# Patient Record
Sex: MALE | Race: WHITE | NOT HISPANIC OR LATINO | Employment: FULL TIME | URBAN - METROPOLITAN AREA
[De-identification: names, ages, dates, MRNs, and addresses within clinical notes are randomized per-mention and may not be internally consistent; named-entity substitution may affect disease eponyms.]

---

## 2019-05-13 ENCOUNTER — HOSPITAL ENCOUNTER (EMERGENCY)
Facility: OTHER | Age: 79
Discharge: HOME | End: 2019-05-13
Attending: EMERGENCY MEDICINE
Payer: MEDICARE

## 2019-05-13 VITALS
BODY MASS INDEX: 32.21 KG/M2 | TEMPERATURE: 98 F | HEIGHT: 70 IN | DIASTOLIC BLOOD PRESSURE: 79 MMHG | HEART RATE: 82 BPM | RESPIRATION RATE: 18 BRPM | SYSTOLIC BLOOD PRESSURE: 134 MMHG | WEIGHT: 225 LBS | OXYGEN SATURATION: 94 %

## 2019-05-13 DIAGNOSIS — M70.62 TROCHANTERIC BURSITIS OF LEFT HIP: Primary | ICD-10-CM

## 2019-05-13 PROCEDURE — 25000003 PHARM REV CODE 250: Performed by: EMERGENCY MEDICINE

## 2019-05-13 PROCEDURE — 63600175 PHARM REV CODE 636 W HCPCS: Performed by: EMERGENCY MEDICINE

## 2019-05-13 PROCEDURE — 99283 EMERGENCY DEPT VISIT LOW MDM: CPT

## 2019-05-13 RX ORDER — MAGNESIUM 30 MG
TABLET ORAL ONCE
Status: ON HOLD | COMMUNITY
End: 2020-01-30 | Stop reason: HOSPADM

## 2019-05-13 RX ORDER — SPIRONOLACTONE 25 MG/1
25 TABLET ORAL DAILY
Status: ON HOLD | COMMUNITY
End: 2020-01-30 | Stop reason: HOSPADM

## 2019-05-13 RX ORDER — LIDOCAINE HYDROCHLORIDE 10 MG/ML
1 INJECTION INFILTRATION; PERINEURAL ONCE
Status: DISCONTINUED | OUTPATIENT
Start: 2019-05-13 | End: 2019-05-13

## 2019-05-13 RX ORDER — METHYLPREDNISOLONE 4 MG/1
TABLET ORAL
Qty: 1 PACKAGE | Refills: 0 | Status: SHIPPED | OUTPATIENT
Start: 2019-05-13 | End: 2019-06-03

## 2019-05-13 RX ORDER — LIDOCAINE HYDROCHLORIDE 10 MG/ML
1 INJECTION INFILTRATION; PERINEURAL
Status: COMPLETED | OUTPATIENT
Start: 2019-05-13 | End: 2019-05-13

## 2019-05-13 RX ORDER — ENALAPRIL MALEATE 20 MG/1
1 TABLET ORAL
Status: ON HOLD | COMMUNITY
End: 2020-01-30 | Stop reason: HOSPADM

## 2019-05-13 RX ORDER — ALLOPURINOL 300 MG/1
1 TABLET ORAL
Status: ON HOLD | COMMUNITY
End: 2020-01-30 | Stop reason: SDUPTHER

## 2019-05-13 RX ORDER — LEVOTHYROXINE SODIUM 75 UG/1
75 TABLET ORAL DAILY
Status: ON HOLD | COMMUNITY
End: 2020-01-30 | Stop reason: SDUPTHER

## 2019-05-13 RX ORDER — BETAMETHASONE SODIUM PHOSPHATE AND BETAMETHASONE ACETATE 3; 3 MG/ML; MG/ML
6 INJECTION, SUSPENSION INTRA-ARTICULAR; INTRALESIONAL; INTRAMUSCULAR; SOFT TISSUE
Status: COMPLETED | OUTPATIENT
Start: 2019-05-13 | End: 2019-05-13

## 2019-05-13 RX ORDER — AMLODIPINE BESYLATE 5 MG/1
1 TABLET ORAL
Status: ON HOLD | COMMUNITY
End: 2020-01-30 | Stop reason: HOSPADM

## 2019-05-13 RX ORDER — HYDROCODONE BITARTRATE AND ACETAMINOPHEN 5; 325 MG/1; MG/1
1 TABLET ORAL
Status: COMPLETED | OUTPATIENT
Start: 2019-05-13 | End: 2019-05-13

## 2019-05-13 RX ORDER — WARFARIN SODIUM 5 MG/1
1 TABLET ORAL
Status: ON HOLD | COMMUNITY
End: 2020-01-30 | Stop reason: HOSPADM

## 2019-05-13 RX ORDER — TRAMADOL HYDROCHLORIDE 50 MG/1
1 TABLET ORAL
Status: ON HOLD | COMMUNITY
End: 2020-01-30 | Stop reason: HOSPADM

## 2019-05-13 RX ORDER — BETAMETHASONE SODIUM PHOSPHATE AND BETAMETHASONE ACETATE 3; 3 MG/ML; MG/ML
6 INJECTION, SUSPENSION INTRA-ARTICULAR; INTRALESIONAL; INTRAMUSCULAR; SOFT TISSUE ONCE
Status: DISCONTINUED | OUTPATIENT
Start: 2019-05-13 | End: 2019-05-13

## 2019-05-13 RX ADMIN — LIDOCAINE HYDROCHLORIDE 1 ML: 10 INJECTION, SOLUTION INFILTRATION; PERINEURAL at 11:05

## 2019-05-13 RX ADMIN — BETAMETHASONE SODIUM PHOSPHATE AND BETAMETHASONE ACETATE 6 MG: 3; 3 INJECTION, SUSPENSION INTRA-ARTICULAR; INTRALESIONAL; INTRAMUSCULAR; SOFT TISSUE at 11:05

## 2019-05-13 RX ADMIN — HYDROCODONE BITARTRATE AND ACETAMINOPHEN 1 TABLET: 5; 325 TABLET ORAL at 01:05

## 2019-05-13 NOTE — DISCHARGE INSTRUCTIONS
Monitor your blood sugar while taking anti-inflammatory medication.  Return for new or worsening symptoms.  Followup with Dr. Romero within a week.

## 2019-05-13 NOTE — ED NOTES
ROUNDING:  Lying on stretcher with HOB elevated. AAOx3. Calm and cooperative. States L hip pain 0/10 at rest and L hip pain 5/10 with movement. States pain prior to injection with movement was 10/10.  Denies any pain or discomfort at this time. Resp:18 even and unlabored. Skin is warm and dry. Comfort and BR needs addressed. Plan of care updated. Family member at BS. Bed locked in low position, side rails up x2 and call light within reach. Will continue to monitor.

## 2019-05-13 NOTE — ED NOTES
Pt to er with  C/o left hip pain x 3 days incresaing last few day . Pt states pain increases with walking . Pt aaox3 skin warm and dry. intermittent bruise noted on several area of pt body. Pt on coumadin . Pt son at bedside.

## 2019-05-13 NOTE — ED PROVIDER NOTES
Encounter Date: 5/13/2019    SCRIBE #1 NOTE: Vance DESAI am scribing for, and in the presence of, Deanna Toussaint.       History     Chief Complaint   Patient presents with    Leg Pain     Pt reports left thigh pain since thursday. He denies any trauma.      Time seen by provider: 10:38 AM    This is a 78 y.o. male with a history of HTN and DM who presents with complaint of left thigh pain that began approximately four days ago. The patient reports that the pain has worsened over the last few days. The patient reports that he uses a cane at home and a walker when he leaves the house at baseline. The patient's son reports that he has been having trouble getting out of the bed this morning and last night which isn't normal. The patient reports that he has been taking two ibuprofen at night with no relief of his symptoms.  He denies fever, sore throat, chest pain, shortness of breath, nausea, and dysuria. The patient reports that he see's his PCP Dr. Romero regularly to check his Coumadin levels.     The history is provided by the patient.     Review of patient's allergies indicates:  No Known Allergies  No past medical history on file.  No past surgical history on file.  No family history on file.  Social History     Tobacco Use    Smoking status: Not on file   Substance Use Topics    Alcohol use: Not on file    Drug use: Not on file     Review of Systems   Constitutional: Negative for fever.   HENT: Negative for sore throat.    Respiratory: Negative for shortness of breath.    Cardiovascular: Negative for chest pain.   Gastrointestinal: Negative for nausea.   Genitourinary: Negative for dysuria.   Musculoskeletal: Negative for back pain.        Positive for left hip pain.    Skin: Negative for rash.   Neurological: Negative for weakness.   Hematological: Does not bruise/bleed easily.       Physical Exam     Initial Vitals [05/13/19 1025]   BP Pulse Resp Temp SpO2   (!) 171/92 102 18 98.7 °F (37.1 °C) (!) 94 %       MAP       --         Physical Exam    Nursing note and vitals reviewed.  Constitutional: He appears well-developed and well-nourished. He is not diaphoretic. No distress.   HENT:   Head: Normocephalic and atraumatic.   Mouth/Throat: Oropharynx is clear and moist.   Eyes: Conjunctivae and EOM are normal. Pupils are equal, round, and reactive to light.   Neck: Normal range of motion.   Cardiovascular: Normal rate, regular rhythm and normal heart sounds. Exam reveals no gallop and no friction rub.    No murmur heard.  Pulmonary/Chest: Breath sounds normal. No respiratory distress. He has no wheezes. He has no rhonchi. He has no rales.   Abdominal: Soft. There is no tenderness. There is no rebound and no guarding.   Musculoskeletal: Normal range of motion. He exhibits tenderness. He exhibits no edema.   Tenderness along the lateral aspect of left hip to trochanteric bursa.   Neurological: He is alert and oriented to person, place, and time. GCS score is 15. GCS eye subscore is 4. GCS verbal subscore is 5. GCS motor subscore is 6.   Skin: Skin is warm and dry. No rash and no abscess noted. No erythema. No pallor.   Psychiatric: He has a normal mood and affect. His behavior is normal. Judgment and thought content normal.         ED Course   Procedures  Labs Reviewed - No data to display       Imaging Results    None          Medical Decision Making:   ED Management:  L hip prepped with chlorhexidene and betamethazone and lidocaine injected at point of maximal tenderness over L trochangeric bursa.  Patient tolerated the procedure well, neurovascularly intact prior and following procedure.     Patient improved with treatment in the emergency department and comfortable going home. Discussed reasons to return and importance of followup.  Patient understands that the emergency visit today is primarily to address immediate concerns and to rule out emergent cause of symptoms and that they may require further workup and  evaluation as an outpatient. All questions addressed and patient given discharge instructions and followup information.                Scribe Attestation:   Scribe #1: I performed the above scribed service and the documentation accurately describes the services I performed. I attest to the accuracy of the note.    Attending Attestation:           Physician Attestation for Scribe:  Physician Attestation Statement for Scribe #1: I, Dr. Huerta, reviewed documentation, as scribed by Vance Roach  in my presence, and it is both accurate and complete.                    Clinical Impression:     1. Trochanteric bursitis of left hip          Disposition:   Disposition: Discharged  Condition: Stable                        Dee Huerta MD  05/18/19 2279

## 2020-01-10 ENCOUNTER — HOSPITAL ENCOUNTER (INPATIENT)
Facility: OTHER | Age: 80
LOS: 20 days | Discharge: NURSING, REHAB OR LONG-TERM CARE | DRG: 064 | End: 2020-01-30
Attending: EMERGENCY MEDICINE | Admitting: HOSPITALIST
Payer: MEDICARE

## 2020-01-10 DIAGNOSIS — I63.9 CEREBROVASCULAR ACCIDENT (CVA), UNSPECIFIED MECHANISM: ICD-10-CM

## 2020-01-10 DIAGNOSIS — G93.6 CYTOTOXIC CEREBRAL EDEMA: ICD-10-CM

## 2020-01-10 DIAGNOSIS — I63.412 EMBOLIC STROKE INVOLVING LEFT MIDDLE CEREBRAL ARTERY: Primary | ICD-10-CM

## 2020-01-10 DIAGNOSIS — R13.11 ORAL PHASE DYSPHAGIA: ICD-10-CM

## 2020-01-10 DIAGNOSIS — I63.512 ACUTE ISCHEMIC LEFT MCA STROKE: ICD-10-CM

## 2020-01-10 DIAGNOSIS — I63.9 CVA (CEREBRAL VASCULAR ACCIDENT): ICD-10-CM

## 2020-01-10 DIAGNOSIS — Z79.01 CHRONIC ANTICOAGULATION: ICD-10-CM

## 2020-01-10 DIAGNOSIS — Z46.59 ENCOUNTER FOR NASOGASTRIC (NG) TUBE PLACEMENT: ICD-10-CM

## 2020-01-10 DIAGNOSIS — I10 ESSENTIAL HYPERTENSION: Chronic | ICD-10-CM

## 2020-01-10 DIAGNOSIS — I48.0 PAROXYSMAL ATRIAL FIBRILLATION: ICD-10-CM

## 2020-01-10 DIAGNOSIS — E87.1 HYPONATREMIA: ICD-10-CM

## 2020-01-10 DIAGNOSIS — R41.0 CONFUSION: ICD-10-CM

## 2020-01-10 DIAGNOSIS — I63.512 ACUTE ISCHEMIC LEFT MIDDLE CEREBRAL ARTERY (MCA) STROKE: ICD-10-CM

## 2020-01-10 DIAGNOSIS — R47.01 APHASIA: ICD-10-CM

## 2020-01-10 DIAGNOSIS — G81.11 RIGHT SPASTIC HEMIPARESIS: ICD-10-CM

## 2020-01-10 DIAGNOSIS — I47.29 VENTRICULAR TACHYCARDIA, NON-SUSTAINED: ICD-10-CM

## 2020-01-10 DIAGNOSIS — I63.9 STROKE: ICD-10-CM

## 2020-01-10 PROBLEM — E11.9 DMII (DIABETES MELLITUS, TYPE 2): Status: ACTIVE | Noted: 2020-01-10

## 2020-01-10 PROBLEM — I48.91 ATRIAL FIBRILLATION: Status: ACTIVE | Noted: 2020-01-10

## 2020-01-10 LAB
ALBUMIN SERPL BCP-MCNC: 3.8 G/DL (ref 3.5–5.2)
ALP SERPL-CCNC: 107 U/L (ref 55–135)
ALT SERPL W/O P-5'-P-CCNC: 18 U/L (ref 10–44)
ANION GAP SERPL CALC-SCNC: 6 MMOL/L (ref 8–16)
AST SERPL-CCNC: 27 U/L (ref 10–40)
BASOPHILS # BLD AUTO: 0.07 K/UL (ref 0–0.2)
BASOPHILS NFR BLD: 1.2 % (ref 0–1.9)
BILIRUB SERPL-MCNC: 1.3 MG/DL (ref 0.1–1)
BILIRUB UR QL STRIP: NEGATIVE
BUN SERPL-MCNC: 16 MG/DL (ref 8–23)
CALCIUM SERPL-MCNC: 10 MG/DL (ref 8.7–10.5)
CHLORIDE SERPL-SCNC: 106 MMOL/L (ref 95–110)
CLARITY UR: CLEAR
CO2 SERPL-SCNC: 27 MMOL/L (ref 23–29)
COLOR UR: YELLOW
CREAT SERPL-MCNC: 1 MG/DL (ref 0.5–1.4)
DIFFERENTIAL METHOD: ABNORMAL
EOSINOPHIL # BLD AUTO: 0.6 K/UL (ref 0–0.5)
EOSINOPHIL NFR BLD: 10 % (ref 0–8)
ERYTHROCYTE [DISTWIDTH] IN BLOOD BY AUTOMATED COUNT: 14.9 % (ref 11.5–14.5)
EST. GFR  (AFRICAN AMERICAN): >60 ML/MIN/1.73 M^2
EST. GFR  (NON AFRICAN AMERICAN): >60 ML/MIN/1.73 M^2
GLUCOSE SERPL-MCNC: 111 MG/DL (ref 70–110)
GLUCOSE UR QL STRIP: NEGATIVE
HCT VFR BLD AUTO: 39.7 % (ref 40–54)
HGB BLD-MCNC: 12.7 G/DL (ref 14–18)
HGB UR QL STRIP: NEGATIVE
IMM GRANULOCYTES # BLD AUTO: 0.02 K/UL (ref 0–0.04)
IMM GRANULOCYTES NFR BLD AUTO: 0.4 % (ref 0–0.5)
INR PPP: 1.2 (ref 0.8–1.2)
KETONES UR QL STRIP: NEGATIVE
LEUKOCYTE ESTERASE UR QL STRIP: NEGATIVE
LYMPHOCYTES # BLD AUTO: 1.2 K/UL (ref 1–4.8)
LYMPHOCYTES NFR BLD: 21.3 % (ref 18–48)
MAGNESIUM SERPL-MCNC: 1.5 MG/DL (ref 1.6–2.6)
MAGNESIUM SERPL-MCNC: 1.6 MG/DL (ref 1.6–2.6)
MCH RBC QN AUTO: 32.9 PG (ref 27–31)
MCHC RBC AUTO-ENTMCNC: 32 G/DL (ref 32–36)
MCV RBC AUTO: 103 FL (ref 82–98)
MONOCYTES # BLD AUTO: 0.6 K/UL (ref 0.3–1)
MONOCYTES NFR BLD: 10.2 % (ref 4–15)
NEUTROPHILS # BLD AUTO: 3.2 K/UL (ref 1.8–7.7)
NEUTROPHILS NFR BLD: 56.9 % (ref 38–73)
NITRITE UR QL STRIP: NEGATIVE
NRBC BLD-RTO: 0 /100 WBC
PH UR STRIP: 7 [PH] (ref 5–8)
PHOSPHATE SERPL-MCNC: 2.7 MG/DL (ref 2.7–4.5)
PLATELET # BLD AUTO: 109 K/UL (ref 150–350)
PMV BLD AUTO: 11.2 FL (ref 9.2–12.9)
POCT GLUCOSE: 105 MG/DL (ref 70–110)
POCT GLUCOSE: 106 MG/DL (ref 70–110)
POTASSIUM SERPL-SCNC: 4.1 MMOL/L (ref 3.5–5.1)
PROT SERPL-MCNC: 6.8 G/DL (ref 6–8.4)
PROT UR QL STRIP: NEGATIVE
PROTHROMBIN TIME: 13.9 SEC (ref 9–12.5)
RBC # BLD AUTO: 3.86 M/UL (ref 4.6–6.2)
SODIUM SERPL-SCNC: 139 MMOL/L (ref 136–145)
SP GR UR STRIP: 1.02 (ref 1–1.03)
T4 FREE SERPL-MCNC: 0.97 NG/DL (ref 0.71–1.51)
TSH SERPL DL<=0.005 MIU/L-ACNC: 4.28 UIU/ML (ref 0.4–4)
URN SPEC COLLECT METH UR: NORMAL
UROBILINOGEN UR STRIP-ACNC: NEGATIVE EU/DL
WBC # BLD AUTO: 5.68 K/UL (ref 3.9–12.7)

## 2020-01-10 PROCEDURE — 93005 ELECTROCARDIOGRAM TRACING: CPT

## 2020-01-10 PROCEDURE — 84439 ASSAY OF FREE THYROXINE: CPT

## 2020-01-10 PROCEDURE — 36415 COLL VENOUS BLD VENIPUNCTURE: CPT

## 2020-01-10 PROCEDURE — 85610 PROTHROMBIN TIME: CPT

## 2020-01-10 PROCEDURE — 94761 N-INVAS EAR/PLS OXIMETRY MLT: CPT

## 2020-01-10 PROCEDURE — 93010 ELECTROCARDIOGRAM REPORT: CPT | Mod: ,,, | Performed by: INTERNAL MEDICINE

## 2020-01-10 PROCEDURE — 11000001 HC ACUTE MED/SURG PRIVATE ROOM

## 2020-01-10 PROCEDURE — 83735 ASSAY OF MAGNESIUM: CPT | Mod: 91

## 2020-01-10 PROCEDURE — 84100 ASSAY OF PHOSPHORUS: CPT

## 2020-01-10 PROCEDURE — 85025 COMPLETE CBC W/AUTO DIFF WBC: CPT

## 2020-01-10 PROCEDURE — 99285 EMERGENCY DEPT VISIT HI MDM: CPT | Mod: 25

## 2020-01-10 PROCEDURE — 82962 GLUCOSE BLOOD TEST: CPT

## 2020-01-10 PROCEDURE — 84443 ASSAY THYROID STIM HORMONE: CPT

## 2020-01-10 PROCEDURE — 83735 ASSAY OF MAGNESIUM: CPT

## 2020-01-10 PROCEDURE — 99223 PR INITIAL HOSPITAL CARE,LEVL III: ICD-10-PCS | Mod: ,,, | Performed by: PHYSICIAN ASSISTANT

## 2020-01-10 PROCEDURE — 80053 COMPREHEN METABOLIC PANEL: CPT

## 2020-01-10 PROCEDURE — 81003 URINALYSIS AUTO W/O SCOPE: CPT

## 2020-01-10 PROCEDURE — 99223 1ST HOSP IP/OBS HIGH 75: CPT | Mod: ,,, | Performed by: PHYSICIAN ASSISTANT

## 2020-01-10 PROCEDURE — 93010 EKG 12-LEAD: ICD-10-PCS | Mod: ,,, | Performed by: INTERNAL MEDICINE

## 2020-01-10 RX ORDER — WARFARIN SODIUM 5 MG/1
5 TABLET ORAL DAILY
Status: DISCONTINUED | OUTPATIENT
Start: 2020-01-11 | End: 2020-01-12

## 2020-01-10 RX ORDER — IBUPROFEN 200 MG
16 TABLET ORAL
Status: DISCONTINUED | OUTPATIENT
Start: 2020-01-10 | End: 2020-01-14

## 2020-01-10 RX ORDER — SODIUM CHLORIDE 0.9 % (FLUSH) 0.9 %
10 SYRINGE (ML) INJECTION
Status: DISCONTINUED | OUTPATIENT
Start: 2020-01-10 | End: 2020-01-17

## 2020-01-10 RX ORDER — INSULIN ASPART 100 [IU]/ML
0-5 INJECTION, SOLUTION INTRAVENOUS; SUBCUTANEOUS
Status: DISCONTINUED | OUTPATIENT
Start: 2020-01-10 | End: 2020-01-15

## 2020-01-10 RX ORDER — METOPROLOL SUCCINATE 25 MG/1
25 TABLET, EXTENDED RELEASE ORAL DAILY
Status: DISCONTINUED | OUTPATIENT
Start: 2020-01-11 | End: 2020-01-14

## 2020-01-10 RX ORDER — GADOBUTROL 604.72 MG/ML
10 INJECTION INTRAVENOUS
Status: COMPLETED | OUTPATIENT
Start: 2020-01-11 | End: 2020-01-10

## 2020-01-10 RX ORDER — LEVOTHYROXINE SODIUM 75 UG/1
75 TABLET ORAL
Status: DISCONTINUED | OUTPATIENT
Start: 2020-01-11 | End: 2020-01-14

## 2020-01-10 RX ORDER — SODIUM CHLORIDE 0.9 % (FLUSH) 0.9 %
10 SYRINGE (ML) INJECTION
Status: DISCONTINUED | OUTPATIENT
Start: 2020-01-10 | End: 2020-01-11

## 2020-01-10 RX ORDER — ACETAMINOPHEN 325 MG/1
650 TABLET ORAL EVERY 8 HOURS PRN
Status: DISCONTINUED | OUTPATIENT
Start: 2020-01-10 | End: 2020-01-14

## 2020-01-10 RX ORDER — GLUCAGON 1 MG
1 KIT INJECTION
Status: DISCONTINUED | OUTPATIENT
Start: 2020-01-10 | End: 2020-01-14

## 2020-01-10 RX ORDER — IBUPROFEN 200 MG
24 TABLET ORAL
Status: DISCONTINUED | OUTPATIENT
Start: 2020-01-10 | End: 2020-01-14

## 2020-01-10 RX ORDER — ALLOPURINOL 300 MG/1
300 TABLET ORAL DAILY
Status: DISCONTINUED | OUTPATIENT
Start: 2020-01-11 | End: 2020-01-14

## 2020-01-10 RX ORDER — ONDANSETRON 2 MG/ML
4 INJECTION INTRAMUSCULAR; INTRAVENOUS EVERY 8 HOURS PRN
Status: DISCONTINUED | OUTPATIENT
Start: 2020-01-10 | End: 2020-01-14

## 2020-01-10 RX ADMIN — GADOBUTROL 10 ML: 604.72 INJECTION INTRAVENOUS at 11:01

## 2020-01-10 NOTE — ED NOTES
Pt tolerated cath sample. Pt still unable to form appropriate responses to questions and seems to rambling without expressing full ideas. Monitoring continues.

## 2020-01-10 NOTE — ED TRIAGE NOTES
"Pt presents via EMS with last seen normal yesterday morning at breakfast by nephew. Nephew reports to EMS that throughout day and this morning pt was much more confused. Pt orientated to person. Pt responds inappropriately to questions. When asked, "Who do you live with" he states, "I feel fine". Pt denies any current pain. No obvious other neuro deficits noted but pt unable to perform neuro exam.   "

## 2020-01-10 NOTE — ED NOTES
Zeynep GUERRERO at bedside. Pt remains free from changes. Still with word salad and unable to fully communicate.

## 2020-01-10 NOTE — ED PROVIDER NOTES
Encounter Date: 1/10/2020    SCRIBE #1 NOTE: I, Murphy Leavitt, am scribing for, and in the presence of, Dr. Negron.       History     Chief Complaint   Patient presents with    Altered Mental Status     pt last seen normal 30 hrs ago. orientated to person and time. no neuro deficits      Time seen by provider: 3:06 PM    This is a 79 y.o. male arriving via EMS who presents with complaint of AMS for over 30 hours per EMS. EMS reports the patient's home health nurse noticed he was not acting how he normally does at baseline and not answering questions appropriately PTA. The patient's nephew told EMS that the patient was normal at breakfast time yesterday. EMS reports the nephew stated after breakfast the patient stated he did not feel well and he slept for a little while. The nephew reported to EMS that after waking up the patient was confused, did not know who he was, did not know where he was, and became upset. The nephew reports to EMS he waited to send the patient to the ED after the home health nurse came check on him today. EMS reports the patient had no fever on arrival. The patient is not answering questions appropriately but keeps stating he feels okay.    The history is provided by the patient and the EMS personnel. The history is limited by the condition of the patient.     Review of patient's allergies indicates:  No Known Allergies  Past Medical History:   Diagnosis Date    Arthritis     Hypertension     Thyroid disease      Past Surgical History:   Procedure Laterality Date    HERNIA REPAIR       History reviewed. No pertinent family history.  Social History     Tobacco Use    Smoking status: Never Smoker   Substance Use Topics    Alcohol use: Never     Frequency: Never    Drug use: Never     Review of Systems   Constitutional: Negative for fever.   HENT: Negative for sore throat.    Respiratory: Negative for shortness of breath.    Cardiovascular: Negative for chest pain.    Gastrointestinal: Negative for nausea.   Genitourinary: Negative for dysuria.   Musculoskeletal: Negative for back pain.   Skin: Negative for rash.   Neurological: Negative for weakness.   Hematological: Does not bruise/bleed easily.   Psychiatric/Behavioral: Positive for confusion.   All other systems reviewed and are negative.      Physical Exam     Initial Vitals [01/10/20 1447]   BP Pulse Resp Temp SpO2   139/80 83 20 98.1 °F (36.7 °C) 98 %      MAP       --         Physical Exam    Nursing note and vitals reviewed.  Constitutional: He appears well-developed.   HENT:   Head: Normocephalic and atraumatic.   Eyes: Conjunctivae and EOM are normal.   Neck: Normal range of motion. Neck supple.   Cardiovascular: Normal rate.   Pulmonary/Chest: Effort normal and breath sounds normal.   Abdominal: Soft. Normal appearance and bowel sounds are normal.   Musculoskeletal: Normal range of motion.   Neurological: He is alert. No cranial nerve deficit or sensory deficit. GCS eye subscore is 4. GCS verbal subscore is 4. GCS motor subscore is 5.   Gait not assessed   Skin: Skin is warm and dry.   Psychiatric:   Confused. Disoriented. Clear speech.         ED Course   Procedures  Labs Reviewed   CBC W/ AUTO DIFFERENTIAL - Abnormal; Notable for the following components:       Result Value    RBC 3.86 (*)     Hemoglobin 12.7 (*)     Hematocrit 39.7 (*)     Mean Corpuscular Volume 103 (*)     Mean Corpuscular Hemoglobin 32.9 (*)     RDW 14.9 (*)     Platelets 109 (*)     Eos # 0.6 (*)     Eosinophil% 10.0 (*)     All other components within normal limits   COMPREHENSIVE METABOLIC PANEL - Abnormal; Notable for the following components:    Glucose 111 (*)     Total Bilirubin 1.3 (*)     Anion Gap 6 (*)     All other components within normal limits   URINALYSIS, REFLEX TO URINE CULTURE    Narrative:     Preferred Collection Type->Urine, Clean Catch   MAGNESIUM   POCT GLUCOSE MONITORING CONTINUOUS     EKG Readings: (Independently  "Interpreted)   Junctional rhythm at a rate of 68 bpm. Artifact present. No STEMI.       Imaging Results          CT Head Without Contrast (Final result)  Result time 01/10/20 16:51:22    Final result by Yasir Espinoza MD (01/10/20 16:51:22)                 Impression:      Evolving left parietal lobe infarct.      Electronically signed by: Yasir Espinoza MD  Date:    01/10/2020  Time:    16:51             Narrative:    EXAMINATION:  CT HEAD WITHOUT CONTRAST    CLINICAL HISTORY:  Stroke;    TECHNIQUE:  Low dose axial CT images obtained throughout the head without intravenous contrast. Sagittal and coronal reconstructions were performed.    COMPARISON:  None.    FINDINGS:  Intracranial compartment:    Ventricles and sulci are normal in size for age without evidence of hydrocephalus. No extra-axial blood or fluid collections.    There is a region of hypoattenuation with loss of gray-white differentiation and sulcal effacement in the left parietal lobe consistent with evolving infarction. Probable chronic lacunar infarcts in the right caudate head and bilateral basal ganglia.  No parenchymal mass or hemorrhage.  Periventricular white matter hypoattenuation in keeping with chronic microvascular disease.    Skull/extracranial contents (limited evaluation): No fracture. Mastoid air cells and paranasal sinuses are essentially clear.                                 Medical Decision Making:   History:   Old Medical Records: I decided to obtain old medical records.  Initial Assessment:   78 yo M brought in by EMS for reported abnormal behavior, last at baseline yesterday per EMS as reported by nephew caretaker. Here pt is verbal though not answering questions appropriately, repeatedly answering "feeling alright". Broad ddx includes uti, cva, metabolic disturbance, ICH. Will obtain imaging, labs reassess.     Independently Interpreted Test(s):   I have ordered and independently interpreted EKG Reading(s) - see prior " notes  Clinical Tests:   Lab Tests: Ordered and Reviewed  Radiological Study: Ordered and Reviewed  Medical Tests: Ordered and Reviewed  ED Management:  Imaging notable for L parietal lobe infarct consistent with abnormal expressive difficulties. Will plan to admit for further work up, MRI etc.             Scribe Attestation:   Scribe #1: I performed the above scribed service and the documentation accurately describes the services I performed. I attest to the accuracy of the note.    Attending Attestation:           Physician Attestation for Scribe:  Physician Attestation Statement for Scribe #1: I, Dr. Negron, reviewed documentation, as scribed by Murphy Leavitt in my presence, and it is both accurate and complete.                 ED Course as of Shane 10 1846   Fri Shane 10, 2020   1709 D/w Dr Templeton, admit to Dr Woods for acute cva continued work up.     [DM]      ED Course User Index  [DM] Olive Negron MD                Clinical Impression:     1. Cerebrovascular accident (CVA), unspecified mechanism    2. Confusion            Disposition:   Disposition: Admitted  Condition: Fair                     Olive Negron MD  01/10/20 9490

## 2020-01-11 PROBLEM — I63.512 ACUTE ISCHEMIC LEFT MCA STROKE: Status: ACTIVE | Noted: 2020-01-10

## 2020-01-11 PROBLEM — E11.59 TYPE 2 DIABETES MELLITUS WITH CIRCULATORY DISORDER, WITHOUT LONG-TERM CURRENT USE OF INSULIN: Chronic | Status: ACTIVE | Noted: 2020-01-10

## 2020-01-11 PROBLEM — I10 ESSENTIAL HYPERTENSION: Chronic | Status: ACTIVE | Noted: 2020-01-10

## 2020-01-11 LAB
ANION GAP SERPL CALC-SCNC: 5 MMOL/L (ref 8–16)
AORTIC ROOT ANNULUS: 2.33 CM
AORTIC VALVE CUSP SEPERATION: 1.53 CM
ASCENDING AORTA: 2.65 CM
AV INDEX (PROSTH): 0.67
AV MEAN GRADIENT: 4 MMHG
AV PEAK GRADIENT: 7 MMHG
AV VALVE AREA: 2.09 CM2
AV VELOCITY RATIO: 0.66
BASOPHILS # BLD AUTO: 0.06 K/UL (ref 0–0.2)
BASOPHILS NFR BLD: 1 % (ref 0–1.9)
BSA FOR ECHO PROCEDURE: 2.2 M2
BUN SERPL-MCNC: 15 MG/DL (ref 8–23)
CALCIUM SERPL-MCNC: 9.5 MG/DL (ref 8.7–10.5)
CHLORIDE SERPL-SCNC: 107 MMOL/L (ref 95–110)
CHOLEST SERPL-MCNC: 150 MG/DL (ref 120–199)
CHOLEST/HDLC SERPL: 2.5 {RATIO} (ref 2–5)
CO2 SERPL-SCNC: 25 MMOL/L (ref 23–29)
CREAT SERPL-MCNC: 0.9 MG/DL (ref 0.5–1.4)
CV ECHO LV RWT: 0.38 CM
DIFFERENTIAL METHOD: ABNORMAL
DOP CALC AO PEAK VEL: 1.34 M/S
DOP CALC AO VTI: 28.69 CM
DOP CALC LVOT AREA: 3.1 CM2
DOP CALC LVOT DIAMETER: 1.99 CM
DOP CALC LVOT PEAK VEL: 0.88 M/S
DOP CALC LVOT STROKE VOLUME: 59.84 CM3
DOP CALCLVOT PEAK VEL VTI: 19.25 CM
ECHO LV POSTERIOR WALL: 0.89 CM (ref 0.6–1.1)
EOSINOPHIL # BLD AUTO: 0.4 K/UL (ref 0–0.5)
EOSINOPHIL NFR BLD: 5.6 % (ref 0–8)
ERYTHROCYTE [DISTWIDTH] IN BLOOD BY AUTOMATED COUNT: 15.1 % (ref 11.5–14.5)
EST. GFR  (AFRICAN AMERICAN): >60 ML/MIN/1.73 M^2
EST. GFR  (NON AFRICAN AMERICAN): >60 ML/MIN/1.73 M^2
ESTIMATED AVG GLUCOSE: 108 MG/DL (ref 68–131)
FRACTIONAL SHORTENING: 31 % (ref 28–44)
GLUCOSE SERPL-MCNC: 104 MG/DL (ref 70–110)
HBA1C MFR BLD HPLC: 5.4 % (ref 4–5.6)
HCT VFR BLD AUTO: 39.1 % (ref 40–54)
HDLC SERPL-MCNC: 61 MG/DL (ref 40–75)
HDLC SERPL: 40.7 % (ref 20–50)
HGB BLD-MCNC: 12.8 G/DL (ref 14–18)
IMM GRANULOCYTES # BLD AUTO: 0.02 K/UL (ref 0–0.04)
IMM GRANULOCYTES NFR BLD AUTO: 0.3 % (ref 0–0.5)
INR PPP: 1.3 (ref 0.8–1.2)
INTERVENTRICULAR SEPTUM: 1.1 CM (ref 0.6–1.1)
IVRT: 0.05 MSEC
LA MAJOR: 5.74 CM
LA MINOR: 5.53 CM
LA WIDTH: 5.23 CM
LDLC SERPL CALC-MCNC: 79.8 MG/DL (ref 63–159)
LEFT ATRIUM SIZE: 4.74 CM
LEFT ATRIUM VOLUME INDEX: 55 ML/M2
LEFT ATRIUM VOLUME: 118.7 CM3
LEFT INTERNAL DIMENSION IN SYSTOLE: 3.25 CM (ref 2.1–4)
LEFT VENTRICLE DIASTOLIC VOLUME INDEX: 47.81 ML/M2
LEFT VENTRICLE DIASTOLIC VOLUME: 103.12 ML
LEFT VENTRICLE MASS INDEX: 76 G/M2
LEFT VENTRICLE SYSTOLIC VOLUME INDEX: 19.7 ML/M2
LEFT VENTRICLE SYSTOLIC VOLUME: 42.49 ML
LEFT VENTRICULAR INTERNAL DIMENSION IN DIASTOLE: 4.71 CM (ref 3.5–6)
LEFT VENTRICULAR MASS: 163.9 G
LYMPHOCYTES # BLD AUTO: 1.1 K/UL (ref 1–4.8)
LYMPHOCYTES NFR BLD: 17.1 % (ref 18–48)
MCH RBC QN AUTO: 33.2 PG (ref 27–31)
MCHC RBC AUTO-ENTMCNC: 32.7 G/DL (ref 32–36)
MCV RBC AUTO: 101 FL (ref 82–98)
MONOCYTES # BLD AUTO: 0.6 K/UL (ref 0.3–1)
MONOCYTES NFR BLD: 9.6 % (ref 4–15)
MV STENOSIS PRESSURE HALF TIME: 65 MS
MV VALVE AREA P 1/2 METHOD: 3.38 CM2
NEUTROPHILS # BLD AUTO: 4.2 K/UL (ref 1.8–7.7)
NEUTROPHILS NFR BLD: 66.4 % (ref 38–73)
NONHDLC SERPL-MCNC: 89 MG/DL
NRBC BLD-RTO: 0 /100 WBC
PISA TR MAX VEL: 2.84 M/S
PLATELET # BLD AUTO: 107 K/UL (ref 150–350)
PMV BLD AUTO: 10.9 FL (ref 9.2–12.9)
POCT GLUCOSE: 102 MG/DL (ref 70–110)
POCT GLUCOSE: 105 MG/DL (ref 70–110)
POTASSIUM SERPL-SCNC: 4.1 MMOL/L (ref 3.5–5.1)
PROTHROMBIN TIME: 14.1 SEC (ref 9–12.5)
PV PEAK VELOCITY: 1.34 CM/S
RA MAJOR: 6.53 CM
RA PRESSURE: 8 MMHG
RA WIDTH: 5.22 CM
RBC # BLD AUTO: 3.86 M/UL (ref 4.6–6.2)
RIGHT VENTRICULAR END-DIASTOLIC DIMENSION: 4 CM
RV TISSUE DOPPLER FREE WALL SYSTOLIC VELOCITY 1 (APICAL 4 CHAMBER VIEW): 14.26 CM/S
SINUS: 2.98 CM
SODIUM SERPL-SCNC: 137 MMOL/L (ref 136–145)
STJ: 2.62 CM
TDI LATERAL: 0.11 M/S
TDI SEPTAL: 0.08 M/S
TDI: 0.1 M/S
TR MAX PG: 32 MMHG
TRICUSPID ANNULAR PLANE SYSTOLIC EXCURSION: 1.85 CM
TRIGL SERPL-MCNC: 46 MG/DL (ref 30–150)
TV REST PULMONARY ARTERY PRESSURE: 40 MMHG
WBC # BLD AUTO: 6.27 K/UL (ref 3.9–12.7)

## 2020-01-11 PROCEDURE — A9585 GADOBUTROL INJECTION: HCPCS | Performed by: HOSPITALIST

## 2020-01-11 PROCEDURE — 99233 PR SUBSEQUENT HOSPITAL CARE,LEVL III: ICD-10-PCS | Mod: ,,, | Performed by: INTERNAL MEDICINE

## 2020-01-11 PROCEDURE — 36415 COLL VENOUS BLD VENIPUNCTURE: CPT

## 2020-01-11 PROCEDURE — 92610 EVALUATE SWALLOWING FUNCTION: CPT

## 2020-01-11 PROCEDURE — 80061 LIPID PANEL: CPT

## 2020-01-11 PROCEDURE — 97161 PT EVAL LOW COMPLEX 20 MIN: CPT

## 2020-01-11 PROCEDURE — 94761 N-INVAS EAR/PLS OXIMETRY MLT: CPT

## 2020-01-11 PROCEDURE — 80048 BASIC METABOLIC PNL TOTAL CA: CPT

## 2020-01-11 PROCEDURE — 99233 SBSQ HOSP IP/OBS HIGH 50: CPT | Mod: ,,, | Performed by: INTERNAL MEDICINE

## 2020-01-11 PROCEDURE — 97535 SELF CARE MNGMENT TRAINING: CPT

## 2020-01-11 PROCEDURE — 11000001 HC ACUTE MED/SURG PRIVATE ROOM

## 2020-01-11 PROCEDURE — 92523 SPEECH SOUND LANG COMPREHEN: CPT

## 2020-01-11 PROCEDURE — 85610 PROTHROMBIN TIME: CPT

## 2020-01-11 PROCEDURE — 25500020 PHARM REV CODE 255: Performed by: HOSPITALIST

## 2020-01-11 PROCEDURE — 25000003 PHARM REV CODE 250: Performed by: INTERNAL MEDICINE

## 2020-01-11 PROCEDURE — 97166 OT EVAL MOD COMPLEX 45 MIN: CPT

## 2020-01-11 PROCEDURE — 85025 COMPLETE CBC W/AUTO DIFF WBC: CPT

## 2020-01-11 PROCEDURE — 25000003 PHARM REV CODE 250: Performed by: PHYSICIAN ASSISTANT

## 2020-01-11 PROCEDURE — 83036 HEMOGLOBIN GLYCOSYLATED A1C: CPT

## 2020-01-11 PROCEDURE — 97116 GAIT TRAINING THERAPY: CPT

## 2020-01-11 RX ORDER — ATORVASTATIN CALCIUM 10 MG/1
10 TABLET, FILM COATED ORAL NIGHTLY
Status: DISCONTINUED | OUTPATIENT
Start: 2020-01-11 | End: 2020-01-14

## 2020-01-11 RX ADMIN — ATORVASTATIN CALCIUM 10 MG: 10 TABLET, FILM COATED ORAL at 08:01

## 2020-01-11 RX ADMIN — ALLOPURINOL 300 MG: 300 TABLET ORAL at 08:01

## 2020-01-11 RX ADMIN — METOPROLOL SUCCINATE 25 MG: 25 TABLET, EXTENDED RELEASE ORAL at 08:01

## 2020-01-11 RX ADMIN — WARFARIN SODIUM 5 MG: 5 TABLET ORAL at 05:01

## 2020-01-11 NOTE — PT/OT/SLP EVAL
"Occupational Therapy   Evaluation and Treatment    Name: Chirag Thompson  MRN: [id number]  Admitting Diagnosis:  Cerebrovascular accident (CVA)      Recommendations:     Discharge Recommendations: rehabilitation facility  Discharge Equipment Recommendations:  other (see comments)(defer to next level of care)  Barriers to discharge:  Other (Comment)(current functional level)    Assessment:   Initial OT eval/treat complete. Sit>stand X 2 from bedside chair with CGA and RW.  Ambulating short household distance bedside chair>bathroom>sink with RW and CGA for steadying/safety though discarding RW once coming from sink and increased verbal cues for continued RW with decreased follow through and discarding RW again requiring HHA with return to chair.  Step transfer to toilet using RW with attempt to lift RW from floor once in bathroom requiring physical return of RW onto floor, able lower onto toilet using grab bar for RUE support, and MIN A needed for lift from toilet using grab bar.  Able to manage hospital gown in stance and cleaning back jocelyne hygiene seated with L-lateral/forward lean while using RUE for toilet hygiene - no LOB during task; assist needed in stance for thoroughness.  Unable to understand command for taking off socks though once off; given single step command for donning sock with good follow through.  Hand hygiene attempted after task with Pt. Turning on water, receiving soap, and able to dry hands with CGA sink for balance and handing soap and towel.  Cognitive/Psychosocial Skills:  Pt. With severe expressive aphasia.  Able to state "I'm sorry."  "Thank you." Though often stating incomprehensible phrases though attempting to communicate.  Pt. Able to follow 60% of commands; does better with short simple commands.  Intense therapy needed to improve dynamic/static standing/sitting balance through balance training, strength through therapeutic exercise, and ADL safety/independence through safety " education/training for daily tasks.  Recommend post acute therapy in IRF.  To benefit from continued acute care OT services to increase independence in self-care/functional transfers.  OT to follow.     Chirag Thompson is a 79 y.o. male with a medical diagnosis of Cerebrovascular accident (CVA).  He presents with below deficits decreasing independence in self-care/functional transfers. Performance deficits affecting function: weakness, impaired self care skills, impaired functional mobilty, gait instability, impaired balance, decreased safety awareness.      Rehab Prognosis: Good; patient would benefit from acute skilled OT services to address these deficits and reach maximum level of function.       Plan:     Patient to be seen 6 x/week to address the above listed problems via self-care/home management, therapeutic activities, therapeutic exercises  · Plan of Care Expires: 01/25/20  · Plan of Care Reviewed with: patient    Subjective     Chief Complaint: Pt. With impaired communication due to severe expressive aphasia.  No signs of pain noted.  Patient/Family Comments/goals:  Pt. With impaired communication due to severe expressive aphasia.    Occupational Profile:  PT reports communicating with nephew of patient with the follow info obtained:  Lives with nephew in [facility] with 4 SARA and R-handrail; tub/shower combo with TTB.  MOD I with ADL and ambulation using SPC. Pt. Performing IADL tasks though nephew would drive; Pt. Also would make out grocery list for nephew to go grocery shopping.   Equipment Used at Home:  cane, straight  Assistance upon Discharge: Nephew able to assist.     Pain/Comfort:  · Pain Rating 1: other (see comments)(impaired communication though no facial grimace, guarding, rigidity, moaning, or furrowed brows observed throughout tasks)  · Pain Rating Post-Intervention 1: other (see comments)(impaired communication though no facial grimace, guarding, rigidity, moaning, or furrowed brows observed  "throughout tasks)    Patients cultural, spiritual, Hoahaoism conflicts given the current situation: other (see comments)(Unable to determine due to impaired communction - severe expressive aphasia)    Objective:     Communicated with: Nursing prior to session.  Patient found up in chair with peripheral IV, telemetry(AVASYS/telesitter) upon OT entry to room.    General Precautions: Standard, aspiration, fall, aphasia   Orthopedic Precautions:N/A   Braces: N/A     Occupational Performance:    Functional Mobility/Transfers:  Sit>stand X 2 from bedside chair with CGA and RW.  Ambulating short household distance bedside chair>bathroom>sink with RW and CGA for steadying/safety though discarding RW once coming from sink and increased verbal cues for continued RW with decreased follow through and discarding RW again requiring HHA with return to chair.  Step transfer to toilet using RW with attempt to lift RW from floor once in bathroom requiring physical return of RW onto floor, able lower onto toilet using grab bar for RUE support, and MIN A needed for lift from toilet using grab bar.      Activities of Daily Living:  Able to manage hospital gown in stance and cleaning back jocelyne hygiene seated with L-lateral/forward lean while using RUE for toilet hygiene - no LOB during task; assist needed in stance for thoroughness.  Unable to understand command for taking off socks though once off; given single step command for donning sock with good follow through.  Hand hygiene attempted after task with Pt. Turning on water, receiving soap, and able to dry hands with CGA sink for balance and handing soap and towel.    Cognitive/Visual Perceptual:  Cognitive/Psychosocial Skills:  Pt. With severe expressive aphasia.  Able to state "I'm sorry."  "Thank you." Though often stating incomprehensible phrases though attempting to communicate.  Pt. Able to follow 60% of commands; does better with short simple commands.  Visual/Perceptual:  " Unable to formally assess due to decreased command following and impaired communication.    Physical Exam:  Postural examination/scapula alignment: -       Rounded shoulders  -       Forward head  Skin integrity: Visible skin intact  Edema:  None noted  Sensation: Unable to formally assess due to impaired communication and command following  Dominant hand: Unable to formally assess due to impaired communication  Upper Extremity Range of Motion:  -       Right Upper Extremity: WFL  -       Left Upper Extremity: WFL  Upper Extremity Strength: -       Right Upper Extremity: 4/5 gross   -       Left Upper Extremity: 4/5 gross    Strength: -       Right Upper Extremity: WFL  -       Left Upper Extremity: WFL  Fine Motor Coordination: attempted finger to thumb opposition with demos and HOHA though no follow through for task observed    AMPA 6 Click ADL:  AMPA Total Score: 18    Treatment & Education:  Educated on role of OT, POC, functional transfers/ADL safety.   Education:    Patient left up in chair with all lines intact, call button in reach, nursing notified and AVASYS/telesitter in place     GOALS:   Multidisciplinary Problems     Occupational Therapy Goals        Problem: Occupational Therapy Goal    Goal Priority Disciplines Outcome Interventions   Occupational Therapy Goal     OT, PT/OT Ongoing, Progressing    Description:  Goals to be met by: 1/25/2020     Patient will increase functional independence with ADLs by performing:    UE Dressing with Stand-by Assistance.  LE Dressing with Stand-by Assistance.  Grooming while standing at sink with Stand-by Assistance for 3 tasks.  Toileting from bedside commode with Stand-by Assistance for hygiene and clothing management.   Toilet transfer to bedside commode with Stand-by Assistance.                      History:     Past Medical History:   Diagnosis Date    Arthritis     Hypertension     Thyroid disease        Past Surgical History:   Procedure Laterality  Date    HERNIA REPAIR         Time Tracking:     OT Date of Treatment: 01/11/20  OT Start Time: 1204  OT Stop Time: 1237  OT Total Time (min): 33 min    Billable Minutes:Evaluation 15  Self Care/Home Management 18    Gema Iqbal, OT  1/11/2020

## 2020-01-11 NOTE — PLAN OF CARE
Pt arrived to floor via bed. VS taken and stable on RA and afebrile.  SCDs applied, oriented to room, call light placed within reach, bed low and locked.  No complaints of pain. No acute distress noted at this time. Will continue to monitor. Pt  is globally aphasic. Unable to answer questions att. Admit papers unable to be completed att.

## 2020-01-11 NOTE — PLAN OF CARE
Initial Discharge Planning Assessment:  Patient admitted on 1/10/2020    Chart reviewed, Care plan discussed with treatment team,  attending Dr Templeton    PCP updated in Epic: Dr. Marinelli  Pharmacy, updated in [facility]: [facility] [address]    DME at home: straight cane    Current dispo: therapy recommending IRF.  List provided, family choice: [facility].  Referral sent in [facility] to [facility] and [facility]N.  Awaiting acceptance/auth.     Prior to hospital stay pt was recieveing wound care at [facility] each Wednesday for Right lower leg and nursing visits once/week by yudelka VALLECILLO.       Power of  or Living Will: Pt does not have children.  Lives with his primary caretaker (nephew) David Arteaga [phone].  Nephew has looked into obtaining POA, however pt never completed paperwork.  David states he is next of kin.     Case management  to follow.       01/11/20 1423   Discharge Assessment   Assessment Type Discharge Planning Assessment   Confirmed/corrected address and phone number on facesheet? Yes   Assessment information obtained from? Caregiver;Medical Record   Communicated expected length of stay with patient/caregiver yes   Prior to hospitilization cognitive status: Alert/Oriented   Prior to hospitalization functional status: Assistive Equipment   Current cognitive status: Unable to Assess   Current Functional Status: Partially Dependent   Lives With other relative(s)   Able to Return to Prior Arrangements no   Is patient able to care for self after discharge? No   Who are your caregiver(s) and their phone number(s)? nephew: David Thompson [phone]   Patient's perception of discharge disposition rehab facility   Readmission Within the Last 30 Days no previous admission in last 30 days   Patient currently being followed by outpatient case management? No   Patient currently receives any other outside agency services? Yes   Name and contact number of agency or person providing outside services Wound care on Right lower  extremity at [facility] once/week on wednesday and current with [facility]    Equipment Currently Used at Home cane, straight   Do you have any problems affording any of your prescribed medications? No   Is the patient taking medications as prescribed? yes   Does the patient have transportation home? Yes   Transportation Anticipated family or friend will provide   Does the patient receive services at [facility]? Yes  (managed by PCP, levels checked once per month)   Discharge Plan A Rehab   Patient/Family in Agreement with Plan yes

## 2020-01-11 NOTE — PT/OT/SLP EVAL
Physical Therapy Evaluation    Patient Name:  Chirag Thompson   MRN:  [id number]    Recommendations:     Discharge Recommendations:  rehabilitation facility   Discharge Equipment Recommendations: (TBD by next level of care)   Barriers to discharge: Decreased caregiver support    Assessment:     Chirag Thompson is a 79 y.o. male admitted with a medical diagnosis of Cerebrovascular accident (CVA).  He presents with the following impairments/functional limitations:  weakness, gait instability, impaired cognition, decreased safety awareness, decreased coordination, impaired balance, impaired self care skills.    Pt admitted with dx of CVA with imaging report from 1-: MRI brain: Large areas of acute infarction in the left temporal and parietal lobes along with punctate focus of infarction in the left occipital lobe.     Pt with severe expressive aphasia and unable to answer questions although following commands at least 75% of the time.  Spoke to nephew David ([phone]) who was listed as a contact in the chart.  He lives with pt and reported that patient was functioning independently using a cane (with the exception that he needed help tieing shoes) prior to this incident.  His present deficits bring into question his ability to function at his previous level and point to his need to be treated in an IP rehab facility.      Rehab Prognosis: Good; patient would benefit from acute skilled PT services to address these deficits and reach maximum level of function.    Recent Surgery: * No surgery found *      Plan:     During this hospitalization, patient to be seen 6 x/week to address the identified rehab impairments via gait training, therapeutic activities, therapeutic exercises, neuromuscular re-education and progress toward the following goals:    · Plan of Care Expires:  02/11/20    Subjective     Chief Complaint: unable to articulate  Patient/Family Comments/goals: unable to articulate  Pain/Comfort:  · Pain Rating  1: (unable to express)    Patients cultural, spiritual, Lutheran conflicts given the current situation:      Living Environment:  Lives with nephew (nephew live with him) in SS house with 4 SARA and R handrail.  Shower in tub with bench  Prior to admission, patients level of function was mod I with cane.  Equipment used at home: cane, straight.  DME owned (not currently used): standard walker.  Upon discharge, patient will have assistance from nephew.    Objective:     Communicated with patient and nurse prior to session.  Patient found with bed in chair position with peripheral IV, telemetry  upon PT entry to room.    General Precautions: Standard, fall   Orthopedic Precautions:N/A   Braces: N/A     Exams:  · Cognitive Exam:  Patient is oriented to Person  · Fine Motor Coordination:    · -       Intact  · Gross Motor Coordination:  WFL  · Postural Exam:  Patient presented with the following abnormalities:    · -       Rounded shoulders  · -       Forward head  · Sensation:    · -       Intact - ?  · Skin Integrity/Edema:      · -       Skin integrity: Visible skin intact  · -       Edema: None noted BLE  · RLE ROM: WFL  · RLE Strength: WFL  · LLE ROM: WFL  · LLE Strength: WFL    Functional Mobility:  · Bed Mobility:     · Supine to Sit: supervision  · Transfers:     · Sit to Stand:  contact guard assistance with rolling walker  · Gait: amb 50' with RW and min A  · Balance: F+ standing dynamic balance      AM-PAC 6 CLICK MOBILITY  Total Score:20     Patient left up in chair with all lines intact, call button in reach and nurse notified.    GOALS:   Multidisciplinary Problems     Physical Therapy Goals        Problem: Physical Therapy Goal    Goal Priority Disciplines Outcome Goal Variances Interventions   Physical Therapy Goal     PT, PT/OT Ongoing, Progressing     Description:  Goals to be met by 1-.  1. Sit<>stand with RW mod I  2. amb 150' with RW mod I  3. Up/down 3 steps B rail mod I                     History:     Past Medical History:   Diagnosis Date    Arthritis     Hypertension     Thyroid disease        Past Surgical History:   Procedure Laterality Date    HERNIA REPAIR         Time Tracking:     PT Received On: 01/11/20  PT Start Time: 0853     PT Stop Time: 1001  PT Total Time (min): 68 min     Billable Minutes: Evaluation 53 and Gait Training 15      Rogelio Kohler, PT  01/11/2020

## 2020-01-11 NOTE — ASSESSMENT & PLAN NOTE
- unable to determine home medications,   - per EMR on metoprolol 25 mg  - Allow for permissive HTN  - monitor

## 2020-01-11 NOTE — ASSESSMENT & PLAN NOTE
- L MCA territory infarcts involving L posterior temporal and L parietal with small focus in L occipital lobe as well.  - Lipid panel with total cholesterol 150, LDL 79; start atorvastatin 10mg PO qHS.  - 2D Echo pending.  - PT/OT/SLP evaluations; neurology consult pending.

## 2020-01-11 NOTE — SUBJECTIVE & OBJECTIVE
"Interval History: No acute events since admission.    Review of Systems   Unable to perform ROS: Other (Severe expressive aphasia.)     Objective:     Vital Signs (Most Recent):  Temp: 97.6 °F (36.4 °C) (01/11/20 1221)  Pulse: 82 (01/11/20 1221)  Resp: 16 (01/11/20 1221)  BP: (!) 142/76 (01/11/20 1221)  SpO2: 97 % (01/11/20 1221) Vital Signs (24h Range):  Temp:  [97.6 °F (36.4 °C)-99 °F (37.2 °C)] 97.6 °F (36.4 °C)  Pulse:  [68-86] 82  Resp:  [16-20] 16  SpO2:  [91 %-98 %] 97 %  BP: (135-170)/(60-83) 142/76     Weight: 98.1 kg (216 lb 4.3 oz)  Body mass index is 31.03 kg/m².  No intake or output data in the 24 hours ending 01/11/20 1422   Physical Exam   Constitutional: He appears well-developed. No distress.   HENT:   Head: Normocephalic and atraumatic.   Eyes: Conjunctivae and EOM are normal.   Cardiovascular: Normal rate and intact distal pulses.   Pulmonary/Chest: Effort normal. No respiratory distress.   Abdominal: Soft. There is no tenderness.   Musculoskeletal: Normal range of motion. He exhibits no edema.   Neurological: He is alert.   Significant expressive and receptive aphasia; able to verbalize some set phrases (e.g. "I am good") but otherwise a 'word salad' reminiscent of Wernicke aphasia with nonsensical but coherent words: "I'm good, with the ladder out in [place] and that will go there what with you'd want me to do next okay then"   Skin: Skin is warm and dry.   Nursing note and vitals reviewed.    Significant Labs:   CBC:  Recent Labs   Lab 01/10/20  1514 01/11/20  0357   WBC 5.68 6.27   HGB 12.7* 12.8*   HCT 39.7* 39.1*   * 107*   GRAN 56.9  3.2 66.4  4.2   LYMPH 21.3  1.2 17.1*  1.1   MONO 10.2  0.6 9.6  0.6   EOS 0.6* 0.4   BASO 0.07 0.06      CMP:  Recent Labs   Lab 01/10/20  1616 01/10/20  2012 01/11/20  0357     --  137   K 4.1  --  4.1     --  107   CO2 27  --  25   BUN 16  --  15   CREATININE 1.0  --  0.9   *  --  104   CALCIUM 10.0  --  9.5   MG 1.6 1.5*  " --    PHOS  --  2.7  --    ALKPHOS 107  --   --    AST 27  --   --    ALT 18  --   --    BILITOT 1.3*  --   --    PROT 6.8  --   --    ALBUMIN 3.8  --   --    ANIONGAP 6*  --  5*     Lipid Panel:  Recent Labs   Lab 01/11/20  0357   CHOL 150   HDL 61   LDLCALC 79.8   TRIG 46     Recent Labs   Lab 01/10/20  2012 01/11/20  0357   TSH 4.277*  --    HGBA1C  --  5.4      Significant Imaging:   MRI Brain W WO Contrast 01/10/19:  Large areas of acute infarction in the left temporal and parietal lobes along with punctate focus of infarction in the left occipital lobe.    MRA Head/Neck 01/10/19:  No evidence of large vessel occlusion in the intracranial circulation.  Diminished flow within the distal segment of the left middle cerebral artery with no definitive occlusion. No hemodynamically significant stenosis of the neck vessels.

## 2020-01-11 NOTE — PLAN OF CARE
Problem: SLP Goal  Goal: SLP Goal  Description  1. Pt will be able to consume mechanical soft solids and thin liquids with no overt s/s of airway threat or aspiration given 1:1 assistance with meals.   2. Pt will be able to follow one step commands with 75% accuracy given max verbal and visual cues.   3. Pt will be able to identify real or pictured objects in a field of 3 with 75% accuracy given verbal cues.   4. Pt will be able to match a word with an object in a field of 3 with 75% accuracy.   5. Pt will be able to communicate basic wants and needs via single words and gestures 75% of time given max verbal and visual cues from caregiver.   6. Ongoing assessment of receptive and expressive language s/p CVA.   Outcome: Ongoing, Progressing    Bedside swallow evaluation and expressive and receptive language evaluated this date

## 2020-01-11 NOTE — ASSESSMENT & PLAN NOTE
- CT head Evolving left parietal lobe infarct  - MRI/MRA brain/neck/ECHO  - allow for permissive hypertension  - assess lipid, a1c  - ECHO  - PT/OT/speech  - neuro checks  - Neuro consulted

## 2020-01-11 NOTE — H&P
[facility] Hospital Medicine  History & Physical    Patient Name: Chirag Thompson  MRN: [id number]  Admission Date: 1/10/2020  Attending Physician: Olive Negron MD   Primary Care Provider: Rogelio Romero MD         Patient information was obtained from EMS personnel, past medical records and ER records.     Subjective:     Principal Problem:Cerebrovascular accident (CVA)    Chief Complaint:   Chief Complaint   Patient presents with    Altered Mental Status     pt last seen normal 30 hrs ago. orientated to person and time. no neuro deficits         HPI:  79 y.o. male with PMHx HTN, AF on coumadin, DMII, venous stasis presented to ED via AMS for over 30 hours per EMS. Per ED, EMS reported the patient's home health nurse noticed he was not acting how he normally does at baseline and not answering questions appropriately PTA. The patient's nephew told EMS that the patient was normal at breakfast time yesterday. EMS reported the nephew stated after breakfast the patient stated he did not feel well and he slept for a little while. The nephew reported to EMS that after waking up the patient was confused, did not know who he was, did not know where he was, and became upset. The nephew reports to EMS he waited to send the patient to the ED after the home health nurse came check on him today. EMS reports the patient had no fever on arrival. The patient is not answering questions appropriately but keeps stating he feels okay. Information from ED, as patient unable to provide history, no family at bedside and no answer on number listed in chart. ED evaluation  CT head showed Evolving left parietal lobe infarct. Admitted.       Past Medical History:   Diagnosis Date    Arthritis     Hypertension     Thyroid disease        Past Surgical History:   Procedure Laterality Date    HERNIA REPAIR         Review of patient's allergies indicates:  No Known Allergies    No current facility-administered  medications on file prior to encounter.      Current Outpatient Medications on File Prior to Encounter   Medication Sig    allopurinol (ZYLOPRIM) 300 MG tablet Take 1 tablet by mouth.    amLODIPine (NORVASC) 5 MG tablet Take 1 tablet by mouth.    enalapril (VASOTEC) 20 MG tablet Take 1 tablet by mouth.    glipiZIDE (GLUCOTROL) 2.5 MG tablet Take 1 tablet by mouth.    levothyroxine (SYNTHROID) 75 MCG tablet Take 75 mcg by mouth once daily.    magnesium 30 mg Tab Take by mouth once.    METOPROLOL SUCCINATE ORAL Take by mouth.    spironolactone (ALDACTONE) 25 MG tablet Take 25 mg by mouth once daily.    traMADol (ULTRAM) 50 mg tablet Take 1 tablet by mouth.    warfarin (COUMADIN) 5 MG tablet Take 1 tablet by mouth.     Family History     None        Tobacco Use    Smoking status: Never Smoker   Substance and Sexual Activity    Alcohol use: Never     Frequency: Never    Drug use: Never    Sexual activity: Not on file     Review of Systems   Unable to perform ROS: Acuity of condition     Objective:     Vital Signs (Most Recent):  Temp: 98.1 °F (36.7 °C) (01/10/20 1447)  Pulse: 72 (01/10/20 1822)  Resp: 18 (01/10/20 1822)  BP: (!) 170/82 (01/10/20 1728)  SpO2: (!) 94 % (01/10/20 1632) Vital Signs (24h Range):  Temp:  [98.1 °F (36.7 °C)] 98.1 °F (36.7 °C)  Pulse:  [71-86] 72  Resp:  [18-20] 18  SpO2:  [93 %-98 %] 94 %  BP: (135-170)/(60-82) 170/82     Weight: 83.9 kg (185 lb)  Body mass index is 26.54 kg/m².    Physical Exam   Constitutional: He appears well-developed and well-nourished. No distress.   Elderly male in no distress   HENT:   Head: Normocephalic and atraumatic.   Eyes: Pupils are equal, round, and reactive to light. Conjunctivae are normal.   Neck: Neck supple.   Cardiovascular: Intact distal pulses.   Irregularly irregular   Pulmonary/Chest: Effort normal and breath sounds normal. No respiratory distress. He has no wheezes.   Abdominal: Soft. Bowel sounds are normal. He exhibits no distension.  "There is no tenderness.   Musculoskeletal: Normal range of motion.   B/l venous stasis, compression hose in place   Neurological:   Difficult exam, patient repeatedly would answer "I am OK" when asked any question, moves all 4 extremities, can tell me his name, time or place   Skin: Skin is warm and dry. He is not diaphoretic.   Psychiatric: He has a normal mood and affect.   Nursing note and vitals reviewed.        CRANIAL NERVES     CN III, IV, VI   Pupils are equal, round, and reactive to light.       Significant Labs:   CBC:   Recent Labs   Lab 01/10/20  1514   WBC 5.68   HGB 12.7*   HCT 39.7*   *     CMP:   Recent Labs   Lab 01/10/20  1616      K 4.1      CO2 27   *   BUN 16   CREATININE 1.0   CALCIUM 10.0   PROT 6.8   ALBUMIN 3.8   BILITOT 1.3*   ALKPHOS 107   AST 27   ALT 18   ANIONGAP 6*   EGFRNONAA >60     All pertinent labs within the past 24 hours have been reviewed.    Significant Imaging: I have reviewed all pertinent imaging results/findings within the past 24 hours.   Imaging Results          CT Head Without Contrast (Final result)  Result time 01/10/20 16:51:22    Final result by Yasir Espinoza MD (01/10/20 16:51:22)                 Impression:      Evolving left parietal lobe infarct.      Electronically signed by: Yasir Espinoza MD  Date:    01/10/2020  Time:    16:51             Narrative:    EXAMINATION:  CT HEAD WITHOUT CONTRAST    CLINICAL HISTORY:  Stroke;    TECHNIQUE:  Low dose axial CT images obtained throughout the head without intravenous contrast. Sagittal and coronal reconstructions were performed.    COMPARISON:  None.    FINDINGS:  Intracranial compartment:    Ventricles and sulci are normal in size for age without evidence of hydrocephalus. No extra-axial blood or fluid collections.    There is a region of hypoattenuation with loss of gray-white differentiation and sulcal effacement in the left parietal lobe consistent with evolving infarction. Probable " chronic lacunar infarcts in the right caudate head and bilateral basal ganglia.  No parenchymal mass or hemorrhage.  Periventricular white matter hypoattenuation in keeping with chronic microvascular disease.    Skull/extracranial contents (limited evaluation): No fracture. Mastoid air cells and paranasal sinuses are essentially clear.                                  Assessment/Plan:     * Cerebrovascular accident (CVA)  - CT head Evolving left parietal lobe infarct  - MRI/MRA brain/neck/ECHO  - allow for permissive hypertension  - assess lipid, a1c  - ECHO  - PT/OT/speech  - neuro checks  - Neuro consulted      Atrial fibrillation  -patient on home Metoprolol 25 q24; on home Warfarin 5 q24  - check INR        DMII (diabetes mellitus, type 2)  - SSI/accuchecks  - check A1c      Essential hypertension  - unable to determine home medications,   - per EMR on metoprolol 25 mg  - Allow for permissive HTN  - monitor      VTE Risk Mitigation (From admission, onward)    None             Zeynep Tovar PA-C  Department of Hospital Medicine   [facility]

## 2020-01-11 NOTE — NURSING
Recvd note from unit clerk that pts nephew called and that he would like to talk with case management.  CM unavailable at this time.  Will notify CM to contact pts nephew.    Changed Directive status to requests information.    David (nephew) [phone]

## 2020-01-11 NOTE — PROGRESS NOTES
Received report from ED nurse and pt arrived to floor at approximately 1840. Telemetry monitor placed and pt oriented to call light use. Bed alarm on. All safety precautions in place. Oncoming nurse given report and will further assess.

## 2020-01-11 NOTE — PT/OT/SLP EVAL
Speech Language Pathology Evaluation  Bedside Swallow    Patient Name:  Chirag Thompson   MRN:  [id number]  Admitting Diagnosis: Cerebrovascular accident (CVA)    Recommendations:                 General Recommendations:  SLP to continue to follow for ongoing assessment and treatment of expressive and receptive language deficits and to monitor diet tolerance s/p CVA    Diet recommendations: Solids: Mechanical soft, Liquids: Thin     Aspiration Precautions: 1 bite/sip at a time, Assistance with meals, Avoid talking while eating, Eliminate distractions, Feed only when awake/alert, HOB to 90 degrees, Meds whole 1 at a time, Remain upright 30 minutes post meal and Small bites/sips     General Precautions: Standard, aspiration, fall, aphasia     Communication strategies:  Pt presenting with severe expressive and receptive aphasia, use simple commands, provide visual cues, do not expect pt to provide accurate answers to questions or comprehend information at this time    History:     HPI per MD:  79 y.o. male with PMHx HTN, AF on coumadin, DMII, venous stasis presented to ED via AMS for over 30 hours per EMS. Per ED, EMS reported the patient's home health nurse noticed he was not acting how he normally does at baseline and not answering questions appropriately PTA. The patient's nephew told EMS that the patient was normal at breakfast time yesterday. EMS reported the nephew stated after breakfast the patient stated he did not feel well and he slept for a little while. The nephew reported to EMS that after waking up the patient was confused, did not know who he was, did not know where he was, and became upset. The nephew reports to EMS he waited to send the patient to the ED after the home health nurse came check on him today. EMS reports the patient had no fever on arrival. The patient is not answering questions appropriately but keeps stating he feels okay. Information from ED, as patient unable to provide history, no family  "at bedside and no answer on number listed in chart. ED evaluation  CT head showed Evolving left parietal lobe infarct. Admitted.     MRI brain 1/10/20: Large areas of acute infarction in the left temporal and parietal lobes along with punctate focus of infarction in the left occipital lobe.    Past Medical History:   Diagnosis Date    Arthritis     Hypertension     Thyroid disease        Past Surgical History:   Procedure Laterality Date    HERNIA REPAIR       Chest X-Rays: none on file this admit    Per PT after phone conversation with family:  "Spoke to nephew David ([phone]) who was listed as a contact in the chart.  He lives with pt and reported that patient was functioning independently using a cane (with the exception that he needed help tieing shoes) prior to this incident."    Subjective     Pt awake, MD attempting to assess pt. Pt unable to follow MD's commands or communicate wants and needs with MD.     Pain/Comfort:  · Pain Rating 1: other (see comments)(unable to communicate pain, however, no pain behaviors observed )    Objective:     Cognitive Communication Status: Pt awake, alert, cooperative. Able to sustain attention to SLP for entire 30 min evaluation with min cues required to attend to task. Unable to provide orientation information. Pt able to state "I am good" in response to "hello" or "how are you". Expressive aphasia impacting pt's ability to communicate effectively. Pt notably upset and frustrated with current status, demonstrating worried facial expressions. Unable to safely communicate basic wants and needs at this time, will primarily rely on caregiver. Ongoing evaluation.    Language:  Portions of the Western Aphasia Battery (WAB) given to assess receptive and expressive language:     VERBAL EXPRESSION: Verbal expression is non-fluent, verbalizations consist of jargon, unintelligible utterances, and as MD would describe "word salad". Pt able to clearly state "I am good I am good" " "in response to majority of questions. Able to clearly state "I don't know" during assessment tasks. All other utterances consist of nonsense words mixed with paraphasias. Pt unable to state name. Able to name objects with 20% accuracy given max cues. Pt able to say "cap" for "cup" after presented with real and pictured object, after SLP model. Instances of pt stating "beverage" for cup or "grass" when asked to point to green. Pt unable to continue with paraphasias or related words past first stimulus item of each task. Unable to recite automatic speech tasks given max cues (count to 10, only repeated "1" after SLP and then onset of jargon). Unable to repeat familiar words after SLP (name, colors, ect). Pt demonstrating perseveration on some words and in response to picture cues often stating "yes this goes here and that can go there" followed by unintelligible nonsense words. Ongoing evaluation required.     RECEPTIVE LANGUAGE: Pt able to answer simple y/n questions with 0% accuracy. Able to follow simple commands related to oral feeding. Able to follow one cornelius commands related to bodily functions with 70% accuracy after demonstration. Unable to independently follow commands outside of simple task or without demonstration. Pt able to identify concrete objects in a field of 3 with 20% accuracy. Able to identify pictured objects in with 30% accuracy. Able to identify shapes with 10% accuracy. Able to identify letters with 50% accuracy. Able to identify numbers with 0% accuracy. Able to identify colors with 30% accuracy. All in a field of 6 and pt required stimulus book to be removed in between each question, if stimulus book remained in sight pt would begin pointing and talking (untintelligible) about each picture on the page. Pt unable to identify objects around room or body parts, requires visual cues in immediate view to succeed in task.     READING: Pt unable to read name, able to identify his own name in a field " of two after hearing both names. Unable to identify last name in a field of two. Unable to read single words. Able to identify picture in a field of 6 when showed word and when word read aloud by SLP with 50% accuracy. Able to identify word in a field of 6 when given picture and when object name said out loud by SLP with 30% accuracy.       Further assessment of writing warranted as pt became upset and notably frustrated with tasks.     Oral Musculature Evaluation  · Oral Musculature: WFL  · Dentition: present and adequate  · Secretion Management: adequate  · Mucosal Quality: good  · Lingual Strength and Mobility: WFL  · Voice Prior to PO Intake: clear, normal volume  · Oral Musculature Comments: Face is symmetrical at rest and during smile. Lingual and labial strength and ROM assessed in function due to pt difficulty follow commands, appear to be WFL for speech and oral intake. Pt presenting with severe expressive aphasia, verbal expression is non-fluent.    · Motor Speech: Pt with instances of clear words, unable to fully evaluated at this time due to level of aphasia. Unable to determine if slurring is due to expressive language deficits or motor speech deficits. Pt unable to repeat single or multiple word utterances during Apraxia portion of WAB.     Bedside Swallow Eval:   Consistencies Assessed:  · Thin liquids single sips via cup, large sips via cup and straw  · Puree 1/2-1tsp bites  · Solids single bites andrew crackcarmela     Oral Phase:   · Lip seal, a-p transport, ability to form cohesive bolus WNL for liquids and purees  · Mildly prolonged mastication of solids, able to clear residuals    Pharyngeal Phase:   · Trigger of pharyngeal swallow appears to be delayed  · No overt s/s of airway threat or aspiration during intake of liquids, purees, or solids: no coughing, change in vocal quality  · Pt with intermittent throat clear throughout entire evaluation, did not appear to be directly related to oral  intake      Treatment: Recommend pt put on a mechanical soft diet with thin liquids at this time, given 1:1 supervision with meals to assure pt is safe during oral intake. Discussed with pt's RN.     Assessment:     Chirag Thompson is a 79 y.o. male with an SLP diagnosis of severe expressive and receptive aphasia, with characteristics of global aphasia according to Western Aphasia Battery. Pt verbal expression is non-fluent, consisting of paraphasias, nonsense utterances, and islands of clear speech often unrelated to topic. Pt also demonstrating severe receptive language deficits with inability to follow commands, answer simple y/n questions, or confirm personal details. Pt with mild oral dysphagia, OK for mechanical soft diet and thin liquids and 1:1 supervision with meals. SLP to continue to follow for ongoing treatment and assessment. Pt would benefit from discharge to a rehabilitation facility from acute care.     Goals:   Multidisciplinary Problems     SLP Goals        Problem: SLP Goal    Goal Priority Disciplines Outcome   SLP Goal     SLP Ongoing, Progressing   Description:  1. Pt will be able to consume mechanical soft solids and thin liquids with no overt s/s of airway threat or aspiration given 1:1 assistance with meals.   2. Pt will be able to follow one step commands with 75% accuracy given max verbal and visual cues.   3. Pt will be able to identify real or pictured objects in a field of 3 with 75% accuracy given verbal cues.   4. Pt will be able to match a word with an object in a field of 3 with 75% accuracy.   5. Pt will be able to communicate basic wants and needs via single words and gestures 75% of time given max verbal and visual cues from caregiver.   6. Ongoing assessment of receptive and expressive language s/p CVA.                    Plan:     · Patient to be seen:  4 x/week, 6 x/week   · Plan of Care expires:  01/18/20  · Plan of Care reviewed with:  other (see comments), patient(MD, RN,  PA, PT, OT)   · SLP Follow-Up:  Yes       Discharge recommendations:  rehabilitation facility       Time Tracking:     SLP Treatment Date:   01/11/20  Speech Start Time:  1010  Speech Stop Time:  1040     Speech Total Time (min):  30 min    Billable Minutes: Eval 20  and Eval Swallow and Oral Function 10    Janette Miller CCC-SLP  01/11/2020

## 2020-01-11 NOTE — HPI
79 y.o. male with PMHx HTN, AF on coumadin, DMII, venous stasis presented to ED via AMS for over 30 hours per EMS. Per ED, EMS reported the patient's home health nurse noticed he was not acting how he normally does at baseline and not answering questions appropriately PTA. The patient's nephew told EMS that the patient was normal at breakfast time yesterday. EMS reported the nephew stated after breakfast the patient stated he did not feel well and he slept for a little while. The nephew reported to EMS that after waking up the patient was confused, did not know who he was, did not know where he was, and became upset. The nephew reports to EMS he waited to send the patient to the ED after the home health nurse came check on him today. EMS reports the patient had no fever on arrival. The patient is not answering questions appropriately but keeps stating he feels okay. Information from ED, as patient unable to provide history, no family at bedside and no answer on number listed in chart. ED evaluation  CT head showed Evolving left parietal lobe infarct. Admitted.

## 2020-01-11 NOTE — PROGRESS NOTES
[facility] Hospital Medicine  Progress Note    Patient Name: Chirag Thompson  MRN: [id number]  Patient Class: IP- Inpatient   Admission Date: 1/10/2020  Length of Stay: 1 days  Attending Physician: WELLINGTON Templeton MD  Primary Care Provider: Dany Marinelli Iii, MD        Subjective:     Principal Problem:Acute ischemic left MCA stroke        HPI:   79 y.o. male with PMHx HTN, AF on coumadin, DMII, venous stasis presented to ED via AMS for over 30 hours per EMS. Per ED, EMS reported the patient's home health nurse noticed he was not acting how he normally does at baseline and not answering questions appropriately PTA. The patient's nephew told EMS that the patient was normal at breakfast time yesterday. EMS reported the nephew stated after breakfast the patient stated he did not feel well and he slept for a little while. The nephew reported to EMS that after waking up the patient was confused, did not know who he was, did not know where he was, and became upset. The nephew reports to EMS he waited to send the patient to the ED after the home health nurse came check on him today. EMS reports the patient had no fever on arrival. The patient is not answering questions appropriately but keeps stating he feels okay. Information from ED, as patient unable to provide history, no family at bedside and no answer on number listed in chart. ED evaluation  CT head showed Evolving left parietal lobe infarct. Admitted.       Overview/Hospital Course:  No notes on file    Interval History: No acute events since admission.    Review of Systems   Unable to perform ROS: Other (Severe expressive aphasia.)     Objective:     Vital Signs (Most Recent):  Temp: 97.6 °F (36.4 °C) (01/11/20 1221)  Pulse: 82 (01/11/20 1221)  Resp: 16 (01/11/20 1221)  BP: (!) 142/76 (01/11/20 1221)  SpO2: 97 % (01/11/20 1221) Vital Signs (24h Range):  Temp:  [97.6 °F (36.4 °C)-99 °F (37.2 °C)] 97.6 °F (36.4 °C)  Pulse:  [68-86] 82  Resp:   "[16-20] 16  SpO2:  [91 %-98 %] 97 %  BP: (135-170)/(60-83) 142/76     Weight: 98.1 kg (216 lb 4.3 oz)  Body mass index is 31.03 kg/m².  No intake or output data in the 24 hours ending 01/11/20 1422   Physical Exam   Constitutional: He appears well-developed. No distress.   HENT:   Head: Normocephalic and atraumatic.   Eyes: Conjunctivae and EOM are normal.   Cardiovascular: Normal rate and intact distal pulses.   Pulmonary/Chest: Effort normal. No respiratory distress.   Abdominal: Soft. There is no tenderness.   Musculoskeletal: Normal range of motion. He exhibits no edema.   Neurological: He is alert.   Significant expressive and receptive aphasia; able to verbalize some set phrases (e.g. "I am good") but otherwise a 'word salad' reminiscent of Wernicke aphasia with nonsensical but coherent words: "I'm good, with the ladder out in [place] and that will go there what with you'd want me to do next okay then"   Skin: Skin is warm and dry.   Nursing note and vitals reviewed.    Significant Labs:   CBC:  Recent Labs   Lab 01/10/20  1514 01/11/20  0357   WBC 5.68 6.27   HGB 12.7* 12.8*   HCT 39.7* 39.1*   * 107*   GRAN 56.9  3.2 66.4  4.2   LYMPH 21.3  1.2 17.1*  1.1   MONO 10.2  0.6 9.6  0.6   EOS 0.6* 0.4   BASO 0.07 0.06      CMP:  Recent Labs   Lab 01/10/20  1616 01/10/20  2012 01/11/20  0357     --  137   K 4.1  --  4.1     --  107   CO2 27  --  25   BUN 16  --  15   CREATININE 1.0  --  0.9   *  --  104   CALCIUM 10.0  --  9.5   MG 1.6 1.5*  --    PHOS  --  2.7  --    ALKPHOS 107  --   --    AST 27  --   --    ALT 18  --   --    BILITOT 1.3*  --   --    PROT 6.8  --   --    ALBUMIN 3.8  --   --    ANIONGAP 6*  --  5*     Lipid Panel:  Recent Labs   Lab 01/11/20  0357   CHOL 150   HDL 61   LDLCALC 79.8   TRIG 46     Recent Labs   Lab 01/10/20  2012 01/11/20  0357   TSH 4.277*  --    HGBA1C  --  5.4      Significant Imaging:   MRI Brain W WO Contrast 01/10/19:  Large areas of acute " infarction in the left temporal and parietal lobes along with punctate focus of infarction in the left occipital lobe.    MRA Head/Neck 01/10/19:  No evidence of large vessel occlusion in the intracranial circulation.  Diminished flow within the distal segment of the left middle cerebral artery with no definitive occlusion. No hemodynamically significant stenosis of the neck vessels.      Assessment/Plan:      * Acute ischemic left MCA stroke  - L MCA territory infarcts involving L posterior temporal and L parietal with small focus in L occipital lobe as well.  - Lipid panel with total cholesterol 150, LDL 79; start atorvastatin 10mg PO qHS.  - 2D Echo pending.  - PT/OT/SLP evaluations; neurology consult pending.    Atrial fibrillation  - Continuing metoprolol 25mg PO daily.  - Daily INR; continuing warfarin 5mg PO daily for time being. Likely convert to DOAC dependent on stroke evaluation.    Type 2 diabetes mellitus with circulatory disorder, without long-term current use of insulin  - HgbA1c 5.4.  - Continuing low-dose sliding scale insulin aspart 0-5U subQ TIDWM PRN.    Essential hypertension  - Permissive hypertension       VTE Risk Mitigation (From admission, onward)         Ordered     warfarin (COUMADIN) tablet 5 mg  Daily      01/10/20 1848     IP VTE HIGH RISK PATIENT  Once      01/10/20 2226     Place sequential compression device  Until discontinued      01/10/20 1847     Reason for No Pharmacological VTE Prophylaxis  Once     Question:  Reasons:  Answer:  Already adequately anticoagulated on oral Anticoagulants    01/10/20 1847                      ANDERS Templeton MD  Department of Hospital Medicine   [facility]

## 2020-01-11 NOTE — PLAN OF CARE
Pt is alert not able to orient.  Not able to voice and make needs known with call light.  No s/s of pain.  Meds given whole with small sip of water by mouth.  Call light near, bed low and locked, rails at head up x2, belongings in place.

## 2020-01-11 NOTE — SUBJECTIVE & OBJECTIVE
Past Medical History:   Diagnosis Date    Arthritis     Hypertension     Thyroid disease        Past Surgical History:   Procedure Laterality Date    HERNIA REPAIR         Review of patient's allergies indicates:  No Known Allergies    No current facility-administered medications on file prior to encounter.      Current Outpatient Medications on File Prior to Encounter   Medication Sig    allopurinol (ZYLOPRIM) 300 MG tablet Take 1 tablet by mouth.    amLODIPine (NORVASC) 5 MG tablet Take 1 tablet by mouth.    enalapril (VASOTEC) 20 MG tablet Take 1 tablet by mouth.    glipiZIDE (GLUCOTROL) 2.5 MG tablet Take 1 tablet by mouth.    levothyroxine (SYNTHROID) 75 MCG tablet Take 75 mcg by mouth once daily.    magnesium 30 mg Tab Take by mouth once.    METOPROLOL SUCCINATE ORAL Take by mouth.    spironolactone (ALDACTONE) 25 MG tablet Take 25 mg by mouth once daily.    traMADol (ULTRAM) 50 mg tablet Take 1 tablet by mouth.    warfarin (COUMADIN) 5 MG tablet Take 1 tablet by mouth.     Family History     None        Tobacco Use    Smoking status: Never Smoker   Substance and Sexual Activity    Alcohol use: Never     Frequency: Never    Drug use: Never    Sexual activity: Not on file     Review of Systems   Unable to perform ROS: Acuity of condition     Objective:     Vital Signs (Most Recent):  Temp: 98.1 °F (36.7 °C) (01/10/20 1447)  Pulse: 72 (01/10/20 1822)  Resp: 18 (01/10/20 1822)  BP: (!) 170/82 (01/10/20 1728)  SpO2: (!) 94 % (01/10/20 1632) Vital Signs (24h Range):  Temp:  [98.1 °F (36.7 °C)] 98.1 °F (36.7 °C)  Pulse:  [71-86] 72  Resp:  [18-20] 18  SpO2:  [93 %-98 %] 94 %  BP: (135-170)/(60-82) 170/82     Weight: 83.9 kg (185 lb)  Body mass index is 26.54 kg/m².    Physical Exam   Constitutional: He appears well-developed and well-nourished. No distress.   Elderly male in no distress   HENT:   Head: Normocephalic and atraumatic.   Eyes: Pupils are equal, round, and reactive to light.  "Conjunctivae are normal.   Neck: Neck supple.   Cardiovascular: Intact distal pulses.   Irregularly irregular   Pulmonary/Chest: Effort normal and breath sounds normal. No respiratory distress. He has no wheezes.   Abdominal: Soft. Bowel sounds are normal. He exhibits no distension. There is no tenderness.   Musculoskeletal: Normal range of motion.   B/l venous stasis, compression hose in place   Neurological:   Difficult exam, patient repeatedly would answer "I am OK" when asked any question, moves all 4 extremities, can tell me his name, time or place   Skin: Skin is warm and dry. He is not diaphoretic.   Psychiatric: He has a normal mood and affect.   Nursing note and vitals reviewed.        CRANIAL NERVES     CN III, IV, VI   Pupils are equal, round, and reactive to light.       Significant Labs:   CBC:   Recent Labs   Lab 01/10/20  1514   WBC 5.68   HGB 12.7*   HCT 39.7*   *     CMP:   Recent Labs   Lab 01/10/20  1616      K 4.1      CO2 27   *   BUN 16   CREATININE 1.0   CALCIUM 10.0   PROT 6.8   ALBUMIN 3.8   BILITOT 1.3*   ALKPHOS 107   AST 27   ALT 18   ANIONGAP 6*   EGFRNONAA >60     All pertinent labs within the past 24 hours have been reviewed.    Significant Imaging: I have reviewed all pertinent imaging results/findings within the past 24 hours.   Imaging Results          CT Head Without Contrast (Final result)  Result time 01/10/20 16:51:22    Final result by Yasir Espinoza MD (01/10/20 16:51:22)                 Impression:      Evolving left parietal lobe infarct.      Electronically signed by: Yasir Espinoza MD  Date:    01/10/2020  Time:    16:51             Narrative:    EXAMINATION:  CT HEAD WITHOUT CONTRAST    CLINICAL HISTORY:  Stroke;    TECHNIQUE:  Low dose axial CT images obtained throughout the head without intravenous contrast. Sagittal and coronal reconstructions were performed.    COMPARISON:  None.    FINDINGS:  Intracranial compartment:    Ventricles and sulci " are normal in size for age without evidence of hydrocephalus. No extra-axial blood or fluid collections.    There is a region of hypoattenuation with loss of gray-white differentiation and sulcal effacement in the left parietal lobe consistent with evolving infarction. Probable chronic lacunar infarcts in the right caudate head and bilateral basal ganglia.  No parenchymal mass or hemorrhage.  Periventricular white matter hypoattenuation in keeping with chronic microvascular disease.    Skull/extracranial contents (limited evaluation): No fracture. Mastoid air cells and paranasal sinuses are essentially clear.

## 2020-01-11 NOTE — PLAN OF CARE
Problem: Physical Therapy Goal  Goal: Physical Therapy Goal  Description  Goals to be met by 1-.  1. Sit<>stand with RW mod I  2. amb 150' with RW mod I  3. Up/down 3 steps B rail mod I   Outcome: Ongoing, Progressing     Physical therapy eval completed.  Pt with severe expressive aphasia and mild apraxia.  Recommend IP rehab.   Pt clinical condition meets full inpatient rehab criteria; the lower level of care cannot provide the total approach needed.

## 2020-01-11 NOTE — ASSESSMENT & PLAN NOTE
- Continuing metoprolol 25mg PO daily.  - Daily INR; continuing warfarin 5mg PO daily for time being. Likely convert to DOAC dependent on stroke evaluation.

## 2020-01-11 NOTE — PLAN OF CARE
Problem: Occupational Therapy Goal  Goal: Occupational Therapy Goal  Description  Goals to be met by: 1/25/2020     Patient will increase functional independence with ADLs by performing:    UE Dressing with Stand-by Assistance.  LE Dressing with Stand-by Assistance.  Grooming while standing at sink with Stand-by Assistance for 3 tasks.  Toileting from bedside commode with Stand-by Assistance for hygiene and clothing management.   Toilet transfer to bedside commode with Stand-by Assistance.     Outcome: Ongoing, Progressing     Initial OT eval/treat complete.  Intense therapy needed to improve dynamic/static standing/sitting balance through balance training, strength through therapeutic exercise, and ADL safety/independence through safety education/training for daily tasks.  Recommend post acute therapy in IRF.  To benefit from continued acute care OT services to increase independence in self-care/functional transfers.  OT to follow.

## 2020-01-12 LAB
ANION GAP SERPL CALC-SCNC: 13 MMOL/L (ref 8–16)
BASOPHILS # BLD AUTO: 0.07 K/UL (ref 0–0.2)
BASOPHILS NFR BLD: 0.9 % (ref 0–1.9)
BUN SERPL-MCNC: 20 MG/DL (ref 8–23)
CALCIUM SERPL-MCNC: 9.8 MG/DL (ref 8.7–10.5)
CHLORIDE SERPL-SCNC: 103 MMOL/L (ref 95–110)
CO2 SERPL-SCNC: 23 MMOL/L (ref 23–29)
CREAT SERPL-MCNC: 1 MG/DL (ref 0.5–1.4)
DIFFERENTIAL METHOD: ABNORMAL
EOSINOPHIL # BLD AUTO: 0.4 K/UL (ref 0–0.5)
EOSINOPHIL NFR BLD: 4.5 % (ref 0–8)
ERYTHROCYTE [DISTWIDTH] IN BLOOD BY AUTOMATED COUNT: 15 % (ref 11.5–14.5)
EST. GFR  (AFRICAN AMERICAN): >60 ML/MIN/1.73 M^2
EST. GFR  (NON AFRICAN AMERICAN): >60 ML/MIN/1.73 M^2
GLUCOSE SERPL-MCNC: 93 MG/DL (ref 70–110)
HCT VFR BLD AUTO: 40.4 % (ref 40–54)
HGB BLD-MCNC: 13.3 G/DL (ref 14–18)
IMM GRANULOCYTES # BLD AUTO: 0.03 K/UL (ref 0–0.04)
IMM GRANULOCYTES NFR BLD AUTO: 0.4 % (ref 0–0.5)
INR PPP: 1.4 (ref 0.8–1.2)
LYMPHOCYTES # BLD AUTO: 1 K/UL (ref 1–4.8)
LYMPHOCYTES NFR BLD: 12.2 % (ref 18–48)
MCH RBC QN AUTO: 33.1 PG (ref 27–31)
MCHC RBC AUTO-ENTMCNC: 32.9 G/DL (ref 32–36)
MCV RBC AUTO: 101 FL (ref 82–98)
MONOCYTES # BLD AUTO: 0.9 K/UL (ref 0.3–1)
MONOCYTES NFR BLD: 11.3 % (ref 4–15)
NEUTROPHILS # BLD AUTO: 5.6 K/UL (ref 1.8–7.7)
NEUTROPHILS NFR BLD: 70.7 % (ref 38–73)
NRBC BLD-RTO: 0 /100 WBC
PLATELET # BLD AUTO: 120 K/UL (ref 150–350)
PMV BLD AUTO: 11 FL (ref 9.2–12.9)
POCT GLUCOSE: 103 MG/DL (ref 70–110)
POCT GLUCOSE: 105 MG/DL (ref 70–110)
POCT GLUCOSE: 161 MG/DL (ref 70–110)
POCT GLUCOSE: 92 MG/DL (ref 70–110)
POTASSIUM SERPL-SCNC: 4.1 MMOL/L (ref 3.5–5.1)
PROTHROMBIN TIME: 16.1 SEC (ref 9–12.5)
RBC # BLD AUTO: 4.02 M/UL (ref 4.6–6.2)
SODIUM SERPL-SCNC: 139 MMOL/L (ref 136–145)
WBC # BLD AUTO: 7.93 K/UL (ref 3.9–12.7)

## 2020-01-12 PROCEDURE — 97110 THERAPEUTIC EXERCISES: CPT | Mod: CQ

## 2020-01-12 PROCEDURE — 99223 PR INITIAL HOSPITAL CARE,LEVL III: ICD-10-PCS | Mod: ,,, | Performed by: STUDENT IN AN ORGANIZED HEALTH CARE EDUCATION/TRAINING PROGRAM

## 2020-01-12 PROCEDURE — 25000003 PHARM REV CODE 250: Performed by: INTERNAL MEDICINE

## 2020-01-12 PROCEDURE — 36415 COLL VENOUS BLD VENIPUNCTURE: CPT

## 2020-01-12 PROCEDURE — 97116 GAIT TRAINING THERAPY: CPT | Mod: CQ

## 2020-01-12 PROCEDURE — 99232 SBSQ HOSP IP/OBS MODERATE 35: CPT | Mod: ,,, | Performed by: INTERNAL MEDICINE

## 2020-01-12 PROCEDURE — 11000001 HC ACUTE MED/SURG PRIVATE ROOM

## 2020-01-12 PROCEDURE — 99223 1ST HOSP IP/OBS HIGH 75: CPT | Mod: ,,, | Performed by: STUDENT IN AN ORGANIZED HEALTH CARE EDUCATION/TRAINING PROGRAM

## 2020-01-12 PROCEDURE — 25000003 PHARM REV CODE 250: Performed by: PHYSICIAN ASSISTANT

## 2020-01-12 PROCEDURE — 92507 TX SP LANG VOICE COMM INDIV: CPT

## 2020-01-12 PROCEDURE — 99232 PR SUBSEQUENT HOSPITAL CARE,LEVL II: ICD-10-PCS | Mod: ,,, | Performed by: INTERNAL MEDICINE

## 2020-01-12 PROCEDURE — 85610 PROTHROMBIN TIME: CPT

## 2020-01-12 PROCEDURE — 85025 COMPLETE CBC W/AUTO DIFF WBC: CPT

## 2020-01-12 PROCEDURE — 92526 ORAL FUNCTION THERAPY: CPT

## 2020-01-12 PROCEDURE — 80048 BASIC METABOLIC PNL TOTAL CA: CPT

## 2020-01-12 RX ORDER — WARFARIN 2.5 MG/1
7.5 TABLET ORAL DAILY
Status: DISCONTINUED | OUTPATIENT
Start: 2020-01-12 | End: 2020-01-14

## 2020-01-12 RX ADMIN — ALLOPURINOL 300 MG: 300 TABLET ORAL at 08:01

## 2020-01-12 RX ADMIN — METOPROLOL SUCCINATE 25 MG: 25 TABLET, EXTENDED RELEASE ORAL at 08:01

## 2020-01-12 RX ADMIN — ATORVASTATIN CALCIUM 10 MG: 10 TABLET, FILM COATED ORAL at 08:01

## 2020-01-12 RX ADMIN — WARFARIN SODIUM 7.5 MG: 5 TABLET ORAL at 05:01

## 2020-01-12 RX ADMIN — LEVOTHYROXINE SODIUM 75 MCG: 75 TABLET ORAL at 05:01

## 2020-01-12 NOTE — PLAN OF CARE
Problem: Physical Therapy Goal  Goal: Physical Therapy Goal  Description  Goals to be met by 1-.  1. Sit<>stand with RW mod I  2. amb 150' with RW mod I  3. Up/down 3 steps B rail mod I   Outcome: Ongoing, Progressing   Pt presented w/ HOB elevated upon arrival of PT. PT agreeable to PT. Pt progressing towards goals. Pt sit <> stand w / CGA and RW. Pt amb'd 150' w/ RW and CGA. VC's for use of RW

## 2020-01-12 NOTE — PT/OT/SLP PROGRESS
Physical Therapy Treatment    Patient Name:  Chirag Thompson   MRN:  [id number]    Recommendations:     Discharge Recommendations:  rehabilitation facility   Discharge Equipment Recommendations: (defer to next level of care)   Barriers to discharge: Decreased caregiver support    Assessment:     Chirag Thompson is a 79 y.o. male admitted with a medical diagnosis of Acute ischemic left MCA stroke.  He presents with the following impairments/functional limitations:  weakness, impaired self care skills, impaired functional mobilty, gait instability, impaired balance, decreased safety awareness ,  Pt presented w/ HOB elevated upon arrival of PT. PT agreeable to PT. Pt progressing towards goals. Pt sit <> stand w / CGA and RW. Pt amb'd 150' w/ RW and CGA. VC's for use of RW .    Rehab Prognosis: Good; patient would benefit from acute skilled PT services to address these deficits and reach maximum level of function.    Recent Surgery: * No surgery found *      Plan:     During this hospitalization, patient to be seen 6 x/week to address the identified rehab impairments via gait training, therapeutic activities, therapeutic exercises, neuromuscular re-education and progress toward the following goals:    · Plan of Care Expires:  02/11/20    Subjective     Chief Complaint: none stated  Patient/Family Comments/goals: none stated  Pain/Comfort:  · Pain Rating 1: other (see comments)(impaired communication)      Objective:     Communicated with  Ns(Farzana) prior to session.  Patient found HOB elevated with peripheral IV, telemetry(AVASYS) upon PT entry to room.     General Precautions: Standard, aphasia, aspiration, fall   Orthopedic Precautions:N/A   Braces: N/A     Functional Mobility:  ·   · Transfers:     · Sit to Stand:  contact guard assistance with rolling walker   · Gait: 150' w/ RW and CGA. VC's for hand placement      AM-PAC 6 CLICK MOBILITY  Turning over in bed (including adjusting bedclothes, sheets and blankets)?:  4  Sitting down on and standing up from a chair with arms (e.g., wheelchair, bedside commode, etc.): 3  Moving from lying on back to sitting on the side of the bed?: 4  Moving to and from a bed to a chair (including a wheelchair)?: 3  Need to walk in hospital room?: 3  Climbing 3-5 steps with a railing?: 3  Basic Mobility Total Score: 20       Therapeutic Activities and Exercises:   Pt performed seated therapeutic exercises including hip flexion, AP and LAQs x 10 reps with verbal and tactile cues.      Patient left up in chair with all lines intact, call button in reach, ns notified and AVASYS..    GOALS:   Multidisciplinary Problems     Physical Therapy Goals        Problem: Physical Therapy Goal    Goal Priority Disciplines Outcome Goal Variances Interventions   Physical Therapy Goal     PT, PT/OT Ongoing, Progressing     Description:  Goals to be met by 1-.  1. Sit<>stand with RW mod I  2. amb 150' with RW mod I  3. Up/down 3 steps B rail mod I                    Time Tracking:     PT Received On: 01/12/20  PT Start Time: 1000     PT Stop Time: 1023  PT Total Time (min): 23 min     Billable Minutes: Gait Training 12 and Therapeutic Exercise 11    Treatment Type: Treatment  PT/PTA: PTA     PTA Visit Number: 1     Houston Lin PTA  01/12/2020
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
## 2020-01-12 NOTE — PLAN OF CARE
No significant events overnight. Remains free from fall, injury, and skin breakdown. Voiding spontaneously via br. VSS on RA throughout the night. Positions self denies pain. Tele maintained; all alarms active and audible. Neuro checks wnl. TEDs/SCDs in place. Plan of care reviewed with patient and all questions answered. Avasys in use. Chair alarm use while up.Bed low, locked w/ bed alarm on. Call light within reach. Purposeful rounding performed. Resting comfortably in bed, no other complaints at this time.  Pt cont with global aphasia.

## 2020-01-12 NOTE — ASSESSMENT & PLAN NOTE
- L MCA territory infarcts involving L posterior temporal and L parietal with small focus in L occipital lobe as well.  - Lipid panel with total cholesterol 150, LDL 79; start atorvastatin 10mg PO qHS.  - 2D Echo shows EF 55%, mild-moderate mitral regurgitation.  - PT/OT/SLP.  - Appreciate neurology assistance.

## 2020-01-12 NOTE — PROGRESS NOTES
[facility] Hospital Medicine  Progress Note    Patient Name: Chirag Thompson  MRN: [id number]  Patient Class: IP- Inpatient   Admission Date: 1/10/2020  Length of Stay: 2 days  Attending Physician: WELLINGTON Templeton MD  Primary Care Provider: Dany Marinelli Iii, MD    Subjective:     Principal Problem:Acute ischemic left MCA stroke    HPI:   79 y.o. male with PMHx HTN, AF on coumadin, DMII, venous stasis presented to ED via AMS for over 30 hours per EMS. Per ED, EMS reported the patient's home health nurse noticed he was not acting how he normally does at baseline and not answering questions appropriately PTA. The patient's nephew told EMS that the patient was normal at breakfast time yesterday. EMS reported the nephew stated after breakfast the patient stated he did not feel well and he slept for a little while. The nephew reported to EMS that after waking up the patient was confused, did not know who he was, did not know where he was, and became upset. The nephew reports to EMS he waited to send the patient to the ED after the home health nurse came check on him today. EMS reports the patient had no fever on arrival. The patient is not answering questions appropriately but keeps stating he feels okay. Information from ED, as patient unable to provide history, no family at bedside and no answer on number listed in chart. ED evaluation  CT head showed Evolving left parietal lobe infarct. Admitted.     Overview/Hospital Course:  No notes on file    Interval History: No acute events since admission.    Review of Systems   Unable to perform ROS: Other (Severe expressive aphasia.)     Objective:     Vital Signs (Most Recent):  Temp: 97.8 °F (36.6 °C) (01/12/20 1159)  Pulse: 84 (01/12/20 1200)  Resp: 20 (01/12/20 1159)  BP: (!) 143/69 (01/12/20 1159)  SpO2: 97 % (01/12/20 1159) Vital Signs (24h Range):  Temp:  [97.8 °F (36.6 °C)-98.7 °F (37.1 °C)] 97.8 °F (36.6 °C)  Pulse:  [68-90] 84  Resp:  [14-20]  20  SpO2:  [95 %-99 %] 97 %  BP: (127-158)/(59-76) 143/69     Weight: 98.1 kg (216 lb 4.3 oz)  Body mass index is 31.03 kg/m².    Intake/Output Summary (Last 24 hours) at 1/12/2020 1522  Last data filed at 1/12/2020 0500  Gross per 24 hour   Intake 111 ml   Output 620 ml   Net -509 ml      Physical Exam   Constitutional: He appears well-developed. No distress.   HENT:   Head: Normocephalic and atraumatic.   Eyes: Conjunctivae and EOM are normal.   Cardiovascular: Normal rate and intact distal pulses.   Pulmonary/Chest: Effort normal. No respiratory distress.   Abdominal: Soft. There is no tenderness.   Musculoskeletal: Normal range of motion. He exhibits no edema.   Neurological: He is alert.   Significant expressive and receptive aphasia.   Skin: Skin is warm and dry.   Nursing note and vitals reviewed.    Significant Labs:   CBC:  Recent Labs   Lab 01/10/20  1514 01/11/20 0357 01/12/20  0444   WBC 5.68 6.27 7.93   HGB 12.7* 12.8* 13.3*   HCT 39.7* 39.1* 40.4   * 107* 120*   GRAN 56.9  3.2 66.4  4.2 70.7  5.6   LYMPH 21.3  1.2 17.1*  1.1 12.2*  1.0   MONO 10.2  0.6 9.6  0.6 11.3  0.9   EOS 0.6* 0.4 0.4   BASO 0.07 0.06 0.07      CMP:  Recent Labs   Lab 01/10/20  1616 01/10/20  2012 01/11/20  0357 01/12/20  0444     --  137 139   K 4.1  --  4.1 4.1     --  107 103   CO2 27  --  25 23   BUN 16  --  15 20   CREATININE 1.0  --  0.9 1.0   *  --  104 93   CALCIUM 10.0  --  9.5 9.8   MG 1.6 1.5*  --   --    PHOS  --  2.7  --   --    ALKPHOS 107  --   --   --    AST 27  --   --   --    ALT 18  --   --   --    BILITOT 1.3*  --   --   --    PROT 6.8  --   --   --    ALBUMIN 3.8  --   --   --    ANIONGAP 6*  --  5* 13     Lipid Panel:  Recent Labs   Lab 01/11/20  0357   CHOL 150   HDL 61   LDLCALC 79.8   TRIG 46     Recent Labs   Lab 01/10/20  2012 01/11/20  0357   TSH 4.277*  --    HGBA1C  --  5.4      Significant Imaging:   No new imaging this morning.      Assessment/Plan:      * Acute  ischemic left MCA stroke  - L MCA territory infarcts involving L posterior temporal and L parietal with small focus in L occipital lobe as well.  - Lipid panel with total cholesterol 150, LDL 79; start atorvastatin 10mg PO qHS.  - 2D Echo shows EF 55%, mild-moderate mitral regurgitation.  - PT/OT/SLP.  - Appreciate neurology assistance.    Atrial fibrillation  - Continuing metoprolol 25mg PO daily.  - Daily INR; continuing warfarin at 7.5mg PO daily.    Type 2 diabetes mellitus with circulatory disorder, without long-term current use of insulin  - HgbA1c 5.4.  - Continuing low-dose sliding scale insulin aspart 0-5U subQ TIDWM PRN.    Essential hypertension  - Initial permissive hypertension; continuing metoprolol as above with decent BPs.    VTE Risk Mitigation (From admission, onward)         Ordered     warfarin tablet 7.5 mg  Daily      01/12/20 1524     IP VTE HIGH RISK PATIENT  Once      01/10/20 2226     Place sequential compression device  Until discontinued      01/10/20 1847     Reason for No Pharmacological VTE Prophylaxis  Once     Question:  Reasons:  Answer:  Already adequately anticoagulated on oral Anticoagulants    01/10/20 1847                 ANDERS Templeton MD  Department of Hospital Medicine   [facility]
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
## 2020-01-12 NOTE — SUBJECTIVE & OBJECTIVE
Interval History: No acute events since admission.    Review of Systems   Unable to perform ROS: Other (Severe expressive aphasia.)     Objective:     Vital Signs (Most Recent):  Temp: 97.8 °F (36.6 °C) (01/12/20 1159)  Pulse: 84 (01/12/20 1200)  Resp: 20 (01/12/20 1159)  BP: (!) 143/69 (01/12/20 1159)  SpO2: 97 % (01/12/20 1159) Vital Signs (24h Range):  Temp:  [97.8 °F (36.6 °C)-98.7 °F (37.1 °C)] 97.8 °F (36.6 °C)  Pulse:  [68-90] 84  Resp:  [14-20] 20  SpO2:  [95 %-99 %] 97 %  BP: (127-158)/(59-76) 143/69     Weight: 98.1 kg (216 lb 4.3 oz)  Body mass index is 31.03 kg/m².    Intake/Output Summary (Last 24 hours) at 1/12/2020 1522  Last data filed at 1/12/2020 0500  Gross per 24 hour   Intake 111 ml   Output 620 ml   Net -509 ml      Physical Exam   Constitutional: He appears well-developed. No distress.   HENT:   Head: Normocephalic and atraumatic.   Eyes: Conjunctivae and EOM are normal.   Cardiovascular: Normal rate and intact distal pulses.   Pulmonary/Chest: Effort normal. No respiratory distress.   Abdominal: Soft. There is no tenderness.   Musculoskeletal: Normal range of motion. He exhibits no edema.   Neurological: He is alert.   Significant expressive and receptive aphasia.   Skin: Skin is warm and dry.   Nursing note and vitals reviewed.    Significant Labs:   CBC:  Recent Labs   Lab 01/10/20  1514 01/11/20  0357 01/12/20  0444   WBC 5.68 6.27 7.93   HGB 12.7* 12.8* 13.3*   HCT 39.7* 39.1* 40.4   * 107* 120*   GRAN 56.9  3.2 66.4  4.2 70.7  5.6   LYMPH 21.3  1.2 17.1*  1.1 12.2*  1.0   MONO 10.2  0.6 9.6  0.6 11.3  0.9   EOS 0.6* 0.4 0.4   BASO 0.07 0.06 0.07      CMP:  Recent Labs   Lab 01/10/20  1616 01/10/20  2012 01/11/20  0357 01/12/20  0444     --  137 139   K 4.1  --  4.1 4.1     --  107 103   CO2 27  --  25 23   BUN 16  --  15 20   CREATININE 1.0  --  0.9 1.0   *  --  104 93   CALCIUM 10.0  --  9.5 9.8   MG 1.6 1.5*  --   --    PHOS  --  2.7  --   --     ALKPHOS 107  --   --   --    AST 27  --   --   --    ALT 18  --   --   --    BILITOT 1.3*  --   --   --    PROT 6.8  --   --   --    ALBUMIN 3.8  --   --   --    ANIONGAP 6*  --  5* 13     Lipid Panel:  Recent Labs   Lab 01/11/20  0357   CHOL 150   HDL 61   LDLCALC 79.8   TRIG 46     Recent Labs   Lab 01/10/20  2012 01/11/20  0357   TSH 4.277*  --    HGBA1C  --  5.4      Significant Imaging:   No new imaging this morning.

## 2020-01-12 NOTE — CONSULTS
[facility]  General Neurology Consultation    Reason for consult:  Left MCA acute ischemic stroke   Reason for admission:  Confusion [R41.0]  Cerebrovascular accident (CVA), unspecified mechanism [I63.9]  Length of Stay:  2    History of present illness:     Mr Thompson is a 78 y/o male presented to the ED at [facility] with difficulty communicating.     Pt was at home with nephew when he noticed pt ws not able to communicate well. He waited for about 1 day and since he was not getting better, got him to emergency room at [facility]. Here pt was found to have aphasia and CTH revealed left MCA wernicke's aphasia. Does not have any focal weakness or numbness.     Review of systems:   CONSTITUTIONAL: No weight loss, fever, chills, weakness or fatigue.   HEENT: Eyes: No visual loss, blurred vision, double vision or yellow sclerae. Ears, Nose, Throat: No hearing loss, sneezing, congestion, runny nose or sore throat.   SKIN: No rash or itching.   CARDIOVASCULAR: No chest pain, chest pressure or chest discomfort. No palpitations or edema.   RESPIRATORY: No shortness of breath, cough or sputum.   GASTROINTESTINAL: No anorexia, nausea, vomiting or diarrhea. No abdominal pain or blood.   GENITOURINARY: No dysuria, frequency or retention.   NEUROLOGICAL: As in HPI   MUSCULOSKELETAL: No muscle, back pain, joint pain or stiffness.   HEMATOLOGIC: No anemia, bleeding or bruising.   LYMPHATICS: No enlarged nodes.  PSYCHIATRIC: No history of depression or anxiety.   ENDOCRINOLOGIC: No reports of sweating, cold or heat intolerance. No polyuria or polydipsia.     Past Medical History:   Diagnosis Date    Arthritis     Hypertension     Thyroid disease        Past Surgical History:   Procedure Laterality Date    HERNIA REPAIR         History reviewed. No pertinent family history.    Social History     Socioeconomic History    Marital status: Single     Spouse name: Not on file    Number of children: Not on file    Years of  education: Not on file    Highest education level: Not on file   Occupational History    Not on file   Social Needs    Financial resource strain: Not on file    Food insecurity:     Worry: Not on file     Inability: Not on file    Transportation needs:     Medical: Not on file     Non-medical: Not on file   Tobacco Use    Smoking status: Never Smoker   Substance and Sexual Activity    Alcohol use: Never     Frequency: Never    Drug use: Never    Sexual activity: Not on file   Lifestyle    Physical activity:     Days per week: Not on file     Minutes per session: Not on file    Stress: Not on file   Relationships    Social connections:     Talks on phone: Not on file     Gets together: Not on file     Attends Gnosticism service: Not on file     Active member of club or organization: Not on file     Attends meetings of clubs or organizations: Not on file     Relationship status: Not on file   Other Topics Concern    Not on file   Social History Narrative    Not on file       Scheduled Meds:   allopurinol  300 mg Oral Daily    atorvastatin  10 mg Oral QHS    levothyroxine  75 mcg Oral Before breakfast    metoprolol succinate  25 mg Oral Daily    warfarin  5 mg Oral Daily     Continuous Infusions:  PRN Meds:.acetaminophen, dextrose 50%, dextrose 50%, glucagon (human recombinant), glucose, glucose, insulin aspart U-100, ondansetron, sodium chloride 0.9%    Review of patient's allergies indicates:  No Known Allergies      Physical Exam    Vitals:    01/11/20 2000 01/11/20 2200 01/11/20 2343 01/12/20 0000   BP:   (!) 129/59    BP Location:   Right arm    Patient Position:   Lying    Pulse: 81 90 78 84   Resp:   18    Temp:   98.1 °F (36.7 °C)    TempSrc:   Oral    SpO2:   95%    Weight:       Height:           MENTAL STATUS:Awake, Alert but does not follow commands. Fluent aphasia, No dysarthria     CRANIAL NERVE EXAM:  Extraocular muscles are intact.  No facial asymmetry. Facial sensation is intact  bilaterally. There is no dysarthria.  Hearing is grossly intact. Neck is supple.     MOTOR EXAM: Normal bulk and tone throughout UE and LE bilaterally.   No pronator drift; rapid sequential movements are normal; Strength is  5/5 in all groups in the lower extremities and upper extremities.    SENSORY EXAM: Normal to light touch, vibration, pinprick and proprioception in BUE and BLE    COORDINATION: Finger to Nose examination intact, Heel to shin examination intact.      IMAGING (personally reviewed):  Results for orders placed or performed during the hospital encounter of 01/10/20   MRI Brain W WO Contrast    Narrative    EXAMINATION:  MRI BRAIN W WO CONTRAST; MRA BRAIN WITHOUT CONTRAST; MRA NECK WITH CONTRAST    CLINICAL HISTORY:  Stroke;    TECHNIQUE:  Multiplanar multisequence MR imaging of the brain was performed before and after the administration of 10 mL Gadavist intravenous contrast.    Per separate acquisition, 3D time-of-flight MRA of the intracranial circulation was performed.  MIP reconstructions were obtained and reviewed.    2D time-of-flight MRA of the neck was performed with intravenous contrast.  MIP reconstructions were obtained and reviewed.    COMPARISON:  CT dated 01/10/2020.    FINDINGS:  MRI of the brain:    The craniocervical junction is within normal limits.  The midline structures appear intact.  The intracranial flow voids appear within normal limits.    There is a large area of diffusion restriction in the left posterior temporal and left parietal lobes.  An adjacent small focus of diffuse is identified within the left occipital lobe.  There is associated T2/FLAIR signal hyperintense signal corresponding to the regions of diffusion weighted signal abnormality.    The ventricles and sulci are prominent, consistent with cerebral volume loss.  There are extensive Virchow Gomez spaces within the basal ganglia.  There are also old lacunar type infarctions in the basal ganglia and in the left  thalamus.  There is a probable old lacunar type infarction in the left cerebellum.  There are no extra-axial fluid collections.  There is no evidence of intracranial hemorrhage.    The right orbit is unremarkable.  There are postoperative changes in the left orbit.  Paranasal sinuses are unremarkable.  The mastoid air cells are clear.  The calvarium is intact.    There is no evidence of abnormal enhancement following intravenous contrast administration.    MRA head:    The intracranial segments of the ICAs are within normal limits.  The anterior cerebral arteries and anterior communicating artery complex are within normal limits.  The right middle cerebral artery is unremarkable.  The proximal segments of the left middle cerebral artery are unremarkable.  There is attenuated flow involving the distal segments of the left middle cerebral with no definitive occlusion.    The vertebral arteries are unremarkable.  There is tortuosity of the basilar.  The basilar remains patent.  The posterior cerebral arteries are within normal limits.  There are hypoplastic posterior communicating arteries.    MRA neck:    The great vessels arising from the aortic arch are within normal limits.  The bilateral common carotid arteries are within normal limits.  The internal and external carotid arteries are unremarkable.    The vertebral arteries are unremarkable.    There is no evidence of hemodynamically significant stenosis in the neck vessels.    There is no evidence of abnormal enhancement      Impression    MRI brain:    Large areas of acute infarction in the left temporal and parietal lobes along with punctate focus of infarction in the left occipital lobe.    MRA head:    No evidence of large vessel occlusion in the intracranial circulation.  Diminished flow within the distal segment of the left middle cerebral artery with no definitive occlusion.  CT angiogram may be obtained for follow-up.    MRA neck:    No hemodynamically  significant stenosis of the neck vessels.    This report was flagged in Epic as abnormal.      Electronically signed by: Finesse Ojeda MD  Date:    01/10/2020  Time:    23:43   MRA Brain without contrast    Narrative    EXAMINATION:  MRI BRAIN W WO CONTRAST; MRA BRAIN WITHOUT CONTRAST; MRA NECK WITH CONTRAST    CLINICAL HISTORY:  Stroke;    TECHNIQUE:  Multiplanar multisequence MR imaging of the brain was performed before and after the administration of 10 mL Gadavist intravenous contrast.    Per separate acquisition, 3D time-of-flight MRA of the intracranial circulation was performed.  MIP reconstructions were obtained and reviewed.    2D time-of-flight MRA of the neck was performed with intravenous contrast.  MIP reconstructions were obtained and reviewed.    COMPARISON:  CT dated 01/10/2020.    FINDINGS:  MRI of the brain:    The craniocervical junction is within normal limits.  The midline structures appear intact.  The intracranial flow voids appear within normal limits.    There is a large area of diffusion restriction in the left posterior temporal and left parietal lobes.  An adjacent small focus of diffuse is identified within the left occipital lobe.  There is associated T2/FLAIR signal hyperintense signal corresponding to the regions of diffusion weighted signal abnormality.    The ventricles and sulci are prominent, consistent with cerebral volume loss.  There are extensive Virchow Gomez spaces within the basal ganglia.  There are also old lacunar type infarctions in the basal ganglia and in the left thalamus.  There is a probable old lacunar type infarction in the left cerebellum.  There are no extra-axial fluid collections.  There is no evidence of intracranial hemorrhage.    The right orbit is unremarkable.  There are postoperative changes in the left orbit.  Paranasal sinuses are unremarkable.  The mastoid air cells are clear.  The calvarium is intact.    There is no evidence of abnormal enhancement  following intravenous contrast administration.    MRA head:    The intracranial segments of the ICAs are within normal limits.  The anterior cerebral arteries and anterior communicating artery complex are within normal limits.  The right middle cerebral artery is unremarkable.  The proximal segments of the left middle cerebral artery are unremarkable.  There is attenuated flow involving the distal segments of the left middle cerebral with no definitive occlusion.    The vertebral arteries are unremarkable.  There is tortuosity of the basilar.  The basilar remains patent.  The posterior cerebral arteries are within normal limits.  There are hypoplastic posterior communicating arteries.    MRA neck:    The great vessels arising from the aortic arch are within normal limits.  The bilateral common carotid arteries are within normal limits.  The internal and external carotid arteries are unremarkable.    The vertebral arteries are unremarkable.    There is no evidence of hemodynamically significant stenosis in the neck vessels.    There is no evidence of abnormal enhancement      Impression    MRI brain:    Large areas of acute infarction in the left temporal and parietal lobes along with punctate focus of infarction in the left occipital lobe.    MRA head:    No evidence of large vessel occlusion in the intracranial circulation.  Diminished flow within the distal segment of the left middle cerebral artery with no definitive occlusion.  CT angiogram may be obtained for follow-up.    MRA neck:    No hemodynamically significant stenosis of the neck vessels.    This report was flagged in Epic as abnormal.      Electronically signed by: Finesse Ojeda MD  Date:    01/10/2020  Time:    23:43   CT Head Without Contrast    Narrative    EXAMINATION:  CT HEAD WITHOUT CONTRAST    CLINICAL HISTORY:  Stroke;    TECHNIQUE:  Low dose axial CT images obtained throughout the head without intravenous contrast. Sagittal and coronal  reconstructions were performed.    COMPARISON:  None.    FINDINGS:  Intracranial compartment:    Ventricles and sulci are normal in size for age without evidence of hydrocephalus. No extra-axial blood or fluid collections.    There is a region of hypoattenuation with loss of gray-white differentiation and sulcal effacement in the left parietal lobe consistent with evolving infarction. Probable chronic lacunar infarcts in the right caudate head and bilateral basal ganglia.  No parenchymal mass or hemorrhage.  Periventricular white matter hypoattenuation in keeping with chronic microvascular disease.    Skull/extracranial contents (limited evaluation): No fracture. Mastoid air cells and paranasal sinuses are essentially clear.      Impression    Evolving left parietal lobe infarct.      Electronically signed by: Yasir Espinoza MD  Date:    01/10/2020  Time:    16:51         LABS:  Lab Results   Component Value Date    WBC 6.27 01/11/2020    HGB 12.8 (L) 01/11/2020    HCT 39.1 (L) 01/11/2020     (H) 01/11/2020     (L) 01/11/2020   CMP  Sodium   Date Value Ref Range Status   01/11/2020 137 136 - 145 mmol/L Final     Potassium   Date Value Ref Range Status   01/11/2020 4.1 3.5 - 5.1 mmol/L Final     Chloride   Date Value Ref Range Status   01/11/2020 107 95 - 110 mmol/L Final     CO2   Date Value Ref Range Status   01/11/2020 25 23 - 29 mmol/L Final     Glucose   Date Value Ref Range Status   01/11/2020 104 70 - 110 mg/dL Final     BUN, Bld   Date Value Ref Range Status   01/11/2020 15 8 - 23 mg/dL Final     Creatinine   Date Value Ref Range Status   01/11/2020 0.9 0.5 - 1.4 mg/dL Final     Calcium   Date Value Ref Range Status   01/11/2020 9.5 8.7 - 10.5 mg/dL Final     Total Protein   Date Value Ref Range Status   01/10/2020 6.8 6.0 - 8.4 g/dL Final     Albumin   Date Value Ref Range Status   01/10/2020 3.8 3.5 - 5.2 g/dL Final     Total Bilirubin   Date Value Ref Range Status   01/10/2020 1.3 (H) 0.1 -  1.0 mg/dL Final     Comment:     For infants and newborns, interpretation of results should be based  on gestational age, weight and in agreement with clinical  observations.  Premature Infant recommended reference ranges:  Up to 24 hours.............<8.0 mg/dL  Up to 48 hours............<12.0 mg/dL  3-5 days..................<15.0 mg/dL  6-29 days.................<15.0 mg/dL       Alkaline Phosphatase   Date Value Ref Range Status   01/10/2020 107 55 - 135 U/L Final     AST   Date Value Ref Range Status   01/10/2020 27 10 - 40 U/L Final     ALT   Date Value Ref Range Status   01/10/2020 18 10 - 44 U/L Final     Anion Gap   Date Value Ref Range Status   01/11/2020 5 (L) 8 - 16 mmol/L Final     eGFR if    Date Value Ref Range Status   01/11/2020 >60 >60 mL/min/1.73 m^2 Final     eGFR if non    Date Value Ref Range Status   01/11/2020 >60 >60 mL/min/1.73 m^2 Final     Comment:     Calculation used to obtain the estimated glomerular filtration  rate (eGFR) is the CKD-EPI equation.                 Assessment / Plan     Left MCA acute ischemic stroke     - etiology - Cardioembolic   - MRA neck no hemodynamically significant stenosis   - subtherapeutic INR 1.3   - if patient has non valvular Afib, rec NOACs instead of Coumadin @ 4 days   - Speech therapt / PT / ot   - agree with low dose statin   - rec SBP < 130 mmHg                Jovanni Osuna MD  Neurology   [facility]

## 2020-01-13 LAB
ANION GAP SERPL CALC-SCNC: 9 MMOL/L (ref 8–16)
BASOPHILS # BLD AUTO: 0.06 K/UL (ref 0–0.2)
BASOPHILS NFR BLD: 0.7 % (ref 0–1.9)
BUN SERPL-MCNC: 22 MG/DL (ref 8–23)
CALCIUM SERPL-MCNC: 9.3 MG/DL (ref 8.7–10.5)
CHLORIDE SERPL-SCNC: 105 MMOL/L (ref 95–110)
CO2 SERPL-SCNC: 24 MMOL/L (ref 23–29)
CREAT SERPL-MCNC: 1 MG/DL (ref 0.5–1.4)
DIFFERENTIAL METHOD: ABNORMAL
EOSINOPHIL # BLD AUTO: 0.4 K/UL (ref 0–0.5)
EOSINOPHIL NFR BLD: 5.2 % (ref 0–8)
ERYTHROCYTE [DISTWIDTH] IN BLOOD BY AUTOMATED COUNT: 15 % (ref 11.5–14.5)
EST. GFR  (AFRICAN AMERICAN): >60 ML/MIN/1.73 M^2
EST. GFR  (NON AFRICAN AMERICAN): >60 ML/MIN/1.73 M^2
GLUCOSE SERPL-MCNC: 83 MG/DL (ref 70–110)
HCT VFR BLD AUTO: 39.6 % (ref 40–54)
HGB BLD-MCNC: 12.8 G/DL (ref 14–18)
IMM GRANULOCYTES # BLD AUTO: 0.04 K/UL (ref 0–0.04)
IMM GRANULOCYTES NFR BLD AUTO: 0.5 % (ref 0–0.5)
INR PPP: 2 (ref 0.8–1.2)
LYMPHOCYTES # BLD AUTO: 1.5 K/UL (ref 1–4.8)
LYMPHOCYTES NFR BLD: 18 % (ref 18–48)
MCH RBC QN AUTO: 32.8 PG (ref 27–31)
MCHC RBC AUTO-ENTMCNC: 32.3 G/DL (ref 32–36)
MCV RBC AUTO: 102 FL (ref 82–98)
MONOCYTES # BLD AUTO: 1.1 K/UL (ref 0.3–1)
MONOCYTES NFR BLD: 13 % (ref 4–15)
NEUTROPHILS # BLD AUTO: 5.1 K/UL (ref 1.8–7.7)
NEUTROPHILS NFR BLD: 62.6 % (ref 38–73)
NRBC BLD-RTO: 0 /100 WBC
PLATELET # BLD AUTO: 121 K/UL (ref 150–350)
PMV BLD AUTO: 11 FL (ref 9.2–12.9)
POCT GLUCOSE: 108 MG/DL (ref 70–110)
POCT GLUCOSE: 109 MG/DL (ref 70–110)
POCT GLUCOSE: 125 MG/DL (ref 70–110)
POTASSIUM SERPL-SCNC: 3.8 MMOL/L (ref 3.5–5.1)
PROTHROMBIN TIME: 22.7 SEC (ref 9–12.5)
RBC # BLD AUTO: 3.9 M/UL (ref 4.6–6.2)
SODIUM SERPL-SCNC: 138 MMOL/L (ref 136–145)
WBC # BLD AUTO: 8.22 K/UL (ref 3.9–12.7)

## 2020-01-13 PROCEDURE — 11000001 HC ACUTE MED/SURG PRIVATE ROOM

## 2020-01-13 PROCEDURE — 94761 N-INVAS EAR/PLS OXIMETRY MLT: CPT

## 2020-01-13 PROCEDURE — 25000003 PHARM REV CODE 250: Performed by: PHYSICIAN ASSISTANT

## 2020-01-13 PROCEDURE — 97110 THERAPEUTIC EXERCISES: CPT | Mod: CQ

## 2020-01-13 PROCEDURE — 25000003 PHARM REV CODE 250: Performed by: EMERGENCY MEDICINE

## 2020-01-13 PROCEDURE — 85025 COMPLETE CBC W/AUTO DIFF WBC: CPT

## 2020-01-13 PROCEDURE — 80048 BASIC METABOLIC PNL TOTAL CA: CPT

## 2020-01-13 PROCEDURE — 85610 PROTHROMBIN TIME: CPT

## 2020-01-13 PROCEDURE — 36415 COLL VENOUS BLD VENIPUNCTURE: CPT

## 2020-01-13 PROCEDURE — 25000003 PHARM REV CODE 250: Performed by: INTERNAL MEDICINE

## 2020-01-13 PROCEDURE — 97116 GAIT TRAINING THERAPY: CPT | Mod: CQ

## 2020-01-13 PROCEDURE — 99232 PR SUBSEQUENT HOSPITAL CARE,LEVL II: ICD-10-PCS | Mod: ,,, | Performed by: INTERNAL MEDICINE

## 2020-01-13 PROCEDURE — 97535 SELF CARE MNGMENT TRAINING: CPT

## 2020-01-13 PROCEDURE — 92507 TX SP LANG VOICE COMM INDIV: CPT

## 2020-01-13 PROCEDURE — 99232 SBSQ HOSP IP/OBS MODERATE 35: CPT | Mod: ,,, | Performed by: INTERNAL MEDICINE

## 2020-01-13 RX ADMIN — ALLOPURINOL 300 MG: 300 TABLET ORAL at 08:01

## 2020-01-13 RX ADMIN — ATORVASTATIN CALCIUM 10 MG: 10 TABLET, FILM COATED ORAL at 08:01

## 2020-01-13 RX ADMIN — METOPROLOL SUCCINATE 25 MG: 25 TABLET, EXTENDED RELEASE ORAL at 08:01

## 2020-01-13 RX ADMIN — ACETAMINOPHEN 650 MG: 325 TABLET ORAL at 05:01

## 2020-01-13 RX ADMIN — LEVOTHYROXINE SODIUM 75 MCG: 75 TABLET ORAL at 05:01

## 2020-01-13 RX ADMIN — WARFARIN SODIUM 7.5 MG: 5 TABLET ORAL at 05:01
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
## 2020-01-13 NOTE — PROGRESS NOTES
[facility] Hospital Medicine  Progress Note    Patient Name: Chirag Thompson  MRN: [id number]  Patient Class: IP- Inpatient   Admission Date: 1/10/2020  Length of Stay: 3 days  Attending Physician: WELLINGTON Templeton MD  Primary Care Provider: Dany Marinelli Iii, MD        Subjective:     Principal Problem:Acute ischemic left MCA stroke        HPI:   79 y.o. male with PMHx HTN, AF on coumadin, DMII, venous stasis presented to ED via AMS for over 30 hours per EMS. Per ED, EMS reported the patient's home health nurse noticed he was not acting how he normally does at baseline and not answering questions appropriately PTA. The patient's nephew told EMS that the patient was normal at breakfast time yesterday. EMS reported the nephew stated after breakfast the patient stated he did not feel well and he slept for a little while. The nephew reported to EMS that after waking up the patient was confused, did not know who he was, did not know where he was, and became upset. The nephew reports to EMS he waited to send the patient to the ED after the home health nurse came check on him today. EMS reports the patient had no fever on arrival. The patient is not answering questions appropriately but keeps stating he feels okay. Information from ED, as patient unable to provide history, no family at bedside and no answer on number listed in chart. ED evaluation  CT head showed Evolving left parietal lobe infarct. Admitted.       Overview/Hospital Course:  Admitted with significant L MCA territory infarct. Physical capabilities not significantly impaired but with significant expressive and receptive aphasia. SLP, PT, and OT consulted as well as neurology. Medications adjusted and stroke evaluation continued.    Interval History: No acute events overnight.    Review of Systems   Unable to perform ROS: Other (Severe expressive aphasia.)     Objective:     Vital Signs (Most Recent):  Temp: 98.4 °F (36.9 °C) (01/13/20  1901)  Pulse: 72 (01/13/20 1901)  Resp: 18 (01/13/20 1901)  BP: 118/75 (01/13/20 1901)  SpO2: 97 % (01/13/20 1901) Vital Signs (24h Range):  Temp:  [97.6 °F (36.4 °C)-98.4 °F (36.9 °C)] 98.4 °F (36.9 °C)  Pulse:  [62-95] 72  Resp:  [18] 18  SpO2:  [92 %-99 %] 97 %  BP: (115-146)/(57-82) 118/75     Weight: 98.1 kg (216 lb 4.3 oz)  Body mass index is 31.03 kg/m².    Intake/Output Summary (Last 24 hours) at 1/13/2020 1905  Last data filed at 1/13/2020 0600  Gross per 24 hour   Intake 60 ml   Output --   Net 60 ml      Physical Exam   Constitutional: He appears well-developed. No distress.   HENT:   Head: Normocephalic and atraumatic.   Eyes: Conjunctivae and EOM are normal.   Cardiovascular: Normal rate and intact distal pulses.   Pulmonary/Chest: Effort normal. No respiratory distress.   Abdominal: Soft. There is no tenderness.   Musculoskeletal: Normal range of motion. He exhibits no edema.   Neurological: He is alert.   Significant expressive and receptive aphasia.   Skin: Skin is warm and dry.   Nursing note and vitals reviewed.    Significant Labs:   CBC:  Recent Labs   Lab 01/11/20 0357 01/12/20 0444 01/13/20 0455   WBC 6.27 7.93 8.22   HGB 12.8* 13.3* 12.8*   HCT 39.1* 40.4 39.6*   * 120* 121*   GRAN 66.4  4.2 70.7  5.6 62.6  5.1   LYMPH 17.1*  1.1 12.2*  1.0 18.0  1.5   MONO 9.6  0.6 11.3  0.9 13.0  1.1*   EOS 0.4 0.4 0.4   BASO 0.06 0.07 0.06      CMP:  Recent Labs   Lab 01/10/20  2012 01/11/20  0357 01/12/20  0444 01/13/20  0455   NA  --  137 139 138   K  --  4.1 4.1 3.8   CL  --  107 103 105   CO2  --  25 23 24   BUN  --  15 20 22   CREATININE  --  0.9 1.0 1.0   GLU  --  104 93 83   CALCIUM  --  9.5 9.8 9.3   MG 1.5*  --   --   --    PHOS 2.7  --   --   --    ANIONGAP  --  5* 13 9      Significant Imaging:   No new imaging this morning.      Assessment/Plan:      * Acute ischemic left MCA stroke  - L MCA territory infarcts involving L posterior temporal and L parietal with small focus in L  occipital lobe as well.  - Lipid panel with total cholesterol 150, LDL 79; continuing atorvastatin 10mg PO qHS.  - 2D Echo shows EF 55%, mild-moderate mitral regurgitation.  - PT/OT/SLP.  - Appreciate neurology assistance.    Atrial fibrillation  - Continuing metoprolol 25mg PO daily.  - Daily INR; continuing warfarin at 7.5mg PO daily.    Type 2 diabetes mellitus with circulatory disorder, without long-term current use of insulin  - HgbA1c 5.4.  - Continuing low-dose sliding scale insulin aspart 0-5U subQ TIDWM PRN.    Essential hypertension  - Initial permissive hypertension; continuing metoprolol as above with decent BPs.    VTE Risk Mitigation (From admission, onward)         Ordered     warfarin tablet 7.5 mg  Daily      01/12/20 1524     IP VTE HIGH RISK PATIENT  Once      01/10/20 2226     Place sequential compression device  Until discontinued      01/10/20 1847     Reason for No Pharmacological VTE Prophylaxis  Once     Question:  Reasons:  Answer:  Already adequately anticoagulated on oral Anticoagulants    01/10/20 1847                      ANDERS Templeton MD  Department of Hospital Medicine   [facility]

## 2020-01-13 NOTE — HOSPITAL COURSE
Admitted with significant L MCA territory infarct. Physical capabilities not significantly impaired but with significant expressive and receptive aphasia. SLP, PT, and OT consulted as well as neurology. Medications adjusted and stroke evaluation continued.

## 2020-01-13 NOTE — PLAN OF CARE
AVILA Thompson was notified by Es MCCLENDON that the request for auth for inpatient rehab has been sent to medical review.    CM contacted Es MCCLENDON [phone] to further discuss reasoning for the patient's case was sent for medical review. Es notified CM that all request for inpatient rehab are sent for medical review and she has include all notes from PT. OT, ST, and provider progress notes. Es will update CM when decision is made.

## 2020-01-13 NOTE — PLAN OF CARE
CM was notified by Kristin, [facility] re[facility] patient was accepted and she submitted to [facility] for auth.

## 2020-01-13 NOTE — PT/OT/SLP PROGRESS
"Occupational Therapy   Treatment    Name: Chirag Thompson  MRN: [id number]  Admitting Diagnosis:  Acute ischemic left MCA stroke       Recommendations:     Discharge Recommendations: rehabilitation facility  Discharge Equipment Recommendations:  walker, rolling, bedside commode  Barriers to discharge:   current level of function    Assessment:     Chirag Thompson is a 79 y.o. male with a medical diagnosis of Acute ischemic left MCA stroke.  He presents with continued expressive and receptive aphasia, often replying "good, good" or "yeah" when asked questions. Pt occasionally responding in brief sentences, but obvious pt doesn't understand question posed by therapist. Some nonsensical speech/jargon noted when attempting to communicate and pt speaking off topic at times. Pt able to follow brief, simple commands after demonstration and when given tactile cues. Pt remains high fall risk. Would benefit from IRF upon discharge for intense OT, PT and SLP.    Performance deficits affecting function are weakness, impaired self care skills, impaired functional mobilty, gait instability, impaired balance, decreased safety awareness, impaired cognition.     Rehab Prognosis:  Good; patient would benefit from acute skilled OT services to address these deficits and reach maximum level of function.       Plan:     Patient to be seen 6 x/week to address the above listed problems via self-care/home management, therapeutic activities, therapeutic exercises  · Plan of Care Expires: 01/25/20  · Plan of Care Reviewed with: patient    Subjective     Pain/Comfort:  Pain Rating 1: 0/10    Objective:     Communicated with: PT/RN prior to session.  Patient found up in chair with chair alarm and AVASYS in place upon OT entry to room.    General Precautions: Standard, aspiration, fall, aphasia   Orthopedic Precautions:N/A   Braces: N/A     Occupational Performance:     Functional Mobility/Transfers:  · Patient completed Sit <> Stand Transfer with " minimum assistance  with  rolling walker   · Patient completed Toilet Transfer Step Transfer technique with minimum assistance with  rolling walker  · Functional Mobility: Pt ambulated from chair to sink in room, to bathroom, to sink, and back to chair with RW and CGA. Occasional verbal and tactile cueing required for RW mgmt, particularly to transfer onto toilet. Difficulty maneuvering in bathroom due to small space but also due to communication barrier.    Activities of Daily Living:  · Grooming: minimum assistance standing sinkside to wash hands x 2 and brush teeth; assistance required to open toothbrush package and toothpaste, reach for soap and hand towels  · Toileting: minimum assistance for assistance with transfer on/off toilet with use of R grab bar and RW; pt able to perform jocelyne hygiene following BM while seated on toilet      [facility] 6 Click ADL: 18    Treatment & Education:  Provided education re: role of OT, safety with transfers/mobility, management of RW, and safety during ADLs.    Patient left up in chair with all lines intact, call button in reach and chair alarm onEducation:      GOALS:   Multidisciplinary Problems     Occupational Therapy Goals        Problem: Occupational Therapy Goal    Goal Priority Disciplines Outcome Interventions   Occupational Therapy Goal     OT, PT/OT Ongoing, Progressing    Description:  Goals to be met by: 1/25/2020     Patient will increase functional independence with ADLs by performing:    UE Dressing with Stand-by Assistance.  LE Dressing with Stand-by Assistance.  Grooming while standing at sink with Stand-by Assistance for 3 tasks.  Toileting from bedside commode with Stand-by Assistance for hygiene and clothing management.   Toilet transfer to bedside commode with Stand-by Assistance.                      Time Tracking:     OT Date of Treatment: 01/13/20  OT Start Time: 1407  OT Stop Time: 1425  OT Total Time (min): 18 min    Billable Minutes:Self Care/Home  Management 18    KATELYN Gaines  1/13/2020

## 2020-01-13 NOTE — PT/OT/SLP PROGRESS
Physical Therapy Treatment    Patient Name:  Chirag Thompson   MRN:  [id number]    Recommendations:     Discharge Recommendations:  rehabilitation facility   Discharge Equipment Recommendations: walker, rolling, bedside commode, bath bench   Barriers to discharge: Decreased caregiver support    Assessment:     Chirag Thompson is a 79 y.o. male admitted with a medical diagnosis of Acute ischemic left MCA stroke.  He presents with the following impairments/functional limitations:  weakness, impaired self care skills, impaired functional mobilty, gait instability, impaired balance, impaired cognition, decreased safety awareness ,  Pt presented sitting in bedside chair upon arrival of PT. Pt agreeable to PT. Pt progressing towards goals. Pt sit <> stand w/ SPC and CGA. Pt amb'd 300' w/ SPC and CGA..    Rehab Prognosis: Good; patient would benefit from acute skilled PT services to address these deficits and reach maximum level of function.    Recent Surgery: * No surgery found *      Plan:     During this hospitalization, patient to be seen 6 x/week to address the identified rehab impairments via gait training, therapeutic activities, therapeutic exercises, neuromuscular re-education and progress toward the following goals:    · Plan of Care Expires:  02/11/20    Subjective     Chief Complaint: none stated  Patient/Family Comments/goals:  None stated  Pain/Comfort:  · Pain Rating 1: 0/10      Objective:     Communicated with ns(Farzana) prior to session.  Patient found up in chair with peripheral IV, telemetry(AVASYS) upon PT entry to room.     General Precautions: Standard, aspiration, aphasia, fall   Orthopedic Precautions:N/A   Braces: N/A     Functional Mobility:  · Transfers:     · Sit to Stand:  contact guard assistance with straight cane  · Gait: 300' w/ SPC and CGA      AM-PAC 6 CLICK MOBILITY  Turning over in bed (including adjusting bedclothes, sheets and blankets)?: 4  Sitting down on and standing up from a chair  with arms (e.g., wheelchair, bedside commode, etc.): 3  Moving from lying on back to sitting on the side of the bed?: 4  Moving to and from a bed to a chair (including a wheelchair)?: 3  Need to walk in hospital room?: 3  Climbing 3-5 steps with a railing?: 3  Basic Mobility Total Score: 20       Therapeutic Activities and Exercises:   Pt performed seated therapeutic exercises including hip flexion, AP and LAQs x 10 reps with verbal and tactile cues.      Patient left up in chair with all lines intact, NS notified,  call button in reach and chair alarm on..    GOALS:   Multidisciplinary Problems     Physical Therapy Goals        Problem: Physical Therapy Goal    Goal Priority Disciplines Outcome Goal Variances Interventions   Physical Therapy Goal     PT, PT/OT Ongoing, Progressing     Description:  Goals to be met by 1-.  1. Sit<>stand with RW mod I  2. amb 150' with RW mod I  3. Up/down 3 steps B rail mod I                    Time Tracking:     PT Received On: 01/13/20  PT Start Time: 0850     PT Stop Time: 0915  PT Total Time (min): 25 min     Billable Minutes: Gait Training 12 and Therapeutic Exercise 13    Treatment Type: Treatment  PT/PTA: PTA     PTA Visit Number: 2     Houston Lin, PTA  01/13/2020

## 2020-01-13 NOTE — PLAN OF CARE
Problem: Occupational Therapy Goal  Goal: Occupational Therapy Goal  Description  Goals to be met by: 1/25/2020     Patient will increase functional independence with ADLs by performing:    UE Dressing with Stand-by Assistance.  LE Dressing with Stand-by Assistance.  Grooming while standing at sink with Stand-by Assistance for 3 tasks.  Toileting from bedside commode with Stand-by Assistance for hygiene and clothing management.   Toilet transfer to bedside commode with Stand-by Assistance.     Outcome: Ongoing, Progressing     Pt continues to benefit from acute OT services. Agreeable to all aspects of session, but limited due to aphasia. Would benefit from rehab.

## 2020-01-13 NOTE — PLAN OF CARE
Patient admitted for stroke.  No physical deficits noted but he does have expressive aphasia and is impulsive. Ambulates with cane. Skin intact, room air, vitals signs wihtin normal limits
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
## 2020-01-13 NOTE — PLAN OF CARE
Problem: Physical Therapy Goal  Goal: Physical Therapy Goal  Description  Goals to be met by 1-.  1. Sit<>stand with RW mod I  2. amb 150' with RW mod I  3. Up/down 3 steps B rail mod I   Outcome: Ongoing, Progressing   Pt presented sitting in bedside chair upon arrival of PT. Pt agreeable to PT. Pt progressing towards goals. Pt sit <> stand w/ SPC and CGA. Pt amb'd 300' w/ SPC and CGA.

## 2020-01-14 PROBLEM — R47.01 APHASIA: Status: ACTIVE | Noted: 2020-01-14

## 2020-01-14 PROBLEM — I63.512 ACUTE ISCHEMIC LEFT MIDDLE CEREBRAL ARTERY (MCA) STROKE: Status: ACTIVE | Noted: 2020-01-14

## 2020-01-14 PROBLEM — Z79.01 CHRONIC ANTICOAGULATION: Status: ACTIVE | Noted: 2020-01-14

## 2020-01-14 PROBLEM — G93.6 CYTOTOXIC CEREBRAL EDEMA: Status: ACTIVE | Noted: 2020-01-14

## 2020-01-14 PROBLEM — G81.11 RIGHT SPASTIC HEMIPARESIS: Status: ACTIVE | Noted: 2020-01-14

## 2020-01-14 PROBLEM — E03.9 ACQUIRED HYPOTHYROIDISM: Status: ACTIVE | Noted: 2020-01-14

## 2020-01-14 PROBLEM — D68.59 HYPERCOAGULABLE STATE: Status: ACTIVE | Noted: 2020-01-14

## 2020-01-14 PROBLEM — E87.1 HYPONATREMIA: Status: ACTIVE | Noted: 2020-01-14

## 2020-01-14 LAB
ALBUMIN SERPL BCP-MCNC: 3.8 G/DL (ref 3.5–5.2)
ALLENS TEST: ABNORMAL
ALLENS TEST: ABNORMAL
ALP SERPL-CCNC: 93 U/L (ref 55–135)
ALT SERPL W/O P-5'-P-CCNC: 39 U/L (ref 10–44)
ANION GAP SERPL CALC-SCNC: 10 MMOL/L (ref 8–16)
APTT BLDCRRT: 35.8 SEC (ref 21–32)
AST SERPL-CCNC: 82 U/L (ref 10–40)
BACTERIA #/AREA URNS AUTO: NORMAL /HPF
BASOPHILS # BLD AUTO: 0.1 K/UL (ref 0–0.2)
BASOPHILS NFR BLD: 1.1 % (ref 0–1.9)
BILIRUB SERPL-MCNC: 1.2 MG/DL (ref 0.1–1)
BILIRUB UR QL STRIP: NEGATIVE
BUN SERPL-MCNC: 25 MG/DL (ref 8–23)
CALCIUM SERPL-MCNC: 9.4 MG/DL (ref 8.7–10.5)
CHLORIDE SERPL-SCNC: 105 MMOL/L (ref 95–110)
CLARITY UR REFRACT.AUTO: CLEAR
CO2 SERPL-SCNC: 20 MMOL/L (ref 23–29)
COLOR UR AUTO: YELLOW
CREAT SERPL-MCNC: 1 MG/DL (ref 0.5–1.4)
CREAT SERPL-MCNC: 1 MG/DL (ref 0.5–1.4)
DELSYS: ABNORMAL
DIFFERENTIAL METHOD: ABNORMAL
EOSINOPHIL # BLD AUTO: 0.5 K/UL (ref 0–0.5)
EOSINOPHIL NFR BLD: 5.7 % (ref 0–8)
ERYTHROCYTE [DISTWIDTH] IN BLOOD BY AUTOMATED COUNT: 15 % (ref 11.5–14.5)
EST. GFR  (AFRICAN AMERICAN): >60 ML/MIN/1.73 M^2
EST. GFR  (NON AFRICAN AMERICAN): >60 ML/MIN/1.73 M^2
GLUCOSE SERPL-MCNC: 108 MG/DL (ref 70–110)
GLUCOSE UR QL STRIP: NEGATIVE
HCO3 UR-SCNC: 22.2 MMOL/L (ref 24–28)
HCO3 UR-SCNC: 22.3 MMOL/L (ref 24–28)
HCT VFR BLD AUTO: 40.8 % (ref 40–54)
HGB BLD-MCNC: 13.4 G/DL (ref 14–18)
HGB UR QL STRIP: ABNORMAL
IMM GRANULOCYTES # BLD AUTO: 0.05 K/UL (ref 0–0.04)
IMM GRANULOCYTES NFR BLD AUTO: 0.5 % (ref 0–0.5)
INR PPP: 2.4 (ref 0.8–1.2)
KETONES UR QL STRIP: ABNORMAL
LEUKOCYTE ESTERASE UR QL STRIP: NEGATIVE
LYMPHOCYTES # BLD AUTO: 1.8 K/UL (ref 1–4.8)
LYMPHOCYTES NFR BLD: 19.1 % (ref 18–48)
MCH RBC QN AUTO: 32.9 PG (ref 27–31)
MCHC RBC AUTO-ENTMCNC: 32.8 G/DL (ref 32–36)
MCV RBC AUTO: 100 FL (ref 82–98)
MICROSCOPIC COMMENT: NORMAL
MODE: ABNORMAL
MONOCYTES # BLD AUTO: 1.3 K/UL (ref 0.3–1)
MONOCYTES NFR BLD: 13.6 % (ref 4–15)
NEUTROPHILS # BLD AUTO: 5.7 K/UL (ref 1.8–7.7)
NEUTROPHILS NFR BLD: 60 % (ref 38–73)
NITRITE UR QL STRIP: NEGATIVE
NRBC BLD-RTO: 0 /100 WBC
PCO2 BLDA: 31.1 MMHG (ref 35–45)
PCO2 BLDA: 32.6 MMHG (ref 35–45)
PH SMN: 7.44 [PH] (ref 7.35–7.45)
PH SMN: 7.46 [PH] (ref 7.35–7.45)
PH UR STRIP: 5 [PH] (ref 5–8)
PLATELET # BLD AUTO: 137 K/UL (ref 150–350)
PMV BLD AUTO: 10.7 FL (ref 9.2–12.9)
PO2 BLDA: 67 MMHG (ref 80–100)
PO2 BLDA: 72 MMHG (ref 80–100)
POC BE: -2 MMOL/L
POC BE: -2 MMOL/L
POC PTINR: 2 (ref 0.9–1.2)
POC PTWBT: 23.2 SEC (ref 9.7–14.3)
POC SATURATED O2: 94 % (ref 95–100)
POC SATURATED O2: 95 % (ref 95–100)
POC TCO2: 23 MMOL/L (ref 23–27)
POC TCO2: 23 MMOL/L (ref 23–27)
POCT GLUCOSE: 100 MG/DL (ref 70–110)
POCT GLUCOSE: 101 MG/DL (ref 70–110)
POCT GLUCOSE: 107 MG/DL (ref 70–110)
POCT GLUCOSE: 87 MG/DL (ref 70–110)
POCT GLUCOSE: 90 MG/DL (ref 70–110)
POCT GLUCOSE: 97 MG/DL (ref 70–110)
POTASSIUM SERPL-SCNC: 4 MMOL/L (ref 3.5–5.1)
PROT SERPL-MCNC: 6.7 G/DL (ref 6–8.4)
PROT UR QL STRIP: NEGATIVE
PROTHROMBIN TIME: 26.9 SEC (ref 9–12.5)
RBC # BLD AUTO: 4.07 M/UL (ref 4.6–6.2)
RBC #/AREA URNS AUTO: 1 /HPF (ref 0–4)
SAMPLE: ABNORMAL
SAMPLE: NORMAL
SITE: ABNORMAL
SITE: ABNORMAL
SODIUM SERPL-SCNC: 135 MMOL/L (ref 136–145)
SP GR UR STRIP: >=1.03 (ref 1–1.03)
SP02: 99
SQUAMOUS #/AREA URNS AUTO: 0 /HPF
URN SPEC COLLECT METH UR: ABNORMAL
WBC # BLD AUTO: 9.48 K/UL (ref 3.9–12.7)
WBC #/AREA URNS AUTO: 4 /HPF (ref 0–5)

## 2020-01-14 PROCEDURE — 92610 EVALUATE SWALLOWING FUNCTION: CPT

## 2020-01-14 PROCEDURE — 85610 PROTHROMBIN TIME: CPT

## 2020-01-14 PROCEDURE — 63600175 PHARM REV CODE 636 W HCPCS: Performed by: STUDENT IN AN ORGANIZED HEALTH CARE EDUCATION/TRAINING PROGRAM

## 2020-01-14 PROCEDURE — 99291 CRITICAL CARE FIRST HOUR: CPT | Mod: 25,ICN,, | Performed by: NURSE PRACTITIONER

## 2020-01-14 PROCEDURE — A4217 STERILE WATER/SALINE, 500 ML: HCPCS | Performed by: STUDENT IN AN ORGANIZED HEALTH CARE EDUCATION/TRAINING PROGRAM

## 2020-01-14 PROCEDURE — 36600 WITHDRAWAL OF ARTERIAL BLOOD: CPT

## 2020-01-14 PROCEDURE — 97162 PT EVAL MOD COMPLEX 30 MIN: CPT

## 2020-01-14 PROCEDURE — 99900035 HC TECH TIME PER 15 MIN (STAT)

## 2020-01-14 PROCEDURE — G0425 PR INPT TELEHEALTH CONSULT 30M: ICD-10-PCS | Mod: GT,,, | Performed by: PSYCHIATRY & NEUROLOGY

## 2020-01-14 PROCEDURE — 99292 CRITICAL CARE ADDL 30 MIN: CPT | Mod: GC,,, | Performed by: ANESTHESIOLOGY

## 2020-01-14 PROCEDURE — G0425 INPT/ED TELECONSULT30: HCPCS | Mod: GT,,, | Performed by: PSYCHIATRY & NEUROLOGY

## 2020-01-14 PROCEDURE — 85730 THROMBOPLASTIN TIME PARTIAL: CPT

## 2020-01-14 PROCEDURE — 82803 BLOOD GASES ANY COMBINATION: CPT

## 2020-01-14 PROCEDURE — 99223 PR INITIAL HOSPITAL CARE,LEVL III: ICD-10-PCS | Mod: GC,,, | Performed by: PSYCHIATRY & NEUROLOGY

## 2020-01-14 PROCEDURE — 93010 EKG 12-LEAD: ICD-10-PCS | Mod: ,,, | Performed by: INTERNAL MEDICINE

## 2020-01-14 PROCEDURE — 25000003 PHARM REV CODE 250: Performed by: STUDENT IN AN ORGANIZED HEALTH CARE EDUCATION/TRAINING PROGRAM

## 2020-01-14 PROCEDURE — 25000003 PHARM REV CODE 250: Performed by: NURSE PRACTITIONER

## 2020-01-14 PROCEDURE — 97166 OT EVAL MOD COMPLEX 45 MIN: CPT

## 2020-01-14 PROCEDURE — 82565 ASSAY OF CREATININE: CPT

## 2020-01-14 PROCEDURE — 99291 PR CRITICAL CARE, E/M 30-74 MINUTES: ICD-10-PCS | Mod: 25,ICN,, | Performed by: NURSE PRACTITIONER

## 2020-01-14 PROCEDURE — 85025 COMPLETE CBC W/AUTO DIFF WBC: CPT

## 2020-01-14 PROCEDURE — 93041 RHYTHM ECG TRACING: CPT

## 2020-01-14 PROCEDURE — 93010 ELECTROCARDIOGRAM REPORT: CPT | Mod: ,,, | Performed by: INTERNAL MEDICINE

## 2020-01-14 PROCEDURE — 93005 ELECTROCARDIOGRAM TRACING: CPT

## 2020-01-14 PROCEDURE — 80053 COMPREHEN METABOLIC PANEL: CPT

## 2020-01-14 PROCEDURE — 36415 COLL VENOUS BLD VENIPUNCTURE: CPT

## 2020-01-14 PROCEDURE — 25500020 PHARM REV CODE 255: Performed by: INTERNAL MEDICINE

## 2020-01-14 PROCEDURE — 20000000 HC ICU ROOM

## 2020-01-14 PROCEDURE — 99223 1ST HOSP IP/OBS HIGH 75: CPT | Mod: GC,,, | Performed by: PSYCHIATRY & NEUROLOGY

## 2020-01-14 PROCEDURE — 99292 PR CRITICAL CARE, ADDL 30 MIN: ICD-10-PCS | Mod: GC,,, | Performed by: ANESTHESIOLOGY

## 2020-01-14 PROCEDURE — 81001 URINALYSIS AUTO W/SCOPE: CPT

## 2020-01-14 RX ORDER — GLUCAGON 1 MG
1 KIT INJECTION
Status: DISCONTINUED | OUTPATIENT
Start: 2020-01-14 | End: 2020-01-18

## 2020-01-14 RX ORDER — ATORVASTATIN CALCIUM 10 MG/1
40 TABLET, FILM COATED ORAL DAILY
Status: DISCONTINUED | OUTPATIENT
Start: 2020-01-14 | End: 2020-01-14

## 2020-01-14 RX ORDER — LEVOTHYROXINE SODIUM 75 UG/1
75 TABLET ORAL
Status: DISCONTINUED | OUTPATIENT
Start: 2020-01-15 | End: 2020-01-14

## 2020-01-14 RX ORDER — ALLOPURINOL 300 MG/1
300 TABLET ORAL DAILY
Status: DISCONTINUED | OUTPATIENT
Start: 2020-01-14 | End: 2020-01-14

## 2020-01-14 RX ORDER — POTASSIUM CHLORIDE 7.45 MG/ML
60 INJECTION INTRAVENOUS
Status: DISCONTINUED | OUTPATIENT
Start: 2020-01-14 | End: 2020-01-17

## 2020-01-14 RX ORDER — SODIUM CHLORIDE 0.9 % (FLUSH) 0.9 %
10 SYRINGE (ML) INJECTION
Status: DISCONTINUED | OUTPATIENT
Start: 2020-01-14 | End: 2020-01-30 | Stop reason: HOSPADM

## 2020-01-14 RX ORDER — POTASSIUM CHLORIDE 7.45 MG/ML
40 INJECTION INTRAVENOUS
Status: DISCONTINUED | OUTPATIENT
Start: 2020-01-14 | End: 2020-01-17

## 2020-01-14 RX ORDER — MAGNESIUM SULFATE HEPTAHYDRATE 40 MG/ML
4 INJECTION, SOLUTION INTRAVENOUS
Status: DISCONTINUED | OUTPATIENT
Start: 2020-01-14 | End: 2020-01-17

## 2020-01-14 RX ORDER — AMOXICILLIN 250 MG
1 CAPSULE ORAL 2 TIMES DAILY
Status: DISCONTINUED | OUTPATIENT
Start: 2020-01-14 | End: 2020-01-30 | Stop reason: HOSPADM

## 2020-01-14 RX ORDER — ACETAMINOPHEN 650 MG/1
650 SUPPOSITORY RECTAL EVERY 6 HOURS PRN
Status: DISCONTINUED | OUTPATIENT
Start: 2020-01-14 | End: 2020-01-30 | Stop reason: HOSPADM

## 2020-01-14 RX ORDER — ATORVASTATIN CALCIUM 10 MG/1
40 TABLET, FILM COATED ORAL DAILY
Status: DISCONTINUED | OUTPATIENT
Start: 2020-01-15 | End: 2020-01-14

## 2020-01-14 RX ORDER — SODIUM CHLORIDE 9 MG/ML
INJECTION, SOLUTION INTRAVENOUS CONTINUOUS
Status: DISCONTINUED | OUTPATIENT
Start: 2020-01-14 | End: 2020-01-16

## 2020-01-14 RX ORDER — POTASSIUM CHLORIDE 7.45 MG/ML
80 INJECTION INTRAVENOUS
Status: DISCONTINUED | OUTPATIENT
Start: 2020-01-14 | End: 2020-01-17

## 2020-01-14 RX ORDER — METOPROLOL TARTRATE 25 MG/1
12.5 TABLET ORAL 2 TIMES DAILY
Status: DISCONTINUED | OUTPATIENT
Start: 2020-01-14 | End: 2020-01-19

## 2020-01-14 RX ORDER — INSULIN ASPART 100 [IU]/ML
1-10 INJECTION, SOLUTION INTRAVENOUS; SUBCUTANEOUS EVERY 6 HOURS PRN
Status: DISCONTINUED | OUTPATIENT
Start: 2020-01-14 | End: 2020-01-18

## 2020-01-14 RX ORDER — IBUPROFEN 200 MG
24 TABLET ORAL
Status: DISCONTINUED | OUTPATIENT
Start: 2020-01-14 | End: 2020-01-30 | Stop reason: HOSPADM

## 2020-01-14 RX ORDER — ONDANSETRON 2 MG/ML
4 INJECTION INTRAMUSCULAR; INTRAVENOUS EVERY 8 HOURS PRN
Status: DISCONTINUED | OUTPATIENT
Start: 2020-01-14 | End: 2020-01-30 | Stop reason: HOSPADM

## 2020-01-14 RX ORDER — IBUPROFEN 200 MG
16 TABLET ORAL
Status: DISCONTINUED | OUTPATIENT
Start: 2020-01-14 | End: 2020-01-30 | Stop reason: HOSPADM

## 2020-01-14 RX ORDER — MAGNESIUM SULFATE HEPTAHYDRATE 40 MG/ML
2 INJECTION, SOLUTION INTRAVENOUS
Status: DISCONTINUED | OUTPATIENT
Start: 2020-01-14 | End: 2020-01-17

## 2020-01-14 RX ORDER — ATORVASTATIN CALCIUM 20 MG/1
40 TABLET, FILM COATED ORAL DAILY
Status: DISCONTINUED | OUTPATIENT
Start: 2020-01-15 | End: 2020-01-30 | Stop reason: HOSPADM

## 2020-01-14 RX ORDER — AMOXICILLIN 250 MG
1 CAPSULE ORAL 2 TIMES DAILY
Status: DISCONTINUED | OUTPATIENT
Start: 2020-01-14 | End: 2020-01-14

## 2020-01-14 RX ADMIN — IOHEXOL 100 ML: 350 INJECTION, SOLUTION INTRAVENOUS at 02:01

## 2020-01-14 RX ADMIN — SODIUM CHLORIDE: 0.9 INJECTION, SOLUTION INTRAVENOUS at 10:01

## 2020-01-14 RX ADMIN — SENNOSIDES AND DOCUSATE SODIUM 1 TABLET: 8.6; 5 TABLET ORAL at 09:01

## 2020-01-14 RX ADMIN — SODIUM CHLORIDE: 234 INJECTION INTRAMUSCULAR; INTRAVENOUS; SUBCUTANEOUS at 02:01

## 2020-01-14 RX ADMIN — SODIUM CHLORIDE 500 ML: 0.9 INJECTION, SOLUTION INTRAVENOUS at 10:01

## 2020-01-14 NOTE — PT/OT/SLP EVAL
Occupational Therapy   Evaluation    Name: Chirag Thompson  MRN: [id number]  Admitting Diagnosis:  Acute ischemic left MCA stroke      Recommendations:     Discharge Recommendations: rehabilitation facility(return pending progression in care)  Discharge Equipment Recommendations:  (TBD with progression in care/at next level of care)  Barriers to discharge:  (level of skilled assistance required)    Assessment:     Chirag Thompson is a 79 y.o. male with a medical diagnosis of Acute ischemic left MCA stroke.  He presents with R hemiparesis, aphasia and a significant decline in his functional indep and participation with all functional tasks and activities. Pt remains in ED for OT assessment- no additional treatment completed on this date. Plan of care to 4x/w at this time. Performance deficits affecting function: weakness, impaired endurance, impaired self care skills, impaired functional mobilty, gait instability, impaired balance, decreased upper extremity function, decreased lower extremity function, visual deficits, impaired cognition, decreased coordination, impaired fine motor, impaired coordination, impaired skin, impaired cardiopulmonary response to activity.      Rehab Prognosis: Fair; patient would benefit from acute skilled OT services to address these deficits and reach maximum level of function.       Plan:     Patient to be seen 4 x/week to address the above listed problems via self-care/home management, therapeutic activities, therapeutic exercises, neuromuscular re-education, cognitive retraining  · Plan of Care Expires: 02/13/20  · Plan of Care Reviewed with: patient    Subjective     Chief Complaint: unable to report  Patient/Family Comments/goals: unable to report    Occupational Profile: (per rehab notes 2/2 aphasia)  L[facility] with nephew [facility] with 4 SARA and R-handrail; [facility]  MOD I with ADL and ambulation using SPC. Pt. Performing IADL tasks though nephew would drive;  Pt. Also would make out grocery list for nephew to go grocery shopping.    Equipment Used at Home:  cane, straight  Assistance upon Discharge: nephew     Pain/Comfort:  · Pain Rating 1: 0/10(pt unable to report; no indications)  · Pain Rating Post-Intervention 1: 0/10    Patients cultural, spiritual, Moravian conflicts given the current situation: no    Objective:     Communicated with: RN prior to session.  Completed with PT in ED. Patient found HOB elevated with blood pressure cuff, telemetry, peripheral IV, pulse ox (continuous) upon OT entry to room.    General Precautions: Standard, aphasia, aspiration, fall, NPO, vision impaired   Orthopedic Precautions:N/A   Braces: N/A     Occupational Performance:    Bed Mobility:    · Patient completed Rolling/Turning to Right with total assistance and 2 persons  · Patient completed Supine to Sit with total assistance and 2 persons  · Patient completed Sit to Supine with total assistance and 2 persons    Functional Mobility/Transfers:  · Patient completed Sit <> Stand Transfer with total(A)- dependence  with  no assistive device     Activities of Daily Living:  Total care    Cognitive/Visual Perceptual:  Cognitive/Psychosocial Skills:     -       Oriented to: unable to report   -       Follows Commands/attention:0%  -       Communication: aphasia; mumbles at times  -       Memory: No Deficits noted and unable to assess  -       Safety awareness/insight to disability: impaired   -       Mood/Affect/Coping skills/emotional control: Lethargic  Visual/Perceptual:      -Impaired  L gaze ; able to get to midline x2 trials      Physical Exam:  Postural examination/scapula alignment:    -       Rounded shoulders  -       Forward head  -       Posterior pelvic tilt  Skin integrity: Tear of L bicep; L calve  Edema:  Mild R UE  Sensation:    -       Impaired  R side  Motor Planning:    -       Impaired R side  Upper Extremity Range of Motion:     -       Right Upper Extremity:  AAROM WFL; limitations for shoulder flex to 100*  -       Left Upper Extremity: AAROM WFL  Upper Extremity Strength:    -       Right Upper Extremity: 1/5  -       Left Upper Extremity: 4+/5   Strength:    -       Right Upper Extremity: 0/5  -       Left Upper Extremity: WFL  Fine Motor Coordination:    -       Impaired  R  Gross motor coordination:   R hemiplegia/paresis   *extensor tone at times with R UE    AMPAC 6 Click ADL:  AMPAC Total Score: 7   Body mass index is 31.03 kg/m².  Vitals:    01/14/20 1232   BP: 138/66   Pulse: 90   Resp:    Temp:        Treatment & Education:  -Pt requiring max cues for stimulation and eyes open upon arrival  -edu on therapy role in care  -no family at bedside for education   -seated EOB with total(A) for postural control; facilitation for R UE in supportive extended arm weight bearing  -following stand, pt with increased extensor tone requiring return to supine  -positioned in upright position; R UE in elevation and extension with abduction   Education:    Patient left HOB elevated with all lines intact, call button in reach and RN notified    GOALS:   Multidisciplinary Problems     Occupational Therapy Goals        Problem: Occupational Therapy Goal    Goal Priority Disciplines Outcome Interventions   Occupational Therapy Goal     OT, PT/OT Ongoing, Progressing    Description:  Goals to be met by: 1/28     Patient will increase functional independence with ADLs by performing:    UE Dressing while seated EOB with Moderate Assistance.  Grooming while EOB with Moderate Assistance.  Sitting at edge of bed x10 minutes with Moderate Assistance.  Rolling to Bilateral with Moderate Assistance and use of bedrail as needed.   Supine to sit with Moderate Assistance.  Squat pivot transfers with Maximum Assistance.                      History:     Past Medical History:   Diagnosis Date    Arthritis     Chronic a-fib     Current use of long term anticoagulation     Hypertension      Pressure ulcer of right leg, unspecified pressure ulcer stage     was going to [facility] healed now    Stroke due to embolism of left middle cerebral artery 01/10/2020    left temp parietal    Thyroid disease        Past Surgical History:   Procedure Laterality Date    HERNIA REPAIR         Time Tracking:     OT Date of Treatment: 01/14/20  OT Start Time: 1023  OT Stop Time: 1041  OT Total Time (min): 18 min    Billable Minutes:Evaluation 18    KATELYN Ivey  1/14/2020

## 2020-01-14 NOTE — PLAN OF CARE
Problem: Physical Therapy Goal  Goal: Physical Therapy Goal  Description  Goals to be met by: 2020     Patient will increase functional independence with mobility by performin. Supine to sit with Moderate Assistance  2. Sit to supine with Moderate Assistance  3. Sit to stand transfer with Moderate Assistance  4. Bed to chair transfer with Moderate Assistance using LRAD  5. Gait  x 15 feet with Moderate Assistance using LRAD.   6. Sitting at edge of bed x10 minutes with Contact Guard Assistance  7. Stand for 1 minute with Contact Guard Assistance using LRAD  8. Lower extremity exercise program x15 reps per handout, with assistance as needed     Outcome: Ongoing, Progressing     Goals set

## 2020-01-14 NOTE — CONSULTS
Inpatient consult to Physical Medicine Rehab  Consult performed by: Norma Duarte NP  Consult ordered by: Connor Pagan NP  Reason for consult: Assess rehab needs      Reviewed patient history and current admission.  Rehab team following.  Full consult to follow.    ELLIS Richey, FNP-C  Physical Medicine & Rehabilitation   01/14/2020  Spectralink: [id number]

## 2020-01-14 NOTE — SUBJECTIVE & OBJECTIVE
Interval History: No acute events overnight.    Review of Systems   Unable to perform ROS: Other (Severe expressive aphasia.)     Objective:     Vital Signs (Most Recent):  Temp: 98.4 °F (36.9 °C) (01/13/20 1901)  Pulse: 72 (01/13/20 1901)  Resp: 18 (01/13/20 1901)  BP: 118/75 (01/13/20 1901)  SpO2: 97 % (01/13/20 1901) Vital Signs (24h Range):  Temp:  [97.6 °F (36.4 °C)-98.4 °F (36.9 °C)] 98.4 °F (36.9 °C)  Pulse:  [62-95] 72  Resp:  [18] 18  SpO2:  [92 %-99 %] 97 %  BP: (115-146)/(57-82) 118/75     Weight: 98.1 kg (216 lb 4.3 oz)  Body mass index is 31.03 kg/m².    Intake/Output Summary (Last 24 hours) at 1/13/2020 1905  Last data filed at 1/13/2020 0600  Gross per 24 hour   Intake 60 ml   Output --   Net 60 ml      Physical Exam   Constitutional: He appears well-developed. No distress.   HENT:   Head: Normocephalic and atraumatic.   Eyes: Conjunctivae and EOM are normal.   Cardiovascular: Normal rate and intact distal pulses.   Pulmonary/Chest: Effort normal. No respiratory distress.   Abdominal: Soft. There is no tenderness.   Musculoskeletal: Normal range of motion. He exhibits no edema.   Neurological: He is alert.   Significant expressive and receptive aphasia.   Skin: Skin is warm and dry.   Nursing note and vitals reviewed.    Significant Labs:   CBC:  Recent Labs   Lab 01/11/20  0357 01/12/20  0444 01/13/20  0455   WBC 6.27 7.93 8.22   HGB 12.8* 13.3* 12.8*   HCT 39.1* 40.4 39.6*   * 120* 121*   GRAN 66.4  4.2 70.7  5.6 62.6  5.1   LYMPH 17.1*  1.1 12.2*  1.0 18.0  1.5   MONO 9.6  0.6 11.3  0.9 13.0  1.1*   EOS 0.4 0.4 0.4   BASO 0.06 0.07 0.06      CMP:  Recent Labs   Lab 01/10/20  2012 01/11/20  0357 01/12/20  0444 01/13/20  0455   NA  --  137 139 138   K  --  4.1 4.1 3.8   CL  --  107 103 105   CO2  --  25 23 24   BUN  --  15 20 22   CREATININE  --  0.9 1.0 1.0   GLU  --  104 93 83   CALCIUM  --  9.5 9.8 9.3   MG 1.5*  --   --   --    PHOS 2.7  --   --   --    ANIONGAP  --  5* 13 9       Significant Imaging:   No new imaging this morning.

## 2020-01-14 NOTE — H&P
[facility]  Neurocritical Care  History & Physical    Admit Date: 1/10/2020  Service Date: 01/14/2020  Length of Stay: 4    Subjective:     Chief Complaint: Acute ischemic left MCA stroke    History of Present Illness: 78 y/o male, transferred to [facility] for thrombectomy of left M1 occlusion. On 1-9-20, he started having difficulty speaking. On 1-10-20 still no improvement was taken to [facility] that revealed a left temp pariatel infarct, causing aphasia. He has hx of afib and is on coumadin. He was admitted to [facility] and d/c recommendation was rehab, he was was waiting on placement. On 1-14-20, around 0050, it was noticed he could  not move his right sided and was no longer trying to talk or following commands. He was moved to ICU and telemedicne consult was done with Dr Olivares. No TPA as INR 2.0. Recommednation was to transfer to [facility] for further evaluation. CTA head and neck done revealing M1 occlusion.   Upon arrival patient still not talking or following commands, not moving the right side. Patient to MRI for acute intervention protocol.  MRI acute intervention protocol reveals large core infarct so no IR. NCC consulted for medical management.    HX from chart as patient is aphasic    Past Medical History:   Diagnosis Date    Arthritis     Chronic a-fib     Current use of long term anticoagulation     Hypertension     Pressure ulcer of right leg, unspecified pressure ulcer stage     was going to [facility] healed now    Stroke due to embolism of left middle cerebral artery 01/10/2020    left temp parietal    Thyroid disease      Past Surgical History:   Procedure Laterality Date    HERNIA REPAIR        No current facility-administered medications on file prior to encounter.      Current Outpatient Medications on File Prior to Encounter   Medication Sig Dispense Refill    allopurinol (ZYLOPRIM) 300 MG tablet Take 1 tablet by mouth.      amLODIPine (NORVASC) 5 MG  tablet Take 1 tablet by mouth.      enalapril (VASOTEC) 20 MG tablet Take 1 tablet by mouth.      glipiZIDE (GLUCOTROL) 2.5 MG tablet Take 1 tablet by mouth.      levothyroxine (SYNTHROID) 75 MCG tablet Take 75 mcg by mouth once daily.      magnesium 30 mg Tab Take by mouth once.      METOPROLOL SUCCINATE ORAL Take by mouth.      spironolactone (ALDACTONE) 25 MG tablet Take 25 mg by mouth once daily.      traMADol (ULTRAM) 50 mg tablet Take 1 tablet by mouth.      warfarin (COUMADIN) 5 MG tablet Take 1 tablet by mouth.        Allergies: Patient has no known allergies.    History reviewed. No pertinent family history.  Social History     Tobacco Use    Smoking status: Never Smoker    Smokeless tobacco: Never Used   Substance Use Topics    Alcohol use: Never     Frequency: Never    Drug use: Never     Review of Systems   Unable to perform ROS: Patient nonverbal (aphasia)     Objective:     Vitals:    Temp: 97.4 °F (36.3 °C)  Pulse: 95  Rhythm: normal sinus rhythm  BP: (!) 152/68  MAP (mmHg): 98  Resp: (!) 22  SpO2: 95 %    Temp  Min: 96.4 °F (35.8 °C)  Max: 98.4 °F (36.9 °C)  Pulse  Min: 62  Max: 97  BP  Min: 114/88  Max: 152/68  MAP (mmHg)  Min: 85  Max: 107  Resp  Min: 16  Max: 26  SpO2  Min: 92 %  Max: 99 %    No intake/output data recorded.   Unmeasured Output  Urine Occurrence: 2       Physical Exam   Constitutional: He appears well-developed.   Cardiovascular: Normal rate, normal heart sounds and intact distal pulses.   Pulmonary/Chest: Effort normal and breath sounds normal.   Abdominal: Soft. Bowel sounds are normal.   Neurological:   E4 V2 M5  Awake, alert, confused  Speech is slurred/mumbled  Right facial droop  Right hemianopsia  Right sided weakness upper and lower, appears almost flaccid.  Decreased sensation to RUE/RLE  Neglect Right side       Skin: Skin is warm. Capillary refill takes less than 2 seconds.   Vitals reviewed.      Today I personally reviewed pertinent medications,  lines/drains/airways, imaging, cardiology results, laboratory results, microbiology results,         Assessment/Plan:     Neuro  * Acute ischemic left MCA stroke  CTA at OSH shows new M1 occlusion, transfered to [facility] for thrombectomy eval.   MRI performed on arrival and reviewed by IR, Not thrombectomy candidate. There is new area of ischemia noted left parietal area.   Exam poor, concern for decline and airway. Admit to NCC, Vascular Neurology consulted  Q1hr Neuro Checks, SBP <220  PT/OT/SLP    Aphasia  SLP    Right spastic hemiparesis  PT/OT    Cardiac/Vascular  Atrial fibrillation  On Coumadin, INR 2.4  Will discuss restarting today with Vascular Neurology    Essential hypertension  Permissive HTN, SBP <220  EKG pending  Echo done prior to transfer, in chart      Endocrine  Acquired hypothyroidism  Home synthroid resumed    Type 2 diabetes mellitus with circulatory disorder, without long-term current use of insulin  Home glipizide held  ISS          The patient is being Prophylaxed for:  Venous Thromboembolism with: Mechanical  Stress Ulcer with: None  Ventilator Pneumonia with: not applicable    Activity Orders          Diet NPO: NPO starting at 01/14 0514        Full Code   Critical Care: 36 min    Connor Pagan NP  Neurocritical Care  [facility]

## 2020-01-14 NOTE — HOSPITAL COURSE
1/14: transfer to [facility] for Thrombectomy eval, Admit to NCC  1/18: CT head- follow up prior starting anticoagulation, rechecks CBC, stepdown to stroke team

## 2020-01-14 NOTE — ASSESSMENT & PLAN NOTE
78 yo man admitted for L temporal-parietal embolic stroke, with worsening of deficits tonight.  Suspect proximal MCA occlusion.  No TPA INR 2.0. No IR as too large infarct.       Antithrombotics: will need Coumadin restarted for afib    Statins: Lipitor 40 mg daily    Aggressive risk factor modification: A-Fib, HTN     Rehab efforts: The patient has been evaluated by a stroke team provider and the therapy needs have been fully considered based off the presenting complaints and exam findings. The following therapy evaluations are needed: PT evaluate and treat, OT evaluate and treat, SLP evaluate and treat, PM&R evaluate for appropriate placement    Diagnostics ordered/pending: None     VTE prophylaxis: None: Reason for No Pharmacological VTE Prophylaxis: Currently on anticoagulation    BP parameters: Infarct: No intervention, SBP <220

## 2020-01-14 NOTE — SUBJECTIVE & OBJECTIVE
"  Woke up with symptoms?: no    Recent bleeding noted: no  Does the patient take any Blood Thinners? yes  Medications: Anticoagulants:  coumadin      Past Medical History: stroke and Afib    Past Surgical History: no relevant surgical history    Family History: no relevant history    Social History: no smoking, no drinking, no drugs    Allergies: No Known Allergies No relevant allergies    Review of Systems   Unable to perform ROS: Patient nonverbal   Constitutional: Negative for chills and fever.   HENT: Negative for ear discharge and ear pain.    Eyes: Negative for pain and redness.   Respiratory: Negative for cough and shortness of breath.    Cardiovascular: Negative for chest pain and leg swelling.   Gastrointestinal: Negative for abdominal pain and anal bleeding.   Genitourinary: Negative for difficulty urinating and dysuria.   Musculoskeletal: Negative for arthralgias and back pain.   Skin: Negative for rash and wound.   Neurological: Positive for facial asymmetry, speech difficulty, weakness and numbness.   Psychiatric/Behavioral: Positive for confusion.     Objective:   Vitals: Blood pressure (!) 143/72, pulse 73, temperature 97.4 °F (36.3 °C), temperature source Oral, resp. rate 16, height 5' 10" (1.778 m), weight 98.1 kg (216 lb 4.3 oz), SpO2 99 %. Height: .    CT READ: Yes  Abnormal CT subacute L temporal/parietal stroke.      CTA head and neck 1-14-20 results:  As on the recent brain MRA examination there is appearance of attenuation of the left MCA branch vessels without evidence for high-grade stenosis or occlusion and otherwise there is no evidence for high-grade stenosis or occlusion of the major arterial vascular structures of the head or neck.    There is no evidence for intracranial aneurysm or AVM.    Findings consistent with acute to subacute left parietal ischemic change again noted.    MRI Brain acute intervention protocol 1-14-20 results:      Physical Exam   Constitutional: He appears " well-developed and well-nourished.   Obese, stuporous   HENT:   Head: Normocephalic and atraumatic.   Eyes: Pupils are equal, round, and reactive to light. EOM are normal.   Neck: Normal range of motion.   Cardiovascular: Normal rate.   Pulmonary/Chest: Effort normal.   Abdominal: Soft.   Neurological:   Drowsy, right sided plegia, and sensory loss, facial droop and dysarthria and aphasia       Skin: Skin is warm and dry.   Nursing note and vitals reviewed.      2D Echo 1-11-20 results:  · Normal left ventricular systolic function. The estimated ejection fraction is 55%.  · Mid anteroseptal and apical moderate hypokinesia.  · Diastolic pattern consistent with atrial fibrillation observed.  · Severe left atrial enlargement.  · Normal right ventricular systolic function.  · Severe right atrial enlargement.  · Mild-to-moderate mitral regurgitation.  · Mild tricuspid regurgitation.  · The estimated PA systolic pressure is 40 mmHg.  · Intermediate central venous pressure (8 mmHg).

## 2020-01-14 NOTE — SIGNIFICANT EVENT
Respiratory insuffiencey    Aphasia  Apnea at times  Moved up in bed  Cytotoxic cerebral edema-poa  Hyponatremia poa  Alkalosis-poa      Plan:  Follow abg  Ayala  Follow exam  Start 2 % gtt  Follow serial Na's    Critical secondary to Patient has a condition that poses threat to life and bodily function: at high risk of resp failure    Follow closely exam/na    Cc time 39 mins     Critical care was time spent personally by me on the following activities: development of treatment plan with patient or surrogate and bedside caregivers, discussions with consultants, evaluation of patient's response to treatment, examination of patient, ordering and performing treatments and interventions, ordering and review of laboratory studies, ordering and review of radiographic studies, pulse oximetry, re-evaluation of patient's condition. This critical care time did not overlap with that of any other provider or involve time for any procedures.

## 2020-01-14 NOTE — CONSULTS
[facility]  Vascular Neurology  Comprehensive Stroke Center  Consult Note    Inpatient consult to Vascular (Stroke) Neurology  Consult performed by: Aysha Solano NP  Consult ordered by: Trenton Hicks DO  Reason for consult: L MCA stroke    Inpatient consult to Vascular (Stroke) Neurology  Consult performed by: Aysha Solano NP  Consult ordered by: Connor Pagan NP        Assessment/Plan:     Patient is a 79 y.o. year old male with:    * Acute ischemic left MCA stroke  78 yo man admitted for L temporal-parietal embolic stroke, with worsening of deficits tonight.  Suspect proximal MCA occlusion.  No TPA INR 2.0. No IR as too large infarct.       Antithrombotics: will need Coumadin restarted for afib    Statins: Lipitor 40 mg daily    Aggressive risk factor modification: A-Fib, HTN     Rehab efforts: The patient has been evaluated by a stroke team provider and the therapy needs have been fully considered based off the presenting complaints and exam findings. The following therapy evaluations are needed: PT evaluate and treat, OT evaluate and treat, SLP evaluate and treat, PM&R evaluate for appropriate placement    Diagnostics ordered/pending: None     VTE prophylaxis: None: Reason for No Pharmacological VTE Prophylaxis: Currently on anticoagulation    BP parameters: Infarct: No intervention, SBP <220        Atrial fibrillation  Stroke risk factor  Rate control  Will need anticoagulation for this     Essential hypertension  Stroke risk factor  SBP <220    Type 2 diabetes mellitus with circulatory disorder, without long-term current use of insulin  Stroke risk factor  HgB A1C 5.1  SSI for now      Right spastic hemiparesis  Due to stroke  Aggressive therapy    Aphasia  Due to stroke  Aggressive therapy        STROKE DOCUMENTATION     Acute Stroke Times   Last Known Normal Date: 01/13/20  Last Known Normal Time: 2315  Symptom Onset Date: 01/14/20  Symptom Onset Time: 0050  Stroke Team  Called Date: 01/14/20  Stroke Team Called Time: 0105  Stroke Team Arrival Date: 01/14/20  Stroke Team Arrival Time: 0355  CT Interpretation Time: 0100    NIH Scale:  1a. Level of Consciousness: 1-->Not alert, but arousable by minor stimulation to obey, answer, or respond  1b. LOC Questions: 2-->Answers neither question correctly  1c. LOC Commands: 2-->Performs neither task correctly  2. Best Gaze: 1-->Partial gaze palsy, gaze is abnormal in one or both eyes, but forced deviation or total gaze paresis is not present  3. Visual: 0-->No visual loss  4. Facial Palsy: 1-->Minor paralysis (flattened nasolabial fold, asymmetry on smiling)  5a. Motor Arm, Left: 1-->Drift, limb holds 90 (or 45) degrees, but drifts down before full 10 seconds, does not hit bed or other support  5b. Motor Arm, Right: 4-->No movement  6a. Motor Leg, Left: 3-->No effort against gravity, leg falls to bed immediately  6b. Motor Leg, Right: 3-->No effort against gravity, leg falls to bed immediately  7. Limb Ataxia: 0-->Absent  8. Sensory: 0-->Normal, no sensory loss  9. Best Language: 2-->Severe aphasia, all communication is through fragmentary expression, great need for inference, questioning, and guessing by the listener. Range of information that can be exchanged is limited, listener carries burden of. . . (see row details)  10. Dysarthria: 2-->Severe dysarthria, patients speech is so slurred as to be unintelligible in the absence of or out of proportion to any dysphasia, or is mute/anarthric  11. Extinction and Inattention (formerly Neglect): 1-->Visual, tactile, auditory, spatial, or personal inattention or extinction to bilateral simultaneous stimulation in one of the sensory modalities  Total (NIH Stroke Scale): 23    Modified Birmingham Score: 0  Millie Coma Scale:11   ABCD2 Score:    [id number] Score:   HAS -BLED Score:   ICH Score:   Hunt & Jones Classification:       Thrombolysis Candidate? No, PT > 15 second or INR > 1.7     Delays to  Thrombolysis?  No    Interventional Revascularization Candidate?   Is the patient eligible for mechanical endovascular reperfusion (JANICE)?  No;  Large core infarct      Hemorrhagic change of an Ischemic Stroke: Does this patient have an ischemic stroke with hemorrhagic changes? No     Subjective:     History of Present Illness:  80 y/o male who on 1-9-20 started with difficulty speaking. On 1-10-20 still no improvement was taken to [facility] that revealed a left temp pariatel infarct. Patient just with aphasia. He has hx of afib and is on coumadin. He was admitted to [facility] and d/c recommendation was rehab so was waiting on placement. On 1-14-20 he was notice around 0050 to not move his right sided and no longer trying to talk and not following commands. He was moved to ICU and telemedicne consult was done with Dr Olivares. No TPA as INR 2.0. Recommednation was to transfer to [facility] for further evaluation. CTA head and neck done revealing M1 occlusion.   Upon arrival patient still not talking or following commands, not moving the right side. Patient to MRI for acute intervention protocol.  MRI acute intervention protocol reveals large core infarct so no IR.    Risk factors afib, HTN,       Woke up with symptoms?: no    Recent bleeding noted: no  Does the patient take any Blood Thinners? yes  Medications: Anticoagulants:  coumadin      Past Medical History: stroke and Afib    Past Surgical History: no relevant surgical history    Family History: no relevant history    Social History: no smoking, no drinking, no drugs    Allergies: No Known Allergies No relevant allergies    Review of Systems   Unable to perform ROS: Patient nonverbal   Constitutional: Negative for chills and fever.   HENT: Negative for ear discharge and ear pain.    Eyes: Negative for pain and redness.   Respiratory: Negative for cough and shortness of breath.    Cardiovascular: Negative for chest pain and leg swelling.   Gastrointestinal:  "Negative for abdominal pain and anal bleeding.   Genitourinary: Negative for difficulty urinating and dysuria.   Musculoskeletal: Negative for arthralgias and back pain.   Skin: Negative for rash and wound.   Neurological: Positive for facial asymmetry, speech difficulty, weakness and numbness.   Psychiatric/Behavioral: Positive for confusion.     Objective:   Vitals: Blood pressure (!) 143/72, pulse 73, temperature 97.4 °F (36.3 °C), temperature source Oral, resp. rate 16, height 5' 10" (1.778 m), weight 98.1 kg (216 lb 4.3 oz), SpO2 99 %. Height: .    CT READ: Yes  Abnormal CT subacute L temporal/parietal stroke.      CTA head and neck 1-14-20 results:  As on the recent brain MRA examination there is appearance of attenuation of the left MCA branch vessels without evidence for high-grade stenosis or occlusion and otherwise there is no evidence for high-grade stenosis or occlusion of the major arterial vascular structures of the head or neck.    There is no evidence for intracranial aneurysm or AVM.    Findings consistent with acute to subacute left parietal ischemic change again noted.    MRI Brain acute intervention protocol 1-14-20 results:      Physical Exam   Constitutional: He appears well-developed and well-nourished.   Obese, stuporous   HENT:   Head: Normocephalic and atraumatic.   Eyes: Pupils are equal, round, and reactive to light. EOM are normal.   Neck: Normal range of motion.   Cardiovascular: Normal rate.   Pulmonary/Chest: Effort normal.   Abdominal: Soft.   Neurological:   Drowsy, right sided plegia, and sensory loss, facial droop and dysarthria and aphasia       Skin: Skin is warm and dry.   Nursing note and vitals reviewed.      2D Echo 1-11-20 results:  · Normal left ventricular systolic function. The estimated ejection fraction is 55%.  · Mid anteroseptal and apical moderate hypokinesia.  · Diastolic pattern consistent with atrial fibrillation observed.  · Severe left atrial " enlargement.  · Normal right ventricular systolic function.  · Severe right atrial enlargement.  · Mild-to-moderate mitral regurgitation.  · Mild tricuspid regurgitation.  · The estimated PA systolic pressure is 40 mmHg.  · Intermediate central venous pressure (8 mmHg).      Aysha Solano NP  [facility]  Department of Vascular Neurology   [facility]

## 2020-01-14 NOTE — CODE/ RAPID DOCUMENTATION
Rapid Response Nurse Chart Check     Chart check completed, abnormal VS noted. No IR intervention.Call [phone] for further concerns or assistance.

## 2020-01-14 NOTE — SIGNIFICANT EVENT
Responded to Code Stroke. Per RN, patient was ambulating and occassionally making appropriate verbal responses, and following some commands. Upon exam, patient is mumbling, without any comprehensible words, not following commands, not even tracking appropriately with eyes when his name is called. He appears to have no facial droop, and good overall tone without any obvious flaccidity. STAT CT Head ordered, STAT Tele Neuro consult, and stat labs ordered. Transfer to ICU for closer monitoring.

## 2020-01-14 NOTE — ED NOTES
Assumed care of patient.   Patient was dishelved and crooked in ED stretcher, patient had disconnected himself from the EKG monitor and had legs hanging out of side rails.  The other RN and I cleaned him up fixed the sheets, placed a brief on him, and placed wedges on each side of him to keep him from coming out of the side of the stretcher.    LOC: Patient name and date of birth verified. The patient is awake  APPEARANCE: Patient resting but restless  SKIN: The skin is warm and dry, color consistent with ethnicity, patient has normal skin turgor and moist mucus membranes, skin intact, no breakdown or bruising noted.  MUSCULOSKELETAL: Patient moving all extremities, no obvious swelling or deformities noted.   RESPIRATORY: Respirations are spontaneous, patient has a normal effort and rate, no accessory muscle use noted.  CARDIAC: Patient has a normal rate and rhythm  ABDOMEN: Soft and non tender to palpation, no distention noted

## 2020-01-14 NOTE — CONSULTS
[facility]  Vascular Neurology  Comprehensive Stroke Center  Tele-Consultation Note      Consults    Consulting Provider: NARAYAN WILLIS  Current Providers  No providers found    Patient Location:  [facility] ICU IP Unit  Spoke hospital nurse at bedside with patient assisting consultant.     Patient information was obtained from RN.         Assessment/Plan:     STROKE DOCUMENTATION     Acute Stroke Times:   Acute Stroke Times   Last Known Normal Date: 01/13/20  Last Known Normal Time: 2248  Stroke Team Called Date: 01/14/20  Stroke Team Called Time: 1252  Stroke Team Arrival Date: 01/14/20  Stroke Team Arrival Time: 0105(patient in scanner then transferred to ICU)  CT Interpretation Time: 0100    NIH Scale:  1a. Level of Consciousness: 2-->Not alert, requires repeated stimulation to attend, or is obtunded and requires strong or painful stimulation to make movements (not stereotyped)  1b. LOC Questions: 2-->Answers neither question correctly  1c. LOC Commands: 2-->Performs neither task correctly  2. Best Gaze: 1-->Partial gaze palsy, gaze is abnormal in one or both eyes, but forced deviation or total gaze paresis is not present  3. Visual: 0-->No visual loss  4. Facial Palsy: 1-->Minor paralysis (flattened nasolabial fold, asymmetry on smiling)  5a. Motor Arm, Left: 0-->No drift, limb holds 90 (or 45) degrees for full 10 secs  5b. Motor Arm, Right: 4-->No movement  6a. Motor Leg, Left: 3-->No effort against gravity, leg falls to bed immediately  6b. Motor Leg, Right: 3-->No effort against gravity, leg falls to bed immediately  7. Limb Ataxia: 0-->Absent  8. Sensory: 0-->Normal, no sensory loss  9. Best Language: 3-->Mute, global aphasia, no usable speech or auditory comprehension  10. Dysarthria: 1-->Mild-to-moderate dysarthria, patient slurs at least some words and, at worst, can be understood with some difficulty  11. Extinction and Inattention (formerly Neglect): 1-->Visual,  "tactile, auditory, spatial, or personal inattention or extinction to bilateral simultaneous stimulation in one of the sensory modalities  Total (NIH Stroke Scale): 23     Modified Blanche    [place] Coma Scale:    ABCD2 Score:    [id number] Score:   HAS -BLED Score:   ICH Score:   Hunt & Jones Classification:       Diagnoses:   * Acute ischemic left MCA stroke  78 yo man admitted for L temporal-parietal embolic stroke, with worsening of deficits tonight.  Suspect proximal MCA occlusion.  May be a candidate for intervention, though subacute stroke poses high risk for reperfusion injury.  Recommend STAT CTA head/neck.      Antithrombotics for secondary stroke prevention: Anticoagulants: Warfarin INR adjusted target  Heparin protocol without bolus    Statins for secondary stroke prevention and hyperlipidemia, if present:   Statins: Atorvastatin- 40 mg daily    Aggressive risk factor modification: A-Fib     Rehab efforts: The patient has been evaluated by a stroke team provider and the therapy needs have been fully considered based off the presenting complaints and exam findings. The following therapy evaluations are needed: PT evaluate and treat, OT evaluate and treat, SLP evaluate and treat, PM&R evaluate for appropriate placement    Diagnostics ordered/pending: CTA Head to assess vasculature , CTA Neck/Arch to assess vasculature    VTE prophylaxis: None: Reason for No Pharmacological VTE Prophylaxis: Currently on anticoagulation    BP parameters: Infarct: No intervention, SBP <220            Blood pressure (!) 143/72, pulse 73, temperature 97.4 °F (36.3 °C), temperature source Oral, resp. rate 16, height 5' 10" (1.778 m), weight 98.1 kg (216 lb 4.3 oz), SpO2 99 %.  Alteplase Eligible?: No  Alteplase Recommendation: Alteplase not recommended due to Full dose anticoagulation   Possible Interventional Revascularization Candidate? Yes    Disposition Recommendation: pending further studies    Subjective:     History of " "Present Illness:  78 y/o man with afib (on coumadin), was admitted on 1/10 for sensory aphasia (without significant weakness), due to L temporal/parietal stroke.  He was ambulating with a cane and speaking fluently up until this evening, at which point he became more somnolent, plegic on the right side, and unable to verbalize any appreciable words.        Woke up with symptoms?: no    Recent bleeding noted: no  Does the patient take any Blood Thinners? yes  Medications: Anticoagulants:  coumadin      Past Medical History: stroke and Afib    Past Surgical History: no relevant surgical history    Family History: no relevant history    Social History: no smoking, no drinking, no drugs    Allergies: No Known Allergies No relevant allergies    Review of Systems   Unable to perform ROS: Patient nonverbal     Objective:   Vitals: Blood pressure (!) 143/72, pulse 73, temperature 97.4 °F (36.3 °C), temperature source Oral, resp. rate 16, height 5' 10" (1.778 m), weight 98.1 kg (216 lb 4.3 oz), SpO2 99 %. Height: .    CT READ: Yes  Abnormal CT subacute L temporal/parietal stroke.     Physical Exam   Constitutional:   Obese, stuporous   HENT:   Head: Normocephalic and atraumatic.   Eyes: Pupils are equal, round, and reactive to light. EOM are normal.   Neurological:   MS: Stuporous, opens eyes to noxious stimuli briefly, no speech output, does not follows commands, R hemispatial neglect  CN: PERRL, L gaze preference, R facial droop, +dysarthria  Motor: R arm plegic, neither leg antigravity  Sens: withdraws to noxious stimuli  Coord: TAMMY                 Recommended the emergency room physician to have a brief discussion with the patient and/or family if available regarding the risks and benefits of treatment, and to briefly document the occurrence of that discussion in his clinical encounter note.     The attending portion of this evaluation, treatment, and documentation was performed per Cris Olivares MD via " audiovisual.    Billing code:  (time dependent stroke, complex case, unstable patient, hemorrhages, any intervention, some mimics)    · This patient has a very critical neurological condition/illness, with very high morbidity and mortality.  · There is a very high probability for acute neurological change leading to clinical and possibly life-threatening deterioration requiring highest level of physician preparedness for urgent intervention.  · There is possibility that this condition will require treatment with high risk medications as quickly as possible.  · There is also a possibility that the patient may benefit from further, more advance complex therapies (e.g. endovascular therapy) that will require prompt diagnosis and care.  · Care was coordinated with other physicians involved in the patient's care.  · Radiologic studies and laboratory data were reviewed and interpreted, and plan of care was re-assessed based on the results.  · Diagnosis, treatment options and prognosis may have been discussed with the patient and/or family members or caregiver.  · Further advanced medical management and further evaluation is warranted for his care.      In your opinion, this was a: Tier 1 Van Positive    Consult End Time: 1:22 AM     Cris Olivares MD  Comprehensive Stroke Center  Vascular Neurology   [facility]

## 2020-01-14 NOTE — SUBJECTIVE & OBJECTIVE
Past Medical History:   Diagnosis Date    Arthritis     Chronic a-fib     Current use of long term anticoagulation     Hypertension     Pressure ulcer of right leg, unspecified pressure ulcer stage     was going to [facility] healed now    Stroke due to embolism of left middle cerebral artery 01/10/2020    left temp parietal    Thyroid disease      Past Surgical History:   Procedure Laterality Date    HERNIA REPAIR        No current facility-administered medications on file prior to encounter.      Current Outpatient Medications on File Prior to Encounter   Medication Sig Dispense Refill    allopurinol (ZYLOPRIM) 300 MG tablet Take 1 tablet by mouth.      amLODIPine (NORVASC) 5 MG tablet Take 1 tablet by mouth.      enalapril (VASOTEC) 20 MG tablet Take 1 tablet by mouth.      glipiZIDE (GLUCOTROL) 2.5 MG tablet Take 1 tablet by mouth.      levothyroxine (SYNTHROID) 75 MCG tablet Take 75 mcg by mouth once daily.      magnesium 30 mg Tab Take by mouth once.      METOPROLOL SUCCINATE ORAL Take by mouth.      spironolactone (ALDACTONE) 25 MG tablet Take 25 mg by mouth once daily.      traMADol (ULTRAM) 50 mg tablet Take 1 tablet by mouth.      warfarin (COUMADIN) 5 MG tablet Take 1 tablet by mouth.        Allergies: Patient has no known allergies.    History reviewed. No pertinent family history.  Social History     Tobacco Use    Smoking status: Never Smoker    Smokeless tobacco: Never Used   Substance Use Topics    Alcohol use: Never     Frequency: Never    Drug use: Never     Review of Systems   Unable to perform ROS: Patient nonverbal (aphasia)     Objective:     Vitals:    Temp: 97.4 °F (36.3 °C)  Pulse: 95  Rhythm: normal sinus rhythm  BP: (!) 152/68  MAP (mmHg): 98  Resp: (!) 22  SpO2: 95 %    Temp  Min: 96.4 °F (35.8 °C)  Max: 98.4 °F (36.9 °C)  Pulse  Min: 62  Max: 97  BP  Min: 114/88  Max: 152/68  MAP (mmHg)  Min: 85  Max: 107  Resp  Min: 16  Max: 26  SpO2  Min: 92 %  Max: 99  %    No intake/output data recorded.   Unmeasured Output  Urine Occurrence: 2       Physical Exam   Constitutional: He appears well-developed.   Cardiovascular: Normal rate, normal heart sounds and intact distal pulses.   Pulmonary/Chest: Effort normal and breath sounds normal.   Abdominal: Soft. Bowel sounds are normal.   Neurological:   E4 V2 M5  Awake, alert, confused  Speech is slurred/mumbled  Right facial droop  Right hemianopsia  Right sided weakness upper and lower, appears almost flaccid.  Decreased sensation to RUE/RLE  Neglect Right side       Skin: Skin is warm. Capillary refill takes less than 2 seconds.   Vitals reviewed.      Today I personally reviewed pertinent medications, lines/drains/airways, imaging, cardiology results, laboratory results, microbiology results,

## 2020-01-14 NOTE — ED NOTES
Pt care assumed. Report received from Foreign TERRY. Pt on continuous cardiac monitoring, continuous pulse oximetry, and automatic BP cuff cycling Q15min. Pt in hospital gown. Side rails up X2. Bed low and locked. Call light within reach. No family/visitors at bedside at this time. Pt denies any complaints or needs. Pt restless, will not remain still in stretcher. Due to high fall risk, sitter is at bedside.

## 2020-01-14 NOTE — PLAN OF CARE
OT eval completed; rec Rehab return when medically appropriate. Plan of care for 4x/w at this time. KATELYN Ivey 1/14/2020   Problem: Occupational Therapy Goal  Goal: Occupational Therapy Goal  Description  Goals to be met by: 1/28     Patient will increase functional independence with ADLs by performing:    UE Dressing while seated EOB with Moderate Assistance.  Grooming while EOB with Moderate Assistance.  Sitting at edge of bed x10 minutes with Moderate Assistance.  Rolling to Bilateral with Moderate Assistance and use of bedrail as needed.   Supine to sit with Moderate Assistance.  Squat pivot transfers with Maximum Assistance.     Outcome: Ongoing, Progressing

## 2020-01-14 NOTE — PT/OT/SLP EVAL
Physical Therapy Evaluation    Patient Name:  Chirag Thompson   MRN:  [id number]    Recommendations:     Discharge Recommendations:  rehabilitation facility   Discharge Equipment Recommendations: (TBD)   Barriers to discharge: None    Assessment:     Chirag Thompson is a 79 y.o. male admitted with a medical diagnosis of Acute ischemic left MCA stroke.  He presents with the following impairments/functional limitations:  weakness, impaired endurance, impaired self care skills, impaired functional mobilty, gait instability, impaired balance, visual deficits, impaired cognition, decreased upper extremity function, decreased lower extremity function, decreased safety awareness, abnormal tone, impaired fine motor, impaired cardiopulmonary response to activity, impaired coordination Pt. cooperative and tolerated treatment well, but had difficulty keeping eyes open/following commands/verbalizing. Pt. with (L) lateral gaze and (R) hemiparesis.    Rehab Prognosis: Fair; patient would benefit from acute skilled PT services to address these deficits and reach maximum level of function.    Recent Surgery: * No surgery found *      Plan:     During this hospitalization, patient to be seen 4 x/week to address the identified rehab impairments via gait training, therapeutic activities, therapeutic exercises, neuromuscular re-education and progress toward the following goals:    · Plan of Care Expires:  02/11/20    Subjective     Chief Complaint: weakess  Patient/Family Comments/goals: pt. unable to verbalize  Pain/Comfort:  · Pain Rating 1: (NAD)    Patients cultural, spiritual, Advent conflicts given the current situation: no    Living Environment:  Lives with nephew in [facility] with 4 SARA and R-handrail  Prior to admission, patients level of function was amb. with SC.  Equipment used at home: cane, straight.  Upon discharge, patient will have assistance from nephew.    Objective:     Communicated with nursing prior to session.  Patient  found supine with blood pressure cuff, peripheral IV, pulse ox (continuous), telemetry  upon PT entry to room.    General Precautions: Standard, aphasia, aspiration, fall, vision impaired   Orthopedic Precautions:N/A   Braces:       Exams:  · RLE ROM: WFL  · RLE Strength: 2/5  · LLE ROM: WFL  · LLE Strength: WFL    Functional Mobility:  · Bed Mobility:     · Rolling Right: total assistance and of 2 persons  · Scooting: total assistance and of 2 persons  · Supine to Sit: total assistance and of 2 persons  · Sit to Supine: total assistance and of 2 persons  · Transfers:     · Sit to Stand:  total assistance with no AD  · Balance: poor sitting/standing      Therapeutic Activities and Exercises:   Pt. Performed sitting balance/tolerance on EOB  and static standing with Max A. Pt. positioned for comfort.    AM-PAC 6 CLICK MOBILITY  Total Score:9     Patient left supine with all lines intact and call button in reach.    GOALS:   Multidisciplinary Problems     Physical Therapy Goals        Problem: Physical Therapy Goal    Goal Priority Disciplines Outcome Goal Variances Interventions   Physical Therapy Goal     PT, PT/OT Ongoing, Progressing     Description:  Goals to be met by: 2020     Patient will increase functional independence with mobility by performin. Supine to sit with Moderate Assistance  2. Sit to supine with Moderate Assistance  3. Sit to stand transfer with Moderate Assistance  4. Bed to chair transfer with Moderate Assistance using LRAD  5. Gait  x 15 feet with Moderate Assistance using LRAD.   6. Sitting at edge of bed x10 minutes with Contact Guard Assistance  7. Stand for 1 minute with Contact Guard Assistance using LRAD  8. Lower extremity exercise program x15 reps per handout, with assistance as needed                      History:     Past Medical History:   Diagnosis Date    Arthritis     Chronic a-fib     Current use of long term anticoagulation     Hypertension     Pressure ulcer  of right leg, unspecified pressure ulcer stage     was going to [facility] healed now    Stroke due to embolism of left middle cerebral artery 01/10/2020    left temp parietal    Thyroid disease        Past Surgical History:   Procedure Laterality Date    HERNIA REPAIR         Time Tracking:     PT Received On: 01/14/20  PT Start Time: 1023     PT Stop Time: 1040  PT Total Time (min): 17 min     Billable Minutes: Evaluation 17      Mahesh Lemus, PT  01/14/2020

## 2020-01-14 NOTE — PLAN OF CARE
Discussed CT & CTA results with Dr. Olivares, Neurology. New M1 occlusion identified. Transfer Request to [facility] ED placed, per Transfer Center Instructions, as there are no Neuro Critical Care beds at this time.

## 2020-01-14 NOTE — EICU
eICU Note : New Admit :notified by the [facility]:    Brief HPI:  79-year-old male with past medical history significant for atrial fibrillation on Coumadin, diabetes mellitus type 2, hypertension with history of venous stasis presented to the ED via EMS for over 30 hours.  The patient's nephew told EMS that the patient was normal at breakfast time yesterday.  He was sent to the ED where the CT of the head showed an evolving parietal lobe infarct.  Patient was admitted to the ICU left MCA territory infarct.    Camera Assessment :Patient lying in bed in no apparent distress .  Vitals:    01/13/20 2248 01/14/20 0050   BP: (!) 152/78 (!) 143/72   BP Location: Left arm Right arm   Patient Position: Lying Sitting   Pulse: 80 73   Resp: 16    Temp: 96.4 °F (35.8 °C) 97.4 °F (36.3 °C)   TempSrc: Oral Oral   SpO2: 96% 99%     Data:WBC 9.48, hemoglobin 13.4, hematocrit 40, platelets 137  PT NT 6.9, INR 2.4, APTT 35.8, sodium 135, potassium 4.0, chloride 20, anion gap 10, BUN 25, creatinine 1.0, EGFR 60    Impression and recommendations;  #1.Acute ischemic left MCA stroke.  Left MCA infarct involving left posterior temporal and left parietal with small focus in the left occipital lobe as well.  2D echo shows EF of 55% with moderate MR.  Neurology consultation.  2.  Atrial fibrillation.  Currently on warfarin 7.5 mg by mouth daily.  Continue metoprolol 2.5 by mouth daily  3..Type 2 diabetes mellitus with circulatory disorder.  Continue low-dose sliding scale  4.Essential hypertension.  Initial permissive hypertension continue metoprolol 25 twice a day.  5.PUD DVT prophylaxis: SCDs and PPI          Nany Pace M.D  eICU Physician

## 2020-01-14 NOTE — PT/OT/SLP EVAL
Speech Language Pathology Evaluation  Bedside Swallow    Patient Name:  Chirag Thompson   MRN:  [id number]  Admitting Diagnosis: Acute ischemic left MCA stroke    Recommendations:                 General Recommendations:  Dysphagia therapy and Speech language evaluation  Diet recommendations:  NPO, NPO   Aspiration Precautions: Strict aspiration precautions   General Precautions: Standard, aspiration, fall, aphasia  Communication strategies:  none    History:     Past Medical History:   Diagnosis Date    Arthritis     Chronic a-fib     Current use of long term anticoagulation     Hypertension     Pressure ulcer of right leg, unspecified pressure ulcer stage     was going to [facility] healed now    Stroke due to embolism of left middle cerebral artery 01/10/2020    left temp parietal    Thyroid disease        Past Surgical History:   Procedure Laterality Date    HERNIA REPAIR         Social History: Patient lives with sitter.    Prior Intubation HX:  na  Modified Barium Swallow: na  Prior diet: reg with thin        Subjective     No family present  Patient goals: matthieu    Pain/Comfort:  · Pain Rating 1: 0/10  · Pain Rating Post-Intervention 1: 0/10    Objective:     Oral Musculature Evaluation  · Oral Musculature: unable to assess due to poor participation/comprehension  · Dentition: present and adequate  · Secretion Management: adequate  · Mucosal Quality: good  · Lingual Strength and Mobility: WFL  · Voice Prior to PO Intake: clear, normal volume  · Oral Musculature Comments: Face is symmetrical at rest and during smile. Lingual and labial strength and ROM assessed in function due to pt difficulty follow commands, appear to be WFL for speech and oral intake. Pt presenting with severe expressive aphasia, verbal expression is non-fluent.     Bedside Swallow Eval:   Consistencies Assessed:  · Thin liquids ice chips placed to lips     Oral Phase:   · Decreased closure around utensil  · Inability to open  lips  · Poor oral acceptance    Pharyngeal Phase:   · Not elicited    ·         Assessment:     Chirag Thompson is a 79 y.o. male with an SLP diagnosis of Aphasia and Dysphagia.      Goals:   Multidisciplinary Problems     SLP Goals        Problem: SLP Goal    Goal Priority Disciplines Outcome   SLP Goal     SLP Ongoing, Progressing   Description:  Goals due 1/21  1.  Pt. Will participate in ongoing assessment of swallow at bedside  2.  Pt. Will participate in speech language eval                    Plan:     · Patient to be seen:  4 x/week   · Plan of Care expires:  02/12/20  · Plan of Care reviewed with:  patient   · SLP Follow-Up:  Yes       Discharge recommendations:  (tbd)       Time Tracking:     SLP Treatment Date:   01/14/20  Speech Start Time:  0955  Speech Stop Time:  1003     Speech Total Time (min):  8 min    Billable Minutes: Eval Swallow and Oral Function 8    Julia Spann MA, CCC-SLP  01/14/2020

## 2020-01-14 NOTE — NURSING
0330 Pt transferred to [facility] ED via [facility]. Report called to Laura TERRY. Belongings and chart sent.

## 2020-01-14 NOTE — CODE/ RAPID DOCUMENTATION
Noted change in patient behavior.    He was unable to stand from chair and mumbling incomprehensibly.  Prior to this, patient was able to stand with no assistance and walk with his cane.  He previously had expressive aphasia and word salad, but could say individual words clearly and occasionally speak in short phrases.  The last neuro check at approximately 2315 showed no deficits and no change from previous assessment.    At approximately 1230, patient repeatedly set off chair alarm while rocking forwards and backwards in an attempt to stand.  Attempted to assist patient in standing from chair and found that he was not moving his right foot and had an absent  in right hand. Upon further assessment, his right arm was flaccid, he was not moving his right leg, could not follow commands, and appeared to have mild right face droop.  Checked vital signs and blood sugar and called Code Stroke

## 2020-01-14 NOTE — ED PROVIDER NOTES
Encounter Date: 1/10/2020       History     Chief Complaint   Patient presents with    Altered Mental Status     pt last seen normal 30 hrs ago. orientated to person and time. no neuro deficits      HPI     79-year-old male with a history of hypertension, atrial fibrillation currently on Coumadin, type 2 diabetes, venous stasis presents as a transfer from outside hospital via EMS for greater than 30 hr of altered mental status.  Per outside hospital reports and documentation, EMS reported the patient's home health nurse noticed that he was not acting normal, and not at baseline.  He was not answering questions appropriately prior to arrival.  The patient is unable to answer any of his history, and does not answer questions appropriately secondary to aphasia.  He keeps stating he feels okay repeatedly.  He originally presented on January 10th to [facility] that revealed a left temporoparietal infarct.  He he presented with aphasia.  On 01/14 he was noticing around midnight to not move his right sided and no longer able to talk or follow commands.  Telemedicine consult was done, and recommendation for transfer here for further evaluation CT head neck down revealing an M1 occlusion.  Upon arrival he is protecting his airway, not following commands, not talking, not moving the right side.  Immediately evaluated by the stroke code team upon arrival.    Review of patient's allergies indicates:  No Known Allergies  Past Medical History:   Diagnosis Date    Arthritis     Hypertension     Thyroid disease      Past Surgical History:   Procedure Laterality Date    HERNIA REPAIR       History reviewed. No pertinent family history.  Social History     Tobacco Use    Smoking status: Never Smoker   Substance Use Topics    Alcohol use: Never     Frequency: Never    Drug use: Never     Review of Systems   Unable to perform ROS: Acuity of condition       Physical Exam     Initial Vitals [01/10/20 1447]   BP  "Pulse Resp Temp SpO2   139/80 83 20 98.1 °F (36.7 °C) 98 %      MAP       --         Physical Exam    Nursing note and vitals reviewed.    Constitutional: He appears well-developed and well-nourished. No distress.   Elderly male in no distress   HENT:   Head: Normocephalic and atraumatic.   Eyes: Pupils are equal, round, and reactive to light. Conjunctivae are normal.   Neck: Neck supple.   Cardiovascular: Intact distal pulses.   Irregularly irregular   Pulmonary/Chest: Effort normal and breath sounds normal. No respiratory distress. He has no wheezes.   Abdominal: Soft. Bowel sounds are normal. He exhibits no distension. There is no tenderness.   Musculoskeletal: Normal range of motion.   B/l venous stasis, compression hose in place   Neurological:   Difficult exam, patient repeatedly would answer "I am OK" when asked any question, moves all 4 extremities, can tell me his name, time or place   Skin: Skin is warm and dry. He is not diaphoretic.   Psychiatric: He has a normal mood and affect.   Nursing note and vitals reviewed.    ED Course   Critical Care  Date/Time: 1/14/2020 4:02 AM  Performed by: Trenton Hicks DO  Authorized by: Trenton Hicks DO   Direct patient critical care time: 20 minutes  Additional history critical care time: 15 minutes  Ordering / reviewing critical care time: 10 minutes  Documentation critical care time: 10 minutes  Consulting other physicians critical care time: 7 minutes  Total critical care time (exclusive of procedural time) : 62 minutes  Critical care was necessary to treat or prevent imminent or life-threatening deterioration of the following conditions: CNS failure or compromise.  Critical care was time spent personally by me on the following activities: blood draw for specimens, development of treatment plan with patient or surrogate, discussions with consultants, evaluation of patient's response to treatment, examination of patient, obtaining history from patient or " surrogate, ordering and performing treatments and interventions, ordering and review of laboratory studies, ordering and review of radiographic studies, pulse oximetry, re-evaluation of patient's condition and review of old charts.        Labs Reviewed   CBC W/ AUTO DIFFERENTIAL - Abnormal; Notable for the following components:       Result Value    RBC 3.86 (*)     Hemoglobin 12.7 (*)     Hematocrit 39.7 (*)     Mean Corpuscular Volume 103 (*)     Mean Corpuscular Hemoglobin 32.9 (*)     RDW 14.9 (*)     Platelets 109 (*)     Eos # 0.6 (*)     Eosinophil% 10.0 (*)     All other components within normal limits   COMPREHENSIVE METABOLIC PANEL - Abnormal; Notable for the following components:    Glucose 111 (*)     Total Bilirubin 1.3 (*)     Anion Gap 6 (*)     All other components within normal limits   URINALYSIS, REFLEX TO URINE CULTURE    Narrative:     Preferred Collection Type->Urine, Clean Catch   MAGNESIUM   POCT GLUCOSE MONITORING CONTINUOUS     EKG Readings: (Independently Interpreted)   Initial Reading: No STEMI. Previous EKG: Compared with most recent EKG Rhythm: Atrial Fibrillation. Heart Rate: 68. Axis: Left Axis Deviation. Clinical Impression: with PVCs     ECG Results          EKG 12-lead (Final result)  Result time 01/14/20 01:21:58    Final result by Maryanne Martinez CRT (01/14/20 01:21:58)                 Narrative:    Maryanne Martinez CRT     1/14/2020  1:21 AM  ekg results given to Stalin TERRY                             EKG 12-lead (Final result)  Result time 01/13/20 19:07:07    Final result by Interface, [facility] In [place] (01/13/20 19:07:07)                 Narrative:    Test Reason : R41.0,    Vent. Rate : 068 BPM     Atrial Rate : 416 BPM     P-R Int : 000 ms          QRS Dur : 096 ms      QT Int : 388 ms       P-R-T Axes : 000 -54 045 degrees     QTc Int : 412 ms    Atrial fibrillation with premature ventricular or aberrantly conducted  complexes  Left axis deviation  Inferior infarct ,age  undetermined  Anterior infarct ,age undetermined  Abnormal ECG    Confirmed by Carolina Foote MD ([id number]) on 1/13/2020 7:07:00 PM    Referred By: [email or web address]   SELF           Confirmed By:Carolina Foote MD                             EKG 12-LEAD (Final result)  Result time 01/11/20 21:52:27    Final result by Unknown User (01/11/20 21:52:27)                                Imaging Results          CTA Head and Neck (xpd) (Final result)  Result time 01/14/20 03:19:53    Final result by Donnell Doran MD (01/14/20 03:19:53)                 Impression:      As on the recent brain MRA examination there is appearance of attenuation of the left MCA branch vessels without evidence for high-grade stenosis or occlusion and otherwise there is no evidence for high-grade stenosis or occlusion of the major arterial vascular structures of the head or neck.    There is no evidence for intracranial aneurysm or AVM.    Findings consistent with acute to subacute left parietal ischemic change again noted.    Small thyroid nodules or cysts.      Electronically signed by: Donnell Doran  Date:    01/14/2020  Time:    03:19             Narrative:    EXAMINATION:  CTA HEAD AND NECK (XPD)    CLINICAL HISTORY:  Stroke;    TECHNIQUE:  Non contrast low dose axial images were obtained through the head.  CT angiogram was performed from the level of the kalie to the top of the head following the IV administration of 100mL of Omnipaque 350.   Sagittal and coronal reconstructions and maximum intensity projection reconstructions were performed. Arterial stenosis percentages are based on NASCET measurement criteria.    COMPARISON:  MRI a examination of the brain and neck January 10, 2020, CT brain January 14, 2020    FINDINGS:  As on the prior examinations there is an area of acute to subacute ischemia/infarct involving the left parietal lobe.  There is no evidence for significant interval detrimental change with respect to this finding.    The  vertebral arteries bilaterally demonstrate atherosclerotic change, demonstrate appropriate opacification from the level of their origin to the basilar artery, there is vertebrobasilar tortuosity, otherwise the basilar artery demonstrates appropriate opacification as well.  The carotid arteries bilaterally demonstrate atherosclerotic change, the common, external and internal carotid arteries demonstrate appropriate opacification, there is no evidence for high-grade stenosis or occlusion or dissection.  The internal carotid arteries demonstrate opacification to the level of the skull base and [id number].    There is a posterior communicating artery on the left, the anterior and posterior cerebral arteries bilaterally otherwise demonstrate appropriate opacification.  The left middle cerebral artery branch vessels demonstrate attenuation compared to the right correlating with the finding on the MRA exam however there is no evidence for high-grade stenosis or vascular occlusion seen and otherwise the MCA bilaterally demonstrate opacification.  There is no evidence for intracranial aneurysm or AVM.  The intracranial venous sinuses appear appropriate.    Mild paranasal sinus disease is noted.  Opacity along the external auditory canals bilaterally may relate to cerumen.  Thyroid hypodensities are noted these may relate to small nodules or cysts measuring up to approximately 9.7 mm.  The visualized aspects of the thoracic aortic arch demonstrate appropriate opacification with atherosclerotic change noted.  The lung apices demonstrate appearance of diminished depth of inspiration.  The osseous structures demonstrate chronic change.                               CT Head Without Contrast (Final result)  Result time 01/14/20 01:11:07    Final result by Donnell Doran MD (01/14/20 01:11:07)                 Impression:      Previously identified area of acute to subacute ischemia/infarct involving the left parietal lobe  is again noted, stable appearance without evidence for superimposed acute hemorrhage, and there is no additional evidence for superimposed significant interval detrimental change.      Electronically signed by: Donnell Doran  Date:    01/14/2020  Time:    01:11             Narrative:    EXAMINATION:  CT HEAD WITHOUT CONTRAST    CLINICAL HISTORY:  Focal neuro deficit, new, fixed or worsening, <6 hours;    TECHNIQUE:  Low dose axial images were obtained through the head.  Coronal and sagittal reformations were also performed. Contrast was not administered.    COMPARISON:  CT brain January 10, 2020    FINDINGS:  As on the prior examination there is diminished attenuation involving the left parietal lobe consistent with an area of acute to subacute ischemia/infarct, greater diminished attenuation character as expected given the time interval, there is no evidence for superimposed acute hemorrhage or significant interval detrimental change.  Chronic intracranial changes are otherwise noted appearing stable compared to the prior study.  There is no evidence for significant midline shift, there is no hydrocephalus, appropriate1 CSF spaces are seen at the skull base.    The visualized orbits appear intact.  The osseous structures appear intact.  The mastoid air cells appear appropriate when accounting for averaging mild opacity along the external auditory canals may relate to cerumen.  Mild paranasal sinus disease is noted.                                MRA Brain without contrast (Final result)  Result time 01/10/20 23:43:02    Final result by Finesse Ojeda MD (01/10/20 23:43:02)                 Impression:      MRI brain:    Large areas of acute infarction in the left temporal and parietal lobes along with punctate focus of infarction in the left occipital lobe.    MRA head:    No evidence of large vessel occlusion in the intracranial circulation.  Diminished flow within the distal segment of the left middle cerebral  artery with no definitive occlusion.  CT angiogram may be obtained for follow-up.    MRA neck:    No hemodynamically significant stenosis of the neck vessels.    This report was flagged in Epic as abnormal.      Electronically signed by: Finesse Ojeda MD  Date:    01/10/2020  Time:    23:43             Narrative:    EXAMINATION:  MRI BRAIN W WO CONTRAST; MRA BRAIN WITHOUT CONTRAST; MRA NECK WITH CONTRAST    CLINICAL HISTORY:  Stroke;    TECHNIQUE:  Multiplanar multisequence MR imaging of the brain was performed before and after the administration of 10 mL Gadavist intravenous contrast.    Per separate acquisition, 3D time-of-flight MRA of the intracranial circulation was performed.  MIP reconstructions were obtained and reviewed.    2D time-of-flight MRA of the neck was performed with intravenous contrast.  MIP reconstructions were obtained and reviewed.    COMPARISON:  CT dated 01/10/2020.    FINDINGS:  MRI of the brain:    The craniocervical junction is within normal limits.  The midline structures appear intact.  The intracranial flow voids appear within normal limits.    There is a large area of diffusion restriction in the left posterior temporal and left parietal lobes.  An adjacent small focus of diffuse is identified within the left occipital lobe.  There is associated T2/FLAIR signal hyperintense signal corresponding to the regions of diffusion weighted signal abnormality.    The ventricles and sulci are prominent, consistent with cerebral volume loss.  There are extensive Virchow Gomez spaces within the basal ganglia.  There are also old lacunar type infarctions in the basal ganglia and in the left thalamus.  There is a probable old lacunar type infarction in the left cerebellum.  There are no extra-axial fluid collections.  There is no evidence of intracranial hemorrhage.    The right orbit is unremarkable.  There are postoperative changes in the left orbit.  Paranasal sinuses are unremarkable.  The  mastoid air cells are clear.  The calvarium is intact.    There is no evidence of abnormal enhancement following intravenous contrast administration.    MRA head:    The intracranial segments of the ICAs are within normal limits.  The anterior cerebral arteries and anterior communicating artery complex are within normal limits.  The right middle cerebral artery is unremarkable.  The proximal segments of the left middle cerebral artery are unremarkable.  There is attenuated flow involving the distal segments of the left middle cerebral with no definitive occlusion.    The vertebral arteries are unremarkable.  There is tortuosity of the basilar.  The basilar remains patent.  The posterior cerebral arteries are within normal limits.  There are hypoplastic posterior communicating arteries.    MRA neck:    The great vessels arising from the aortic arch are within normal limits.  The bilateral common carotid arteries are within normal limits.  The internal and external carotid arteries are unremarkable.    The vertebral arteries are unremarkable.    There is no evidence of hemodynamically significant stenosis in the neck vessels.    There is no evidence of abnormal enhancement                                MRI Brain W WO Contrast (Final result)  Result time 01/10/20 23:43:02   Procedure changed from MRI Brain Without Contrast     Final result by Finesse Ojeda MD (01/10/20 23:43:02)                 Impression:      MRI brain:    Large areas of acute infarction in the left temporal and parietal lobes along with punctate focus of infarction in the left occipital lobe.    MRA head:    No evidence of large vessel occlusion in the intracranial circulation.  Diminished flow within the distal segment of the left middle cerebral artery with no definitive occlusion.  CT angiogram may be obtained for follow-up.    MRA neck:    No hemodynamically significant stenosis of the neck vessels.    This report was flagged in Epic as  abnormal.      Electronically signed by: Finesse Ojeda MD  Date:    01/10/2020  Time:    23:43             Narrative:    EXAMINATION:  MRI BRAIN W WO CONTRAST; MRA BRAIN WITHOUT CONTRAST; MRA NECK WITH CONTRAST    CLINICAL HISTORY:  Stroke;    TECHNIQUE:  Multiplanar multisequence MR imaging of the brain was performed before and after the administration of 10 mL Gadavist intravenous contrast.    Per separate acquisition, 3D time-of-flight MRA of the intracranial circulation was performed.  MIP reconstructions were obtained and reviewed.    2D time-of-flight MRA of the neck was performed with intravenous contrast.  MIP reconstructions were obtained and reviewed.    COMPARISON:  CT dated 01/10/2020.    FINDINGS:  MRI of the brain:    The craniocervical junction is within normal limits.  The midline structures appear intact.  The intracranial flow voids appear within normal limits.    There is a large area of diffusion restriction in the left posterior temporal and left parietal lobes.  An adjacent small focus of diffuse is identified within the left occipital lobe.  There is associated T2/FLAIR signal hyperintense signal corresponding to the regions of diffusion weighted signal abnormality.    The ventricles and sulci are prominent, consistent with cerebral volume loss.  There are extensive Virchow Gomez spaces within the basal ganglia.  There are also old lacunar type infarctions in the basal ganglia and in the left thalamus.  There is a probable old lacunar type infarction in the left cerebellum.  There are no extra-axial fluid collections.  There is no evidence of intracranial hemorrhage.    The right orbit is unremarkable.  There are postoperative changes in the left orbit.  Paranasal sinuses are unremarkable.  The mastoid air cells are clear.  The calvarium is intact.    There is no evidence of abnormal enhancement following intravenous contrast administration.    MRA head:    The intracranial segments of the ICAs  are within normal limits.  The anterior cerebral arteries and anterior communicating artery complex are within normal limits.  The right middle cerebral artery is unremarkable.  The proximal segments of the left middle cerebral artery are unremarkable.  There is attenuated flow involving the distal segments of the left middle cerebral with no definitive occlusion.    The vertebral arteries are unremarkable.  There is tortuosity of the basilar.  The basilar remains patent.  The posterior cerebral arteries are within normal limits.  There are hypoplastic posterior communicating arteries.    MRA neck:    The great vessels arising from the aortic arch are within normal limits.  The bilateral common carotid arteries are within normal limits.  The internal and external carotid arteries are unremarkable.    The vertebral arteries are unremarkable.    There is no evidence of hemodynamically significant stenosis in the neck vessels.    There is no evidence of abnormal enhancement                                MRA Neck with contrast (Final result)  Result time 01/10/20 23:43:02    Final result by Finesse Ojeda MD (01/10/20 23:43:02)                 Impression:      MRI brain:    Large areas of acute infarction in the left temporal and parietal lobes along with punctate focus of infarction in the left occipital lobe.    MRA head:    No evidence of large vessel occlusion in the intracranial circulation.  Diminished flow within the distal segment of the left middle cerebral artery with no definitive occlusion.  CT angiogram may be obtained for follow-up.    MRA neck:    No hemodynamically significant stenosis of the neck vessels.    This report was flagged in Epic as abnormal.      Electronically signed by: Finesse Ojeda MD  Date:    01/10/2020  Time:    23:43             Narrative:    EXAMINATION:  MRI BRAIN W WO CONTRAST; MRA BRAIN WITHOUT CONTRAST; MRA NECK WITH CONTRAST    CLINICAL HISTORY:  Stroke;    TECHNIQUE:  Multiplanar  multisequence MR imaging of the brain was performed before and after the administration of 10 mL Gadavist intravenous contrast.    Per separate acquisition, 3D time-of-flight MRA of the intracranial circulation was performed.  MIP reconstructions were obtained and reviewed.    2D time-of-flight MRA of the neck was performed with intravenous contrast.  MIP reconstructions were obtained and reviewed.    COMPARISON:  CT dated 01/10/2020.    FINDINGS:  MRI of the brain:    The craniocervical junction is within normal limits.  The midline structures appear intact.  The intracranial flow voids appear within normal limits.    There is a large area of diffusion restriction in the left posterior temporal and left parietal lobes.  An adjacent small focus of diffuse is identified within the left occipital lobe.  There is associated T2/FLAIR signal hyperintense signal corresponding to the regions of diffusion weighted signal abnormality.    The ventricles and sulci are prominent, consistent with cerebral volume loss.  There are extensive Virchow Gomez spaces within the basal ganglia.  There are also old lacunar type infarctions in the basal ganglia and in the left thalamus.  There is a probable old lacunar type infarction in the left cerebellum.  There are no extra-axial fluid collections.  There is no evidence of intracranial hemorrhage.    The right orbit is unremarkable.  There are postoperative changes in the left orbit.  Paranasal sinuses are unremarkable.  The mastoid air cells are clear.  The calvarium is intact.    There is no evidence of abnormal enhancement following intravenous contrast administration.    MRA head:    The intracranial segments of the ICAs are within normal limits.  The anterior cerebral arteries and anterior communicating artery complex are within normal limits.  The right middle cerebral artery is unremarkable.  The proximal segments of the left middle cerebral artery are unremarkable.  There is  attenuated flow involving the distal segments of the left middle cerebral with no definitive occlusion.    The vertebral arteries are unremarkable.  There is tortuosity of the basilar.  The basilar remains patent.  The posterior cerebral arteries are within normal limits.  There are hypoplastic posterior communicating arteries.    MRA neck:    The great vessels arising from the aortic arch are within normal limits.  The bilateral common carotid arteries are within normal limits.  The internal and external carotid arteries are unremarkable.    The vertebral arteries are unremarkable.    There is no evidence of hemodynamically significant stenosis in the neck vessels.    There is no evidence of abnormal enhancement                               CT Head Without Contrast (Final result)  Result time 01/10/20 16:51:22    Final result by Yasir Espinoza MD (01/10/20 16:51:22)                 Impression:      Evolving left parietal lobe infarct.      Electronically signed by: Yasir Espinoza MD  Date:    01/10/2020  Time:    16:51             Narrative:    EXAMINATION:  CT HEAD WITHOUT CONTRAST    CLINICAL HISTORY:  Stroke;    TECHNIQUE:  Low dose axial CT images obtained throughout the head without intravenous contrast. Sagittal and coronal reconstructions were performed.    COMPARISON:  None.    FINDINGS:  Intracranial compartment:    Ventricles and sulci are normal in size for age without evidence of hydrocephalus. No extra-axial blood or fluid collections.    There is a region of hypoattenuation with loss of gray-white differentiation and sulcal effacement in the left parietal lobe consistent with evolving infarction. Probable chronic lacunar infarcts in the right caudate head and bilateral basal ganglia.  No parenchymal mass or hemorrhage.  Periventricular white matter hypoattenuation in keeping with chronic microvascular disease.    Skull/extracranial contents (limited evaluation): No fracture. Mastoid air cells and  paranasal sinuses are essentially clear.                                 Medical Decision Making:   History:   I obtained history from: EMS provider.  Old Medical Records: I decided to obtain old medical records.  Old Records Summarized: records from clinic visits, records from previous admission(s) and records from another hospital.  Initial Assessment:   7-year-old male with a history of hypertension, atrial fibrillation currently on Coumadin, type 2 diabetes, venous stasis presents as a transfer from outside hospital via EMS for greater than 30 hr of altered mental status.  Found to have a left M1 occlusion.  Differential Diagnosis:   Differential diagnosis includes not limited to:  Ischemic stroke, hemorrhagic stroke, intracranial hemorrhage, intraparenchymal hemorrhage, ACS  Independently Interpreted Test(s):   I have ordered and independently interpreted EKG Reading(s) - see prior notes  Clinical Tests:   Lab Tests: Reviewed  Radiological Study: Reviewed and Ordered  Medical Tests: Ordered and Reviewed  Other:   I have discussed this case with another health care provider.       <> Summary of the Discussion: Vascular Neurology, Neuro Critical Care    Emergent evaluation of patient presenting as a transfer from outside hospital for left M1 occlusion with new and worsening aphasia and right-sided weakness. He is afebrile, without tachycardia, positive for tachypnea without hypoxemia.  No hypotension or hypertension at this time.  Physical exam findings remarkable for right-sided weakness, significant aphasia and word-finding difficulty.  Patient does not follow commands.  He is able to protect his airway.  No chest wall tenderness, lung sounds clear.  Immediately upon arrival, stroke team was activated and evaluated mid bedside.  MRI was obtained, labs and EKG were reviewed and obtained. No evidence of hypoglycemia.  ECG with no signs of ischemia on my read.  MRI confirms very large area of infarct.  No need for  emergent intervention at this time based on vascular Neurology and current evolution of stroke.  However given serious and enlarging area and territory of ischemic stroke, discussed case with vascular Neurology and Neuro Critical Care in, will admit to Neuro ICU for ongoing management, rehab, therapy and monitoring of acute stroke.  Please see critical care note for critical care time.    Complexity:  Critical care                              Clinical Impression:       ICD-10-CM ICD-9-CM   1. Cerebrovascular accident (CVA), unspecified mechanism I63.9 434.91   2. Confusion R41.0 298.9   3. CVA (cerebral vascular accident) I63.9 434.91   4. Stroke I63.9 434.91   5. Acute ischemic left MCA stroke I63.512 434.91         Disposition:   Disposition: Admitted  Condition: Serious      Trenton Hicks DO  Dept of Emergency Medicine   [facility]  Spectralink: [id number]                 Trenton Hicks DO  01/14/20 0704

## 2020-01-14 NOTE — NURSING
Responded to code stroke, attempted to get an iv before going to CT. Blood draw done after head CT. Transferred to icu without events.

## 2020-01-14 NOTE — ASSESSMENT & PLAN NOTE
- L MCA territory infarcts involving L posterior temporal and L parietal with small focus in L occipital lobe as well.  - Lipid panel with total cholesterol 150, LDL 79; continuing atorvastatin 10mg PO qHS.  - 2D Echo shows EF 55%, mild-moderate mitral regurgitation.  - PT/OT/SLP.  - Appreciate neurology assistance.

## 2020-01-14 NOTE — HPI
80 y/o male, transferred to eval for thrombectomy of left M1 occlusion. On 1-9-20, he started having difficulty speaking. On 1-10-20 still no improvement was taken to [facility] that revealed a left temp pariatel infarct, causing aphasia. He has hx of afib and is on coumadin. He was admitted to [facility] and d/c recommendation was rehab, he was was waiting on placement. On 1-14-20, around 0050, it was noticed he could  not move his right sided and was no longer trying to talk or following commands. He was moved to ICU and telemedicne consult was done with Dr Olivares. No TPA as INR 2.0. Recommednation was to transfer to [facility] for further evaluation. CTA head and neck done revealing M1 occlusion.   Upon arrival patient still not talking or following commands, not moving the right side. Patient to MRI for acute intervention protocol.  MRI acute intervention protocol reveals large core infarct so no IR. NCC consulted for medical management.    HX from chart as patient is aphasic

## 2020-01-14 NOTE — HPI
80 y/o male who on 1-9-20 started with difficulty speaking. On 1-10-20 still no improvement was taken to [facility] that revealed a left temp pariatel infarct. Patient just with aphasia. He has hx of afib and is on coumadin. He was admitted to [facility] and d/c recommendation was rehab so was waiting on placement. On 1-14-20 he was notice around 0050 to not move his right sided and no longer trying to talk and not following commands. He was moved to ICU and telemedicne consult was done with Dr Olivares. No TPA as INR 2.0. Recommednation was to transfer to [facility] for further evaluation. CTA head and neck done revealing M1 occlusion.   Upon arrival patient still not talking or following commands, not moving the right side. Patient to MRI for acute intervention protocol.  MRI acute intervention protocol reveals large core infarct so no IR.    Risk factors afib, HTN,

## 2020-01-14 NOTE — ASSESSMENT & PLAN NOTE
CTA at OSH shows new M1 occlusion, transfered to [facility] for thrombectomy eval.   MRI performed on arrival and reviewed by IR, Not thrombectomy candidate. There is new area of ischemia noted left parietal area.   Exam poor, concern for decline and airway. Admit to NCC, Vascular Neurology consulted  Q1hr Neuro Checks, SBP <220  PT/OT/SLP

## 2020-01-15 LAB
ALBUMIN SERPL BCP-MCNC: 3.3 G/DL (ref 3.5–5.2)
ALP SERPL-CCNC: 82 U/L (ref 55–135)
ALT SERPL W/O P-5'-P-CCNC: 34 U/L (ref 10–44)
ANION GAP SERPL CALC-SCNC: 11 MMOL/L (ref 8–16)
ANION GAP SERPL CALC-SCNC: 11 MMOL/L (ref 8–16)
AST SERPL-CCNC: 73 U/L (ref 10–40)
BASOPHILS # BLD AUTO: 0.05 K/UL (ref 0–0.2)
BASOPHILS # BLD AUTO: 0.05 K/UL (ref 0–0.2)
BASOPHILS NFR BLD: 0.6 % (ref 0–1.9)
BASOPHILS NFR BLD: 0.6 % (ref 0–1.9)
BILIRUB SERPL-MCNC: 1.4 MG/DL (ref 0.1–1)
BUN SERPL-MCNC: 20 MG/DL (ref 8–23)
BUN SERPL-MCNC: 20 MG/DL (ref 8–23)
CALCIUM SERPL-MCNC: 8.5 MG/DL (ref 8.7–10.5)
CALCIUM SERPL-MCNC: 8.5 MG/DL (ref 8.7–10.5)
CHLORIDE SERPL-SCNC: 108 MMOL/L (ref 95–110)
CHLORIDE SERPL-SCNC: 108 MMOL/L (ref 95–110)
CO2 SERPL-SCNC: 21 MMOL/L (ref 23–29)
CO2 SERPL-SCNC: 21 MMOL/L (ref 23–29)
CREAT SERPL-MCNC: 0.9 MG/DL (ref 0.5–1.4)
CREAT SERPL-MCNC: 0.9 MG/DL (ref 0.5–1.4)
DIFFERENTIAL METHOD: ABNORMAL
DIFFERENTIAL METHOD: ABNORMAL
EOSINOPHIL # BLD AUTO: 0 K/UL (ref 0–0.5)
EOSINOPHIL # BLD AUTO: 0 K/UL (ref 0–0.5)
EOSINOPHIL NFR BLD: 0.1 % (ref 0–8)
EOSINOPHIL NFR BLD: 0.1 % (ref 0–8)
ERYTHROCYTE [DISTWIDTH] IN BLOOD BY AUTOMATED COUNT: 14.9 % (ref 11.5–14.5)
ERYTHROCYTE [DISTWIDTH] IN BLOOD BY AUTOMATED COUNT: 14.9 % (ref 11.5–14.5)
EST. GFR  (AFRICAN AMERICAN): >60 ML/MIN/1.73 M^2
EST. GFR  (AFRICAN AMERICAN): >60 ML/MIN/1.73 M^2
EST. GFR  (NON AFRICAN AMERICAN): >60 ML/MIN/1.73 M^2
EST. GFR  (NON AFRICAN AMERICAN): >60 ML/MIN/1.73 M^2
GLUCOSE SERPL-MCNC: 91 MG/DL (ref 70–110)
GLUCOSE SERPL-MCNC: 91 MG/DL (ref 70–110)
HCT VFR BLD AUTO: 37.6 % (ref 40–54)
HCT VFR BLD AUTO: 37.6 % (ref 40–54)
HGB BLD-MCNC: 12.2 G/DL (ref 14–18)
HGB BLD-MCNC: 12.2 G/DL (ref 14–18)
IMM GRANULOCYTES # BLD AUTO: 0.03 K/UL (ref 0–0.04)
IMM GRANULOCYTES # BLD AUTO: 0.03 K/UL (ref 0–0.04)
IMM GRANULOCYTES NFR BLD AUTO: 0.3 % (ref 0–0.5)
IMM GRANULOCYTES NFR BLD AUTO: 0.3 % (ref 0–0.5)
INR PPP: 4.4 (ref 0.8–1.2)
LYMPHOCYTES # BLD AUTO: 0.6 K/UL (ref 1–4.8)
LYMPHOCYTES # BLD AUTO: 0.6 K/UL (ref 1–4.8)
LYMPHOCYTES NFR BLD: 6.9 % (ref 18–48)
LYMPHOCYTES NFR BLD: 6.9 % (ref 18–48)
MAGNESIUM SERPL-MCNC: 1.4 MG/DL (ref 1.6–2.6)
MAGNESIUM SERPL-MCNC: 2.6 MG/DL (ref 1.6–2.6)
MCH RBC QN AUTO: 33 PG (ref 27–31)
MCH RBC QN AUTO: 33 PG (ref 27–31)
MCHC RBC AUTO-ENTMCNC: 32.4 G/DL (ref 32–36)
MCHC RBC AUTO-ENTMCNC: 32.4 G/DL (ref 32–36)
MCV RBC AUTO: 102 FL (ref 82–98)
MCV RBC AUTO: 102 FL (ref 82–98)
MONOCYTES # BLD AUTO: 0.8 K/UL (ref 0.3–1)
MONOCYTES # BLD AUTO: 0.8 K/UL (ref 0.3–1)
MONOCYTES NFR BLD: 9.3 % (ref 4–15)
MONOCYTES NFR BLD: 9.3 % (ref 4–15)
NEUTROPHILS # BLD AUTO: 7.2 K/UL (ref 1.8–7.7)
NEUTROPHILS # BLD AUTO: 7.2 K/UL (ref 1.8–7.7)
NEUTROPHILS NFR BLD: 82.8 % (ref 38–73)
NEUTROPHILS NFR BLD: 82.8 % (ref 38–73)
NRBC BLD-RTO: 0 /100 WBC
NRBC BLD-RTO: 0 /100 WBC
PHOSPHATE SERPL-MCNC: 2.8 MG/DL (ref 2.7–4.5)
PLATELET # BLD AUTO: 114 K/UL (ref 150–350)
PLATELET # BLD AUTO: 114 K/UL (ref 150–350)
PMV BLD AUTO: 11.1 FL (ref 9.2–12.9)
PMV BLD AUTO: 11.1 FL (ref 9.2–12.9)
POCT GLUCOSE: 106 MG/DL (ref 70–110)
POCT GLUCOSE: 111 MG/DL (ref 70–110)
POCT GLUCOSE: 21 MG/DL (ref 70–110)
POCT GLUCOSE: 86 MG/DL (ref 70–110)
POTASSIUM SERPL-SCNC: 3.7 MMOL/L (ref 3.5–5.1)
POTASSIUM SERPL-SCNC: 3.7 MMOL/L (ref 3.5–5.1)
POTASSIUM SERPL-SCNC: 3.9 MMOL/L (ref 3.5–5.1)
PROT SERPL-MCNC: 5.9 G/DL (ref 6–8.4)
PROTHROMBIN TIME: 41.3 SEC (ref 9–12.5)
RBC # BLD AUTO: 3.7 M/UL (ref 4.6–6.2)
RBC # BLD AUTO: 3.7 M/UL (ref 4.6–6.2)
SODIUM SERPL-SCNC: 139 MMOL/L (ref 136–145)
SODIUM SERPL-SCNC: 140 MMOL/L (ref 136–145)
SODIUM SERPL-SCNC: 141 MMOL/L (ref 136–145)
WBC # BLD AUTO: 8.7 K/UL (ref 3.9–12.7)
WBC # BLD AUTO: 8.7 K/UL (ref 3.9–12.7)

## 2020-01-15 PROCEDURE — 25000003 PHARM REV CODE 250: Performed by: STUDENT IN AN ORGANIZED HEALTH CARE EDUCATION/TRAINING PROGRAM

## 2020-01-15 PROCEDURE — 99291 PR CRITICAL CARE, E/M 30-74 MINUTES: ICD-10-PCS | Mod: GC,,, | Performed by: PSYCHIATRY & NEUROLOGY

## 2020-01-15 PROCEDURE — 63600175 PHARM REV CODE 636 W HCPCS: Performed by: NURSE PRACTITIONER

## 2020-01-15 PROCEDURE — 85025 COMPLETE CBC W/AUTO DIFF WBC: CPT

## 2020-01-15 PROCEDURE — 94761 N-INVAS EAR/PLS OXIMETRY MLT: CPT

## 2020-01-15 PROCEDURE — 83735 ASSAY OF MAGNESIUM: CPT | Mod: 91

## 2020-01-15 PROCEDURE — 84132 ASSAY OF SERUM POTASSIUM: CPT

## 2020-01-15 PROCEDURE — 63600175 PHARM REV CODE 636 W HCPCS: Performed by: STUDENT IN AN ORGANIZED HEALTH CARE EDUCATION/TRAINING PROGRAM

## 2020-01-15 PROCEDURE — 80053 COMPREHEN METABOLIC PANEL: CPT

## 2020-01-15 PROCEDURE — 25000003 PHARM REV CODE 250: Performed by: NURSE PRACTITIONER

## 2020-01-15 PROCEDURE — 85610 PROTHROMBIN TIME: CPT

## 2020-01-15 PROCEDURE — 83735 ASSAY OF MAGNESIUM: CPT

## 2020-01-15 PROCEDURE — 84295 ASSAY OF SERUM SODIUM: CPT | Mod: 91

## 2020-01-15 PROCEDURE — 92526 ORAL FUNCTION THERAPY: CPT

## 2020-01-15 PROCEDURE — 99291 CRITICAL CARE FIRST HOUR: CPT | Mod: GC,,, | Performed by: PSYCHIATRY & NEUROLOGY

## 2020-01-15 PROCEDURE — 99222 PR INITIAL HOSPITAL CARE,LEVL II: ICD-10-PCS | Mod: ,,, | Performed by: NURSE PRACTITIONER

## 2020-01-15 PROCEDURE — 25000003 PHARM REV CODE 250: Performed by: PSYCHIATRY & NEUROLOGY

## 2020-01-15 PROCEDURE — A4217 STERILE WATER/SALINE, 500 ML: HCPCS | Performed by: STUDENT IN AN ORGANIZED HEALTH CARE EDUCATION/TRAINING PROGRAM

## 2020-01-15 PROCEDURE — 99233 SBSQ HOSP IP/OBS HIGH 50: CPT | Mod: GC,,, | Performed by: PSYCHIATRY & NEUROLOGY

## 2020-01-15 PROCEDURE — 20000000 HC ICU ROOM

## 2020-01-15 PROCEDURE — 84100 ASSAY OF PHOSPHORUS: CPT

## 2020-01-15 PROCEDURE — 63600175 PHARM REV CODE 636 W HCPCS: Performed by: PSYCHIATRY & NEUROLOGY

## 2020-01-15 PROCEDURE — 99222 1ST HOSP IP/OBS MODERATE 55: CPT | Mod: ,,, | Performed by: NURSE PRACTITIONER

## 2020-01-15 PROCEDURE — 92523 SPEECH SOUND LANG COMPREHEN: CPT

## 2020-01-15 PROCEDURE — 99233 PR SUBSEQUENT HOSPITAL CARE,LEVL III: ICD-10-PCS | Mod: GC,,, | Performed by: PSYCHIATRY & NEUROLOGY

## 2020-01-15 RX ORDER — HEPARIN SODIUM 5000 [USP'U]/ML
5000 INJECTION, SOLUTION INTRAVENOUS; SUBCUTANEOUS EVERY 8 HOURS
Status: DISCONTINUED | OUTPATIENT
Start: 2020-01-15 | End: 2020-01-16

## 2020-01-15 RX ORDER — NAPROXEN SODIUM 220 MG/1
81 TABLET, FILM COATED ORAL DAILY
Status: DISCONTINUED | OUTPATIENT
Start: 2020-01-15 | End: 2020-01-15

## 2020-01-15 RX ADMIN — ASPIRIN 81 MG CHEWABLE TABLET 81 MG: 81 TABLET CHEWABLE at 11:01

## 2020-01-15 RX ADMIN — POTASSIUM CHLORIDE 40 MEQ: 7.46 INJECTION, SOLUTION INTRAVENOUS at 05:01

## 2020-01-15 RX ADMIN — SODIUM CHLORIDE: 234 INJECTION INTRAMUSCULAR; INTRAVENOUS; SUBCUTANEOUS at 05:01

## 2020-01-15 RX ADMIN — SENNOSIDES AND DOCUSATE SODIUM 1 TABLET: 8.6; 5 TABLET ORAL at 08:01

## 2020-01-15 RX ADMIN — MAGNESIUM SULFATE IN WATER 4 G: 40 INJECTION, SOLUTION INTRAVENOUS at 04:01

## 2020-01-15 RX ADMIN — HEPARIN SODIUM 5000 UNITS: 5000 INJECTION, SOLUTION INTRAVENOUS; SUBCUTANEOUS at 09:01

## 2020-01-15 RX ADMIN — MAGNESIUM SULFATE IN WATER 4 G: 40 INJECTION, SOLUTION INTRAVENOUS at 09:01

## 2020-01-15 RX ADMIN — LEVOTHYROXINE SODIUM 75 MCG: 25 TABLET ORAL at 06:01

## 2020-01-15 RX ADMIN — METOPROLOL TARTRATE 12.5 MG: 25 TABLET, FILM COATED ORAL at 08:01

## 2020-01-15 RX ADMIN — HEPARIN SODIUM 5000 UNITS: 5000 INJECTION, SOLUTION INTRAVENOUS; SUBCUTANEOUS at 11:01

## 2020-01-15 RX ADMIN — POTASSIUM CHLORIDE 10 MEQ: 7.46 INJECTION, SOLUTION INTRAVENOUS at 09:01

## 2020-01-15 RX ADMIN — SENNOSIDES AND DOCUSATE SODIUM 1 TABLET: 8.6; 5 TABLET ORAL at 09:01

## 2020-01-15 RX ADMIN — METOPROLOL TARTRATE 12.5 MG: 25 TABLET, FILM COATED ORAL at 09:01

## 2020-01-15 RX ADMIN — ATORVASTATIN CALCIUM 40 MG: 20 TABLET, FILM COATED ORAL at 08:01

## 2020-01-15 NOTE — PLAN OF CARE
POC reviewed with pt. Pt unable to verbalize understanding. Questions and concerns addressed. No acute events overnight. Pt had no fevers during shift. Pt able maintain airway during shift and did not need to be intubated. Pt remains on intubation watch. Ultrasound of extremities performed during shift. Mag and potassium being replaced. Will continue to monitor. See flowsheets for full assessment and VS info

## 2020-01-15 NOTE — HPI
Chirag Thompson is a 79-year-old male with PMHx of A fib and HTN. Patient presented to [facility] and transferred to [facility] on 1/10/20. CTH revealed evolving moderate-sized region of acute infarction in the left MCA territory. Bilateral LE US no DVTs present. Hospital course complicated by resp insufficiency and hyponatremia (Began 2% 1/14).     Functional History: Patient lives with nephew in [facility] with 4 SARA. Prior to admission, ambulation with cane.

## 2020-01-15 NOTE — NURSING
Patient arrived to [facility]ED[facility] via [facility]  Type of stroke/diagnosis:  L MCA    TPA start and end time n/a    Thrombectomy start and end time n/a    Current symptoms: Right lynda paresis/global aphasic/Right Facial Droop    Skin assessment done: Y  Wounds noted:dryness and darken skin  bilateral lower legs  LOU Armband Applied:yes     NCC notified: Nichol JASSO at bedside

## 2020-01-15 NOTE — HOSPITAL COURSE
Chirag Thompson is a 79 y.o. male with PMHx of HTN, a fib (on Coumadin) and hypothyroidism. Patient has been admitted to [facility] for left temporal partieal infarct on 1/10. On 1/14 patient with worsening of deficits: aphasia, right side plegia and not following commands. Telestroke consult was completed by Dr. Olivares, no tpa given as INR was 2.0. Patient transferred to [facility] ED for stat CTA stroke MP which revealed a L M1 occlusion. Patient not candidate for intervention due to large core infarct. Patient was admitted to [facility] for close monitoring. Etiology of stroke cardioembolic.    Patient was evaluated by Physical therapy, Occupational therapy, speech therapy who recommended discharge to skilled nursing facility.  Patient was evaluated by speech therapy who recommended patient remain NPO.  Patient failed modified barium swallow study on 01/21/2020 and had PEG placed on 01/23/2020, patient tolerating tube feeds at discharge. Patient with evidence of pneumonitis on CXR and patient treated with pneumonitis. Hypernatremia treated with increasing enteral water boluses. Wound care consulted for RLE abrasions, sacral pressure injury discovered during wound care exam. SNF to treat abrasions with triad hydrophilic and foam dressing, change dressing every Monday and Thursday. SNF to treat sacral deep tissue injury with venelex BID, turning schedule, low air loss bed, and elevated heels     Anticoagulation initially held due to size of stroke and need for PEG tube placement. After peg tube placement, he was started on Eliquis 5 BID for secondary stroke prevention. Patient will also continue atorvastatin 40 mg daily. Patient's nephew contacted on day of discharge to discuss discharge plan, but did not answer phone call. Voicemail was left and phone call wasn't returned. Patient will be discharged to SNF. He will follow up with his PCP and stroke clinic.                  Patient admitted to neurocritical care for Left MCA  syndrome, severe aphasia following extension of prior stroke.  01/15/2020: Overnight concern for extensor posturing of right arm, emergent CT this AM showing stable area of infarct, no new hemorrhage or extension.  01/16/20: No acute events overnight, neurostatus unchanged  01/17/2020: No events overnight, patient received seroquel for agitation this AM. To remain in neuro ICU for closer monitoring  01/18/2020: Patient calm and alert during today's exam. Patient to step down to stroke primary service. Breath sounds course, CXR without significant change- will continue pulmonary tolieting.   1/19/20: Patient now with leukocytosis. Repeat CBC pending. CXR with pneumonitis. Rocephin ordered.  1/20/20: CT abd/pelvis for PEG planning, scheduled for tomorrow AM with IR. Plan to restart AC (Heparin vs DOAC) post procedure  1/21/20: Issues with placing NG overnight, PEG deferred to tomorrow as pt unable to get barium. SLP re-eval, MBSS today  1/22/20: NG pulled overnight, gen surgery consulted with plan for PEG tomorrow   1/23/20: PEG placed today by general surgery, plan to start DOAC 24 hr after procedure.   1/24/20: Patient 24 hour s/p PEG. Eliquis started. TF started.   1/25/20: Neuro exam stable. Patient now at goal on TF- tolerating well.   1/26/20: Neuro exam stable. Patient with decreased bowel sounds and taut abdomen. KUB without obstruction. Patient LBM on 1/21, brown bomb ordered.   1/27 multiple bowel movements overnight after receiving bowel regimen.  Neutraphos added to PEG tube.  Mild leukocytosis on labs today.  CXR did not show evidence of pneumonia.  Multiple skin tears and could be the etiology of leukocytosis.  Will continue to monitor   1/28/20 working well with therapy.  Waiting for placement to SNF.    1/29/20 increasing water boluses for hypernatremia

## 2020-01-15 NOTE — ASSESSMENT & PLAN NOTE
Stroke risk factor  Rate control  Will need anticoagulation for this when stable, holding in setting of large area of infarct

## 2020-01-15 NOTE — ASSESSMENT & PLAN NOTE
- CTH revealed evolving moderate-sized region of acute infarction in the left MCA territory.     See hospital course for functional, cognitive/speech/language, and nutrition/swallow status.      Recommendations  -  Encourage mobility, OOB in chair at least 3 hours per day, and early ambulation as appropriate   -  PT/OT evaluate and treat  -  SLP speech and cognitive evaluate and treat  -  Monitor sleep disturbances and establish consistent sleep-wake cycle  -  Monitor for bowel and bladder dysfunction  -  Monitor for shoulder pain and subluxation  -  Monitor for spasticity  -  Monitor for and prevent skin breakdown and pressure ulcers  · Early mobility, repositioning/weight shifting every 20-30 minutes when sitting, turn patient every 2 hours, proper mattress/overlay and chair cushioning, pressure relief/heel protector boots  -  DVT prophylaxis  -  Reviewed discharge options (IP rehab, SNF, HH therapy, and OP therapy)

## 2020-01-15 NOTE — PT/OT/SLP EVAL
Speech Language Pathology Evaluation  Cognitive Communication    Patient Name:  Chirag Thompson   MRN:  [id number]  Admitting Diagnosis: Acute ischemic left MCA stroke    Recommendations:     Recommendations:                General Recommendations:  Dysphagia therapy and Speech/language therapy  Diet recommendations:  NPO, NPO   Aspiration Precautions: Strict aspiration precautions   General Precautions: Standard, aspiration, aphasia, NPO, fall  Communication strategies:  none    History:     Past Medical History:   Diagnosis Date    Arthritis     Chronic a-fib     Current use of long term anticoagulation     Hypertension     Pressure ulcer of right leg, unspecified pressure ulcer stage     was going to [facility] healed now    Stroke due to embolism of left middle cerebral artery 01/10/2020    left temp parietal    Thyroid disease        Past Surgical History:   Procedure Laterality Date    HERNIA REPAIR         Social History: Patient lives with [facility].    Prior Intubation HX:  na  Modified Barium Swallow: na    Prior diet: regular with thin      Subjective     :No family present   Patient goals: matthieu    Pain/Comfort:  · Pain Rating 1: 0/10  · Pain Rating Post-Intervention 1: 0/10    Objective:   Cognitive Status:    Pt. Lethargic, requiring frequent cues to remain alert with poor attention to task and no initiation of communication.  Responses elicited were delayed.        Receptive Language:   Comprehension:   Pt. modelled 4/5 simple commands but did not follow any simple commands.  Responses to simple yes/no questions were no differentiated.      Pragmatics:    Poor eye contact    Expressive Language:  Verbal:    Pt. Did count to 5 with responses being intelligible only in known context with careful listening.  Undifferentiated Vocalizations elicited when asked to complete automatic sentences and in spontaneous contexts .  He did not respond to questions requiring single word response.          Motor  Speech:  Severe dysarthria    Voice:   Adequate intensity with slightly wet vocal quality     Visual-Spatial:  tba    Reading:   tba     Written Expression:   tba    Treatment: Hob was raised to 90 degree angle.  Pt. Did not produce cough, throat clear on command.  When presented with 1/2 teaspoon of water, pt. Did open his mouth however lipseal was impaired.  Anterior loss of most of bolus noted on both trials (2) with no pharyngeal swallow elicited.      Assessment:   Chirag Thompson is a 79 y.o. male with an SLP diagnosis of Aphasia, Dysphagia, Dysarthria and Cognitive-Linguistic Impairment.      Goals:   Multidisciplinary Problems     SLP Goals        Problem: SLP Goal    Goal Priority Disciplines Outcome   SLP Goal     SLP Ongoing, Progressing   Description:  Goals due 1/21  1.  Pt. Will participate in ongoing assessment of swallow at bedside  2.  Pt. Will participate in speech language eval /met  3.  Model simple commands with 100% accuracy  4.  Respond to simple yes/no questions with 70% accuracy  5.  Complete single words on au tomatic speech tasks with 50% accuracy                      Plan:   · Patient to be seen:  4 x/week   · Plan of Care expires:  02/12/20  · Plan of Care reviewed with:  patient   · SLP Follow-Up:  Yes       Discharge recommendations:  Discharge Facility/Level of Care Needs: (tbd)   Barriers to Discharge:  Level of Skilled Assistance Needed 24 hour    Time Tracking:   SLP Treatment Date:   01/15/20  Speech Start Time:  1040  Speech Stop Time:  1058     Speech Total Time (min):  18 min    Billable Minutes: Eval 10  and Treatment Swallowing Dysfunction 8    Julia Spann MA, CCC-SLP  01/15/2020

## 2020-01-15 NOTE — PROGRESS NOTES
[facility]  Vascular Neurology  Comprehensive Stroke Center  Progress Note    Assessment/Plan:     * Acute ischemic left MCA stroke  80 yo man admitted for L temporal-parietal embolic stroke, with worsening of deficits tonight.  Suspect proximal MCA occlusion.  No TPA INR 2.0. No IR as too large infarct. Holding coumadin due to large area of infarct. Will likely need repeat imaging after a week and further decision regarding anticoagulation pending.      Antithrombotics: Holding in setting of large area of infarct, high risk for hemorrhagic conversion.    Statins: Lipitor 40 mg daily    Aggressive risk factor modification: A-Fib, HTN     Rehab efforts: The patient has been evaluated by a stroke team provider and the therapy needs have been fully considered based off the presenting complaints and exam findings. The following therapy evaluations are needed: PT evaluate and treat, OT evaluate and treat, SLP evaluate and treat, PM&R evaluate for appropriate placement    Diagnostics ordered/pending: None     VTE prophylaxis: None: Hold pharmacologic prophylaxis in setting of large infarct. SCDs.    BP parameters: Infarct: No intervention, SBP <220    Aphasia  Due to stroke  Aggressive therapy    Right spastic hemiparesis  Due to stroke  Aggressive therapy    Atrial fibrillation  Stroke risk factor  Rate control  Will need anticoagulation for this when stable, holding in setting of large area of infarct    Type 2 diabetes mellitus with circulatory disorder, without long-term current use of insulin  Stroke risk factor  HgB A1C 5.1  SSI for now      Essential hypertension  Stroke risk factor  SBP <220         Patient admitted to neurocritical care for Left MCA syndrome, severe aphasia following extension of prior stroke.  01/15/2020: Overnight concern for extensor posturing of right arm, emergent CT this AM showing stable area of infarct, no new hemorrhage or extension.    STROKE DOCUMENTATION   Acute  Stroke Times   Last Known Normal Date: 01/13/20  Last Known Normal Time: 2315  Symptom Onset Date: 01/14/20  Symptom Onset Time: 0050  Stroke Team Called Date: 01/14/20  Stroke Team Called Time: 0105  Stroke Team Arrival Date: 01/14/20  Stroke Team Arrival Time: 0355  CT Interpretation Time: 0100    NIH Scale:  Interval: baseline  1a. Level of Consciousness: 1-->Not alert, but arousable by minor stimulation to obey, answer, or respond  1b. LOC Questions: 2-->Answers neither question correctly  1c. LOC Commands: 2-->Performs neither task correctly  2. Best Gaze: 1-->Partial gaze palsy, gaze is abnormal in one or both eyes, but forced deviation or total gaze paresis is not present  3. Visual: 0-->No visual loss  4. Facial Palsy: 1-->Minor paralysis (flattened nasolabial fold, asymmetry on smiling)  5a. Motor Arm, Left: 1-->Drift, limb holds 90 (or 45) degrees, but drifts down before full 10 seconds, does not hit bed or other support  5b. Motor Arm, Right: 4-->No movement  6a. Motor Leg, Left: 3-->No effort against gravity, leg falls to bed immediately  6b. Motor Leg, Right: 3-->No effort against gravity, leg falls to bed immediately  7. Limb Ataxia: 0-->Absent  8. Sensory: 0-->Normal, no sensory loss  9. Best Language: 2-->Severe aphasia, all communication is through fragmentary expression, great need for inference, questioning, and guessing by the listener. Range of information that can be exchanged is limited, listener carries burden of. . . (see row details)  10. Dysarthria: 2-->Severe dysarthria, patients speech is so slurred as to be unintelligible in the absence of or out of proportion to any dysphasia, or is mute/anarthric  11. Extinction and Inattention (formerly Neglect): 1-->Visual, tactile, auditory, spatial, or personal inattention or extinction to bilateral simultaneous stimulation in one of the sensory modalities  Total (NIH Stroke Scale): 23       Modified Blanche Score: 0  [place] Coma Scale:    ABCD2  Score:    [id number] Score:   HAS -BLED Score:   ICH Score:   Hunt & Jones Classification:      Hemorrhagic change of an Ischemic Stroke: Does this patient have an ischemic stroke with hemorrhagic changes? No     Neurologic Chief Complaint: Right sided weakness    Subjective:     Interval History: Patient is seen for follow-up neurological assessment and treatment recommendations:     Stat CT head this morning for concern of right arm extensor posturing, repeat CT without any acute change, showing similar size evolving infarct. No other acute events.    HPI, Past Medical, Family, and Social History remains the same as documented in the initial encounter.     Review of Systems   Reason unable to perform ROS: Expressive aphasia.     Scheduled Meds:   aspirin  81 mg Per NG tube Daily    atorvastatin  40 mg Per NG tube Daily    heparin (porcine)  5,000 Units Subcutaneous Q8H    levothyroxine  75 mcg Per NG tube Before breakfast    metoprolol tartrate  12.5 mg Per NG tube BID    senna-docusate 8.6-50 mg  1 tablet Per NG tube BID     Continuous Infusions:   sodium chloride 0.9% 125 mL/hr at 01/15/20 1000     PRN Meds:acetaminophen, glucagon (human recombinant), glucose, glucose, insulin aspart U-100, insulin aspart U-100, magnesium sulfate IVPB, magnesium sulfate IVPB, ondansetron, potassium chloride in water **AND** potassium chloride in water **AND** potassium chloride in water, sodium chloride 0.9%, sodium chloride 0.9%, sodium phosphate IVPB, sodium phosphate IVPB, sodium phosphate IVPB    Objective:     Vital Signs (Most Recent):  Temp: 97.7 °F (36.5 °C) (01/15/20 0701)  Pulse: 83 (01/15/20 0848)  Resp: (!) 21 (01/15/20 0730)  BP: 136/60 (01/15/20 0848)  SpO2: 98 % (01/15/20 0730)  BP Location: Right arm    Vital Signs Range (Last 24H):  Temp:  [97.7 °F (36.5 °C)-99.4 °F (37.4 °C)]   Pulse:  [64-96]   Resp:  [0-30]   BP: (121-163)/()   SpO2:  [93 %-100 %]   BP Location: Right arm    Physical Exam    Constitutional: He appears well-developed and well-nourished.   Obese, stuporous   HENT:   Head: Normocephalic and atraumatic.   Eyes: Pupils are equal, round, and reactive to light. EOM are normal.   Neck: Normal range of motion.   Cardiovascular: Normal rate.   Pulmonary/Chest: Effort normal.   Abdominal: Soft.   Neurological:   Drowsy, right sided plegia, and sensory loss, facial droop and dysarthria and aphasia       Skin: Skin is warm and dry.   Nursing note and vitals reviewed.      Neurological Exam:   LOC: drowsy  Attention Span: poor  Language: Global aphasia  Articulation: Dysarthria  Orientation: Untestable due to severe aphasia   Visual Fields: Visual neglect  EOM (CN III, IV, VI): Gaze preference  left  Pupils (CN II, III): PERRL  Facial Sensation (CN V): Normal  Facial Movement (CN VII): Lower facial weakness on the Right  Motor: Unable to assess, patient uncooperative with exam  Cebellar: No evidence of appendicular or axial ataxia  Sensation: Davidson-hypoesthesia right  Tone: Normal tone throughout    Laboratory:  CMP:   Recent Labs   Lab 01/15/20  0222  01/15/20  1310   CALCIUM 8.5*  8.5*  --   --    ALBUMIN 3.3*  --   --    PROT 5.9*  --   --      140  141   < > 139   K 3.7  3.7  --   --    CO2 21*  21*  --   --      108  --   --    BUN 20  20  --   --    CREATININE 0.9  0.9  --   --    ALKPHOS 82  --   --    ALT 34  --   --    AST 73*  --   --    BILITOT 1.4*  --   --     < > = values in this interval not displayed.     CBC:   Recent Labs   Lab 01/15/20  0222   WBC 8.70  8.70   RBC 3.70*  3.70*   HGB 12.2*  12.2*   HCT 37.6*  37.6*   *  114*   *  102*   MCH 33.0*  33.0*   MCHC 32.4  32.4     Lipid Panel:   Recent Labs   Lab 01/11/20  0357   CHOL 150   LDLCALC 79.8   HDL 61   TRIG 46     Coagulation:   Recent Labs   Lab 01/14/20  0040 01/15/20  0222   INR 2.4* 4.4*   APTT 35.8*  --      Hgb A1C:   Recent Labs   Lab 01/11/20  0357   HGBA1C 5.4     TSH:    Recent Labs   Lab 01/10/20  2012   TSH 4.277*       Diagnostic Results     Brain Imaging   CT Head without 1/15 (07:43)  Evolving moderate-sized region of acute infarction in the left MCA territory as above.  Distribution grossly stable from recent MRI without significant infarct extension.  No hemorrhagic conversion or new territorial infarct.    MRI Brain 1/14 (04:28)  There is interval change, there is interval development of acute ischemia/infarct along the left MCA distribution, this is superimposed on subacute ischemic change seen on the recent MRI examination  Findings concerning for occlusion of the M2 segment of the left MCA as above.    Additional chronic changes are noted.    Vessel Imaging   CTA Head and Neck 1/14 (02:43)  As on the recent brain MRA examination there is appearance of attenuation of the left MCA branch vessels without evidence for high-grade stenosis or occlusion and otherwise there is no evidence for high-grade stenosis or occlusion of the major arterial vascular structures of the head or neck.    There is no evidence for intracranial aneurysm or AVM.    Findings consistent with acute to subacute left parietal ischemic change again noted.    Small thyroid nodules or cysts.    Cardiac Imaging   TTE 1/11  · Normal left ventricular systolic function. The estimated ejection fraction is 55%.  · Mid anteroseptal and apical moderate hypokinesia.  · Diastolic pattern consistent with atrial fibrillation observed.  · Severe left atrial enlargement.  · Normal right ventricular systolic function.  · Severe right atrial enlargement.  · Mild-to-moderate mitral regurgitation.  · Mild tricuspid regurgitation.  · The estimated PA systolic pressure is 40 mmHg.  · Intermediate central venous pressure (8 mmHg).      Oj Minaya MD  Comprehensive Stroke Center  Department of Vascular Neurology   [facility]

## 2020-01-15 NOTE — PLAN OF CARE
Patient transferred to [facility] Neuro ICU from [facility] due to no longer trying to talk or following commands.  Now on intubation watch     Per Assessment at [facility]:  DME at home: straight cane     Current dispo: therapy recommending IRF.  List provided, family choice: [facility].  Referral sent in [facility] to [facility] and [facility].  Awaiting acceptance/auth.      Prior to hospital stay pt was recieveing wound care at [facility] each Wednesday for Right lower leg and nursing visits once/week by Kayden VALLECILLO.         Power of  or Living Will: Pt does not have children.  Lives with his primary caretaker (nephew) David Arteaga [phone].  Nephew has looked into obtaining POA, however pt never completed paperwork.  David states he is next of kin.             01/15/20 0946   Discharge Reassessment   Assessment Type Discharge Planning Reassessment   Provided patient/caregiver education on the expected discharge date and the discharge plan No   Do you have any problems affording any of your prescribed medications? No   Discharge Plan A Rehab   Discharge Plan B Skilled Nursing Facility   DME Needed Upon Discharge  other (see comments)  (tbd)   Patient choice form signed by patient/caregiver N/A   Anticipated Discharge Disposition Rehab   Can the patient answer the patient profile reliably? No, cognitively impaired   Describe the patient's ability to walk at the present time. Does not walk or unable to take any steps at all   How often would a person be available to care for the patient? Often   Number of comorbid conditions (as recorded on the chart) Four   Post-Acute Status   Post-Acute Authorization Placement   Post-Acute Placement Status Awaiting Internal Medical Clearance   Discharge Delays (!) Change in Medical Condition  (Patient transferred to Neuro ICU)       Latonai Ramon RN, CCRN-K, [facility]  Neuro-Critical Care   X [phone]

## 2020-01-15 NOTE — PLAN OF CARE
01/15/20 1244   Post-Acute Status   Post-Acute Authorization Placement   Post-Acute Placement Status Referrals Sent  ([facility])     SW reviewed chart for dc planning needs. Previous case management working on transfer to [facility] before Pt transferred to [facility]. Sent referral to [facility] via [facility].    Isa Gomez LCSW  Neurocritical Care   [facility]  [phone]

## 2020-01-15 NOTE — ASSESSMENT & PLAN NOTE
80 yo man admitted for L temporal-parietal embolic stroke, with worsening of deficits tonight.  Suspect proximal MCA occlusion.  No TPA INR 2.0. No IR as too large infarct. Holding coumadin due to large area of infarct. Will likely need repeat imaging after a week and further decision regarding anticoagulation pending.      Antithrombotics: Holding in setting of large area of infarct, high risk for hemorrhagic conversion.    Statins: Lipitor 40 mg daily    Aggressive risk factor modification: A-Fib, HTN     Rehab efforts: The patient has been evaluated by a stroke team provider and the therapy needs have been fully considered based off the presenting complaints and exam findings. The following therapy evaluations are needed: PT evaluate and treat, OT evaluate and treat, SLP evaluate and treat, PM&R evaluate for appropriate placement    Diagnostics ordered/pending: None     VTE prophylaxis: None: Hold pharmacologic prophylaxis in setting of large infarct. SCDs.    BP parameters: Infarct: No intervention, SBP <220

## 2020-01-15 NOTE — SUBJECTIVE & OBJECTIVE
Neurologic Chief Complaint: Right sided weakness    Subjective:     Interval History: Patient is seen for follow-up neurological assessment and treatment recommendations:     Stat CT head this morning for concern of right arm extensor posturing, repeat CT without any acute change, showing similar size evolving infarct. No other acute events.    HPI, Past Medical, Family, and Social History remains the same as documented in the initial encounter.     Review of Systems   Reason unable to perform ROS: Expressive aphasia.     Scheduled Meds:   aspirin  81 mg Per NG tube Daily    atorvastatin  40 mg Per NG tube Daily    heparin (porcine)  5,000 Units Subcutaneous Q8H    levothyroxine  75 mcg Per NG tube Before breakfast    metoprolol tartrate  12.5 mg Per NG tube BID    senna-docusate 8.6-50 mg  1 tablet Per NG tube BID     Continuous Infusions:   sodium chloride 0.9% 125 mL/hr at 01/15/20 1000     PRN Meds:acetaminophen, glucagon (human recombinant), glucose, glucose, insulin aspart U-100, insulin aspart U-100, magnesium sulfate IVPB, magnesium sulfate IVPB, ondansetron, potassium chloride in water **AND** potassium chloride in water **AND** potassium chloride in water, sodium chloride 0.9%, sodium chloride 0.9%, sodium phosphate IVPB, sodium phosphate IVPB, sodium phosphate IVPB    Objective:     Vital Signs (Most Recent):  Temp: 97.7 °F (36.5 °C) (01/15/20 0701)  Pulse: 83 (01/15/20 0848)  Resp: (!) 21 (01/15/20 0730)  BP: 136/60 (01/15/20 0848)  SpO2: 98 % (01/15/20 0730)  BP Location: Right arm    Vital Signs Range (Last 24H):  Temp:  [97.7 °F (36.5 °C)-99.4 °F (37.4 °C)]   Pulse:  [64-96]   Resp:  [0-30]   BP: (121-163)/()   SpO2:  [93 %-100 %]   BP Location: Right arm    Physical Exam   Constitutional: He appears well-developed and well-nourished.   Obese, stuporous   HENT:   Head: Normocephalic and atraumatic.   Eyes: Pupils are equal, round, and reactive to light. EOM are normal.   Neck: Normal  range of motion.   Cardiovascular: Normal rate.   Pulmonary/Chest: Effort normal.   Abdominal: Soft.   Neurological:   Drowsy, right sided plegia, and sensory loss, facial droop and dysarthria and aphasia       Skin: Skin is warm and dry.   Nursing note and vitals reviewed.      Neurological Exam:   LOC: drowsy  Attention Span: poor  Language: Global aphasia  Articulation: Dysarthria  Orientation: Untestable due to severe aphasia   Visual Fields: Visual neglect  EOM (CN III, IV, VI): Gaze preference  left  Pupils (CN II, III): PERRL  Facial Sensation (CN V): Normal  Facial Movement (CN VII): Lower facial weakness on the Right  Motor: Unable to assess, patient uncooperative with exam  Cebellar: No evidence of appendicular or axial ataxia  Sensation: Davidson-hypoesthesia right  Tone: Normal tone throughout    Laboratory:  CMP:   Recent Labs   Lab 01/15/20  0222  01/15/20  1310   CALCIUM 8.5*  8.5*  --   --    ALBUMIN 3.3*  --   --    PROT 5.9*  --   --      140  141   < > 139   K 3.7  3.7  --   --    CO2 21*  21*  --   --      108  --   --    BUN 20  20  --   --    CREATININE 0.9  0.9  --   --    ALKPHOS 82  --   --    ALT 34  --   --    AST 73*  --   --    BILITOT 1.4*  --   --     < > = values in this interval not displayed.     CBC:   Recent Labs   Lab 01/15/20  0222   WBC 8.70  8.70   RBC 3.70*  3.70*   HGB 12.2*  12.2*   HCT 37.6*  37.6*   *  114*   *  102*   MCH 33.0*  33.0*   MCHC 32.4  32.4     Lipid Panel:   Recent Labs   Lab 01/11/20 0357   CHOL 150   LDLCALC 79.8   HDL 61   TRIG 46     Coagulation:   Recent Labs   Lab 01/14/20  0040 01/15/20  0222   INR 2.4* 4.4*   APTT 35.8*  --      Hgb A1C:   Recent Labs   Lab 01/11/20 0357   HGBA1C 5.4     TSH:   Recent Labs   Lab 01/10/20  2012   TSH 4.277*       Diagnostic Results     Brain Imaging   CT Head without 1/15 (07:43)  Evolving moderate-sized region of acute infarction in the left MCA territory as above.   Distribution grossly stable from recent MRI without significant infarct extension.  No hemorrhagic conversion or new territorial infarct.    MRI Brain 1/14 (04:28)  There is interval change, there is interval development of acute ischemia/infarct along the left MCA distribution, this is superimposed on subacute ischemic change seen on the recent MRI examination  Findings concerning for occlusion of the M2 segment of the left MCA as above.    Additional chronic changes are noted.    Vessel Imaging   CTA Head and Neck 1/14 (02:43)  As on the recent brain MRA examination there is appearance of attenuation of the left MCA branch vessels without evidence for high-grade stenosis or occlusion and otherwise there is no evidence for high-grade stenosis or occlusion of the major arterial vascular structures of the head or neck.    There is no evidence for intracranial aneurysm or AVM.    Findings consistent with acute to subacute left parietal ischemic change again noted.    Small thyroid nodules or cysts.    Cardiac Imaging   TTE 1/11  · Normal left ventricular systolic function. The estimated ejection fraction is 55%.  · Mid anteroseptal and apical moderate hypokinesia.  · Diastolic pattern consistent with atrial fibrillation observed.  · Severe left atrial enlargement.  · Normal right ventricular systolic function.  · Severe right atrial enlargement.  · Mild-to-moderate mitral regurgitation.  · Mild tricuspid regurgitation.  · The estimated PA systolic pressure is 40 mmHg.  · Intermediate central venous pressure (8 mmHg).

## 2020-01-15 NOTE — CONSULTS
[facility]  Physical Medicine & Rehab  Consult Note    Patient Name: Chirag Thompson  MRN: [id number]  Admission Date: 1/10/2020  Hospital Length of Stay: 5 days  Attending Physician: María Elena Berger MD     Inpatient consult to Physical Medicine & Rehabilitation  Consult performed by: Norma Duarte NP  Consult requested by:  María Elena Berger MD    Collaborating Physician: Balbir Zamudio MD  Reason for Consult:  Assess rehabilitation needs     Consults  Subjective:     Principal Problem: Acute ischemic left MCA stroke    HPI: Chirag Thompson is a 79-year-old male with PMHx of A fib and HTN. Patient presented to [facility] and transferred to [facility] on 1/10/20. CTH revealed evolving moderate-sized region of acute infarction in the left MCA territory. Bilateral LE US no DVTs present. Hospital course complicated by resp insufficiency and hyponatremia (Began 2% 1/14).     Functional History: Patient lives with nephew in [facility] with 4 SARA. Prior to admission, ambulation with cane.      Hospital Course: 1/14/20: Evaluated by PT & OT. Bed mobility totalA x 2 ppl. Sit to stand totaA- dependence.     Past Medical History:   Diagnosis Date    Arthritis     Chronic a-fib     Current use of long term anticoagulation     Hypertension     Pressure ulcer of right leg, unspecified pressure ulcer stage     was going to [facility] healed now    Stroke due to embolism of left middle cerebral artery 01/10/2020    left temp parietal    Thyroid disease      Past Surgical History:   Procedure Laterality Date    HERNIA REPAIR       Review of patient's allergies indicates:  No Known Allergies    Scheduled Medications:    aspirin  81 mg Per NG tube Daily    atorvastatin  40 mg Per NG tube Daily    heparin (porcine)  5,000 Units Subcutaneous Q8H    levothyroxine  75 mcg Per NG tube Before breakfast    metoprolol tartrate  12.5 mg Per NG tube BID    senna-docusate 8.6-50 mg  1 tablet Per NG tube BID        PRN Medications: acetaminophen, glucagon (human recombinant), glucose, glucose, insulin aspart U-100, insulin aspart U-100, magnesium sulfate IVPB, magnesium sulfate IVPB, ondansetron, potassium chloride in water **AND** potassium chloride in water **AND** potassium chloride in water, sodium chloride 0.9%, sodium chloride 0.9%, sodium phosphate IVPB, sodium phosphate IVPB, sodium phosphate IVPB    Family History     None        Tobacco Use    Smoking status: Never Smoker    Smokeless tobacco: Never Used   Substance and Sexual Activity    Alcohol use: Never     Frequency: Never    Drug use: Never    Sexual activity: Not on file     Review of Systems   Reason unable to perform ROS: lethargic.     Objective:     Vital Signs (Most Recent):  Temp: 97.7 °F (36.5 °C) (01/15/20 0701)  Pulse: 83 (01/15/20 0848)  Resp: (!) 21 (01/15/20 0730)  BP: 136/60 (01/15/20 0848)  SpO2: 98 % (01/15/20 0730)    Vital Signs (24h Range):  Temp:  [97.7 °F (36.5 °C)-99.4 °F (37.4 °C)] 97.7 °F (36.5 °C)  Pulse:  [64-96] 83  Resp:  [0-30] 21  SpO2:  [93 %-100 %] 98 %  BP: (121-163)/() 136/60     Body mass index is 30.75 kg/m².    Physical Exam   Constitutional: He appears well-developed and well-nourished.   HENT:   Head: Normocephalic and atraumatic.   Neck: Neck supple.   Pulmonary/Chest: Effort normal. No respiratory distress.   Abdominal:   NG tube in place   Musculoskeletal: He exhibits no deformity.   Neurological:   - lethargic    Skin: Skin is warm and dry.   Psychiatric: He has a normal mood and affect. His behavior is normal.   Nursing note and vitals reviewed.         Diagnostic Results: Labs: Reviewed  ECG: Reviewed  CT: Reviewed    Assessment/Plan:     * Acute ischemic left MCA stroke  - CTH revealed evolving moderate-sized region of acute infarction in the left MCA territory.     See hospital course for functional, cognitive/speech/language, and nutrition/swallow status.      Recommendations  -  Encourage  mobility, OOB in chair at least 3 hours per day, and early ambulation as appropriate   -  PT/OT evaluate and treat  -  SLP speech and cognitive evaluate and treat  -  Monitor sleep disturbances and establish consistent sleep-wake cycle  -  Monitor for bowel and bladder dysfunction  -  Monitor for shoulder pain and subluxation  -  Monitor for spasticity  -  Monitor for and prevent skin breakdown and pressure ulcers  · Early mobility, repositioning/weight shifting every 20-30 minutes when sitting, turn patient every 2 hours, proper mattress/overlay and chair cushioning, pressure relief/heel protector boots  -  DVT prophylaxis  -  Reviewed discharge options (IP rehab, SNF, HH therapy, and OP therapy)    Right spastic hemiparesis    - PT and OT eval and treat    Thank you for your consult.     Norma Duarte NP  Department of Physical Medicine & Rehab  [facility]

## 2020-01-15 NOTE — SUBJECTIVE & OBJECTIVE
Past Medical History:   Diagnosis Date    Arthritis     Chronic a-fib     Current use of long term anticoagulation     Hypertension     Pressure ulcer of right leg, unspecified pressure ulcer stage     was going to [facility] healed now    Stroke due to embolism of left middle cerebral artery 01/10/2020    left temp parietal    Thyroid disease      Past Surgical History:   Procedure Laterality Date    HERNIA REPAIR       Review of patient's allergies indicates:  No Known Allergies    Scheduled Medications:    aspirin  81 mg Per NG tube Daily    atorvastatin  40 mg Per NG tube Daily    heparin (porcine)  5,000 Units Subcutaneous Q8H    levothyroxine  75 mcg Per NG tube Before breakfast    metoprolol tartrate  12.5 mg Per NG tube BID    senna-docusate 8.6-50 mg  1 tablet Per NG tube BID       PRN Medications: acetaminophen, glucagon (human recombinant), glucose, glucose, insulin aspart U-100, insulin aspart U-100, magnesium sulfate IVPB, magnesium sulfate IVPB, ondansetron, potassium chloride in water **AND** potassium chloride in water **AND** potassium chloride in water, sodium chloride 0.9%, sodium chloride 0.9%, sodium phosphate IVPB, sodium phosphate IVPB, sodium phosphate IVPB    Family History     None        Tobacco Use    Smoking status: Never Smoker    Smokeless tobacco: Never Used   Substance and Sexual Activity    Alcohol use: Never     Frequency: Never    Drug use: Never    Sexual activity: Not on file     Review of Systems   Reason unable to perform ROS: lethargic.     Objective:     Vital Signs (Most Recent):  Temp: 97.7 °F (36.5 °C) (01/15/20 0701)  Pulse: 83 (01/15/20 0848)  Resp: (!) 21 (01/15/20 0730)  BP: 136/60 (01/15/20 0848)  SpO2: 98 % (01/15/20 0730)    Vital Signs (24h Range):  Temp:  [97.7 °F (36.5 °C)-99.4 °F (37.4 °C)] 97.7 °F (36.5 °C)  Pulse:  [64-96] 83  Resp:  [0-30] 21  SpO2:  [93 %-100 %] 98 %  BP: (121-163)/() 136/60     Body mass index is 30.75  kg/m².    Physical Exam   Constitutional: He appears well-developed and well-nourished.   HENT:   Head: Normocephalic and atraumatic.   Neck: Neck supple.   Pulmonary/Chest: Effort normal. No respiratory distress.   Abdominal:   NG tube in place   Musculoskeletal: He exhibits no deformity.   Neurological:   - lethargic    Skin: Skin is warm and dry.   Psychiatric: He has a normal mood and affect. His behavior is normal.   Nursing note and vitals reviewed.         Diagnostic Results: Labs: Reviewed  ECG: Reviewed  CT: Reviewed

## 2020-01-15 NOTE — PROGRESS NOTES
Travelled to CT via bed with RN x 1 , PCT x 1, sushil, 02, portable monitor, returned to room 9079 at 7:50 am , tolerated well.

## 2020-01-15 NOTE — CONSULTS
"  [facility]  Adult Nutrition  Consult Note    SUMMARY     Recommendations  1. As medically able, recommend starting TF.   Isosource 1.5 @ goal rate 60mL/hr.   - Initiate @ 10mL/hr and increase by 10mL q4hrs, or as tolerated, until goal rate is reached.   - Provides 2160kcals, 98g protein and 1100mL free water.     2. If able to advance diet, recommend Regular with texture per SLP recommendations.     RD to monitor.     Goals: Pt to receive nutrition by RD follow up  Nutrition Goal Status: new  Communication of RD Recs: reviewed with RN    Reason for Assessment    Reason For Assessment: consult  Diagnosis: stroke/CVA  Relevant Medical History: a fib, HTN, T2DM  Interdisciplinary Rounds: attended  General Information Comments: Pt aphasic, NG tube in place. NPO with possible TF to be started today per team. Pt able to report adequate po intake and appetite. Unable to report UBW or weight hx. Per chart review, possible 10lb weight loss since May 2019 (5% in 6 months). NFPE completed - pt nourished, noted moderate muscle wasting in temples. No other indications of malnutrition at this time. Will continue to assess and monitor. Per chart review, appears pt receiving mechanical soft diet prior to transfer.  Nutrition Discharge Planning: unable to determine at this time    Nutrition Risk Screen    Nutrition Risk Screen: tube feeding or parenteral nutrition    Nutrition/Diet History    Spiritual, Cultural Beliefs, Shinto Practices, Values that Affect Care: no    Anthropometrics    Temp: 97.7 °F (36.5 °C)  Height Method: Estimated  Height: 5' 10" (177.8 cm)  Height (inches): 70 in  Weight Method: Bed Scale  Weight: 97.2 kg (214 lb 4.6 oz)  Weight (lb): 214.29 lb  Ideal Body Weight (IBW), Male: 166 lb  % Ideal Body Weight, Male (lb): 129.09 %  BMI (Calculated): 30.7  BMI Grade: 30 - 34.9- obesity - grade I  Usual Body Weight (UBW), k.7 kg  % Usual Body Weight: 94.84  % Weight Change From Usual " Weight: -5.36 %       Lab/Procedures/Meds    Pertinent Labs Reviewed: reviewed  Pertinent Labs Comments: POCT Glu   Pertinent Medications Reviewed: reviewed  Pertinent Medications Comments: IVF, insulin    Estimated/Assessed Needs    Weight Used For Calorie Calculations: 97.2 kg (214 lb 4.6 oz)  Energy Calorie Requirements (kcal): 2110  Energy Need Method: Ogden-St Jeor  Protein Requirements: 97-117g(1.0-1.2g/kg)  Weight Used For Protein Calculations: 97.2 kg (214 lb 4.6 oz)  Fluid Requirements (mL): 1mL/kcal or per MD     RDA Method (mL): 2110  CHO Requirement: 264g CHO daily      Nutrition Prescription Ordered    Current Diet Order: NPO    Evaluation of Received Nutrient/Fluid Intake    IV Fluid (mL): 1800  I/O: +2.9L since admit, +UOP, LBM 1/13  % Intake of Estimated Energy Needs: 0 - 25 %  % Meal Intake: NPO    Nutrition Risk    Level of Risk/Frequency of Follow-up: high(f/u 2x/week)     Assessment and Plan    Nutrition Problem  Inadequate energy intake    Related to (etiology):   NPO    Signs and Symptoms (as evidenced by):   Pt receiving <75% EEN and EPN.     Interventions(treatment strategy):  Collaboration of care with providers    Nutrition Diagnosis Status:   New         Monitor and Evaluation    Food and Nutrient Intake: energy intake, food and beverage intake, enteral nutrition intake  Food and Nutrient Adminstration: diet order, enteral and parenteral nutrition administration  Anthropometric Measurements: weight, weight change, body mass index  Biochemical Data, Medical Tests and Procedures: electrolyte and renal panel, gastrointestinal profile, glucose/endocrine profile, inflammatory profile, lipid profile  Nutrition-Focused Physical Findings: overall appearance     Malnutrition Assessment                 Orbital Region (Subcutaneous Fat Loss): well nourished  Upper Arm Region (Subcutaneous Fat Loss): well nourished  Thoracic and Lumbar Region: well nourished   [place] Region (Muscle Loss):  moderate depletion  Clavicle Bone Region (Muscle Loss): mild depletion  Clavicle and Acromion Bone Region (Muscle Loss): mild depletion  Scapular Bone Region (Muscle Loss): mild depletion  Dorsal Hand (Muscle Loss): well nourished  Patellar Region (Muscle Loss): mild depletion  Anterior Thigh Region (Muscle Loss): mild depletion  Posterior Calf Region (Muscle Loss): mild depletion                 Nutrition Follow-Up    RD Follow-up?: Yes

## 2020-01-16 LAB
ALBUMIN SERPL BCP-MCNC: 3.1 G/DL (ref 3.5–5.2)
ALLENS TEST: ABNORMAL
ALP SERPL-CCNC: 90 U/L (ref 55–135)
ALT SERPL W/O P-5'-P-CCNC: 34 U/L (ref 10–44)
ANION GAP SERPL CALC-SCNC: 6 MMOL/L (ref 8–16)
AST SERPL-CCNC: 63 U/L (ref 10–40)
BASOPHILS # BLD AUTO: 0.05 K/UL (ref 0–0.2)
BASOPHILS NFR BLD: 0.5 % (ref 0–1.9)
BILIRUB SERPL-MCNC: 1 MG/DL (ref 0.1–1)
BUN SERPL-MCNC: 25 MG/DL (ref 8–23)
CALCIUM SERPL-MCNC: 8.6 MG/DL (ref 8.7–10.5)
CHLORIDE SERPL-SCNC: 110 MMOL/L (ref 95–110)
CO2 SERPL-SCNC: 21 MMOL/L (ref 23–29)
CREAT SERPL-MCNC: 0.8 MG/DL (ref 0.5–1.4)
DELSYS: ABNORMAL
DIFFERENTIAL METHOD: ABNORMAL
EOSINOPHIL # BLD AUTO: 0.1 K/UL (ref 0–0.5)
EOSINOPHIL NFR BLD: 1.1 % (ref 0–8)
ERYTHROCYTE [DISTWIDTH] IN BLOOD BY AUTOMATED COUNT: 15.1 % (ref 11.5–14.5)
EST. GFR  (AFRICAN AMERICAN): >60 ML/MIN/1.73 M^2
EST. GFR  (NON AFRICAN AMERICAN): >60 ML/MIN/1.73 M^2
FIO2: 28
FLOW: 2
GLUCOSE SERPL-MCNC: 138 MG/DL (ref 70–110)
HCO3 UR-SCNC: 21 MMOL/L (ref 24–28)
HCT VFR BLD AUTO: 38.1 % (ref 40–54)
HGB BLD-MCNC: 12.3 G/DL (ref 14–18)
IMM GRANULOCYTES # BLD AUTO: 0.06 K/UL (ref 0–0.04)
IMM GRANULOCYTES NFR BLD AUTO: 0.6 % (ref 0–0.5)
INR PPP: 5.2 (ref 0.8–1.2)
LYMPHOCYTES # BLD AUTO: 0.9 K/UL (ref 1–4.8)
LYMPHOCYTES NFR BLD: 8.4 % (ref 18–48)
MAGNESIUM SERPL-MCNC: 2.3 MG/DL (ref 1.6–2.6)
MCH RBC QN AUTO: 33.5 PG (ref 27–31)
MCHC RBC AUTO-ENTMCNC: 32.3 G/DL (ref 32–36)
MCV RBC AUTO: 104 FL (ref 82–98)
MODE: ABNORMAL
MONOCYTES # BLD AUTO: 1.3 K/UL (ref 0.3–1)
MONOCYTES NFR BLD: 11.9 % (ref 4–15)
NEUTROPHILS # BLD AUTO: 8.2 K/UL (ref 1.8–7.7)
NEUTROPHILS NFR BLD: 77.5 % (ref 38–73)
NRBC BLD-RTO: 0 /100 WBC
PCO2 BLDA: 30.4 MMHG (ref 35–45)
PH SMN: 7.45 [PH] (ref 7.35–7.45)
PHOSPHATE SERPL-MCNC: 1.9 MG/DL (ref 2.7–4.5)
PHOSPHATE SERPL-MCNC: 4.7 MG/DL (ref 2.7–4.5)
PLATELET # BLD AUTO: 130 K/UL (ref 150–350)
PMV BLD AUTO: 11.1 FL (ref 9.2–12.9)
PO2 BLDA: 79 MMHG (ref 80–100)
POC BE: -3 MMOL/L
POC SATURATED O2: 96 % (ref 95–100)
POC TCO2: 22 MMOL/L (ref 23–27)
POCT GLUCOSE: 131 MG/DL (ref 70–110)
POCT GLUCOSE: 136 MG/DL (ref 70–110)
POCT GLUCOSE: 138 MG/DL (ref 70–110)
POTASSIUM SERPL-SCNC: 3.8 MMOL/L (ref 3.5–5.1)
PROT SERPL-MCNC: 5.8 G/DL (ref 6–8.4)
PROTHROMBIN TIME: 49.8 SEC (ref 9–12.5)
RBC # BLD AUTO: 3.67 M/UL (ref 4.6–6.2)
SAMPLE: ABNORMAL
SITE: ABNORMAL
SODIUM SERPL-SCNC: 137 MMOL/L (ref 136–145)
SODIUM SERPL-SCNC: 138 MMOL/L (ref 136–145)
SODIUM SERPL-SCNC: 141 MMOL/L (ref 136–145)
SP02: 95
WBC # BLD AUTO: 10.51 K/UL (ref 3.9–12.7)

## 2020-01-16 PROCEDURE — 97530 THERAPEUTIC ACTIVITIES: CPT

## 2020-01-16 PROCEDURE — 99900035 HC TECH TIME PER 15 MIN (STAT)

## 2020-01-16 PROCEDURE — 36600 WITHDRAWAL OF ARTERIAL BLOOD: CPT

## 2020-01-16 PROCEDURE — 82803 BLOOD GASES ANY COMBINATION: CPT

## 2020-01-16 PROCEDURE — 25000003 PHARM REV CODE 250: Performed by: NURSE PRACTITIONER

## 2020-01-16 PROCEDURE — 84295 ASSAY OF SERUM SODIUM: CPT | Mod: 91

## 2020-01-16 PROCEDURE — 85610 PROTHROMBIN TIME: CPT

## 2020-01-16 PROCEDURE — 99232 PR SUBSEQUENT HOSPITAL CARE,LEVL II: ICD-10-PCS | Mod: ,,, | Performed by: NURSE PRACTITIONER

## 2020-01-16 PROCEDURE — 84100 ASSAY OF PHOSPHORUS: CPT | Mod: 91

## 2020-01-16 PROCEDURE — 99232 SBSQ HOSP IP/OBS MODERATE 35: CPT | Mod: ,,, | Performed by: NURSE PRACTITIONER

## 2020-01-16 PROCEDURE — 97530 THERAPEUTIC ACTIVITIES: CPT | Mod: CQ

## 2020-01-16 PROCEDURE — 20000000 HC ICU ROOM

## 2020-01-16 PROCEDURE — 92526 ORAL FUNCTION THERAPY: CPT

## 2020-01-16 PROCEDURE — 82800 BLOOD PH: CPT

## 2020-01-16 PROCEDURE — 63600175 PHARM REV CODE 636 W HCPCS: Performed by: NURSE PRACTITIONER

## 2020-01-16 PROCEDURE — 84295 ASSAY OF SERUM SODIUM: CPT

## 2020-01-16 PROCEDURE — 63600175 PHARM REV CODE 636 W HCPCS: Performed by: PSYCHIATRY & NEUROLOGY

## 2020-01-16 PROCEDURE — 97112 NEUROMUSCULAR REEDUCATION: CPT | Mod: CQ

## 2020-01-16 PROCEDURE — 84100 ASSAY OF PHOSPHORUS: CPT

## 2020-01-16 PROCEDURE — 27000221 HC OXYGEN, UP TO 24 HOURS

## 2020-01-16 PROCEDURE — 83735 ASSAY OF MAGNESIUM: CPT

## 2020-01-16 PROCEDURE — 85025 COMPLETE CBC W/AUTO DIFF WBC: CPT

## 2020-01-16 PROCEDURE — 99291 PR CRITICAL CARE, E/M 30-74 MINUTES: ICD-10-PCS | Mod: GC,,, | Performed by: PSYCHIATRY & NEUROLOGY

## 2020-01-16 PROCEDURE — 92507 TX SP LANG VOICE COMM INDIV: CPT

## 2020-01-16 PROCEDURE — 99291 CRITICAL CARE FIRST HOUR: CPT | Mod: GC,,, | Performed by: PSYCHIATRY & NEUROLOGY

## 2020-01-16 PROCEDURE — 94761 N-INVAS EAR/PLS OXIMETRY MLT: CPT

## 2020-01-16 PROCEDURE — 80053 COMPREHEN METABOLIC PANEL: CPT

## 2020-01-16 RX ADMIN — INSULIN ASPART 2 UNITS: 100 INJECTION, SOLUTION INTRAVENOUS; SUBCUTANEOUS at 05:01

## 2020-01-16 RX ADMIN — SENNOSIDES AND DOCUSATE SODIUM 1 TABLET: 8.6; 5 TABLET ORAL at 08:01

## 2020-01-16 RX ADMIN — METOPROLOL TARTRATE 12.5 MG: 25 TABLET, FILM COATED ORAL at 10:01

## 2020-01-16 RX ADMIN — HEPARIN SODIUM 5000 UNITS: 5000 INJECTION, SOLUTION INTRAVENOUS; SUBCUTANEOUS at 06:01

## 2020-01-16 RX ADMIN — METOPROLOL TARTRATE 12.5 MG: 25 TABLET, FILM COATED ORAL at 08:01

## 2020-01-16 RX ADMIN — SODIUM PHOSPHATE, MONOBASIC, MONOHYDRATE 20.01 MMOL: 276; 142 INJECTION, SOLUTION INTRAVENOUS at 09:01

## 2020-01-16 RX ADMIN — ATORVASTATIN CALCIUM 40 MG: 20 TABLET, FILM COATED ORAL at 08:01

## 2020-01-16 RX ADMIN — LEVOTHYROXINE SODIUM 75 MCG: 25 TABLET ORAL at 07:01

## 2020-01-16 RX ADMIN — SENNOSIDES AND DOCUSATE SODIUM 1 TABLET: 8.6; 5 TABLET ORAL at 09:01

## 2020-01-16 NOTE — PLAN OF CARE
Problem: Physical Therapy Goal  Goal: Physical Therapy Goal  Description  Goals to be met by: 2020     Patient will increase functional independence with mobility by performin. Supine to sit with Moderate Assistance  2. Sit to supine with Moderate Assistance  3. Sit to stand transfer with Moderate Assistance  4. Bed to chair transfer with Moderate Assistance using LRAD  5. Gait  x 15 feet with Moderate Assistance using LRAD.   6. Sitting at edge of bed x10 minutes with Contact Guard Assistance  7. Stand for 1 minute with Contact Guard Assistance using LRAD  8. Lower extremity exercise program x15 reps per handout, with assistance as needed     Outcome: Ongoing, Progressing       Discharge Recommendations: Rehab    Pt requires max A to sit at the EOB.    Goals remain appropriate.     Gisselle Truong, PTA.   [phone]   2020

## 2020-01-16 NOTE — PROGRESS NOTES
[facility]  Physical Medicine & Rehab  Progress Note    Patient Name: Chirag Thompson  MRN: [id number]  Admission Date: 1/10/2020  Length of Stay: 6 days  Attending Physician: María Elena Berger MD    Subjective:     Principal Problem:Acute ischemic left MCA stroke    Hospital Course:   1/14/20: Evaluated by PT & OT. Bed mobility totalA x 2 ppl. Sit to stand totaA- dependence.     Interval History 1/16/2020:  Patient is seen for follow-up rehab evaluation and recommendations: Low level with therapy. NG tube in place.     HPI, Past Medical, Family, and Social History remains the same as documented in the initial encounter.    Scheduled Medications:    atorvastatin  40 mg Per NG tube Daily    levothyroxine  75 mcg Per NG tube Before breakfast    metoprolol tartrate  12.5 mg Per NG tube BID    senna-docusate 8.6-50 mg  1 tablet Per NG tube BID       Diagnostic Results:   Labs: Reviewed  ECG: Reviewed  CT: Reviewed    PRN Medications: acetaminophen, glucagon (human recombinant), glucose, glucose, insulin aspart U-100, magnesium sulfate IVPB, magnesium sulfate IVPB, ondansetron, potassium chloride in water **AND** potassium chloride in water **AND** potassium chloride in water, sodium chloride 0.9%, sodium chloride 0.9%, sodium phosphate IVPB, sodium phosphate IVPB, sodium phosphate IVPB    Review of Systems   Constitutional: Positive for activity change.   Neurological: Positive for speech difficulty and weakness.   Psychiatric/Behavioral: Positive for confusion. Negative for agitation.     Objective:     Vital Signs (Most Recent):  Temp: 98.8 °F (37.1 °C) (01/16/20 0739)  Pulse: 75 (01/16/20 0905)  Resp: (!) 23 (01/16/20 0905)  BP: (!) 140/77 (01/16/20 0905)  SpO2: 96 % (01/16/20 0905)    Vital Signs (24h Range):  Temp:  [97.7 °F (36.5 °C)-98.9 °F (37.2 °C)] 98.8 °F (37.1 °C)  Pulse:  [65-90] 75  Resp:  [12-27] 23  SpO2:  [92 %-99 %] 96 %  BP: (123-156)/(53-83) 140/77     Physical Exam    Constitutional: He appears well-developed and well-nourished.   HENT:   Head: Normocephalic and atraumatic.   Neck: Neck supple.   Pulmonary/Chest: Effort normal. No respiratory distress.   Abdominal:   NG tube in place   Musculoskeletal: He exhibits no deformity.   Neurological:   - LUE restraint in place  - AMS   Skin: Skin is warm and dry.   Psychiatric: He has a normal mood and affect. His behavior is normal.   Nursing note and vitals reviewed.           Assessment/Plan:      * Acute ischemic left MCA stroke  - CTH revealed evolving moderate-sized region of acute infarction in the left MCA territory.     See hospital course for functional, cognitive/speech/language, and nutrition/swallow status.      Recommendations  -  Encourage mobility, OOB in chair at least 3 hours per day, and early ambulation as appropriate   -  PT/OT evaluate and treat  -  SLP speech and cognitive evaluate and treat  -  Monitor sleep disturbances and establish consistent sleep-wake cycle  -  Monitor for bowel and bladder dysfunction  -  Monitor for shoulder pain and subluxation  -  Monitor for spasticity  -  Monitor for and prevent skin breakdown and pressure ulcers  · Early mobility, repositioning/weight shifting every 20-30 minutes when sitting, turn patient every 2 hours, proper mattress/overlay and chair cushioning, pressure relief/heel protector boots  -  DVT prophylaxis  -  Reviewed discharge options (IP rehab, SNF, HH therapy, and OP therapy)    Aphasia  - SLP eval and treat    Participating with therapy. Will follow progress and discuss with rehab team for post acute care/rehab recommendation.    Norma Duarte NP  Department of Physical Medicine & Rehab   [facility]

## 2020-01-16 NOTE — PT/OT/SLP PROGRESS
"Speech Language Pathology Treatment    Patient Name:  Chirag Thompson   MRN:  [id number]  Admitting Diagnosis: Acute ischemic left MCA stroke    Recommendations:                 General Recommendations:  Dysphagia therapy and Speech/language therapy  Diet recommendations:  NPO, Liquid Diet Level: NPO   Aspiration Precautions: Strict aspiration precautions   General Precautions: Standard, aspiration, aphasia, NPO, fall  Communication strategies:  yes/no questions only and provide increased time to answer    Subjective     "ok"  Patient goals: UTO    Pain/Comfort:  · Pain Rating 1: 0/10  · Pain Rating Post-Intervention 1: 0/10    Objective:     Has the patient been evaluated by SLP for swallowing?   Yes  Keep patient NPO? Yes   Current Respiratory Status: room air      Pt seen bedside with no family present. Pt awake and alert. Pt responded with " ok" or nonsense words. He was able to count to five with therapist and sing a portion of a familiar song. He did not model any commands. All responses to simple y/n questions were nonsense. He was unable to model vowel sounds. He readily accepted bolus from spoon and pt with tongue pumping and oral loss noted.Pt given thin and nectar thick liquids. Pt did initiate a swallow response with poor cough/throat clear noted following thin liquids and very weak throat clear following nectar thick liquids. Multiple swallows needed per bolus. Pt not safe for po diet at this time. White board updated.    Assessment:     Chirag Thompson is a 79 y.o. male with an SLP diagnosis of Aphasia and Dysphagia.  He presents with progress towards goals.    Goals:   Multidisciplinary Problems     SLP Goals        Problem: SLP Goal    Goal Priority Disciplines Outcome   SLP Goal     SLP Ongoing, Progressing   Description:  Goals due 1/21  1.  Pt. Will participate in ongoing assessment of swallow at bedside  2.  Pt. Will participate in speech language eval /met  3.  Model simple commands with 100% " accuracy  4.  Respond to simple yes/no questions with 70% accuracy  5.  Complete single words on au tomatic speech tasks with 50% accuracy                      Plan:     · Patient to be seen:  4 x/week   · Plan of Care expires:  02/12/20  · Plan of Care reviewed with:  patient   · SLP Follow-Up:  Yes       Discharge recommendations:  other (see comments)(tbd)       Time Tracking:     SLP Treatment Date:   01/16/20  Speech Start Time:  1003  Speech Stop Time:  1020     Speech Total Time (min):  17 min    Billable Minutes: Speech Therapy Individual 9 and Treatment Swallowing Dysfunction 8    JENISE Morales, CCC-SLP  01/16/2020

## 2020-01-16 NOTE — PLAN OF CARE
Problem: SLP Goal  Goal: SLP Goal  Description  Goals due 1/21  1.  Pt. Will participate in ongoing assessment of swallow at bedside  2.  Pt. Will participate in speech language eval /met  3.  Model simple commands with 100% accuracy  4.  Respond to simple yes/no questions with 70% accuracy  5.  Complete single words on au tomatic speech tasks with 50% accuracy     Outcome: Ongoing, Progressing   Goals remain appropriate. Pt should remain npo.    JENISE Morales, CCC-SLP  1/16/2020

## 2020-01-16 NOTE — PT/OT/SLP PROGRESS
Physical Therapy Treatment    Patient Name:  Chirag Thompson   MRN:  [id number]  Admitting Diagnosis: Acute ischemic left MCA stroke  Recent Surgery: * No surgery found *      Recommendations:     Discharge Recommendations:  rehabilitation facility   Discharge Equipment Recommendations: (TBD)   Barriers to discharge: None    Plan:     During this hospitalization, patient to be seen 4 x/week to address the above listed problems via gait training, therapeutic activities, therapeutic exercises, neuromuscular re-education  · Plan of Care Expires:  02/11/20   Plan of Care Reviewed with: patient    This Plan of care has been discussed with the patient who was involved in its development and understands and is in agreement with the identified goals and treatment plan    Subjective     Communicated with RN (Shayna) prior to session.     Patient comments: Pt with no complaints  Pain/Comfort:  · Pain Rating 1: 0/10  · Pain Rating Post-Intervention 1: 0/10    Objective:     Patient found with: blood pressure cuff, Condom Catheter, NG tube, oxygen, pressure relief boots, peripheral IV, pulse ox (continuous), telemetry    Patient found sup in bed bed upon PT entry to room, agreeable to treatment.  No family present in the room.    General Precautions: Standard, aphasia, aspiration, fall, NPO, vision impaired   Orthopedic Precautions:N/A   Braces: N/A       BED MOBILITY (vc's for hand placement sequencing of task):        Rolling to the R:  NT.       Rolling to the L:  Max A.        Sup > sit at the EOB:  Max A for trunk elevation, from L side lying.       Sit > sup:  Max A for trunk and LE's.       Scooting hips to EOB with mod/max A       Scooting hips along the EOB to the L requiring max A x3 scoots                SITTING AT THE EDGE OF THE BED (10 min)   Assistance Level Required: initially with mod A then min A for trunk and head control with L UE support and R UE in weight bearing position   Postural deviations noted: flexed  trunk, rounded shoulders, decreased attention to the R, R post lean   Encouraged: upright posture, midline orientation, APT, B feet in contact with the floor, attention to the R        TRANSFERS  (vc's for hand placement, sequencing of task and safety)   Patient completed Sit <> Stand Transfer from EOB with max A of 2 for hip ext, balance, R knee ext R UE (tech assists with R UE) with B UE support x1 trial(s)   Patient completed Stand <> Sit Transfer to EOB with max A for controlled descent with no assistive device     STANDING       Pt tolerates static standing with B UE support requiring max/total A of 2 for hip ext, trunk, R knee control, balance x10 sec with vc's for upright posture, weight shift and safety.     EDUCATION  Education provided to pt regarding: postural control, midline orientation, attention to the R.    They were provided and educated on proper positioning in supine and in sitting with support of affected R UE in order to increase awareness of it and to decrease the effects of immobility, specifically edema and pain.     Whiteboard updated with correct mobility information. RN/PCT notified.  Pt requires max A to sit at the EOB.    Patient left sup in bed with HOB elevated and R UE supported on pillow, with  all lines intact, call button in reach, restraints reapplied at end of session and RN notified    AM-PAC 6 CLICK MOBILITY  Turning over in bed (including adjusting bedclothes, sheets and blankets)?: 2  Sitting down on and standing up from a chair with arms (e.g., wheelchair, bedside commode, etc.): 2  Moving from lying on back to sitting on the side of the bed?: 2  Moving to and from a bed to a chair (including a wheelchair)?: 1  Need to walk in hospital room?: 1  Climbing 3-5 steps with a railing?: 1  Basic Mobility Total Score: 9     Assessment:     Chirag Thompson is a 79 y.o. male admitted with a medical diagnosis of Acute ischemic left MCA stroke.  He presents with the following  impairments/functional limitations:  weakness, impaired endurance, impaired sensation, impaired self care skills, impaired functional mobilty, gait instability, impaired balance, visual deficits, impaired cognition, decreased coordination, decreased upper extremity function, decreased lower extremity function, decreased safety awareness, abnormal tone, impaired coordination, impaired fine motor, impaired skin, edema. R hemiparesis requiring significant assistance and verbal cues for bed mob, static sitting, scooting to/along the EOB, sit < > stand, static standing 2* weakness, decreased attention to the R, R post lean and cognitive deficits.   In light of pt's current functional level and deficits, it is anticipated that pt will need to participate in an intense rehab program consisting of PT, OT and ST in order to achieve full rehab potential to return to previous level of function and roles.  Pt remains motivated to participate in PT session and will cont to benefit from skilled PT intervention.    Rehab Prognosis:  Fair to Good; patient would benefit from acute skilled PT services to address these deficits and reach maximum level of function.      GOALS:   Multidisciplinary Problems     Physical Therapy Goals        Problem: Physical Therapy Goal    Goal Priority Disciplines Outcome Goal Variances Interventions   Physical Therapy Goal     PT, PT/OT Ongoing, Progressing     Description:  Goals to be met by: 2020     Patient will increase functional independence with mobility by performin. Supine to sit with Moderate Assistance  2. Sit to supine with Moderate Assistance  3. Sit to stand transfer with Moderate Assistance  4. Bed to chair transfer with Moderate Assistance using LRAD  5. Gait  x 15 feet with Moderate Assistance using LRAD.   6. Sitting at edge of bed x10 minutes with Contact Guard Assistance  7. Stand for 1 minute with Contact Guard Assistance using LRAD  8. Lower extremity exercise  program x15 reps per handout, with assistance as needed                      Time Tracking:     PT Received On: 01/16/20  PT Start Time: 1052     PT Stop Time: 1119  PT Total Time (min): 27 min     Billable Minutes: Therapeutic Activity 17 and Neuromuscular Re-education 10    Treatment Type: Treatment  PT/PTA: PTA     PTA Visit Number: 1       Gisselle Truong PTA.  Pager [phone]    1/16/2020    .

## 2020-01-16 NOTE — NURSING
Pt became fidgity and agitated started mumbling desated to 90 2L o2 applied.  Notified Shear, NP.  Orders given to put in order for stat ABG.  RT notified ABG drawn

## 2020-01-16 NOTE — PROGRESS NOTES
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 Stroke extension since admission to [facility].   -cpt, monitor respiratory status  -permissive htn, eunatremia  -pt/ot if tolerates, slp/tf, oob  -hold asa, statin, michael  -monitor exam for worsening    Afib, elevated INR off coumadin (last dose 1/14, had three doses prior)  -start tf, consider vit k if rising tomorrow    I spent 31 min of uninterrupted critical care time caring for this critically ill patient exclusive of procedures and teaching.

## 2020-01-16 NOTE — PT/OT/SLP PROGRESS
Occupational Therapy   Treatment    Name: Chirag Thompson  MRN: [id number]  Admitting Diagnosis:  Acute ischemic left MCA stroke       Recommendations:     Discharge Recommendations: (tbd)  Discharge Equipment Recommendations:  (tbd)  Barriers to discharge:       Assessment:     Chirag Thompson is a 79 y.o. male with a medical diagnosis of Acute ischemic left MCA stroke.  He presents with impairments listed below. Pt displayed poor overall tolerance for therapy at this time. Pt w/ minimal to no active participation in therapy/ Pt highly impaired in performing ADLs and mobility at this time. Pt displayed global deconditioning requiring increased assist for ADLs and mobility at this time. Pt would benefit from skilled OT services to improve independence and overall occupational functioning.     Performance deficits affecting function are weakness, impaired endurance, impaired self care skills, impaired functional mobilty, gait instability, impaired balance, visual deficits, decreased lower extremity function, decreased coordination, impaired cognition, decreased upper extremity function, decreased safety awareness, impaired coordination, impaired cardiopulmonary response to activity.     Rehab Prognosis:  Good; patient would benefit from acute skilled OT services to address these deficits and reach maximum level of function.       Plan:     Patient to be seen 3 x/week to address the above listed problems via self-care/home management, therapeutic activities, therapeutic exercises  · Plan of Care Expires: 02/13/20  · Plan of Care Reviewed with: patient    Subjective     Pain/Comfort:  · Pain Rating 1: 0/10  · Pain Rating Post-Intervention 1: 0/10    Objective:     Communicated with: RN prior to session.  Patient found HOB elevated with blood pressure cuff, pulse ox (continuous), telemetry, peripheral IV, NG tube, oxygen upon OT entry to room.    General Precautions: Standard, aspiration, aphasia, NPO, fall   Orthopedic  Precautions:N/A   Braces: N/A     Occupational Performance:     Bed Mobility:    · Patient completed Scooting/Bridging with total assistance  · Patient completed Supine to Sit with total assistance  · Patient completed Sit to Supine with total assistance       [facility] 6 Click ADL: 6    Treatment & Education:  Pt sat EOB ~5 min at max a.   Pt not following commands. Possible minimal modeling. Pt attempting to lie back down in bed.    Patient left HOB elevated with all lines intact, call button in reach and RN presentEducation:      GOALS:   Multidisciplinary Problems     Occupational Therapy Goals        Problem: Occupational Therapy Goal    Goal Priority Disciplines Outcome Interventions   Occupational Therapy Goal     OT, PT/OT Ongoing, Progressing    Description:  Goals to be met by: 1/28     Patient will increase functional independence with ADLs by performing:    UE Dressing while seated EOB with Moderate Assistance.  Grooming while EOB with Moderate Assistance.  Sitting at edge of bed x10 minutes with Moderate Assistance.  Rolling to Bilateral with Moderate Assistance and use of bedrail as needed.   Supine to sit with Moderate Assistance.  Squat pivot transfers with Maximum Assistance.                      Time Tracking:     OT Date of Treatment: 01/16/20  OT Start Time: 1525  OT Stop Time: 1537  OT Total Time (min): 12 min    Billable Minutes:Therapeutic Activity 12 mintues    Alexander Abdi, OT  1/16/2020

## 2020-01-16 NOTE — PLAN OF CARE
POC reviewed with pt and family at 1400. Pt family verbalized understanding. Questions and concerns addressed. No acute events today. Pt progressing toward goals. Will continue to monitor. See flowsheets for full assessment and VS info.

## 2020-01-16 NOTE — SUBJECTIVE & OBJECTIVE
Interval History 1/16/2020:  Patient is seen for follow-up rehab evaluation and recommendations: Low level with therapy. NG tube in place.     HPI, Past Medical, Family, and Social History remains the same as documented in the initial encounter.    Scheduled Medications:    atorvastatin  40 mg Per NG tube Daily    levothyroxine  75 mcg Per NG tube Before breakfast    metoprolol tartrate  12.5 mg Per NG tube BID    senna-docusate 8.6-50 mg  1 tablet Per NG tube BID       Diagnostic Results:   Labs: Reviewed  ECG: Reviewed  CT: Reviewed    PRN Medications: acetaminophen, glucagon (human recombinant), glucose, glucose, insulin aspart U-100, magnesium sulfate IVPB, magnesium sulfate IVPB, ondansetron, potassium chloride in water **AND** potassium chloride in water **AND** potassium chloride in water, sodium chloride 0.9%, sodium chloride 0.9%, sodium phosphate IVPB, sodium phosphate IVPB, sodium phosphate IVPB    Review of Systems   Constitutional: Positive for activity change.   Neurological: Positive for speech difficulty and weakness.   Psychiatric/Behavioral: Positive for confusion. Negative for agitation.     Objective:     Vital Signs (Most Recent):  Temp: 98.8 °F (37.1 °C) (01/16/20 0739)  Pulse: 75 (01/16/20 0905)  Resp: (!) 23 (01/16/20 0905)  BP: (!) 140/77 (01/16/20 0905)  SpO2: 96 % (01/16/20 0905)    Vital Signs (24h Range):  Temp:  [97.7 °F (36.5 °C)-98.9 °F (37.2 °C)] 98.8 °F (37.1 °C)  Pulse:  [65-90] 75  Resp:  [12-27] 23  SpO2:  [92 %-99 %] 96 %  BP: (123-156)/(53-83) 140/77     Physical Exam   Constitutional: He appears well-developed and well-nourished.   HENT:   Head: Normocephalic and atraumatic.   Neck: Neck supple.   Pulmonary/Chest: Effort normal. No respiratory distress.   Abdominal:   NG tube in place   Musculoskeletal: He exhibits no deformity.   Neurological:   - LUE restraint in place  - AMS   Skin: Skin is warm and dry.   Psychiatric: He has a normal mood and affect. His behavior is  normal.   Nursing note and vitals reviewed.

## 2020-01-16 NOTE — PLAN OF CARE
Problem: Occupational Therapy Goal  Goal: Occupational Therapy Goal  Description  Goals to be met by: 1/28     Patient will increase functional independence with ADLs by performing:    UE Dressing while seated EOB with Moderate Assistance.  Grooming while EOB with Moderate Assistance.  Sitting at edge of bed x10 minutes with Moderate Assistance.  Rolling to Bilateral with Moderate Assistance and use of bedrail as needed.   Supine to sit with Moderate Assistance.  Squat pivot transfers with Maximum Assistance.     Outcome: Ongoing, Progressing    Alexander Abdi OTR/L  1/16/2020

## 2020-01-16 NOTE — PROGRESS NOTES
Pt sustained 4 beat run of V Tach. VSS, O2 sats sustained above 90. NCC notified. No verbal orders given. WCTM

## 2020-01-16 NOTE — PLAN OF CARE
Tube feeds increased over night   Crit INR 5.2 reported to NP  Afebrile   See notes for events over night   POC reviewed with pt at 0500. Pt unable to verbalized understanding. No acute events overnight. Pt progressing toward goals. Will continue to monitor. See flowsheets for full assessment and VS info

## 2020-01-16 NOTE — PLAN OF CARE
POC reviewed with pt at 1100. Pt  unable to verbalized understanding. Questions and concerns addressed. No acute events today. Pt progressing toward goals. Will continue to monitor. See flowsheets for full assessment and VS info. Pt more alert overall , opens eyes to voice, spontaneous movements to all ext except RUE which is a withdraw vs localizes vs extensor . Intermittently will follow simple commands on left. TF started today, will continue to keep in most upright position, closely monitor.

## 2020-01-16 NOTE — PROGRESS NOTES
Patient's chart was reviewed by a stroke team provider.  Patient's CT without any acute changes. No change in mental status, remains severely aphasic.  There is no new imaging to review.  Pending diagnostics to follow up on include: None  For other recommendations please see our previous note completed on: 1/15/2020.      There are no new recommendations at this time. Will continue to follow. Discussed patient with staff. Please contact stroke team for any questions or concerns.     Oj Minaya MD  Internal Medicine PGY-2  [facility] [place]

## 2020-01-17 LAB
ALBUMIN SERPL BCP-MCNC: 2.9 G/DL (ref 3.5–5.2)
ALP SERPL-CCNC: 72 U/L (ref 55–135)
ALT SERPL W/O P-5'-P-CCNC: 37 U/L (ref 10–44)
ANION GAP SERPL CALC-SCNC: 8 MMOL/L (ref 8–16)
AST SERPL-CCNC: 56 U/L (ref 10–40)
BASOPHILS # BLD AUTO: 0.05 K/UL (ref 0–0.2)
BASOPHILS NFR BLD: 0.6 % (ref 0–1.9)
BILIRUB SERPL-MCNC: 1.2 MG/DL (ref 0.1–1)
BUN SERPL-MCNC: 22 MG/DL (ref 8–23)
CALCIUM SERPL-MCNC: 8.5 MG/DL (ref 8.7–10.5)
CHLORIDE SERPL-SCNC: 111 MMOL/L (ref 95–110)
CO2 SERPL-SCNC: 21 MMOL/L (ref 23–29)
CREAT SERPL-MCNC: 0.8 MG/DL (ref 0.5–1.4)
DIFFERENTIAL METHOD: ABNORMAL
EOSINOPHIL # BLD AUTO: 0.1 K/UL (ref 0–0.5)
EOSINOPHIL NFR BLD: 1 % (ref 0–8)
ERYTHROCYTE [DISTWIDTH] IN BLOOD BY AUTOMATED COUNT: 15.2 % (ref 11.5–14.5)
EST. GFR  (AFRICAN AMERICAN): >60 ML/MIN/1.73 M^2
EST. GFR  (NON AFRICAN AMERICAN): >60 ML/MIN/1.73 M^2
GLUCOSE SERPL-MCNC: 125 MG/DL (ref 70–110)
HCT VFR BLD AUTO: 36.8 % (ref 40–54)
HGB BLD-MCNC: 11.8 G/DL (ref 14–18)
IMM GRANULOCYTES # BLD AUTO: 0.07 K/UL (ref 0–0.04)
IMM GRANULOCYTES NFR BLD AUTO: 0.8 % (ref 0–0.5)
INR PPP: 3 (ref 0.8–1.2)
LYMPHOCYTES # BLD AUTO: 0.7 K/UL (ref 1–4.8)
LYMPHOCYTES NFR BLD: 7.9 % (ref 18–48)
MAGNESIUM SERPL-MCNC: 1.7 MG/DL (ref 1.6–2.6)
MCH RBC QN AUTO: 33.7 PG (ref 27–31)
MCHC RBC AUTO-ENTMCNC: 32.1 G/DL (ref 32–36)
MCV RBC AUTO: 105 FL (ref 82–98)
MONOCYTES # BLD AUTO: 1.2 K/UL (ref 0.3–1)
MONOCYTES NFR BLD: 13.5 % (ref 4–15)
NEUTROPHILS # BLD AUTO: 6.7 K/UL (ref 1.8–7.7)
NEUTROPHILS NFR BLD: 76.2 % (ref 38–73)
NRBC BLD-RTO: 0 /100 WBC
PHOSPHATE SERPL-MCNC: 2.8 MG/DL (ref 2.7–4.5)
PLATELET # BLD AUTO: 104 K/UL (ref 150–350)
PMV BLD AUTO: 11.5 FL (ref 9.2–12.9)
POCT GLUCOSE: 129 MG/DL (ref 70–110)
POCT GLUCOSE: 143 MG/DL (ref 70–110)
POCT GLUCOSE: 148 MG/DL (ref 70–110)
POCT GLUCOSE: 172 MG/DL (ref 70–110)
POTASSIUM SERPL-SCNC: 3.9 MMOL/L (ref 3.5–5.1)
PROT SERPL-MCNC: 5.6 G/DL (ref 6–8.4)
PROTHROMBIN TIME: 28.8 SEC (ref 9–12.5)
RBC # BLD AUTO: 3.5 M/UL (ref 4.6–6.2)
SODIUM SERPL-SCNC: 140 MMOL/L (ref 136–145)
WBC # BLD AUTO: 8.83 K/UL (ref 3.9–12.7)

## 2020-01-17 PROCEDURE — 20000000 HC ICU ROOM

## 2020-01-17 PROCEDURE — 80053 COMPREHEN METABOLIC PANEL: CPT

## 2020-01-17 PROCEDURE — 99233 PR SUBSEQUENT HOSPITAL CARE,LEVL III: ICD-10-PCS | Mod: GC,,, | Performed by: PSYCHIATRY & NEUROLOGY

## 2020-01-17 PROCEDURE — 99233 SBSQ HOSP IP/OBS HIGH 50: CPT | Mod: GC,,, | Performed by: PSYCHIATRY & NEUROLOGY

## 2020-01-17 PROCEDURE — 92507 TX SP LANG VOICE COMM INDIV: CPT

## 2020-01-17 PROCEDURE — 85025 COMPLETE CBC W/AUTO DIFF WBC: CPT

## 2020-01-17 PROCEDURE — 99232 SBSQ HOSP IP/OBS MODERATE 35: CPT | Mod: ,,, | Performed by: NURSE PRACTITIONER

## 2020-01-17 PROCEDURE — 99232 PR SUBSEQUENT HOSPITAL CARE,LEVL II: ICD-10-PCS | Mod: ,,, | Performed by: NURSE PRACTITIONER

## 2020-01-17 PROCEDURE — 25000003 PHARM REV CODE 250: Performed by: NURSE PRACTITIONER

## 2020-01-17 PROCEDURE — 83735 ASSAY OF MAGNESIUM: CPT

## 2020-01-17 PROCEDURE — 97803 MED NUTRITION INDIV SUBSEQ: CPT

## 2020-01-17 PROCEDURE — 85610 PROTHROMBIN TIME: CPT

## 2020-01-17 PROCEDURE — 25000003 PHARM REV CODE 250: Performed by: STUDENT IN AN ORGANIZED HEALTH CARE EDUCATION/TRAINING PROGRAM

## 2020-01-17 PROCEDURE — 84100 ASSAY OF PHOSPHORUS: CPT

## 2020-01-17 PROCEDURE — 97530 THERAPEUTIC ACTIVITIES: CPT

## 2020-01-17 PROCEDURE — 63600175 PHARM REV CODE 636 W HCPCS: Performed by: NURSE PRACTITIONER

## 2020-01-17 PROCEDURE — 27000221 HC OXYGEN, UP TO 24 HOURS

## 2020-01-17 PROCEDURE — 94761 N-INVAS EAR/PLS OXIMETRY MLT: CPT

## 2020-01-17 RX ORDER — POTASSIUM CHLORIDE 20 MEQ/15ML
40 SOLUTION ORAL
Status: DISCONTINUED | OUTPATIENT
Start: 2020-01-17 | End: 2020-01-20

## 2020-01-17 RX ORDER — SODIUM,POTASSIUM PHOSPHATES 280-250MG
2 POWDER IN PACKET (EA) ORAL
Status: DISCONTINUED | OUTPATIENT
Start: 2020-01-17 | End: 2020-01-20

## 2020-01-17 RX ORDER — LANOLIN ALCOHOL/MO/W.PET/CERES
800 CREAM (GRAM) TOPICAL
Status: DISCONTINUED | OUTPATIENT
Start: 2020-01-17 | End: 2020-01-17

## 2020-01-17 RX ORDER — SODIUM,POTASSIUM PHOSPHATES 280-250MG
2 POWDER IN PACKET (EA) ORAL
Status: DISCONTINUED | OUTPATIENT
Start: 2020-01-17 | End: 2020-01-17

## 2020-01-17 RX ORDER — LANOLIN ALCOHOL/MO/W.PET/CERES
800 CREAM (GRAM) TOPICAL
Status: DISCONTINUED | OUTPATIENT
Start: 2020-01-17 | End: 2020-01-20

## 2020-01-17 RX ORDER — POTASSIUM CHLORIDE 20 MEQ/15ML
40 SOLUTION ORAL
Status: DISCONTINUED | OUTPATIENT
Start: 2020-01-17 | End: 2020-01-17

## 2020-01-17 RX ORDER — QUETIAPINE FUMARATE 25 MG/1
25 TABLET, FILM COATED ORAL NIGHTLY PRN
Status: DISCONTINUED | OUTPATIENT
Start: 2020-01-17 | End: 2020-01-30 | Stop reason: HOSPADM

## 2020-01-17 RX ADMIN — LEVOTHYROXINE SODIUM 75 MCG: 25 TABLET ORAL at 07:01

## 2020-01-17 RX ADMIN — INSULIN ASPART 2 UNITS: 100 INJECTION, SOLUTION INTRAVENOUS; SUBCUTANEOUS at 05:01

## 2020-01-17 RX ADMIN — ATORVASTATIN CALCIUM 40 MG: 20 TABLET, FILM COATED ORAL at 08:01

## 2020-01-17 RX ADMIN — METOPROLOL TARTRATE 12.5 MG: 25 TABLET, FILM COATED ORAL at 08:01

## 2020-01-17 RX ADMIN — MAGNESIUM SULFATE IN WATER 2 G: 40 INJECTION, SOLUTION INTRAVENOUS at 07:01

## 2020-01-17 RX ADMIN — SENNOSIDES AND DOCUSATE SODIUM 1 TABLET: 8.6; 5 TABLET ORAL at 08:01

## 2020-01-17 RX ADMIN — SENNOSIDES AND DOCUSATE SODIUM 1 TABLET: 8.6; 5 TABLET ORAL at 10:01

## 2020-01-17 RX ADMIN — QUETIAPINE FUMARATE 25 MG: 25 TABLET ORAL at 01:01

## 2020-01-17 RX ADMIN — METOPROLOL TARTRATE 12.5 MG: 25 TABLET, FILM COATED ORAL at 10:01

## 2020-01-17 NOTE — PROGRESS NOTES
Subjective:  inr now downtrending  Maintaining airway    Objective:  Vital signs, lab studies, and imaging reviewed by me  Alert, intermittent commands, dysarthria w reduced fluency, R weak  Warm and well perfused, regular rate and rhythm, no murmurs  No dependent edema  Breathing comfortably, breath sounds clear  Belly soft, nontender, no hepatosplenomegaly    Assessment/Plan:  L m1 occlusion/stenosis complicated by infarct, cytotoxic edema, brain compression, debility/dysphagia, and respiratory insufficiency. Stroke extension since admission to [facility].   -cpt, monitor respiratory status  -permissive htn, eunatremia  -pt/ot, slp/tf, oob  -hold asa, statin, michael due to inr  -monitor exam for worsening    Afib, coagulopathy related to coumadin use (last dose 1/14)  -monitor, no reversal indicated

## 2020-01-17 NOTE — SUBJECTIVE & OBJECTIVE
Interval History 1/17/2020:  Patient is seen for follow-up rehab evaluation and recommendations: Agitation present this AM rec Seroquel.     HPI, Past Medical, Family, and Social History remains the same as documented in the initial encounter.    Scheduled Medications:    atorvastatin  40 mg Per NG tube Daily    levothyroxine  75 mcg Per NG tube Before breakfast    metoprolol tartrate  12.5 mg Per NG tube BID    senna-docusate 8.6-50 mg  1 tablet Per NG tube BID       Diagnostic Results:   Labs: Reviewed    PRN Medications: acetaminophen, glucagon (human recombinant), glucose, glucose, insulin aspart U-100, magnesium sulfate IVPB, magnesium sulfate IVPB, ondansetron, potassium chloride in water **AND** potassium chloride in water **AND** potassium chloride in water, QUEtiapine, sodium chloride 0.9%, sodium chloride 0.9%, sodium phosphate IVPB, sodium phosphate IVPB, sodium phosphate IVPB    Review of Systems   Reason unable to perform ROS: Lethargic.     Objective:     Vital Signs (Most Recent):  Temp: 98.3 °F (36.8 °C) (01/17/20 0705)  Pulse: 74 (01/17/20 0905)  Resp: (!) 23 (01/17/20 0905)  BP: (!) 156/84 (01/17/20 0905)  SpO2: 100 % (01/17/20 0905)    Vital Signs (24h Range):  Temp:  [97.9 °F (36.6 °C)-98.9 °F (37.2 °C)] 98.3 °F (36.8 °C)  Pulse:  [74-95] 74  Resp:  [13-40] 23  SpO2:  [93 %-100 %] 100 %  BP: (108-211)/() 156/84     Physical Exam   Constitutional: He appears well-developed and well-nourished.   HENT:   Head: Normocephalic and atraumatic.   Neck: Neck supple.   Pulmonary/Chest: Effort normal. No respiratory distress.   Abdominal:   NG tube in place   Musculoskeletal: He exhibits no deformity.   Neurological:   - LUE restraint in place  - Lethargic   Skin: Skin is warm and dry.   Nursing note and vitals reviewed.

## 2020-01-17 NOTE — SUBJECTIVE & OBJECTIVE
Neurologic Chief Complaint: Right sided weakness    Subjective:     Interval History: Patient is seen for follow-up neurological assessment and treatment recommendations:     No acute events overnight. Exam remains the same, to remain in neuro ICU for further monitoring.    HPI, Past Medical, Family, and Social History remains the same as documented in the initial encounter.     Review of Systems   Reason unable to perform ROS: Aphasia.     Scheduled Meds:   atorvastatin  40 mg Per NG tube Daily    levothyroxine  75 mcg Per NG tube Before breakfast    metoprolol tartrate  12.5 mg Per NG tube BID    senna-docusate 8.6-50 mg  1 tablet Per NG tube BID     Continuous Infusions:  PRN Meds:acetaminophen, glucagon (human recombinant), glucose, glucose, insulin aspart U-100, magnesium oxide, magnesium oxide, ondansetron, potassium chloride 10%, potassium chloride 10%, potassium chloride 10%, potassium, sodium phosphates, potassium, sodium phosphates, potassium, sodium phosphates, QUEtiapine, sodium chloride 0.9%    Objective:     Vital Signs (Most Recent):  Temp: 98.1 °F (36.7 °C) (01/17/20 1505)  Pulse: 87 (01/17/20 1505)  Resp: 18 (01/17/20 1505)  BP: 139/72 (01/17/20 1505)  SpO2: 98 % (01/17/20 1505)  BP Location: Left arm    Vital Signs Range (Last 24H):  Temp:  [98.1 °F (36.7 °C)-98.9 °F (37.2 °C)]   Pulse:  [73-93]   Resp:  [13-41]   BP: (108-211)/()   SpO2:  [93 %-100 %]   BP Location: Left arm    Physical Exam   Constitutional: He appears well-developed and well-nourished.   Obese, stuporous   HENT:   Head: Normocephalic and atraumatic.   Eyes: Pupils are equal, round, and reactive to light. EOM are normal.   Neck: Normal range of motion.   Cardiovascular: Normal rate.   Pulmonary/Chest: Effort normal.   Abdominal: Soft.   Neurological:   Drowsy, right sided plegia, and sensory loss, facial droop and dysarthria and aphasia       Skin: Skin is warm and dry.   Nursing note and vitals  reviewed.      Neurological Exam:   LOC: drowsy  Attention Span: poor  Language: Global aphasia  Articulation: Dysarthria  Orientation: Not oriented to person, place, and time  Visual Fields: Full  Visual neglect  EOM (CN III, IV, VI): Gaze preference  left  Pupils (CN II, III): PERRL  Facial Sensation (CN V): Normal  Facial Movement (CN VII): Lower facial weakness on the Right  Motor: Arm left  Paresis: 4/5  Leg left  Paresis: 4/5  Arm right  Paresis: 1/5  Leg right Paresis: 1/5  Cebellar: No evidence of appendicular or axial ataxia  Sensation: Davidson-hypoesthesia right  Tone: Normal tone throughout    Laboratory:  CMP:   Recent Labs   Lab 01/17/20  0330   CALCIUM 8.5*   ALBUMIN 2.9*   PROT 5.6*      K 3.9   CO2 21*   *   BUN 22   CREATININE 0.8   ALKPHOS 72   ALT 37   AST 56*   BILITOT 1.2*     CBC:   Recent Labs   Lab 01/17/20  0330   WBC 8.83   RBC 3.50*   HGB 11.8*   HCT 36.8*   *   *   MCH 33.7*   MCHC 32.1     Lipid Panel:   Recent Labs   Lab 01/11/20  0357   CHOL 150   LDLCALC 79.8   HDL 61   TRIG 46     Coagulation:   Recent Labs   Lab 01/14/20  0040  01/17/20  0330   INR 2.4*   < > 3.0*   APTT 35.8*  --   --     < > = values in this interval not displayed.     Hgb A1C:   Recent Labs   Lab 01/11/20 0357   HGBA1C 5.4     TSH:   Recent Labs   Lab 01/10/20  2012   TSH 4.277*       Diagnostic Results     Brain Imaging   Brain Imaging   CT Head without 1/15 (07:43)  Evolving moderate-sized region of acute infarction in the left MCA territory as above.  Distribution grossly stable from recent MRI without significant infarct extension.  No hemorrhagic conversion or new territorial infarct.     MRI Brain 1/14 (04:28)  There is interval change, there is interval development of acute ischemia/infarct along the left MCA distribution, this is superimposed on subacute ischemic change seen on the recent MRI examination  Findings concerning for occlusion of the M2 segment of the left MCA as  above.    Additional chronic changes are noted.    Vessel Imaging   CTA Head and Neck 1/14 (02:43)  As on the recent brain MRA examination there is appearance of attenuation of the left MCA branch vessels without evidence for high-grade stenosis or occlusion and otherwise there is no evidence for high-grade stenosis or occlusion of the major arterial vascular structures of the head or neck.    There is no evidence for intracranial aneurysm or AVM.    Findings consistent with acute to subacute left parietal ischemic change again noted.    Small thyroid nodules or cysts.    Cardiac Imaging  TTE 1/11  · Normal left ventricular systolic function. The estimated ejection fraction is 55%.  · Mid anteroseptal and apical moderate hypokinesia.  · Diastolic pattern consistent with atrial fibrillation observed.  · Severe left atrial enlargement.  · Normal right ventricular systolic function.  · Severe right atrial enlargement.  · Mild-to-moderate mitral regurgitation.  · Mild tricuspid regurgitation.  · The estimated PA systolic pressure is 40 mmHg.  Intermediate central venous pressure (8 mmHg).

## 2020-01-17 NOTE — PLAN OF CARE
Problem: SLP Goal  Goal: SLP Goal  Description  Goals due 1/21  1.  Pt. Will participate in ongoing assessment of swallow at bedside  2.  Pt. Will participate in speech language eval /met  3.  Model simple commands with 100% accuracy  4.  Respond to simple yes/no questions with 70% accuracy  5.  Complete single words on au tomatic speech tasks with 50% accuracy     1/17/2020 1153 by Julia Spann MA, CCC-SLP  Outcome: Ongoing, Progressing  1/17/2020 1150 by Julia Spann MA, CCC-SLP  Outcome: Ongoing, Progressing

## 2020-01-17 NOTE — ASSESSMENT & PLAN NOTE
78 yo man admitted for L temporal-parietal embolic stroke, with worsening of deficits tonight.  Suspect proximal MCA occlusion.  No TPA INR 2.0. No IR as too large infarct. Holding coumadin due to large area of infarct. Will need repeat imaging after a week and further decision regarding anticoagulation pending. To remain in Neuro ICU for closer monitoring.      Antithrombotics: Holding in setting of large area of infarct, high risk for hemorrhagic conversion.    Statins: Lipitor 40 mg daily    Aggressive risk factor modification: A-Fib, HTN     Rehab efforts: The patient has been evaluated by a stroke team provider and the therapy needs have been fully considered based off the presenting complaints and exam findings. The following therapy evaluations are needed: PT evaluate and treat, OT evaluate and treat, SLP evaluate and treat, PM&R evaluate for appropriate placement    Diagnostics ordered/pending: None     VTE prophylaxis: None: Hold pharmacologic prophylaxis in setting of large infarct. SCDs.    BP parameters: Infarct: No intervention, SBP <220

## 2020-01-17 NOTE — NURSING
Concerns over patient being more drowsy than what was reported the day before. Albert Wilde RN notified and LOUISA Allen with NCC notified. Tiffany and MD Ab with NCC came to evaluate. Patient able to show thumbs up and aroused to voice. VSS, no new orders placed. Will continue to monitor.

## 2020-01-17 NOTE — PLAN OF CARE
Order for PRN seroqel given and administered for agitation   POC reviewed with pt at 0500. Pt unable to verbalize understanding. No acute events overnight. Pt progressing toward goals. Will continue to monitor. See flowsheets for full assessment and VS info

## 2020-01-17 NOTE — PROGRESS NOTES
"[facility]  Adult Nutrition  Progress Note    SUMMARY       Recommendations    Recommendation:  1. Continue TF of Isosource 1.5 @ 60 mL/hr goal rate to provide pt with 2160 kcal, 98 gm protein and 1100 mL free water.   2. If able to advance diet, recommend regular with texture per SLP     RD to monitor and follow up     Goals: Pt to receive nutrition by RD follow up    Nutrition Goal Status: goal met  Communication of RD Recs: reviewed with RN    Reason for Assessment    Reason For Assessment: RD follow-up  Diagnosis: stroke/CVA  Relevant Medical History: a fib, HTN, T2DM  Interdisciplinary Rounds: attended  General Information Comments: Pt aphasic, NG tube in place. TF infusing at goal rate of 60 mL/hr. Pt tolerating TF well per RN. No c/o N/V/C/D reported. Pt continues to be evaluated by SLP, remains NPO. No family in room at time of visit. Per chart pt with possible 10 lbs wt loss since may 2019. NFPE completed on 1/15, pt nourished with moderate muscle wasting in temples. No other indications of malnutrition at this time.   Nutrition Discharge Planning: unable to determine at this time    Nutrition Risk Screen    Nutrition Risk Screen: tube feeding or parenteral nutrition    Nutrition/Diet History    Spiritual, Cultural Beliefs, Christian Practices, Values that Affect Care: no  Factors Affecting Nutritional Intake: NPO    Anthropometrics    Temp: 98.3 °F (36.8 °C)  Height Method: Estimated  Height: 5' 10" (177.8 cm)  Height (inches): 70 in  Weight Method: Bed Scale  Weight: 97.2 kg (214 lb 4.6 oz)  Weight (lb): 214.29 lb  Ideal Body Weight (IBW), Male: 166 lb  % Ideal Body Weight, Male (lb): 129.09 %  BMI (Calculated): 30.7  BMI Grade: 30 - 34.9- obesity - grade I  Usual Body Weight (UBW), k.7 kg  % Usual Body Weight: 94.84  % Weight Change From Usual Weight: -5.36 %       Lab/Procedures/Meds    Pertinent Labs Reviewed: reviewed  Pertinent Labs Comments: Glucose 125; Ca 8.5  Pertinent " Medications Reviewed: reviewed  Pertinent Medications Comments:  Statin; metoprolol; senna-docusate     Estimated/Assessed Needs    Weight Used For Calorie Calculations: 97.2 kg (214 lb 4.6 oz)  Energy Calorie Requirements (kcal): 2110  Energy Need Method: [place]-St Jeor  Protein Requirements: 97-117g(1.0-1.2g/kg)  Weight Used For Protein Calculations: 97.2 kg (214 lb 4.6 oz)  Fluid Requirements (mL): 1mL/kcal or per MD  RDA Method (mL): 2110  CHO Requirement: 264g CHO daily    Nutrition Prescription Ordered    Current Diet Order: NPO  Current Nutrition Support Formula Ordered: Isosource 1.5  Current Nutrition Support Rate Ordered: 60 (ml)  Current Nutrition Support Frequency Ordered: mL/hr     Evaluation of Received Nutrient/Fluid Intake    Enteral Calories (kcal): 2160  Enteral Protein (gm): 98  Enteral (Free Water) Fluid (mL): 1100  % Kcal Needs: 102  % Protein Needs: 100  IV Fluid (mL): 1800  I/O: +6.7 L since admit   Energy Calories Required: meeting needs  Protein Required: meeting needs  Fluid Required: meeting needs  Comments: LBM 1/13  % Intake of Estimated Energy Needs: 75 - 100 %  % Meal Intake: NPO    Nutrition Risk    Level of Risk/Frequency of Follow-up: high(f/u 2x/week)     Assessment and Plan  Nutrition Problem  Inadequate energy intake     Related to (etiology):   NPO     Signs and Symptoms (as evidenced by):   Pt receiving <75% EEN and EPN.      Interventions(treatment strategy):  Collaboration of care with providers     Nutrition Diagnosis Status:  Resolving     Monitor and Evaluation    Food and Nutrient Intake: energy intake, food and beverage intake, enteral nutrition intake  Food and Nutrient Adminstration: diet order, enteral and parenteral nutrition administration  Anthropometric Measurements: weight, weight change, body mass index  Biochemical Data, Medical Tests and Procedures: electrolyte and renal panel, gastrointestinal profile, glucose/endocrine profile, inflammatory profile, lipid  profile  Nutrition-Focused Physical Findings: overall appearance     Malnutrition Assessment    Orbital Region (Subcutaneous Fat Loss): well nourished  Upper Arm Region (Subcutaneous Fat Loss): well nourished  Thoracic and Lumbar Region: well nourished   [place] Region (Muscle Loss): moderate depletion  Clavicle Bone Region (Muscle Loss): mild depletion  Clavicle and Acromion Bone Region (Muscle Loss): mild depletion  Scapular Bone Region (Muscle Loss): mild depletion  Dorsal Hand (Muscle Loss): well nourished  Patellar Region (Muscle Loss): mild depletion  Anterior Thigh Region (Muscle Loss): mild depletion  Posterior Calf Region (Muscle Loss): mild depletion     Nutrition Follow-Up    RD Follow-up?: Yes

## 2020-01-17 NOTE — PT/OT/SLP PROGRESS
"Physical Therapy Treatment    Patient Name:  Chirag Thompson   MRN:  [id number]    Recommendations:     Discharge Recommendations:  nursing facility, skilled   Discharge Equipment Recommendations: (TBD)   Barriers to discharge: Inaccessible home and Decreased caregiver support    Assessment:     Chirag Thompson is a 79 y.o. male admitted with a medical diagnosis of Acute ischemic left MCA stroke.  He presents with the following impairments/functional limitations:  weakness, impaired endurance, impaired self care skills, impaired functional mobilty, impaired balance, gait instability, impaired cognition, decreased safety awareness, impaired cardiopulmonary response to activity, impaired coordination, impaired fine motor, decreased upper extremity function, decreased lower extremity function, impaired sensation, edema, impaired skin.    Pt continues to require total A for all bed mobility.  At this time pt is resistant to PROM and A with bed mobility, fighting the PT for most of his transfer training.  Pt is not cognitively intact at this time.      Rehab Prognosis: Fair; patient would benefit from acute skilled PT services to address these deficits and reach maximum level of function.    Recent Surgery: * No surgery found *      Plan:     During this hospitalization, patient to be seen 4 x/week to address the identified rehab impairments via gait training, therapeutic activities, therapeutic exercises, neuromuscular re-education and progress toward the following goals:    · Plan of Care Expires:  02/11/20    Subjective     Chief Complaint: no complaints  Patient/Family Comments/goals: "co, co, co" - mumbling  Pain/Comfort:  · Pain Rating 1: 0/10      Objective:     Communicated with nursing prior to session.  Patient found supine with blood pressure cuff, pulse ox (continuous), telemetry, peripheral IV, Condom Catheter, oxygen, NG tube upon PT entry to room.     General Precautions: Standard, aspiration, NPO, fall, " aphasia   Orthopedic Precautions:N/A   Braces: N/A     Functional Mobility:  · Bed Mobility:     · Rolling Left:  Total A  · Rolling Right: total assistance  · Scooting: Total A x 2 persons: to scoot to HOB in trendelenburg  · Supine to Sit: total assistance  · Sit to Supine: total assistance      AM-PAC 6 CLICK MOBILITY  Turning over in bed (including adjusting bedclothes, sheets and blankets)?: 2  Sitting down on and standing up from a chair with arms (e.g., wheelchair, bedside commode, etc.): 1  Moving from lying on back to sitting on the side of the bed?: 2  Moving to and from a bed to a chair (including a wheelchair)?: 1  Need to walk in hospital room?: 1  Climbing 3-5 steps with a railing?: 1  Basic Mobility Total Score: 8       Therapeutic Activities and Exercises:   Whiteboard updated    Patient left supine with all lines intact, call button in reach, restraints reapplied at end of session and nursing notified.    GOALS:   Multidisciplinary Problems     Physical Therapy Goals        Problem: Physical Therapy Goal    Goal Priority Disciplines Outcome Goal Variances Interventions   Physical Therapy Goal     PT, PT/OT Ongoing, Not Progressing     Description:  Goals to be met by: 2020     Patient will increase functional independence with mobility by performin. Supine to sit with Moderate Assistance  2. Sit to supine with Moderate Assistance  3. Sit to stand transfer with Moderate Assistance  4. Bed to chair transfer with Moderate Assistance using LRAD  5. Gait  x 15 feet with Moderate Assistance using LRAD.   6. Sitting at edge of bed x10 minutes with Contact Guard Assistance  7. Stand for 1 minute with Contact Guard Assistance using LRAD  8. Lower extremity exercise program x15 reps per handout, with assistance as needed                      Time Tracking:     PT Received On: 20  PT Start Time: 1238     PT Stop Time: 1255  PT Total Time (min): 17 min     Billable Minutes: Therapeutic  Activity 17    Treatment Type: Treatment  PT/PTA: PT     PTA Visit Number: 1     Leti Kelley, PT  01/17/2020

## 2020-01-17 NOTE — PT/OT/SLP PROGRESS
"Speech Language Pathology Treatment    Patient Name:  Chirag Thompson   MRN:  [id number]  Admitting Diagnosis: Acute ischemic left MCA stroke    Recommendations:                 General Recommendations:  Dysphagia therapy and Speech/language therapy  Diet recommendations:  NPO, Liquid Diet Level: NPO   Aspiration Precautions: Strict aspiration precautions   General Precautions: Standard, aspiration, NPO, aphasia  Communication strategies:  none    Subjective     No family present  Patient goals: matthieu  Pain/Comfort:  · Pain Rating 1: 0/10  · Pain Rating Post-Intervention 1: 0/10    Objective:     Has the patient been evaluated by SLP for swallowing?   Yes  Keep patient NPO? Yes   Current Respiratory Status: room air      Per nursing pt. Received seroquel overnight and remained lethargic.  HOB was raised to 90 degree angle.  Pt. Did rouse/open eyes when stimulated but did not remain alert without constant cues.  He did not follow or model any simple commands and differentiated yes/no response was not elicited.  Undifferentiated vocalizatons elicited with adequate vocal intensity noted.  One intelligible word "okay" elicited in simple conversation.  No initiation of communication noted.   Pt. Did not attempt to count, sing with therapist nor did he atetmpt to model simple vowels.  No po intake attempted at this time due to high risk of aspiration.  NG tube remains in place.       Assessment:     Chirag Thompson is a 79 y.o. male with an SLP diagnosis of Aphasia and Dysphagia.    Goals:   Multidisciplinary Problems     SLP Goals        Problem: SLP Goal    Goal Priority Disciplines Outcome   SLP Goal     SLP Ongoing, Progressing   Description:  Goals due 1/21  1.  Pt. Will participate in ongoing assessment of swallow at bedside  2.  Pt. Will participate in speech language eval /met  3.  Model simple commands with 100% accuracy  4.  Respond to simple yes/no questions with 70% accuracy  5.  Complete single words on au tomatic " speech tasks with 50% accuracy                      Plan:     · Patient to be seen:  4 x/week   · Plan of Care expires:  02/12/20  · Plan of Care reviewed with:  patient   · SLP Follow-Up:  Yes       Discharge recommendations:  nursing facility, skilled   Barriers to Discharge:  Level of Skilled Assistance Needed 24 hour    Time Tracking:     SLP Treatment Date:   01/17/20  Speech Start Time:  1140  Speech Stop Time:  1148     Speech Total Time (min):  8 min    Billable Minutes: Speech Therapy Individual 8    Julia Spann MA, CCC-SLP  01/17/2020

## 2020-01-17 NOTE — PROGRESS NOTES
[facility]  Physical Medicine & Rehab  Progress Note    Patient Name: Chirag Thompson  MRN: [id number]  Admission Date: 1/10/2020  Length of Stay: 7 days  Attending Physician: María Elena Berger MD    Subjective:     Principal Problem:Acute ischemic left MCA stroke    Hospital Course:   1/14/20: Evaluated by PT & OT. Bed mobility totalA x 2 ppl. Sit to stand totaA- dependence.   1/16/20: Participated w/ PT & OT. Bed mobility mod/maxA- totalA. Sit to stand maxA. Sat EOB min-modA.     Interval History 1/17/2020:  Patient is seen for follow-up rehab evaluation and recommendations: Agitation present this AM rec Seroquel.     HPI, Past Medical, Family, and Social History remains the same as documented in the initial encounter.    Scheduled Medications:    atorvastatin  40 mg Per NG tube Daily    levothyroxine  75 mcg Per NG tube Before breakfast    metoprolol tartrate  12.5 mg Per NG tube BID    senna-docusate 8.6-50 mg  1 tablet Per NG tube BID       Diagnostic Results:   Labs: Reviewed    PRN Medications: acetaminophen, glucagon (human recombinant), glucose, glucose, insulin aspart U-100, magnesium sulfate IVPB, magnesium sulfate IVPB, ondansetron, potassium chloride in water **AND** potassium chloride in water **AND** potassium chloride in water, QUEtiapine, sodium chloride 0.9%, sodium chloride 0.9%, sodium phosphate IVPB, sodium phosphate IVPB, sodium phosphate IVPB    Review of Systems   Reason unable to perform ROS: Lethargic.     Objective:     Vital Signs (Most Recent):  Temp: 98.3 °F (36.8 °C) (01/17/20 0705)  Pulse: 74 (01/17/20 0905)  Resp: (!) 23 (01/17/20 0905)  BP: (!) 156/84 (01/17/20 0905)  SpO2: 100 % (01/17/20 0905)    Vital Signs (24h Range):  Temp:  [97.9 °F (36.6 °C)-98.9 °F (37.2 °C)] 98.3 °F (36.8 °C)  Pulse:  [74-95] 74  Resp:  [13-40] 23  SpO2:  [93 %-100 %] 100 %  BP: (108-211)/() 156/84     Physical Exam   Constitutional: He appears well-developed and well-nourished.    HENT:   Head: Normocephalic and atraumatic.   Neck: Neck supple.   Pulmonary/Chest: Effort normal. No respiratory distress.   Abdominal:   NG tube in place   Musculoskeletal: He exhibits no deformity.   Neurological:   - LUE restraint in place  - Lethargic   Skin: Skin is warm and dry.   Nursing note and vitals reviewed.           Assessment/Plan:      * Acute ischemic left MCA stroke  - CTH revealed evolving moderate-sized region of acute infarction in the left MCA territory.     See hospital course for functional, cognitive/speech/language, and nutrition/swallow status.      Recommendations  -  Encourage mobility, OOB in chair at least 3 hours per day, and early ambulation as appropriate   -  PT/OT evaluate and treat  -  SLP speech and cognitive evaluate and treat  -  Monitor sleep disturbances and establish consistent sleep-wake cycle  -  Monitor for bowel and bladder dysfunction  -  Monitor for shoulder pain and subluxation  -  Monitor for spasticity  -  Monitor for and prevent skin breakdown and pressure ulcers  · Early mobility, repositioning/weight shifting every 20-30 minutes when sitting, turn patient every 2 hours, proper mattress/overlay and chair cushioning, pressure relief/heel protector boots  -  DVT prophylaxis  -  Reviewed discharge options (IP rehab, SNF, HH therapy, and OP therapy)    Aphasia  - SLP eval and treat    Will follow progress and discuss with rehab team for post acute care/rehab recommendation.    Norma Duarte NP  Department of Physical Medicine & Rehab   [facility]

## 2020-01-17 NOTE — PLAN OF CARE
Problem: SLP Goal  Goal: SLP Goal  Description  Goals due 1/21  1.  Pt. Will participate in ongoing assessment of swallow at bedside  2.  Pt. Will participate in speech language eval /met  3.  Model simple commands with 100% accuracy  4.  Respond to simple yes/no questions with 70% accuracy  5.  Complete single words on au tomatic speech tasks with 50% accuracy     Outcome: Ongoing, Progressing

## 2020-01-17 NOTE — PLAN OF CARE
Problem: Physical Therapy Goal  Goal: Physical Therapy Goal  Description  Goals to be met by: 2020     Patient will increase functional independence with mobility by performin. Supine to sit with Moderate Assistance  2. Sit to supine with Moderate Assistance  3. Sit to stand transfer with Moderate Assistance  4. Bed to chair transfer with Moderate Assistance using LRAD  5. Gait  x 15 feet with Moderate Assistance using LRAD.   6. Sitting at edge of bed x10 minutes with Contact Guard Assistance  7. Stand for 1 minute with Contact Guard Assistance using LRAD  8. Lower extremity exercise program x15 reps per handout, with assistance as needed     Outcome: Ongoing, Not Progressing

## 2020-01-17 NOTE — ASSESSMENT & PLAN NOTE
Stroke risk factor  Rate control  Will need anticoagulation for this when stable, holding in setting of large area of infarct. Likely repeat imaging 7 days post event to reassess for bleed

## 2020-01-17 NOTE — PROGRESS NOTES
Subjective:  inr rising  Exam unchanged with laying flat/bolus, reportedly moved R side (with yawn?)    Objective:  Vital signs, lab studies, and imaging reviewed by me  Alert, intermittent commands, dysarthria w reduced fluency, R weak  Warm and well perfused, regular rate and rhythm, no murmurs  No dependent edema  Breathing comfortably, breath sounds clear  Belly soft, nontender, no hepatosplenomegaly    Assessment/Plan:    L m1 occlusion/stenosis complicated by infarct, cytotoxic edema, brain compression, debility/dysphagia, and respiratory insufficiency. Stroke extension since admission to [facility].   -cpt, monitor respiratory status  -permissive htn, eunatremia  -pt/ot if tolerated, slp/tf, oob  -hold asa, statin, michael due to inr  -monitor exam for worsening    Afib, coagulopathy related to coumadin use (last dose 1/14)  -monitor, hold on reversal, expect to down trend tomorrow    I spent 31 min of uninterrupted critical care time caring for this critically ill patient exclusive of procedures and teaching.

## 2020-01-17 NOTE — PROGRESS NOTES
[facility]  Vascular Neurology  Comprehensive Stroke Center  Progress Note    Assessment/Plan:     * Acute ischemic left MCA stroke  80 yo man admitted for L temporal-parietal embolic stroke, with worsening of deficits tonight.  Suspect proximal MCA occlusion.  No TPA INR 2.0. No IR as too large infarct. Holding coumadin due to large area of infarct. Will need repeat imaging after a week and further decision regarding anticoagulation pending. To remain in Neuro ICU for closer monitoring.      Antithrombotics: Holding in setting of large area of infarct, high risk for hemorrhagic conversion.    Statins: Lipitor 40 mg daily    Aggressive risk factor modification: A-Fib, HTN     Rehab efforts: The patient has been evaluated by a stroke team provider and the therapy needs have been fully considered based off the presenting complaints and exam findings. The following therapy evaluations are needed: PT evaluate and treat, OT evaluate and treat, SLP evaluate and treat, PM&R evaluate for appropriate placement    Diagnostics ordered/pending: None     VTE prophylaxis: None: Hold pharmacologic prophylaxis in setting of large infarct. SCDs.    BP parameters: Infarct: No intervention, SBP <220    Aphasia  Due to stroke  Aggressive therapy    Right spastic hemiparesis  Due to stroke  Aggressive therapy    Atrial fibrillation  Stroke risk factor  Rate control  Will need anticoagulation for this when stable, holding in setting of large area of infarct. Likely repeat imaging 7 days post event to reassess for bleed    Type 2 diabetes mellitus with circulatory disorder, without long-term current use of insulin  Stroke risk factor  HgB A1C 5.1  SSI for now      Essential hypertension  Stroke risk factor  SBP <220         Patient admitted to neurocritical care for Left MCA syndrome, severe aphasia following extension of prior stroke.  01/15/2020: Overnight concern for extensor posturing of right arm, emergent CT this  AM showing stable area of infarct, no new hemorrhage or extension.  01/16/20: No acute events overnight, neurostatus unchanged  01/17/2020: No events overnight, patient received seroquel for agitation this AM. To remain in neuro ICU for closer monitoring    STROKE DOCUMENTATION   Acute Stroke Times   Last Known Normal Date: 01/13/20  Last Known Normal Time: 2315  Symptom Onset Date: 01/14/20  Symptom Onset Time: 0050  Stroke Team Called Date: 01/14/20  Stroke Team Called Time: 0105  Stroke Team Arrival Date: 01/14/20  Stroke Team Arrival Time: 0355  CT Interpretation Time: 0100    NIH Scale:          Modified Blanche Score: 0  Millie Coma Scale:    ABCD2 Score:    [id number] Score:   HAS -BLED Score:   ICH Score:   Hunt & Jones Classification:      Hemorrhagic change of an Ischemic Stroke: Does this patient have an ischemic stroke with hemorrhagic changes? No     Neurologic Chief Complaint: Right sided weakness    Subjective:     Interval History: Patient is seen for follow-up neurological assessment and treatment recommendations:     No acute events overnight. Exam remains the same, to remain in neuro ICU for further monitoring.    HPI, Past Medical, Family, and Social History remains the same as documented in the initial encounter.     Review of Systems   Reason unable to perform ROS: Aphasia.     Scheduled Meds:   atorvastatin  40 mg Per NG tube Daily    levothyroxine  75 mcg Per NG tube Before breakfast    metoprolol tartrate  12.5 mg Per NG tube BID    senna-docusate 8.6-50 mg  1 tablet Per NG tube BID     Continuous Infusions:  PRN Meds:acetaminophen, glucagon (human recombinant), glucose, glucose, insulin aspart U-100, magnesium oxide, magnesium oxide, ondansetron, potassium chloride 10%, potassium chloride 10%, potassium chloride 10%, potassium, sodium phosphates, potassium, sodium phosphates, potassium, sodium phosphates, QUEtiapine, sodium chloride 0.9%    Objective:     Vital Signs (Most  Recent):  Temp: 98.1 °F (36.7 °C) (01/17/20 1505)  Pulse: 87 (01/17/20 1505)  Resp: 18 (01/17/20 1505)  BP: 139/72 (01/17/20 1505)  SpO2: 98 % (01/17/20 1505)  BP Location: Left arm    Vital Signs Range (Last 24H):  Temp:  [98.1 °F (36.7 °C)-98.9 °F (37.2 °C)]   Pulse:  [73-93]   Resp:  [13-41]   BP: (108-211)/()   SpO2:  [93 %-100 %]   BP Location: Left arm    Physical Exam   Constitutional: He appears well-developed and well-nourished.   Obese, stuporous   HENT:   Head: Normocephalic and atraumatic.   Eyes: Pupils are equal, round, and reactive to light. EOM are normal.   Neck: Normal range of motion.   Cardiovascular: Normal rate.   Pulmonary/Chest: Effort normal.   Abdominal: Soft.   Neurological:   Drowsy, right sided plegia, and sensory loss, facial droop and dysarthria and aphasia       Skin: Skin is warm and dry.   Nursing note and vitals reviewed.      Neurological Exam:   LOC: drowsy  Attention Span: poor  Language: Global aphasia  Articulation: Dysarthria  Orientation: Not oriented to person, place, and time  Visual Fields: Full  Visual neglect  EOM (CN III, IV, VI): Gaze preference  left  Pupils (CN II, III): PERRL  Facial Sensation (CN V): Normal  Facial Movement (CN VII): Lower facial weakness on the Right  Motor: Arm left  Paresis: 4/5  Leg left  Paresis: 4/5  Arm right  Paresis: 1/5  Leg right Paresis: 1/5  Cebellar: No evidence of appendicular or axial ataxia  Sensation: Davidson-hypoesthesia right  Tone: Normal tone throughout    Laboratory:  CMP:   Recent Labs   Lab 01/17/20  0330   CALCIUM 8.5*   ALBUMIN 2.9*   PROT 5.6*      K 3.9   CO2 21*   *   BUN 22   CREATININE 0.8   ALKPHOS 72   ALT 37   AST 56*   BILITOT 1.2*     CBC:   Recent Labs   Lab 01/17/20  0330   WBC 8.83   RBC 3.50*   HGB 11.8*   HCT 36.8*   *   *   MCH 33.7*   MCHC 32.1     Lipid Panel:   Recent Labs   Lab 01/11/20  0357   CHOL 150   LDLCALC 79.8   HDL 61   TRIG 46     Coagulation:   Recent Labs   Lab  01/14/20  0040  01/17/20  0330   INR 2.4*   < > 3.0*   APTT 35.8*  --   --     < > = values in this interval not displayed.     Hgb A1C:   Recent Labs   Lab 01/11/20  0357   HGBA1C 5.4     TSH:   Recent Labs   Lab 01/10/20  2012   TSH 4.277*       Diagnostic Results     Brain Imaging   Brain Imaging   CT Head without 1/15 (07:43)  Evolving moderate-sized region of acute infarction in the left MCA territory as above.  Distribution grossly stable from recent MRI without significant infarct extension.  No hemorrhagic conversion or new territorial infarct.     MRI Brain 1/14 (04:28)  There is interval change, there is interval development of acute ischemia/infarct along the left MCA distribution, this is superimposed on subacute ischemic change seen on the recent MRI examination  Findings concerning for occlusion of the M2 segment of the left MCA as above.    Additional chronic changes are noted.    Vessel Imaging   CTA Head and Neck 1/14 (02:43)  As on the recent brain MRA examination there is appearance of attenuation of the left MCA branch vessels without evidence for high-grade stenosis or occlusion and otherwise there is no evidence for high-grade stenosis or occlusion of the major arterial vascular structures of the head or neck.    There is no evidence for intracranial aneurysm or AVM.    Findings consistent with acute to subacute left parietal ischemic change again noted.    Small thyroid nodules or cysts.    Cardiac Imaging  TTE 1/11  · Normal left ventricular systolic function. The estimated ejection fraction is 55%.  · Mid anteroseptal and apical moderate hypokinesia.  · Diastolic pattern consistent with atrial fibrillation observed.  · Severe left atrial enlargement.  · Normal right ventricular systolic function.  · Severe right atrial enlargement.  · Mild-to-moderate mitral regurgitation.  · Mild tricuspid regurgitation.  · The estimated PA systolic pressure is 40 mmHg.  Intermediate central venous pressure  (8 mmHg).         Oj Minaya MD  Comprehensive Stroke Center  Department of Vascular Neurology   [facility]

## 2020-01-18 PROBLEM — I63.412 EMBOLIC STROKE INVOLVING LEFT MIDDLE CEREBRAL ARTERY: Status: ACTIVE | Noted: 2020-01-10

## 2020-01-18 LAB
ALBUMIN SERPL BCP-MCNC: 2.7 G/DL (ref 3.5–5.2)
ALP SERPL-CCNC: 76 U/L (ref 55–135)
ALT SERPL W/O P-5'-P-CCNC: 41 U/L (ref 10–44)
ANION GAP SERPL CALC-SCNC: 7 MMOL/L (ref 8–16)
ANISOCYTOSIS BLD QL SMEAR: SLIGHT
AST SERPL-CCNC: 54 U/L (ref 10–40)
BASOPHILS # BLD AUTO: 0.02 K/UL (ref 0–0.2)
BASOPHILS # BLD AUTO: 0.03 K/UL (ref 0–0.2)
BASOPHILS NFR BLD: 0.1 % (ref 0–1.9)
BASOPHILS NFR BLD: 0.3 % (ref 0–1.9)
BILIRUB SERPL-MCNC: 1.3 MG/DL (ref 0.1–1)
BUN SERPL-MCNC: 20 MG/DL (ref 8–23)
CALCIUM SERPL-MCNC: 8.5 MG/DL (ref 8.7–10.5)
CHLORIDE SERPL-SCNC: 108 MMOL/L (ref 95–110)
CO2 SERPL-SCNC: 23 MMOL/L (ref 23–29)
CREAT SERPL-MCNC: 0.8 MG/DL (ref 0.5–1.4)
DIFFERENTIAL METHOD: ABNORMAL
DIFFERENTIAL METHOD: ABNORMAL
EOSINOPHIL # BLD AUTO: 0 K/UL (ref 0–0.5)
EOSINOPHIL # BLD AUTO: 0 K/UL (ref 0–0.5)
EOSINOPHIL NFR BLD: 0.1 % (ref 0–8)
EOSINOPHIL NFR BLD: 0.1 % (ref 0–8)
ERYTHROCYTE [DISTWIDTH] IN BLOOD BY AUTOMATED COUNT: 15 % (ref 11.5–14.5)
ERYTHROCYTE [DISTWIDTH] IN BLOOD BY AUTOMATED COUNT: 15 % (ref 11.5–14.5)
EST. GFR  (AFRICAN AMERICAN): >60 ML/MIN/1.73 M^2
EST. GFR  (NON AFRICAN AMERICAN): >60 ML/MIN/1.73 M^2
GLUCOSE SERPL-MCNC: 226 MG/DL (ref 70–110)
HCT VFR BLD AUTO: 37.5 % (ref 40–54)
HCT VFR BLD AUTO: 40.8 % (ref 40–54)
HGB BLD-MCNC: 12 G/DL (ref 14–18)
HGB BLD-MCNC: 13.1 G/DL (ref 14–18)
IMM GRANULOCYTES # BLD AUTO: 0.12 K/UL (ref 0–0.04)
IMM GRANULOCYTES # BLD AUTO: 0.18 K/UL (ref 0–0.04)
IMM GRANULOCYTES NFR BLD AUTO: 1.1 % (ref 0–0.5)
IMM GRANULOCYTES NFR BLD AUTO: 1.3 % (ref 0–0.5)
INR PPP: 2.3 (ref 0.8–1.2)
LYMPHOCYTES # BLD AUTO: 0.4 K/UL (ref 1–4.8)
LYMPHOCYTES # BLD AUTO: 0.8 K/UL (ref 1–4.8)
LYMPHOCYTES NFR BLD: 3.4 % (ref 18–48)
LYMPHOCYTES NFR BLD: 6 % (ref 18–48)
MAGNESIUM SERPL-MCNC: 1.8 MG/DL (ref 1.6–2.6)
MCH RBC QN AUTO: 33 PG (ref 27–31)
MCH RBC QN AUTO: 33.6 PG (ref 27–31)
MCHC RBC AUTO-ENTMCNC: 32 G/DL (ref 32–36)
MCHC RBC AUTO-ENTMCNC: 32.1 G/DL (ref 32–36)
MCV RBC AUTO: 103 FL (ref 82–98)
MCV RBC AUTO: 105 FL (ref 82–98)
MONOCYTES # BLD AUTO: 1.6 K/UL (ref 0.3–1)
MONOCYTES # BLD AUTO: 2.3 K/UL (ref 0.3–1)
MONOCYTES NFR BLD: 14.4 % (ref 4–15)
MONOCYTES NFR BLD: 16.6 % (ref 4–15)
NEUTROPHILS # BLD AUTO: 10.5 K/UL (ref 1.8–7.7)
NEUTROPHILS # BLD AUTO: 8.8 K/UL (ref 1.8–7.7)
NEUTROPHILS NFR BLD: 75.9 % (ref 38–73)
NEUTROPHILS NFR BLD: 80.7 % (ref 38–73)
NRBC BLD-RTO: 0 /100 WBC
NRBC BLD-RTO: 0 /100 WBC
OVALOCYTES BLD QL SMEAR: ABNORMAL
PHOSPHATE SERPL-MCNC: 2.5 MG/DL (ref 2.7–4.5)
PLATELET # BLD AUTO: 97 K/UL (ref 150–350)
PLATELET # BLD AUTO: 97 K/UL (ref 150–350)
PLATELET BLD QL SMEAR: ABNORMAL
PMV BLD AUTO: 12 FL (ref 9.2–12.9)
PMV BLD AUTO: 12.6 FL (ref 9.2–12.9)
POCT GLUCOSE: 169 MG/DL (ref 70–110)
POCT GLUCOSE: 182 MG/DL (ref 70–110)
POCT GLUCOSE: 200 MG/DL (ref 70–110)
POCT GLUCOSE: 217 MG/DL (ref 70–110)
POIKILOCYTOSIS BLD QL SMEAR: SLIGHT
POTASSIUM SERPL-SCNC: 4.3 MMOL/L (ref 3.5–5.1)
PROT SERPL-MCNC: 5.8 G/DL (ref 6–8.4)
PROTHROMBIN TIME: 21.6 SEC (ref 9–12.5)
RBC # BLD AUTO: 3.64 M/UL (ref 4.6–6.2)
RBC # BLD AUTO: 3.9 M/UL (ref 4.6–6.2)
SODIUM SERPL-SCNC: 138 MMOL/L (ref 136–145)
WBC # BLD AUTO: 10.95 K/UL (ref 3.9–12.7)
WBC # BLD AUTO: 13.83 K/UL (ref 3.9–12.7)

## 2020-01-18 PROCEDURE — 25000003 PHARM REV CODE 250: Performed by: NURSE PRACTITIONER

## 2020-01-18 PROCEDURE — 99232 PR SUBSEQUENT HOSPITAL CARE,LEVL II: ICD-10-PCS | Mod: ,,, | Performed by: NURSE PRACTITIONER

## 2020-01-18 PROCEDURE — 94761 N-INVAS EAR/PLS OXIMETRY MLT: CPT

## 2020-01-18 PROCEDURE — 99232 SBSQ HOSP IP/OBS MODERATE 35: CPT | Mod: ,,, | Performed by: NURSE PRACTITIONER

## 2020-01-18 PROCEDURE — 85610 PROTHROMBIN TIME: CPT

## 2020-01-18 PROCEDURE — 27000221 HC OXYGEN, UP TO 24 HOURS

## 2020-01-18 PROCEDURE — 99233 PR SUBSEQUENT HOSPITAL CARE,LEVL III: ICD-10-PCS | Mod: GC,,, | Performed by: PSYCHIATRY & NEUROLOGY

## 2020-01-18 PROCEDURE — 63600175 PHARM REV CODE 636 W HCPCS: Performed by: NURSE PRACTITIONER

## 2020-01-18 PROCEDURE — 85025 COMPLETE CBC W/AUTO DIFF WBC: CPT

## 2020-01-18 PROCEDURE — 11000001 HC ACUTE MED/SURG PRIVATE ROOM

## 2020-01-18 PROCEDURE — 99233 SBSQ HOSP IP/OBS HIGH 50: CPT | Mod: GC,,, | Performed by: PSYCHIATRY & NEUROLOGY

## 2020-01-18 PROCEDURE — 84100 ASSAY OF PHOSPHORUS: CPT

## 2020-01-18 PROCEDURE — 80053 COMPREHEN METABOLIC PANEL: CPT

## 2020-01-18 PROCEDURE — 83735 ASSAY OF MAGNESIUM: CPT

## 2020-01-18 RX ORDER — IPRATROPIUM BROMIDE AND ALBUTEROL SULFATE 2.5; .5 MG/3ML; MG/3ML
3 SOLUTION RESPIRATORY (INHALATION)
Status: DISCONTINUED | OUTPATIENT
Start: 2020-01-19 | End: 2020-01-30 | Stop reason: HOSPADM

## 2020-01-18 RX ORDER — INSULIN ASPART 100 [IU]/ML
1-10 INJECTION, SOLUTION INTRAVENOUS; SUBCUTANEOUS EVERY 4 HOURS PRN
Status: DISCONTINUED | OUTPATIENT
Start: 2020-01-18 | End: 2020-01-30 | Stop reason: HOSPADM

## 2020-01-18 RX ORDER — GLUCAGON 1 MG
1 KIT INJECTION
Status: DISCONTINUED | OUTPATIENT
Start: 2020-01-18 | End: 2020-01-30 | Stop reason: HOSPADM

## 2020-01-18 RX ADMIN — INSULIN ASPART 2 UNITS: 100 INJECTION, SOLUTION INTRAVENOUS; SUBCUTANEOUS at 03:01

## 2020-01-18 RX ADMIN — SENNOSIDES AND DOCUSATE SODIUM 1 TABLET: 8.6; 5 TABLET ORAL at 08:01

## 2020-01-18 RX ADMIN — ATORVASTATIN CALCIUM 40 MG: 20 TABLET, FILM COATED ORAL at 08:01

## 2020-01-18 RX ADMIN — METOPROLOL TARTRATE 12.5 MG: 25 TABLET, FILM COATED ORAL at 09:01

## 2020-01-18 RX ADMIN — METOPROLOL TARTRATE 12.5 MG: 25 TABLET, FILM COATED ORAL at 08:01

## 2020-01-18 RX ADMIN — LEVOTHYROXINE SODIUM 75 MCG: 25 TABLET ORAL at 07:01

## 2020-01-18 RX ADMIN — INSULIN ASPART 4 UNITS: 100 INJECTION, SOLUTION INTRAVENOUS; SUBCUTANEOUS at 06:01

## 2020-01-18 RX ADMIN — INSULIN ASPART 1 UNITS: 100 INJECTION, SOLUTION INTRAVENOUS; SUBCUTANEOUS at 09:01

## 2020-01-18 RX ADMIN — SENNOSIDES AND DOCUSATE SODIUM 1 TABLET: 8.6; 5 TABLET ORAL at 09:01

## 2020-01-18 NOTE — PLAN OF CARE
POC reviewed with pt at 1400. Pt unable to verbalized understanding. Questions and concerns addressed. No acute events today. Pt progressing toward goals. Will continue to monitor. See flowsheets for full assessment and VS info.      Transfer orders placed  Additional CBC done  Follow up CT done  Chest Xray done

## 2020-01-18 NOTE — PROGRESS NOTES
[facility]  Neurocritical Care  Progress Note    Admit Date: 1/10/2020  Service Date: 01/18/2020  Length of Stay: 8    Subjective:     Chief Complaint: Acute ischemic left MCA stroke    History of Present Illness: 78 y/o male, transferred to eval for thrombectomy of left M1 occlusion. On 1-9-20, he started having difficulty speaking. On 1-10-20 still no improvement was taken to [facility] that revealed a left temp pariatel infarct, causing aphasia. He has hx of afib and is on coumadin. He was admitted to [facility] and d/c recommendation was rehab, he was was waiting on placement. On 1-14-20, around 0050, it was noticed he could  not move his right sided and was no longer trying to talk or following commands. He was moved to ICU and telemedicne consult was done with Dr Olivares. No TPA as INR 2.0. Recommednation was to transfer to [facility] for further evaluation. CTA head and neck done revealing M1 occlusion.   Upon arrival patient still not talking or following commands, not moving the right side. Patient to MRI for acute intervention protocol.  MRI acute intervention protocol reveals large core infarct so no IR. NCC consulted for medical management.    HX from chart as patient is aphasic    Hospital Course: 1/14: transfer to [facility] for Thrombectomy eval, Admit to NCC  1/18: CT head- follow up prior starting anticoagulation, rechecks CBC, stepdown to stroke team        Review of Systems  Unable to obtain a complete ROS due to level of consciousness.  Objective:     Vitals:  Temp: 98.3 °F (36.8 °C)  Pulse: 83  Rhythm: atrial rhythm  BP: (!) 143/67  MAP (mmHg): 97  Resp: (!) 21  SpO2: 99 %  O2 Device (Oxygen Therapy): nasal cannula w/ humidification    Temp  Min: 98.1 °F (36.7 °C)  Max: 98.9 °F (37.2 °C)  Pulse  Min: 73  Max: 89  BP  Min: 132/70  Max: 164/90  MAP (mmHg)  Min: 93  Max: 122  Resp  Min: 14  Max: 28  SpO2  Min: 95 %  Max: 100 %    01/17 0701 - 01/18 0700  In: 2280 [I.V.:240]  Out:  1885 [Urine:1885]   Unmeasured Output  Urine Occurrence: 1  Stool Occurrence: 0  Pad Count: 2       Physical Exam  GA: comfortable, no acute distress.   HEENT: No scleral icterus or JVD.   Pulmonary: Diminished to auscultation A/L. No wheezing, crackles, or rhonchi.  Cardiac: RRR S1 & S2 w/o rubs/murmurs/gallops.   Abdominal: Bowel sounds present x 4. No appreciable hepatosplenomegaly.  Skin: No jaundice, rashes, or visible lesions.  Neuro:  --GCS: E4 V2 M4  --Mental Status:  Opens eyes spont, R facial droop, aphasic, doesn't follow commands  --CN II-XII grossly intact.   --Pupils 3mm, PERRL.   --Corneal reflex, gag, cough intact.  --LUE spont  --RUE  Extensor posturing  --LLE spont  --RLE spont    Medications:  Continuous Scheduled  atorvastatin 40 mg Daily   levothyroxine 75 mcg Before breakfast   metoprolol tartrate 12.5 mg BID   senna-docusate 8.6-50 mg 1 tablet BID   PRN  acetaminophen 650 mg Q6H PRN   Dextrose 10% Bolus 12.5 g PRN   glucagon (human recombinant) 1 mg PRN   glucose 16 g PRN   glucose 24 g PRN   insulin aspart U-100 1-10 Units Q4H PRN   magnesium oxide 800 mg PRN   magnesium oxide 800 mg PRN   ondansetron 4 mg Q8H PRN   potassium chloride 10% 40 mEq PRN   potassium chloride 10% 40 mEq PRN   potassium chloride 10% 40 mEq PRN   potassium, sodium phosphates 2 packet PRN   potassium, sodium phosphates 2 packet PRN   potassium, sodium phosphates 2 packet PRN   QUEtiapine 25 mg Nightly PRN   sodium chloride 0.9% 10 mL PRN     Today I personally reviewed pertinent medications, lines/drains/airways, imaging, laboratory results,     Diet  Diet NPO      Assessment/Plan:     Neuro  * Acute ischemic left MCA stroke  CTA at OSH shows new M1 occlusion, transfered to [facility] for thrombectomy eval.   MRI performed on arrival and reviewed by IR, Not thrombectomy candidate. There is new area of ischemia noted left parietal area.    Admit to NCC, Vascular Neurology consulted  Q1hr Neuro Checks, SBP  <220  PT/OT/SLP  1/18: plan for repeat CTH today prior starting anticoagulation ( hx of A-fib)  -plan to stepdown to VN    Aphasia  SLP    Right spastic hemiparesis  PT/OT    Cardiac/Vascular  Atrial fibrillation  On Coumadin, INR 2.3  Will discuss restarting today with Vascular Neurology    Essential hypertension  Permissive HTN, SBP <220  Echo       Hematology  Chronic anticoagulation  --CTH today prior starting anticoagulation    Endocrine  Acquired hypothyroidism  Home synthroid     Type 2 diabetes mellitus with circulatory disorder, without long-term current use of insulin    ISS          The patient is being Prophylaxed for:  Venous Thromboembolism with: Mechanical  Stress Ulcer with: None  Ventilator Pneumonia with: not applicable    Activity Orders          Diet NPO: NPO starting at 01/14 0514        Full Code    Tiffany Mclean NP  Neurocritical Care  [facility]

## 2020-01-18 NOTE — NURSING
Patient transferred to CT via bed with portable monitor, ambubag, and oxygen tank by 1 RN and 1 PCT. VSS patient tolerated transport to and from well.

## 2020-01-18 NOTE — SUBJECTIVE & OBJECTIVE
Review of Systems  Unable to obtain a complete ROS due to level of consciousness.  Objective:     Vitals:  Temp: 98.3 °F (36.8 °C)  Pulse: 83  Rhythm: atrial rhythm  BP: (!) 143/67  MAP (mmHg): 97  Resp: (!) 21  SpO2: 99 %  O2 Device (Oxygen Therapy): nasal cannula w/ humidification    Temp  Min: 98.1 °F (36.7 °C)  Max: 98.9 °F (37.2 °C)  Pulse  Min: 73  Max: 89  BP  Min: 132/70  Max: 164/90  MAP (mmHg)  Min: 93  Max: 122  Resp  Min: 14  Max: 28  SpO2  Min: 95 %  Max: 100 %    01/17 0701 - 01/18 0700  In: 2280 [I.V.:240]  Out: 1885 [Urine:1885]   Unmeasured Output  Urine Occurrence: 1  Stool Occurrence: 0  Pad Count: 2       Physical Exam  GA: comfortable, no acute distress.   HEENT: No scleral icterus or JVD.   Pulmonary: Diminished to auscultation A/L. No wheezing, crackles, or rhonchi.  Cardiac: RRR S1 & S2 w/o rubs/murmurs/gallops.   Abdominal: Bowel sounds present x 4. No appreciable hepatosplenomegaly.  Skin: No jaundice, rashes, or visible lesions.  Neuro:  --GCS: E4 V2 M4  --Mental Status:  Opens eyes spont, R facial droop, aphasic, doesn't follow commands  --CN II-XII grossly intact.   --Pupils 3mm, PERRL.   --Corneal reflex, gag, cough intact.  --LUE spont  --RUE  Extensor posturing  --LLE spont  --RLE spont    Medications:  Continuous Scheduled  atorvastatin 40 mg Daily   levothyroxine 75 mcg Before breakfast   metoprolol tartrate 12.5 mg BID   senna-docusate 8.6-50 mg 1 tablet BID   PRN  acetaminophen 650 mg Q6H PRN   Dextrose 10% Bolus 12.5 g PRN   glucagon (human recombinant) 1 mg PRN   glucose 16 g PRN   glucose 24 g PRN   insulin aspart U-100 1-10 Units Q4H PRN   magnesium oxide 800 mg PRN   magnesium oxide 800 mg PRN   ondansetron 4 mg Q8H PRN   potassium chloride 10% 40 mEq PRN   potassium chloride 10% 40 mEq PRN   potassium chloride 10% 40 mEq PRN   potassium, sodium phosphates 2 packet PRN   potassium, sodium phosphates 2 packet PRN   potassium, sodium phosphates 2 packet PRN   QUEtiapine 25  mg Nightly PRN   sodium chloride 0.9% 10 mL PRN     Today I personally reviewed pertinent medications, lines/drains/airways, imaging, laboratory results,     Diet  Diet NPO

## 2020-01-18 NOTE — SUBJECTIVE & OBJECTIVE
Neurologic Chief Complaint: Right sided weakness    Subjective:     Interval History: Patient is seen for follow-up neurological assessment and treatment recommendations:     Patient calm and alert during today's exam. Patient to step down to stroke primary service. Breath sounds course, CXR without significant change- will continue pulmonary tolieting.       HPI, Past Medical, Family, and Social History remains the same as documented in the initial encounter.     Review of Systems   Constitutional: Negative for fever.   HENT: Positive for drooling and trouble swallowing.    Eyes: Negative for visual disturbance.   Respiratory: Positive for cough.    Gastrointestinal: Negative for diarrhea and vomiting.   Musculoskeletal: Positive for gait problem.   Neurological: Positive for facial asymmetry, speech difficulty and weakness.   Psychiatric/Behavioral: Positive for confusion and decreased concentration.     Scheduled Meds:   atorvastatin  40 mg Per NG tube Daily    levothyroxine  75 mcg Per NG tube Before breakfast    metoprolol tartrate  12.5 mg Per NG tube BID    senna-docusate 8.6-50 mg  1 tablet Per NG tube BID     Continuous Infusions:  PRN Meds:acetaminophen, Dextrose 10% Bolus, glucagon (human recombinant), glucose, glucose, insulin aspart U-100, magnesium oxide, magnesium oxide, ondansetron, potassium chloride 10%, potassium chloride 10%, potassium chloride 10%, potassium, sodium phosphates, potassium, sodium phosphates, potassium, sodium phosphates, QUEtiapine, sodium chloride 0.9%    Objective:     Vital Signs (Most Recent):  Temp: 97.8 °F (36.6 °C) (01/18/20 1505)  Pulse: 91 (01/18/20 1705)  Resp: 20 (01/18/20 1705)  BP: (!) 141/65 (01/18/20 1705)  SpO2: 95 % (01/18/20 1705)  BP Location: Left arm    Vital Signs Range (Last 24H):  Temp:  [97.8 °F (36.6 °C)-98.9 °F (37.2 °C)]   Pulse:  [73-91]   Resp:  [14-34]   BP: (132-160)/(65-79)   SpO2:  [95 %-100 %]   BP Location: Left arm    Physical Exam    Constitutional: He appears well-developed and well-nourished.   HENT:   Head: Normocephalic and atraumatic.   Eyes: Pupils are equal, round, and reactive to light.   Left gaze preference    Neck: Normal range of motion.   Cardiovascular: Normal rate.   Pulmonary/Chest: Effort normal. He has rhonchi.   Abdominal: Soft.   Neurological:          Skin: Skin is warm and dry.   Nursing note and vitals reviewed.      Neurological Exam:   LOC: alert  Attention Span: poor  Language: Expressive aphasia, Receptive aphasia  Articulation: Dysarthria  Orientation: Untestable due to severe aphasia   Visual Fields: Full  EOM (CN III, IV, VI): Gaze preference  left  Pupils (CN II, III): PERRL  Facial Sensation (CN V): Corneal reflex present Bilateral  Facial Movement (CN VII): Lower facial weakness on the Right  Motor: Arm left  Paresis: 4/5  Leg left  Paresis: 4/5  Arm right  Plegia 0/5  Leg right Paresis: 1/5  Cebellar: No evidence of appendicular or axial ataxia  Tone: Flaccid  RUE    Laboratory:  CMP:   Recent Labs   Lab 01/18/20  0239   CALCIUM 8.5*   ALBUMIN 2.7*   PROT 5.8*      K 4.3   CO2 23      BUN 20   CREATININE 0.8   ALKPHOS 76   ALT 41   AST 54*   BILITOT 1.3*     CBC:   Recent Labs   Lab 01/18/20  1140   WBC 13.83*   RBC 3.90*   HGB 13.1*   HCT 40.8   PLT 97*   *   MCH 33.6*   MCHC 32.1     Lipid Panel:   No results for input(s): CHOL, LDLCALC, HDL, TRIG in the last 168 hours.  Coagulation:   Recent Labs   Lab 01/14/20  0040  01/18/20  0239   INR 2.4*   < > 2.3*   APTT 35.8*  --   --     < > = values in this interval not displayed.     Hgb A1C:   No results for input(s): HGBA1C in the last 168 hours.  TSH:   No results for input(s): TSH in the last 168 hours.    Diagnostic Results     Brain Imaging   Brain Imaging   CT Head without 1/15 (07:43)  Evolving moderate-sized region of acute infarction in the left MCA territory as above.  Distribution grossly stable from recent MRI without significant  infarct extension.  No hemorrhagic conversion or new territorial infarct.     MRI Brain 1/14 (04:28)  There is interval change, there is interval development of acute ischemia/infarct along the left MCA distribution, this is superimposed on subacute ischemic change seen on the recent MRI examination  Findings concerning for occlusion of the M2 segment of the left MCA as above.    Additional chronic changes are noted.    Vessel Imaging   CTA Head and Neck 1/14 (02:43)  As on the recent brain MRA examination there is appearance of attenuation of the left MCA branch vessels without evidence for high-grade stenosis or occlusion and otherwise there is no evidence for high-grade stenosis or occlusion of the major arterial vascular structures of the head or neck.    There is no evidence for intracranial aneurysm or AVM.    Findings consistent with acute to subacute left parietal ischemic change again noted.    Small thyroid nodules or cysts.    Cardiac Imaging  TTE 1/11  · Normal left ventricular systolic function. The estimated ejection fraction is 55%.  · Mid anteroseptal and apical moderate hypokinesia.  · Diastolic pattern consistent with atrial fibrillation observed.  · Severe left atrial enlargement.  · Normal right ventricular systolic function.  · Severe right atrial enlargement.  · Mild-to-moderate mitral regurgitation.  · Mild tricuspid regurgitation.  · The estimated PA systolic pressure is 40 mmHg.  Intermediate central venous pressure (8 mmHg).

## 2020-01-18 NOTE — ASSESSMENT & PLAN NOTE
CTA at OSH shows new M1 occlusion, transfered to [facility] for thrombectomy eval.   MRI performed on arrival and reviewed by IR, Not thrombectomy candidate. There is new area of ischemia noted left parietal area.    Admit to NCC, Vascular Neurology consulted  Q1hr Neuro Checks, SBP <220  PT/OT/SLP  1/18: plan for repeat CTH today prior starting anticoagulation ( hx of A-fib)  -plan to stepdown to VN

## 2020-01-18 NOTE — PLAN OF CARE
POC reviewed with pt at 0500. Pt unable to verbalize understanding. Questions and concerns addressed with family over phone. No acute events overnight. Pt progressing toward goals. Will continue to monitor. See flowsheets for full assessment and VS info

## 2020-01-19 LAB
POCT GLUCOSE: 127 MG/DL (ref 70–110)
POCT GLUCOSE: 160 MG/DL (ref 70–110)
POCT GLUCOSE: 165 MG/DL (ref 70–110)
POCT GLUCOSE: 174 MG/DL (ref 70–110)

## 2020-01-19 PROCEDURE — 99233 SBSQ HOSP IP/OBS HIGH 50: CPT | Mod: GC,,, | Performed by: PSYCHIATRY & NEUROLOGY

## 2020-01-19 PROCEDURE — 27000221 HC OXYGEN, UP TO 24 HOURS

## 2020-01-19 PROCEDURE — 99233 PR SUBSEQUENT HOSPITAL CARE,LEVL III: ICD-10-PCS | Mod: GC,,, | Performed by: PSYCHIATRY & NEUROLOGY

## 2020-01-19 PROCEDURE — 11000001 HC ACUTE MED/SURG PRIVATE ROOM

## 2020-01-19 PROCEDURE — 25000003 PHARM REV CODE 250: Performed by: NURSE PRACTITIONER

## 2020-01-19 PROCEDURE — 94668 MNPJ CHEST WALL SBSQ: CPT

## 2020-01-19 PROCEDURE — 94761 N-INVAS EAR/PLS OXIMETRY MLT: CPT

## 2020-01-19 PROCEDURE — 94640 AIRWAY INHALATION TREATMENT: CPT

## 2020-01-19 PROCEDURE — 25000242 PHARM REV CODE 250 ALT 637 W/ HCPCS: Performed by: NURSE PRACTITIONER

## 2020-01-19 RX ORDER — CEFTRIAXONE 1 G/1
1 INJECTION, POWDER, FOR SOLUTION INTRAMUSCULAR; INTRAVENOUS
Status: DISCONTINUED | OUTPATIENT
Start: 2020-01-20 | End: 2020-01-24

## 2020-01-19 RX ORDER — METOPROLOL TARTRATE 25 MG/1
25 TABLET, FILM COATED ORAL 2 TIMES DAILY
Status: DISCONTINUED | OUTPATIENT
Start: 2020-01-19 | End: 2020-01-30

## 2020-01-19 RX ADMIN — SENNOSIDES AND DOCUSATE SODIUM 1 TABLET: 8.6; 5 TABLET ORAL at 08:01

## 2020-01-19 RX ADMIN — IPRATROPIUM BROMIDE AND ALBUTEROL SULFATE 3 ML: .5; 3 SOLUTION RESPIRATORY (INHALATION) at 07:01

## 2020-01-19 RX ADMIN — LEVOTHYROXINE SODIUM 75 MCG: 25 TABLET ORAL at 08:01

## 2020-01-19 RX ADMIN — IPRATROPIUM BROMIDE AND ALBUTEROL SULFATE 3 ML: .5; 3 SOLUTION RESPIRATORY (INHALATION) at 01:01

## 2020-01-19 RX ADMIN — INSULIN ASPART 1 UNITS: 100 INJECTION, SOLUTION INTRAVENOUS; SUBCUTANEOUS at 09:01

## 2020-01-19 RX ADMIN — ATORVASTATIN CALCIUM 40 MG: 20 TABLET, FILM COATED ORAL at 08:01

## 2020-01-19 RX ADMIN — METOPROLOL TARTRATE 12.5 MG: 25 TABLET, FILM COATED ORAL at 08:01

## 2020-01-19 RX ADMIN — INSULIN ASPART 2 UNITS: 100 INJECTION, SOLUTION INTRAVENOUS; SUBCUTANEOUS at 05:01

## 2020-01-19 RX ADMIN — SENNOSIDES AND DOCUSATE SODIUM 1 TABLET: 8.6; 5 TABLET ORAL at 09:01

## 2020-01-19 RX ADMIN — METOPROLOL TARTRATE 25 MG: 25 TABLET ORAL at 09:01

## 2020-01-19 NOTE — ASSESSMENT & PLAN NOTE
Stroke risk factor  Rate control  Will need anticoagulation for this when stable, holding in setting of large area of infarct.    Repeat CT today stable. Will consider restarting AC after PEG placement.

## 2020-01-19 NOTE — ASSESSMENT & PLAN NOTE
Area of cytotoxic cerebral edema identified when reviewing brain imaging in the territory of the left middle cerebral artery. There is mass effect associated with it. We will continue to monitor the patients clinical exam for any worsening of symptoms which may indicate expansion of the stroke or the area of the edema resulting in the clinical change. The pattern is suggestive of cardio embolic etiology.

## 2020-01-19 NOTE — PLAN OF CARE
Plan of care reviewed with pt. Pt globally aphasic. No apparent distress noted. Pt on tube feedings. Isosource 1.5 @ goal rate of 60ml/hr. 150 ml water bolus given x2. Pt has mitten and soft wrist restraints on left hand. Fall precautions maintained. Bed in lowest position, and locked. Call light within reach and advised to call for assistance. Side rails x 2 and slip resistant socks on at this time. WCTM.

## 2020-01-19 NOTE — NURSING
Telemetry notified me of a pulse rate of 140 back and forth, the pt appears stable and showed no sign of distress, stroke team notified, will continue to monitor, reassessed pt, awake in bed watching television, with no signs of distress

## 2020-01-19 NOTE — PROGRESS NOTES
[facility]  Vascular Neurology  Comprehensive Stroke Center  Progress Note    Assessment/Plan:     * Embolic stroke involving left middle cerebral artery  78 yo man with history of HTN, a fib (on Coumadin) and hypothyroidism. Patient has been admitted to [facility] for left temp partieal infarct on 1/10. On 1/14 patient with worsening of deficits: aphasia, right side plegia and not following commands. Telestroke consult was completed by Dr. Olivares, no tpa given as INR was 2.0. [place] [facility] [place].  Positive for LVO, L M1 occlusion. Patient not candidate for intervention d/t large core infarct. Patient was admitted to [facility] for close monitoring.  Coumadin held d/t size of stroke. Will switch to DOAC and start after PEG placement.     Repeat CT head completed today stable.     Antithrombotics: currently held, to start after PEG placement      Statins: Lipitor 40 mg daily    Aggressive risk factor modification: A-Fib, HTN     Rehab efforts: The patient has been evaluated by a stroke team provider and the therapy needs have been fully considered based off the presenting complaints and exam findings. The following therapy evaluations are needed: PT evaluate and treat, OT evaluate and treat, SLP evaluate and treat, PM&R evaluate for appropriate placement    Diagnostics ordered/pending: None     VTE prophylaxis: None: Hold pharmacologic prophylaxis in setting of large infarct. SCDs.    BP parameters: Infarct: No intervention, SBP <220    Cytotoxic cerebral edema  Area of cytotoxic cerebral edema identified when reviewing brain imaging in the territory of the left middle cerebral artery. There is mass effect associated with it. We will continue to monitor the patients clinical exam for any worsening of symptoms which may indicate expansion of the stroke or the area of the edema resulting in the clinical change. The pattern is suggestive of cardio embolic  etiology.        Aphasia  Due to stroke  Aggressive therapy    Right spastic hemiparesis  Due to stroke  Aggressive therapy    Atrial fibrillation  Stroke risk factor  Rate control  Will need anticoagulation for this when stable, holding in setting of large area of infarct.    Repeat CT today stable. Will consider restarting AC after PEG placement.     Type 2 diabetes mellitus with circulatory disorder, without long-term current use of insulin  Stroke risk factor  HgB A1C 5.1  SSI for now      Essential hypertension  Stroke risk factor  SBP <220         Patient admitted to neurocritical care for Left MCA syndrome, severe aphasia following extension of prior stroke.  01/15/2020: Overnight concern for extensor posturing of right arm, emergent CT this AM showing stable area of infarct, no new hemorrhage or extension.  01/16/20: No acute events overnight, neurostatus unchanged  01/17/2020: No events overnight, patient received seroquel for agitation this AM. To remain in neuro ICU for closer monitoring  01/18/2020: Patient calm and alert during today's exam. Patient to step down to stroke primary service. Breath sounds course, CXR without significant change- will continue pulmonary tolieting.     STROKE DOCUMENTATION   Acute Stroke Times   Last Known Normal Date: 01/13/20  Last Known Normal Time: 2315  Symptom Onset Date: 01/14/20  Symptom Onset Time: 0050  Stroke Team Called Date: 01/14/20  Stroke Team Called Time: 0105  Stroke Team Arrival Date: 01/14/20  Stroke Team Arrival Time: 0355  CT Interpretation Time: 0100    NIH Scale:  1a. Level of Consciousness: 0-->Alert, keenly responsive  1b. LOC Questions: 2-->Answers neither question correctly  1c. LOC Commands: 2-->Performs neither task correctly  2. Best Gaze: 1-->Partial gaze palsy, gaze is abnormal in one or both eyes, but forced deviation or total gaze paresis is not present  3. Visual: 0-->No visual loss  4. Facial Palsy: 1-->Minor paralysis (flattened  nasolabial fold, asymmetry on smiling)  5a. Motor Arm, Left: 1-->Drift, limb holds 90 (or 45) degrees, but drifts down before full 10 seconds, does not hit bed or other support  5b. Motor Arm, Right: 4-->No movement  6a. Motor Leg, Left: 3-->No effort against gravity, leg falls to bed immediately  6b. Motor Leg, Right: 3-->No effort against gravity, leg falls to bed immediately  7. Limb Ataxia: 0-->Absent  8. Sensory: 0-->Normal, no sensory loss  9. Best Language: 2-->Severe aphasia, all communication is through fragmentary expression, great need for inference, questioning, and guessing by the listener. Range of information that can be exchanged is limited, listener carries burden of. . . (see row details)  10. Dysarthria: 2-->Severe dysarthria, patients speech is so slurred as to be unintelligible in the absence of or out of proportion to any dysphasia, or is mute/anarthric  11. Extinction and Inattention (formerly Neglect): 1-->Visual, tactile, auditory, spatial, or personal inattention or extinction to bilateral simultaneous stimulation in one of the sensory modalities  Total (NIH Stroke Scale): 22       Modified Crawley Score: 0  Madison Coma Scale:    ABCD2 Score:    [id number] Score:   HAS -BLED Score:   ICH Score:   Hunt & Jones Classification:      Hemorrhagic change of an Ischemic Stroke: Does this patient have an ischemic stroke with hemorrhagic changes? No     Neurologic Chief Complaint: Right sided weakness    Subjective:     Interval History: Patient is seen for follow-up neurological assessment and treatment recommendations:     Patient calm and alert during today's exam. Patient to step down to stroke primary service. Breath sounds course, CXR without significant change- will continue pulmonary tolieting.       HPI, Past Medical, Family, and Social History remains the same as documented in the initial encounter.     Review of Systems   Constitutional: Negative for fever.   HENT: Positive for drooling  and trouble swallowing.    Eyes: Negative for visual disturbance.   Respiratory: Positive for cough.    Gastrointestinal: Negative for diarrhea and vomiting.   Musculoskeletal: Positive for gait problem.   Neurological: Positive for facial asymmetry, speech difficulty and weakness.   Psychiatric/Behavioral: Positive for confusion and decreased concentration.     Scheduled Meds:   atorvastatin  40 mg Per NG tube Daily    levothyroxine  75 mcg Per NG tube Before breakfast    metoprolol tartrate  12.5 mg Per NG tube BID    senna-docusate 8.6-50 mg  1 tablet Per NG tube BID     Continuous Infusions:  PRN Meds:acetaminophen, Dextrose 10% Bolus, glucagon (human recombinant), glucose, glucose, insulin aspart U-100, magnesium oxide, magnesium oxide, ondansetron, potassium chloride 10%, potassium chloride 10%, potassium chloride 10%, potassium, sodium phosphates, potassium, sodium phosphates, potassium, sodium phosphates, QUEtiapine, sodium chloride 0.9%    Objective:     Vital Signs (Most Recent):  Temp: 97.8 °F (36.6 °C) (01/18/20 1505)  Pulse: 91 (01/18/20 1705)  Resp: 20 (01/18/20 1705)  BP: (!) 141/65 (01/18/20 1705)  SpO2: 95 % (01/18/20 1705)  BP Location: Left arm    Vital Signs Range (Last 24H):  Temp:  [97.8 °F (36.6 °C)-98.9 °F (37.2 °C)]   Pulse:  [73-91]   Resp:  [14-34]   BP: (132-160)/(65-79)   SpO2:  [95 %-100 %]   BP Location: Left arm    Physical Exam   Constitutional: He appears well-developed and well-nourished.   HENT:   Head: Normocephalic and atraumatic.   Eyes: Pupils are equal, round, and reactive to light.   Left gaze preference    Neck: Normal range of motion.   Cardiovascular: Normal rate.   Pulmonary/Chest: Effort normal. He has rhonchi.   Abdominal: Soft.   Neurological:          Skin: Skin is warm and dry.   Nursing note and vitals reviewed.      Neurological Exam:   LOC: alert  Attention Span: poor  Language: Expressive aphasia, Receptive aphasia  Articulation: Dysarthria  Orientation:  Untestable due to severe aphasia   Visual Fields: Full  EOM (CN III, IV, VI): Gaze preference  left  Pupils (CN II, III): PERRL  Facial Sensation (CN V): Corneal reflex present Bilateral  Facial Movement (CN VII): Lower facial weakness on the Right  Motor: Arm left  Paresis: 4/5  Leg left  Paresis: 4/5  Arm right  Plegia 0/5  Leg right Paresis: 1/5  Cebellar: No evidence of appendicular or axial ataxia  Tone: Flaccid  RUE    Laboratory:  CMP:   Recent Labs   Lab 01/18/20  0239   CALCIUM 8.5*   ALBUMIN 2.7*   PROT 5.8*      K 4.3   CO2 23      BUN 20   CREATININE 0.8   ALKPHOS 76   ALT 41   AST 54*   BILITOT 1.3*     CBC:   Recent Labs   Lab 01/18/20  1140   WBC 13.83*   RBC 3.90*   HGB 13.1*   HCT 40.8   PLT 97*   *   MCH 33.6*   MCHC 32.1     Lipid Panel:   No results for input(s): CHOL, LDLCALC, HDL, TRIG in the last 168 hours.  Coagulation:   Recent Labs   Lab 01/14/20  0040  01/18/20  0239   INR 2.4*   < > 2.3*   APTT 35.8*  --   --     < > = values in this interval not displayed.     Hgb A1C:   No results for input(s): HGBA1C in the last 168 hours.  TSH:   No results for input(s): TSH in the last 168 hours.    Diagnostic Results     Brain Imaging   Brain Imaging   CT Head without 1/15 (07:43)  Evolving moderate-sized region of acute infarction in the left MCA territory as above.  Distribution grossly stable from recent MRI without significant infarct extension.  No hemorrhagic conversion or new territorial infarct.     MRI Brain 1/14 (04:28)  There is interval change, there is interval development of acute ischemia/infarct along the left MCA distribution, this is superimposed on subacute ischemic change seen on the recent MRI examination  Findings concerning for occlusion of the M2 segment of the left MCA as above.    Additional chronic changes are noted.    Vessel Imaging   CTA Head and Neck 1/14 (02:43)  As on the recent brain MRA examination there is appearance of attenuation of the left  MCA branch vessels without evidence for high-grade stenosis or occlusion and otherwise there is no evidence for high-grade stenosis or occlusion of the major arterial vascular structures of the head or neck.    There is no evidence for intracranial aneurysm or AVM.    Findings consistent with acute to subacute left parietal ischemic change again noted.    Small thyroid nodules or cysts.    Cardiac Imaging  TTE 1/11  · Normal left ventricular systolic function. The estimated ejection fraction is 55%.  · Mid anteroseptal and apical moderate hypokinesia.  · Diastolic pattern consistent with atrial fibrillation observed.  · Severe left atrial enlargement.  · Normal right ventricular systolic function.  · Severe right atrial enlargement.  · Mild-to-moderate mitral regurgitation.  · Mild tricuspid regurgitation.  · The estimated PA systolic pressure is 40 mmHg.  Intermediate central venous pressure (8 mmHg).         Ambrose Rothman NP  Comprehensive Stroke Center  Department of Vascular Neurology   [facility]

## 2020-01-20 PROBLEM — E87.1 HYPONATREMIA: Status: RESOLVED | Noted: 2020-01-14 | Resolved: 2020-01-20

## 2020-01-20 LAB
ALBUMIN SERPL BCP-MCNC: 2.4 G/DL (ref 3.5–5.2)
ALP SERPL-CCNC: 92 U/L (ref 55–135)
ALT SERPL W/O P-5'-P-CCNC: 45 U/L (ref 10–44)
ANION GAP SERPL CALC-SCNC: 7 MMOL/L (ref 8–16)
AST SERPL-CCNC: 55 U/L (ref 10–40)
BASOPHILS # BLD AUTO: 0.04 K/UL (ref 0–0.2)
BASOPHILS # BLD AUTO: 0.05 K/UL (ref 0–0.2)
BASOPHILS NFR BLD: 0.4 % (ref 0–1.9)
BASOPHILS NFR BLD: 0.4 % (ref 0–1.9)
BILIRUB SERPL-MCNC: 1.1 MG/DL (ref 0.1–1)
BUN SERPL-MCNC: 31 MG/DL (ref 8–23)
CALCIUM SERPL-MCNC: 9.1 MG/DL (ref 8.7–10.5)
CHLORIDE SERPL-SCNC: 107 MMOL/L (ref 95–110)
CO2 SERPL-SCNC: 26 MMOL/L (ref 23–29)
CREAT SERPL-MCNC: 0.8 MG/DL (ref 0.5–1.4)
DIFFERENTIAL METHOD: ABNORMAL
DIFFERENTIAL METHOD: ABNORMAL
EOSINOPHIL # BLD AUTO: 0.2 K/UL (ref 0–0.5)
EOSINOPHIL # BLD AUTO: 0.2 K/UL (ref 0–0.5)
EOSINOPHIL NFR BLD: 1.7 % (ref 0–8)
EOSINOPHIL NFR BLD: 2.2 % (ref 0–8)
ERYTHROCYTE [DISTWIDTH] IN BLOOD BY AUTOMATED COUNT: 14.8 % (ref 11.5–14.5)
ERYTHROCYTE [DISTWIDTH] IN BLOOD BY AUTOMATED COUNT: 14.9 % (ref 11.5–14.5)
EST. GFR  (AFRICAN AMERICAN): >60 ML/MIN/1.73 M^2
EST. GFR  (NON AFRICAN AMERICAN): >60 ML/MIN/1.73 M^2
GLUCOSE SERPL-MCNC: 189 MG/DL (ref 70–110)
HCT VFR BLD AUTO: 38.6 % (ref 40–54)
HCT VFR BLD AUTO: 38.8 % (ref 40–54)
HGB BLD-MCNC: 12.1 G/DL (ref 14–18)
HGB BLD-MCNC: 12.3 G/DL (ref 14–18)
IMM GRANULOCYTES # BLD AUTO: 0.09 K/UL (ref 0–0.04)
IMM GRANULOCYTES # BLD AUTO: 0.14 K/UL (ref 0–0.04)
IMM GRANULOCYTES NFR BLD AUTO: 0.9 % (ref 0–0.5)
IMM GRANULOCYTES NFR BLD AUTO: 1.2 % (ref 0–0.5)
INR PPP: 1.3 (ref 0.8–1.2)
LYMPHOCYTES # BLD AUTO: 0.6 K/UL (ref 1–4.8)
LYMPHOCYTES # BLD AUTO: 0.7 K/UL (ref 1–4.8)
LYMPHOCYTES NFR BLD: 5.8 % (ref 18–48)
LYMPHOCYTES NFR BLD: 6 % (ref 18–48)
MAGNESIUM SERPL-MCNC: 1.7 MG/DL (ref 1.6–2.6)
MCH RBC QN AUTO: 33 PG (ref 27–31)
MCH RBC QN AUTO: 33.1 PG (ref 27–31)
MCHC RBC AUTO-ENTMCNC: 31.3 G/DL (ref 32–36)
MCHC RBC AUTO-ENTMCNC: 31.7 G/DL (ref 32–36)
MCV RBC AUTO: 104 FL (ref 82–98)
MCV RBC AUTO: 105 FL (ref 82–98)
MONOCYTES # BLD AUTO: 1.5 K/UL (ref 0.3–1)
MONOCYTES # BLD AUTO: 1.7 K/UL (ref 0.3–1)
MONOCYTES NFR BLD: 14.5 % (ref 4–15)
MONOCYTES NFR BLD: 14.9 % (ref 4–15)
NEUTROPHILS # BLD AUTO: 7.8 K/UL (ref 1.8–7.7)
NEUTROPHILS # BLD AUTO: 8.8 K/UL (ref 1.8–7.7)
NEUTROPHILS NFR BLD: 75.8 % (ref 38–73)
NEUTROPHILS NFR BLD: 76.2 % (ref 38–73)
NRBC BLD-RTO: 0 /100 WBC
NRBC BLD-RTO: 0 /100 WBC
PHOSPHATE SERPL-MCNC: 3.1 MG/DL (ref 2.7–4.5)
PLATELET # BLD AUTO: 115 K/UL (ref 150–350)
PLATELET # BLD AUTO: 116 K/UL (ref 150–350)
PMV BLD AUTO: 12 FL (ref 9.2–12.9)
PMV BLD AUTO: 12.1 FL (ref 9.2–12.9)
POCT GLUCOSE: 156 MG/DL (ref 70–110)
POCT GLUCOSE: 175 MG/DL (ref 70–110)
POCT GLUCOSE: 180 MG/DL (ref 70–110)
POCT GLUCOSE: 192 MG/DL (ref 70–110)
POCT GLUCOSE: 215 MG/DL (ref 70–110)
POTASSIUM SERPL-SCNC: 4.1 MMOL/L (ref 3.5–5.1)
PROT SERPL-MCNC: 5.4 G/DL (ref 6–8.4)
PROTHROMBIN TIME: 13.2 SEC (ref 9–12.5)
RBC # BLD AUTO: 3.67 M/UL (ref 4.6–6.2)
RBC # BLD AUTO: 3.72 M/UL (ref 4.6–6.2)
SODIUM SERPL-SCNC: 140 MMOL/L (ref 136–145)
WBC # BLD AUTO: 10.24 K/UL (ref 3.9–12.7)
WBC # BLD AUTO: 11.67 K/UL (ref 3.9–12.7)

## 2020-01-20 PROCEDURE — 11000001 HC ACUTE MED/SURG PRIVATE ROOM

## 2020-01-20 PROCEDURE — 83735 ASSAY OF MAGNESIUM: CPT

## 2020-01-20 PROCEDURE — 25000003 PHARM REV CODE 250: Performed by: NURSE PRACTITIONER

## 2020-01-20 PROCEDURE — 99233 SBSQ HOSP IP/OBS HIGH 50: CPT | Mod: GC,,, | Performed by: PSYCHIATRY & NEUROLOGY

## 2020-01-20 PROCEDURE — 84100 ASSAY OF PHOSPHORUS: CPT

## 2020-01-20 PROCEDURE — 92507 TX SP LANG VOICE COMM INDIV: CPT

## 2020-01-20 PROCEDURE — 63600175 PHARM REV CODE 636 W HCPCS: Performed by: NURSE PRACTITIONER

## 2020-01-20 PROCEDURE — 80053 COMPREHEN METABOLIC PANEL: CPT

## 2020-01-20 PROCEDURE — 36415 COLL VENOUS BLD VENIPUNCTURE: CPT

## 2020-01-20 PROCEDURE — 94668 MNPJ CHEST WALL SBSQ: CPT

## 2020-01-20 PROCEDURE — 94640 AIRWAY INHALATION TREATMENT: CPT

## 2020-01-20 PROCEDURE — 97110 THERAPEUTIC EXERCISES: CPT | Mod: CQ

## 2020-01-20 PROCEDURE — 85610 PROTHROMBIN TIME: CPT

## 2020-01-20 PROCEDURE — 99900035 HC TECH TIME PER 15 MIN (STAT)

## 2020-01-20 PROCEDURE — 25000242 PHARM REV CODE 250 ALT 637 W/ HCPCS: Performed by: NURSE PRACTITIONER

## 2020-01-20 PROCEDURE — 27000221 HC OXYGEN, UP TO 24 HOURS

## 2020-01-20 PROCEDURE — 92526 ORAL FUNCTION THERAPY: CPT

## 2020-01-20 PROCEDURE — 85025 COMPLETE CBC W/AUTO DIFF WBC: CPT | Mod: 91

## 2020-01-20 PROCEDURE — 94761 N-INVAS EAR/PLS OXIMETRY MLT: CPT

## 2020-01-20 PROCEDURE — 99233 PR SUBSEQUENT HOSPITAL CARE,LEVL III: ICD-10-PCS | Mod: GC,,, | Performed by: PSYCHIATRY & NEUROLOGY

## 2020-01-20 PROCEDURE — 97530 THERAPEUTIC ACTIVITIES: CPT | Mod: CQ

## 2020-01-20 RX ADMIN — IPRATROPIUM BROMIDE AND ALBUTEROL SULFATE 3 ML: .5; 3 SOLUTION RESPIRATORY (INHALATION) at 07:01

## 2020-01-20 RX ADMIN — IPRATROPIUM BROMIDE AND ALBUTEROL SULFATE 3 ML: .5; 3 SOLUTION RESPIRATORY (INHALATION) at 01:01

## 2020-01-20 RX ADMIN — LEVOTHYROXINE SODIUM 75 MCG: 25 TABLET ORAL at 07:01

## 2020-01-20 RX ADMIN — INSULIN ASPART 2 UNITS: 100 INJECTION, SOLUTION INTRAVENOUS; SUBCUTANEOUS at 05:01

## 2020-01-20 RX ADMIN — METOPROLOL TARTRATE 25 MG: 25 TABLET ORAL at 10:01

## 2020-01-20 RX ADMIN — INSULIN ASPART 1 UNITS: 100 INJECTION, SOLUTION INTRAVENOUS; SUBCUTANEOUS at 11:01

## 2020-01-20 RX ADMIN — SENNOSIDES AND DOCUSATE SODIUM 1 TABLET: 8.6; 5 TABLET ORAL at 09:01

## 2020-01-20 RX ADMIN — INSULIN ASPART 4 UNITS: 100 INJECTION, SOLUTION INTRAVENOUS; SUBCUTANEOUS at 11:01

## 2020-01-20 RX ADMIN — CEFTRIAXONE SODIUM 1 G: 1 INJECTION, POWDER, FOR SOLUTION INTRAMUSCULAR; INTRAVENOUS at 01:01

## 2020-01-20 RX ADMIN — METOPROLOL TARTRATE 25 MG: 25 TABLET ORAL at 09:01

## 2020-01-20 RX ADMIN — SENNOSIDES AND DOCUSATE SODIUM 1 TABLET: 8.6; 5 TABLET ORAL at 10:01

## 2020-01-20 RX ADMIN — CEFTRIAXONE SODIUM 1 G: 1 INJECTION, POWDER, FOR SOLUTION INTRAMUSCULAR; INTRAVENOUS at 11:01

## 2020-01-20 RX ADMIN — ATORVASTATIN CALCIUM 40 MG: 20 TABLET, FILM COATED ORAL at 09:01

## 2020-01-20 NOTE — ASSESSMENT & PLAN NOTE
Stroke risk factor  Rate control  Will need anticoagulation for this when stable, holding in setting of large area of infarct.  Repeat CT on 1/18 stable. Will consider restarting AC after PEG placement.

## 2020-01-20 NOTE — ASSESSMENT & PLAN NOTE
78 yo man with history of HTN, a fib (on Coumadin) and hypothyroidism. Patient has been admitted to [facility] for left temp partieal infarct on 1/10. On 1/14 patient with worsening of deficits: aphasia, right side plegia and not following commands. Telestroke consult was completed by Dr. Olivares, no tpa given as INR was 2.0. Patient transferred [place] [facility] [place] for stat CTA stroke MP.  Positive for LVO, L M1 occlusion. Patient not candidate for intervention d/t large core infarct. Patient was admitted to [facility] for close monitoring.  Coumadin held d/t size of stroke. Will switch to DOAC and start after PEG placement.     Plan for PEG placement on 1/21 with IR. CT abdomen     Antithrombotics: currently held, to start after PEG placement on 1/21, plan to start DOAC      Statins: Lipitor 40 mg daily    Aggressive risk factor modification: A-Fib, HTN     Rehab efforts: The patient has been evaluated by a stroke team provider and the therapy needs have been fully considered based off the presenting complaints and exam findings. The following therapy evaluations are needed: PT evaluate and treat, OT evaluate and treat, SLP evaluate and treat, PM&R evaluate for appropriate placement    Diagnostics ordered/pending: None     VTE prophylaxis: None: Hold pharmacologic prophylaxis in setting of large infarct. SCDs.    BP parameters: Infarct: No intervention, SBP <220

## 2020-01-20 NOTE — H&P
Vascular and Interventional Radiology History & Physical    Date:  1/20/2020    Chief Complaint:   stroke    History of Present Illness:  Chirag Thompson is a 79 y.o. male who presents for PRG.    Past Medical History:  Past Medical History:   Diagnosis Date    Arthritis     Chronic a-fib     Current use of long term anticoagulation     Hypertension     Pressure ulcer of right leg, unspecified pressure ulcer stage     was going to [facility] healed now    Stroke due to embolism of left middle cerebral artery 01/10/2020    left temp parietal    Thyroid disease        Past Surgical History:  Past Surgical History:   Procedure Laterality Date    HERNIA REPAIR          Sedation History:    Denies any adverse reactions.  Denies problems laying flat.    Social History:  Social History     Tobacco Use    Smoking status: Never Smoker    Smokeless tobacco: Never Used   Substance Use Topics    Alcohol use: Never     Frequency: Never    Drug use: Never        Home Medications:   Prior to Admission medications    Medication Sig Start Date End Date Taking? Authorizing Provider   allopurinol (ZYLOPRIM) 300 MG tablet Take 1 tablet by mouth.    Historical Provider, MD   amLODIPine (NORVASC) 5 MG tablet Take 1 tablet by mouth.    Historical Provider, MD   enalapril (VASOTEC) 20 MG tablet Take 1 tablet by mouth.    Historical Provider, MD   glipiZIDE (GLUCOTROL) 2.5 MG tablet Take 1 tablet by mouth.    Historical Provider, MD   levothyroxine (SYNTHROID) 75 MCG tablet Take 75 mcg by mouth once daily.    Historical Provider, MD   magnesium 30 mg Tab Take by mouth once.    Historical Provider, MD   METOPROLOL SUCCINATE ORAL Take by mouth.    Historical Provider, MD   spironolactone (ALDACTONE) 25 MG tablet Take 25 mg by mouth once daily.    Historical Provider, MD   traMADol (ULTRAM) 50 mg tablet Take 1 tablet by mouth.    Historical Provider, MD   warfarin (COUMADIN) 5 MG tablet Take 1 tablet by mouth.    Historical  MD Chucky       Inpatient Medications:    Current Facility-Administered Medications:     acetaminophen suppository 650 mg, 650 mg, Rectal, Q6H PRN, Connor Pagan NP    albuterol-ipratropium 2.5 mg-0.5 mg/3 mL nebulizer solution 3 mL, 3 mL, Nebulization, Q6H WAKE, Ambrose Rothman NP, 3 mL at 01/20/20 0742    atorvastatin tablet 40 mg, 40 mg, Per NG tube, Daily, Nichol Clark NP, 40 mg at 01/19/20 0847    barium (READI-CAT 2) suspension 450 mL, 450 mL, Oral, ONCE PRN, Kaz Yao MD    cefTRIAXone injection 1 g, 1 g, Intravenous, Q24H, Ambrose Rothman NP, 1 g at 01/20/20 0100    dextrose 10% (D10W) Bolus, 12.5 g, Intravenous, PRN, María Elena Berger MD    glucagon (human recombinant) injection 1 mg, 1 mg, Intramuscular, PRN, Tiffany Mclean NP    glucose chewable tablet 16 g, 16 g, Per NG tube, PRN, DAYNE Li-VIRI    glucose chewable tablet 24 g, 24 g, Per NG tube, PRN, DAYNE Li-VIRI    insulin aspart U-100 pen 1-10 Units, 1-10 Units, Subcutaneous, Q4H PRN, Tiffany Mclean NP, 2 Units at 01/20/20 0501    levothyroxine tablet 75 mcg, 75 mcg, Per NG tube, Before breakfast, Nichol Clark NP, 75 mcg at 01/19/20 0850    metoprolol tartrate (LOPRESSOR) tablet 25 mg, 25 mg, Per NG tube, BID, Ambrose Rothman NP, 25 mg at 01/19/20 2125    ondansetron injection 4 mg, 4 mg, Intravenous, Q8H PRN, Connor Pagan NP    QUEtiapine tablet 25 mg, 25 mg, Per NG tube, Nightly PRN, Donnell Ryan MD, 25 mg at 01/17/20 0134    senna-docusate 8.6-50 mg per tablet 1 tablet, 1 tablet, Per NG tube, BID, Nichol Clark NP, 1 tablet at 01/19/20 8214    sodium chloride 0.9% flush 10 mL, 10 mL, Intravenous, PRN, Connor Pagan NP     Anticoagulants/Antiplatelets:   no anticoagulation    Allergies:   Review of patient's allergies indicates:  No Known Allergies    Review of Systems:   As documented in primary provider H&P.    Vital Signs (Most Recent):  Temp: 98.5  °F (36.9 °C) (01/20/20 0406)  Pulse: 101 (01/20/20 0742)  Resp: (!) 24 (01/20/20 0742)  BP: (!) 157/79 (01/20/20 0406)  SpO2: 96 % (01/20/20 0742)    Physical Exam:  No acute distress, laying comfortably in bed, pleasant and cooperative  Regular rate and rhythm  Breathing unlabored  Abdomen benign  Extremities warm and well perfused    Sedation Exam:  ASA: II - Patient appears to have mild systemic disease, adequately controlled   Mallampati: III (soft and hard palate and base of uvula visible)     Laboratory:  Lab Results   Component Value Date    INR 1.3 (H) 01/20/2020       Lab Results   Component Value Date    WBC 10.24 01/20/2020    HGB 12.1 (L) 01/20/2020    HCT 38.6 (L) 01/20/2020     (H) 01/20/2020     (L) 01/20/2020      Lab Results   Component Value Date     (H) 01/20/2020     01/20/2020    K 4.1 01/20/2020     01/20/2020    CO2 26 01/20/2020    BUN 31 (H) 01/20/2020    CREATININE 0.8 01/20/2020    CALCIUM 9.1 01/20/2020    MG 1.7 01/20/2020    ALT 45 (H) 01/20/2020    AST 55 (H) 01/20/2020    ALBUMIN 2.4 (L) 01/20/2020    BILITOT 1.1 (H) 01/20/2020       Imaging:  Reviewed.      ASSESSMENT/PLAN:   Stroke with dysphagia.                       Sedation Plan: moderate  Patient will undergo: percutaneous radiologic gastrostomy      Kaz Yao MD  [facility], Diagnostic and Interventional Radiology  Pager: [phone]

## 2020-01-20 NOTE — CONSULTS
VIR Consult    79yoM c L MCA infarct. High aspiration risk. Significant dysphagia. Consult for G tube. No AC. Order placed. Will plan tentatively for tomorrow, pending CT abdomen (ordered by me). Keep NG in. Make NPO p MN. Barium to give tonight (ordered by me). Consent obtained via phone by David Gill at 08:50 on 1/20/2020.     Kaz Yao MD  [facility], Diagnostic and Interventional Radiology  Pager: [phone]

## 2020-01-20 NOTE — PLAN OF CARE
Problem: Adult Inpatient Plan of Care  Goal: Plan of Care Review  Outcome: Ongoing, Progressing  Goal: Patient-Specific Goal (Individualization)  Description  Admit Date:  1/14/2020    Admit Dx:LMCA    Past Medical History:  No date: Arthritis  No date: Chronic a-fib  No date: Current use of long term anticoagulation  No date: Hypertension  No date: Pressure ulcer of right leg, unspecified pressure ulcer stage      Comment:  was going to [facility] healed now  01/10/2020: Stroke due to embolism of left middle cerebral artery      Comment:  left temp parietal  No date: Thyroid disease    Past Surgical History:  No date: HERNIA REPAIR    Individualization:   1. Patient likes to be covered with 2 blankets.    Restraints: Bilateral wrist restrain applied 1/14/20 @ 1910by Chucky TERRY          Outcome: Ongoing, Progressing  Goal: Absence of Hospital-Acquired Illness or Injury  Outcome: Ongoing, Progressing

## 2020-01-20 NOTE — PROGRESS NOTES
[facility]  Vascular Neurology  Comprehensive Stroke Center  Progress Note    Assessment/Plan:     * Embolic stroke involving left middle cerebral artery  78 yo man with history of HTN, a fib (on Coumadin) and hypothyroidism. Patient has been admitted to [facility] for left temp partieal infarct on 1/10. On 1/14 patient with worsening of deficits: aphasia, right side plegia and not following commands. Telestroke consult was completed by Dr. Olivares, no tpa given as INR was 2.0. Patient transferred to [facility] ED for stat CTA stroke MP.  Positive for LVO, L M1 occlusion. Patient not candidate for intervention d/t large core infarct. Patient was admitted to [facility] for close monitoring.  Coumadin held d/t size of stroke. Will switch to DOAC and start after PEG placement.     Plan for PEG placement on 1/21 with IR. CT abdomen     Antithrombotics: currently held, to start after PEG placement on 1/21, plan to start DOAC      Statins: Lipitor 40 mg daily    Aggressive risk factor modification: A-Fib, HTN     Rehab efforts: The patient has been evaluated by a stroke team provider and the therapy needs have been fully considered based off the presenting complaints and exam findings. The following therapy evaluations are needed: PT evaluate and treat, OT evaluate and treat, SLP evaluate and treat, PM&R evaluate for appropriate placement    Diagnostics ordered/pending: None     VTE prophylaxis: None: Hold pharmacologic prophylaxis in setting of large infarct. SCDs.    BP parameters: Infarct: No intervention, SBP <220    Chronic anticoagulation  Previously on coumadin, plan to transfer to DOAC post PEG placement     Cytotoxic cerebral edema  Area of cytotoxic cerebral edema identified when reviewing brain imaging in the territory of the left middle cerebral artery. There is mass effect associated with it. We will continue to monitor the patients clinical exam for any worsening of symptoms which may indicate  expansion of the stroke or the area of the edema resulting in the clinical change. The pattern is suggestive of cardio embolic etiology.        Hypercoagulable state  Restart AC post PEG tube placement  Pt previously on Coumadin but subtherapeutic     Aphasia  Due to stroke  Aggressive therapy SLP    Right spastic hemiparesis  Due to stroke  Aggressive therapy with PT/OT    Atrial fibrillation  Stroke risk factor  Rate control  Will need anticoagulation for this when stable, holding in setting of large area of infarct.  Repeat CT on 1/18 stable. Will consider restarting AC after PEG placement.     Type 2 diabetes mellitus with circulatory disorder, without long-term current use of insulin  Stroke risk factor  HgB A1C 5.1  SSI       Essential hypertension  Stroke risk factor  SBP <220         Patient admitted to neurocritical care for Left MCA syndrome, severe aphasia following extension of prior stroke.  01/15/2020: Overnight concern for extensor posturing of right arm, emergent CT this AM showing stable area of infarct, no new hemorrhage or extension.  01/16/20: No acute events overnight, neurostatus unchanged  01/17/2020: No events overnight, patient received seroquel for agitation this AM. To remain in neuro ICU for closer monitoring  01/18/2020: Patient calm and alert during today's exam. Patient to step down to stroke primary service. Breath sounds course, CXR without significant change- will continue pulmonary tolieting.   1/19/20: Patient now with leukocytosis. Repeat CBC pending. CXR with pneumonitis. Rocephin ordered.  1/20/20: CT abd/pelvis for PEG planning, scheduled for tomorrow AM with IR. Plan to restart AC (Heparin vs DOAC) post procedure    STROKE DOCUMENTATION   Acute Stroke Times   Last Known Normal Date: 01/13/20  Last Known Normal Time: 2315  Symptom Onset Date: 01/14/20  Symptom Onset Time: 0050  Stroke Team Called Date: 01/14/20  Stroke Team Called Time: 0105  Stroke Team Arrival Date:  01/14/20  Stroke Team Arrival Time: 0355  CT Interpretation Time: 0100    NIH Scale:  1a. Level of Consciousness: 0-->Alert, keenly responsive  1b. LOC Questions: 2-->Answers neither question correctly  1c. LOC Commands: 2-->Performs neither task correctly  2. Best Gaze: 1-->Partial gaze palsy, gaze is abnormal in one or both eyes, but forced deviation or total gaze paresis is not present  3. Visual: 0-->No visual loss  4. Facial Palsy: 1-->Minor paralysis (flattened nasolabial fold, asymmetry on smiling)  5a. Motor Arm, Left: 1-->Drift, limb holds 90 (or 45) degrees, but drifts down before full 10 seconds, does not hit bed or other support  5b. Motor Arm, Right: 4-->No movement  6a. Motor Leg, Left: 3-->No effort against gravity, leg falls to bed immediately  6b. Motor Leg, Right: 3-->No effort against gravity, leg falls to bed immediately  7. Limb Ataxia: 0-->Absent  8. Sensory: 0-->Normal, no sensory loss  9. Best Language: 2-->Severe aphasia, all communication is through fragmentary expression, great need for inference, questioning, and guessing by the listener. Range of information that can be exchanged is limited, listener carries burden of. . . (see row details)  10. Dysarthria: 2-->Severe dysarthria, patients speech is so slurred as to be unintelligible in the absence of or out of proportion to any dysphasia, or is mute/anarthric  11. Extinction and Inattention (formerly Neglect): 1-->Visual, tactile, auditory, spatial, or personal inattention or extinction to bilateral simultaneous stimulation in one of the sensory modalities  Total (NIH Stroke Scale): 22       Modified Bland Score: 0  Omer Coma Scale:14   ABCD2 Score:    [id number] Score:   HAS -BLED Score:   ICH Score:   Hunt & Jones Classification:      Hemorrhagic change of an Ischemic Stroke: Does this patient have an ischemic stroke with hemorrhagic changes? No     Neurologic Chief Complaint: Right sided weakness    Subjective:     Interval  History: Patient is seen for follow-up neurological assessment and treatment recommendations:     Leukocytosis without fever over weekend, CXR was concerning for pneumonitis. Pt received ceftriaxone. WCC trending down and pt remains afebrile. Pt requring PEG tube placement, IR consulted with plan for procedure tomorrow AM. Post procedure, pt will require anticoagulation, ?DOAC.      HPI, Past Medical, Family, and Social History remains the same as documented in the initial encounter.     Review of Systems   Constitutional: Negative for fever.   HENT: Positive for drooling and trouble swallowing.    Eyes: Negative for visual disturbance.   Respiratory: Positive for cough.    Gastrointestinal: Negative for diarrhea and vomiting.   Musculoskeletal: Positive for gait problem.   Neurological: Positive for facial asymmetry, speech difficulty and weakness.   Psychiatric/Behavioral: Positive for confusion and decreased concentration.     Scheduled Meds:   albuterol-ipratropium  3 mL Nebulization Q6H WAKE    atorvastatin  40 mg Per NG tube Daily    cefTRIAXone (ROCEPHIN) IVPB  1 g Intravenous Q24H    levothyroxine  75 mcg Per NG tube Before breakfast    metoprolol tartrate  25 mg Per NG tube BID    senna-docusate 8.6-50 mg  1 tablet Per NG tube BID     Continuous Infusions:  PRN Meds:acetaminophen, barium, Dextrose 10% Bolus, glucagon (human recombinant), glucose, glucose, insulin aspart U-100, ondansetron, QUEtiapine, sodium chloride 0.9%    Objective:     Vital Signs (Most Recent):  Temp: 98.2 °F (36.8 °C) (01/20/20 0730)  Pulse: 79 (01/20/20 1308)  Resp: 20 (01/20/20 1308)  BP: 129/71 (01/20/20 0730)  SpO2: 99 % (01/20/20 1308)  BP Location: Left arm    Vital Signs Range (Last 24H):  Temp:  [98.2 °F (36.8 °C)-99.4 °F (37.4 °C)]   Pulse:  []   Resp:  [17-24]   BP: (129-157)/(68-79)   SpO2:  [92 %-99 %]   BP Location: Left arm    Physical Exam   Constitutional: He appears well-developed and well-nourished.    HENT:   Head: Normocephalic and atraumatic.   Eyes: Pupils are equal, round, and reactive to light.   Left gaze preference    Neck: Normal range of motion.   Cardiovascular: Normal rate.   Pulmonary/Chest: Effort normal. He has rhonchi.   Abdominal: Soft.   Skin: Skin is warm and dry.   Nursing note and vitals reviewed.      Neurological Exam:   LOC: alert  Attention Span: poor  Language: Expressive aphasia, Receptive aphasia  Articulation: Dysarthria  Orientation: Untestable due to severe aphasia   Visual Fields: Full  EOM (CN III, IV, VI): Gaze preference  left  Pupils (CN II, III): PERRL  Facial Movement (CN VII): Lower facial weakness on the Right  Motor: Arm left  Paresis: 4/5  Leg left  Paresis: 4/5  Arm right  Plegia 0/5  Leg right Paresis: 1/5  Cebellar: No evidence of appendicular or axial ataxia  Tone: Flaccid  RUE    Laboratory:  CMP:   Recent Labs   Lab 01/20/20  0524   CALCIUM 9.1   ALBUMIN 2.4*   PROT 5.4*      K 4.1   CO2 26      BUN 31*   CREATININE 0.8   ALKPHOS 92   ALT 45*   AST 55*   BILITOT 1.1*     CBC:   Recent Labs   Lab 01/20/20  0524   WBC 10.24   RBC 3.67*   HGB 12.1*   HCT 38.6*   *   *   MCH 33.0*   MCHC 31.3*     Lipid Panel:   No results for input(s): CHOL, LDLCALC, HDL, TRIG in the last 168 hours.  Coagulation:   Recent Labs   Lab 01/14/20  0040  01/20/20  0524   INR 2.4*   < > 1.3*   APTT 35.8*  --   --     < > = values in this interval not displayed.     Hgb A1C:   No results for input(s): HGBA1C in the last 168 hours.  TSH:   No results for input(s): TSH in the last 168 hours.    Diagnostic Results     Brain Imaging   Brain Imaging   CT Head without 1/15 (07:43)  Evolving moderate-sized region of acute infarction in the left MCA territory as above.  Distribution grossly stable from recent MRI without significant infarct extension.  No hemorrhagic conversion or new territorial infarct.     MRI Brain 1/14 (04:28)  There is interval change, there is  interval development of acute ischemia/infarct along the left MCA distribution, this is superimposed on subacute ischemic change seen on the recent MRI examination  Findings concerning for occlusion of the M2 segment of the left MCA as above.    Additional chronic changes are noted.    Vessel Imaging   CTA Head and Neck 1/14 (02:43)  As on the recent brain MRA examination there is appearance of attenuation of the left MCA branch vessels without evidence for high-grade stenosis or occlusion and otherwise there is no evidence for high-grade stenosis or occlusion of the major arterial vascular structures of the head or neck.    There is no evidence for intracranial aneurysm or AVM.    Findings consistent with acute to subacute left parietal ischemic change again noted.    Small thyroid nodules or cysts.    Cardiac Imaging  TTE 1/11  · Normal left ventricular systolic function. The estimated ejection fraction is 55%.  · Mid anteroseptal and apical moderate hypokinesia.  · Diastolic pattern consistent with atrial fibrillation observed.  · Severe left atrial enlargement.  · Normal right ventricular systolic function.  · Severe right atrial enlargement.  · Mild-to-moderate mitral regurgitation.  · Mild tricuspid regurgitation.  · The estimated PA systolic pressure is 40 mmHg.  Intermediate central venous pressure (8 mmHg).         Quan Cruz MD  Comprehensive Stroke Center  Department of Vascular Neurology   [facility]

## 2020-01-20 NOTE — SUBJECTIVE & OBJECTIVE
Neurologic Chief Complaint: Right sided weakness    Subjective:     Interval History: Patient is seen for follow-up neurological assessment and treatment recommendations:     Leukocytosis without fever over weekend, CXR was concerning for pneumonitis. Pt received ceftriaxone. WCC trending down and pt remains afebrile. Pt requring PEG tube placement, IR consulted with plan for procedure tomorrow AM. Post procedure, pt will require anticoagulation, ?DOAC.      HPI, Past Medical, Family, and Social History remains the same as documented in the initial encounter.     Review of Systems   Constitutional: Negative for fever.   HENT: Positive for drooling and trouble swallowing.    Eyes: Negative for visual disturbance.   Respiratory: Positive for cough.    Gastrointestinal: Negative for diarrhea and vomiting.   Musculoskeletal: Positive for gait problem.   Neurological: Positive for facial asymmetry, speech difficulty and weakness.   Psychiatric/Behavioral: Positive for confusion and decreased concentration.     Scheduled Meds:   albuterol-ipratropium  3 mL Nebulization Q6H WAKE    atorvastatin  40 mg Per NG tube Daily    cefTRIAXone (ROCEPHIN) IVPB  1 g Intravenous Q24H    levothyroxine  75 mcg Per NG tube Before breakfast    metoprolol tartrate  25 mg Per NG tube BID    senna-docusate 8.6-50 mg  1 tablet Per NG tube BID     Continuous Infusions:  PRN Meds:acetaminophen, barium, Dextrose 10% Bolus, glucagon (human recombinant), glucose, glucose, insulin aspart U-100, ondansetron, QUEtiapine, sodium chloride 0.9%    Objective:     Vital Signs (Most Recent):  Temp: 98.2 °F (36.8 °C) (01/20/20 0730)  Pulse: 79 (01/20/20 1308)  Resp: 20 (01/20/20 1308)  BP: 129/71 (01/20/20 0730)  SpO2: 99 % (01/20/20 1308)  BP Location: Left arm    Vital Signs Range (Last 24H):  Temp:  [98.2 °F (36.8 °C)-99.4 °F (37.4 °C)]   Pulse:  []   Resp:  [17-24]   BP: (129-157)/(68-79)   SpO2:  [92 %-99 %]   BP Location: Left  arm    Physical Exam   Constitutional: He appears well-developed and well-nourished.   HENT:   Head: Normocephalic and atraumatic.   Eyes: Pupils are equal, round, and reactive to light.   Left gaze preference    Neck: Normal range of motion.   Cardiovascular: Normal rate.   Pulmonary/Chest: Effort normal. He has rhonchi.   Abdominal: Soft.   Skin: Skin is warm and dry.   Nursing note and vitals reviewed.      Neurological Exam:   LOC: alert  Attention Span: poor  Language: Expressive aphasia, Receptive aphasia  Articulation: Dysarthria  Orientation: Untestable due to severe aphasia   Visual Fields: Full  EOM (CN III, IV, VI): Gaze preference  left  Pupils (CN II, III): PERRL  Facial Movement (CN VII): Lower facial weakness on the Right  Motor: Arm left  Paresis: 4/5  Leg left  Paresis: 4/5  Arm right  Plegia 0/5  Leg right Paresis: 1/5  Cebellar: No evidence of appendicular or axial ataxia  Tone: Flaccid  RUE    Laboratory:  CMP:   Recent Labs   Lab 01/20/20  0524   CALCIUM 9.1   ALBUMIN 2.4*   PROT 5.4*      K 4.1   CO2 26      BUN 31*   CREATININE 0.8   ALKPHOS 92   ALT 45*   AST 55*   BILITOT 1.1*     CBC:   Recent Labs   Lab 01/20/20  0524   WBC 10.24   RBC 3.67*   HGB 12.1*   HCT 38.6*   *   *   MCH 33.0*   MCHC 31.3*     Lipid Panel:   No results for input(s): CHOL, LDLCALC, HDL, TRIG in the last 168 hours.  Coagulation:   Recent Labs   Lab 01/14/20  0040  01/20/20  0524   INR 2.4*   < > 1.3*   APTT 35.8*  --   --     < > = values in this interval not displayed.     Hgb A1C:   No results for input(s): HGBA1C in the last 168 hours.  TSH:   No results for input(s): TSH in the last 168 hours.    Diagnostic Results     Brain Imaging   Brain Imaging   CT Head without 1/15 (07:43)  Evolving moderate-sized region of acute infarction in the left MCA territory as above.  Distribution grossly stable from recent MRI without significant infarct extension.  No hemorrhagic conversion or new  territorial infarct.     MRI Brain 1/14 (04:28)  There is interval change, there is interval development of acute ischemia/infarct along the left MCA distribution, this is superimposed on subacute ischemic change seen on the recent MRI examination  Findings concerning for occlusion of the M2 segment of the left MCA as above.    Additional chronic changes are noted.    Vessel Imaging   CTA Head and Neck 1/14 (02:43)  As on the recent brain MRA examination there is appearance of attenuation of the left MCA branch vessels without evidence for high-grade stenosis or occlusion and otherwise there is no evidence for high-grade stenosis or occlusion of the major arterial vascular structures of the head or neck.    There is no evidence for intracranial aneurysm or AVM.    Findings consistent with acute to subacute left parietal ischemic change again noted.    Small thyroid nodules or cysts.    Cardiac Imaging  TTE 1/11  · Normal left ventricular systolic function. The estimated ejection fraction is 55%.  · Mid anteroseptal and apical moderate hypokinesia.  · Diastolic pattern consistent with atrial fibrillation observed.  · Severe left atrial enlargement.  · Normal right ventricular systolic function.  · Severe right atrial enlargement.  · Mild-to-moderate mitral regurgitation.  · Mild tricuspid regurgitation.  · The estimated PA systolic pressure is 40 mmHg.  Intermediate central venous pressure (8 mmHg).

## 2020-01-20 NOTE — SUBJECTIVE & OBJECTIVE
Neurologic Chief Complaint: Right sided weakness    Subjective:     Interval History: Patient is seen for follow-up neurological assessment and treatment recommendations:     Patient now with leukocytosis. Repeat CBC pending. CXR with pneumonitis. Rocephin ordered.      HPI, Past Medical, Family, and Social History remains the same as documented in the initial encounter.     Review of Systems   Constitutional: Negative for fever.   HENT: Positive for drooling and trouble swallowing.    Eyes: Negative for visual disturbance.   Respiratory: Positive for cough.    Gastrointestinal: Negative for diarrhea and vomiting.   Musculoskeletal: Positive for gait problem.   Neurological: Positive for facial asymmetry, speech difficulty and weakness.   Psychiatric/Behavioral: Positive for confusion and decreased concentration.     Scheduled Meds:   albuterol-ipratropium  3 mL Nebulization Q6H WAKE    atorvastatin  40 mg Per NG tube Daily    levothyroxine  75 mcg Per NG tube Before breakfast    metoprolol tartrate  25 mg Per NG tube BID    senna-docusate 8.6-50 mg  1 tablet Per NG tube BID     Continuous Infusions:  PRN Meds:acetaminophen, Dextrose 10% Bolus, glucagon (human recombinant), glucose, glucose, insulin aspart U-100, magnesium oxide, magnesium oxide, ondansetron, potassium chloride 10%, potassium chloride 10%, potassium chloride 10%, potassium, sodium phosphates, potassium, sodium phosphates, potassium, sodium phosphates, QUEtiapine, sodium chloride 0.9%    Objective:     Vital Signs (Most Recent):  Temp: 98.6 °F (37 °C) (01/19/20 2028)  Pulse: 96 (01/19/20 2028)  Resp: 17 (01/19/20 2028)  BP: 129/68 (01/19/20 2028)  SpO2: 96 % (01/19/20 2028)  BP Location: Left arm    Vital Signs Range (Last 24H):  Temp:  [98.2 °F (36.8 °C)-98.9 °F (37.2 °C)]   Pulse:  []   Resp:  [16-20]   BP: (129-154)/(64-77)   SpO2:  [92 %-99 %]   BP Location: Left arm    Physical Exam   Constitutional: He appears well-developed and  well-nourished.   HENT:   Head: Normocephalic and atraumatic.   Eyes: Pupils are equal, round, and reactive to light.   Left gaze preference    Neck: Normal range of motion.   Cardiovascular: Normal rate.   Pulmonary/Chest: Effort normal. He has rhonchi.   Abdominal: Soft.   Neurological:          Skin: Skin is warm and dry.   Nursing note and vitals reviewed.      Neurological Exam:   LOC: alert  Attention Span: poor  Language: Expressive aphasia, Receptive aphasia  Articulation: Dysarthria  Orientation: Untestable due to severe aphasia   Visual Fields: Full  EOM (CN III, IV, VI): Gaze preference  left  Pupils (CN II, III): PERRL  Facial Sensation (CN V): Corneal reflex present Bilateral  Facial Movement (CN VII): Lower facial weakness on the Right  Motor: Arm left  Paresis: 4/5  Leg left  Paresis: 4/5  Arm right  Plegia 0/5  Leg right Paresis: 1/5  Cebellar: No evidence of appendicular or axial ataxia  Tone: Flaccid  RUE    Laboratory:  CMP:   No results for input(s): GLUCOSE, CALCIUM, ALBUMIN, PROT, NA, K, CO2, CL, BUN, CREATININE, ALKPHOS, ALT, AST, BILITOT in the last 24 hours.  CBC:   Recent Labs   Lab 01/18/20  1140   WBC 13.83*   RBC 3.90*   HGB 13.1*   HCT 40.8   PLT 97*   *   MCH 33.6*   MCHC 32.1     Lipid Panel:   No results for input(s): CHOL, LDLCALC, HDL, TRIG in the last 168 hours.  Coagulation:   Recent Labs   Lab 01/14/20  0040  01/18/20  0239   INR 2.4*   < > 2.3*   APTT 35.8*  --   --     < > = values in this interval not displayed.     Hgb A1C:   No results for input(s): HGBA1C in the last 168 hours.  TSH:   No results for input(s): TSH in the last 168 hours.    Diagnostic Results     Brain Imaging   Brain Imaging   CT Head without 1/15 (07:43)  Evolving moderate-sized region of acute infarction in the left MCA territory as above.  Distribution grossly stable from recent MRI without significant infarct extension.  No hemorrhagic conversion or new territorial infarct.     MRI Brain 1/14  (04:28)  There is interval change, there is interval development of acute ischemia/infarct along the left MCA distribution, this is superimposed on subacute ischemic change seen on the recent MRI examination  Findings concerning for occlusion of the M2 segment of the left MCA as above.    Additional chronic changes are noted.    Vessel Imaging   CTA Head and Neck 1/14 (02:43)  As on the recent brain MRA examination there is appearance of attenuation of the left MCA branch vessels without evidence for high-grade stenosis or occlusion and otherwise there is no evidence for high-grade stenosis or occlusion of the major arterial vascular structures of the head or neck.    There is no evidence for intracranial aneurysm or AVM.    Findings consistent with acute to subacute left parietal ischemic change again noted.    Small thyroid nodules or cysts.    Cardiac Imaging  TTE 1/11  · Normal left ventricular systolic function. The estimated ejection fraction is 55%.  · Mid anteroseptal and apical moderate hypokinesia.  · Diastolic pattern consistent with atrial fibrillation observed.  · Severe left atrial enlargement.  · Normal right ventricular systolic function.  · Severe right atrial enlargement.  · Mild-to-moderate mitral regurgitation.  · Mild tricuspid regurgitation.  · The estimated PA systolic pressure is 40 mmHg.  Intermediate central venous pressure (8 mmHg).

## 2020-01-20 NOTE — PLAN OF CARE
Plan of care reviewed with pt. Pt globally aphasic. No apparent distress noted. Pt on tube feedings. Isosource 1.5 @ goal rate of 60ml/hr. 150 ml water bolus given x2. Pt soft wrist restraints on left hand. Fall precautions maintained. Bed in lowest position, and locked. Call light within reach and advised to call for assistance. Side rails x 2 and slip resistant socks on at this time. TM.

## 2020-01-20 NOTE — PT/OT/SLP PROGRESS
Speech Language Pathology Treatment    Patient Name:  Chirag Thompson   MRN:  [id number]  Admitting Diagnosis: Embolic stroke involving left middle cerebral artery    Recommendations:                 General Recommendations:  Dysphagia therapy and Speech/language therapy  Diet recommendations:  NPO, Liquid Diet Level: NPO   Aspiration Precautions: Strict aspiration precautions   General Precautions: Standard, aspiration, NPO, fall, aphasia  Communication strategies:  none    Subjective     No family present  Patient goals: matthieu    Pain/Comfort:  · Pain Rating 1: 0/10  · Pain Rating Post-Intervention 1: 0/10    Objective:     Has the patient been evaluated by SLP for swallowing?   Yes  Keep patient NPO? Yes   Current Respiratory Status: room air      Pt. Seen at bedside with ng tube in p lace.  Improved alertness noted today.  Pt. Did not follow or model any simple commands and responses to simple yes/no q uestions were undifferentiated vocalizations.  Vocalizations elicited in spontaneous   Contexts were jargon with no intelligible speech.  When presented with teaspoon of water x2 trials and teaspoon of h oney thick water x3 trials, pt. Did open his mouth and removed bolus from spoon.  Anterior loss of bolus noted on water with no anterior loss noted on honey thick water.    Mildly delay in oral transit and delayed in initiation of pharyngeal swallow noted.  Wet cough noted following trials of water with no immediate cough noted following honey thick water.  Following 3rd trials of honey thick water, pt. Produced delayed cough x1.  Unable to rule out aspiration at bedside.    Assessment:     Chirag Thompson is a 79 y.o. male with an SLP diagnosis of Aphasia and Dysphagia.  .    Goals:   Multidisciplinary Problems     SLP Goals        Problem: SLP Goal    Goal Priority Disciplines Outcome   SLP Goal     SLP Ongoing, Progressing   Description:  Goals due 1/21  1.  Pt. Will participate in ongoing assessment of swallow at  bedside  2.  Pt. Will participate in speech language eval /met  3.  Model simple commands with 100% accuracy  4.  Respond to simple yes/no questions with 70% accuracy  5.  Complete single words on au tomatic speech tasks with 50% accuracy                      Plan:     · Patient to be seen:  4 x/week   · Plan of Care expires:  02/12/20  · Plan of Care reviewed with:  patient   · SLP Follow-Up:  Yes       Discharge recommendations:  nursing facility, skilled   Barriers to Discharge:  Level of Skilled Assistance Needed 24 hour    Time Tracking:     SLP Treatment Date:   01/20/20  Speech Start Time:  0810  Speech Stop Time:  0826     Speech Total Time (min):  16 min    Billable Minutes: Speech Therapy Individual 8 and Treatment Swallowing Dysfunction 8    uJlia Spann MA, CCC-SLP  01/20/2020

## 2020-01-20 NOTE — PROGRESS NOTES
[facility]  Vascular Neurology  Comprehensive Stroke Center  Progress Note    Assessment/Plan:     * Embolic stroke involving left middle cerebral artery  78 yo man with history of HTN, a fib (on Coumadin) and hypothyroidism. Patient has been admitted to [facility] for left temp partieal infarct on 1/10. On 1/14 patient with worsening of deficits: aphasia, right side plegia and not following commands. Telestroke consult was completed by Dr. Olivares, no tpa given as INR was 2.0. Patient transferred to [facility] ED for stat CTA stroke MP.  Positive for LVO, L M1 occlusion. Patient not candidate for intervention d/t large core infarct. Patient was admitted to [facility] for close monitoring.  Coumadin held d/t size of stroke. Will switch to DOAC and start after PEG placement.     Repeat CT head completed today stable.     Antithrombotics: currently held, to start after PEG placement, IR consulted      Statins: Lipitor 40 mg daily    Aggressive risk factor modification: A-Fib, HTN     Rehab efforts: The patient has been evaluated by a stroke team provider and the therapy needs have been fully considered based off the presenting complaints and exam findings. The following therapy evaluations are needed: PT evaluate and treat, OT evaluate and treat, SLP evaluate and treat, PM&R evaluate for appropriate placement    Diagnostics ordered/pending: None     VTE prophylaxis: None: Hold pharmacologic prophylaxis in setting of large infarct. SCDs.    BP parameters: Infarct: No intervention, SBP <220    Cytotoxic cerebral edema  Area of cytotoxic cerebral edema identified when reviewing brain imaging in the territory of the left middle cerebral artery. There is mass effect associated with it. We will continue to monitor the patients clinical exam for any worsening of symptoms which may indicate expansion of the stroke or the area of the edema resulting in the clinical change. The pattern is suggestive of cardio embolic  etiology.        Aphasia  Due to stroke  Aggressive therapy    Right spastic hemiparesis  Due to stroke  Aggressive therapy    Atrial fibrillation  Stroke risk factor  Rate control  Will need anticoagulation for this when stable, holding in setting of large area of infarct.    Repeat CT on 1/18 stable. Will consider restarting AC after PEG placement.     Type 2 diabetes mellitus with circulatory disorder, without long-term current use of insulin  Stroke risk factor  HgB A1C 5.1  SSI for now      Essential hypertension  Stroke risk factor  SBP <220         Patient admitted to neurocritical care for Left MCA syndrome, severe aphasia following extension of prior stroke.  01/15/2020: Overnight concern for extensor posturing of right arm, emergent CT this AM showing stable area of infarct, no new hemorrhage or extension.  01/16/20: No acute events overnight, neurostatus unchanged  01/17/2020: No events overnight, patient received seroquel for agitation this AM. To remain in neuro ICU for closer monitoring  01/18/2020: Patient calm and alert during today's exam. Patient to step down to stroke primary service. Breath sounds course, CXR without significant change- will continue pulmonary tolieting.   1/19/20: Patient now with leukocytosis. Repeat CBC pending. CXR with pneumonitis. Rocephin ordered.    STROKE DOCUMENTATION   Acute Stroke Times   Last Known Normal Date: 01/13/20  Last Known Normal Time: 2315  Symptom Onset Date: 01/14/20  Symptom Onset Time: 0050  Stroke Team Called Date: 01/14/20  Stroke Team Called Time: 0105  Stroke Team Arrival Date: 01/14/20  Stroke Team Arrival Time: 0355  CT Interpretation Time: 0100    NIH Scale:  1a. Level of Consciousness: 0-->Alert, keenly responsive  1b. LOC Questions: 2-->Answers neither question correctly  1c. LOC Commands: 2-->Performs neither task correctly  2. Best Gaze: 1-->Partial gaze palsy, gaze is abnormal in one or both eyes, but forced deviation or total gaze paresis  is not present  3. Visual: 0-->No visual loss  4. Facial Palsy: 1-->Minor paralysis (flattened nasolabial fold, asymmetry on smiling)  5a. Motor Arm, Left: 1-->Drift, limb holds 90 (or 45) degrees, but drifts down before full 10 seconds, does not hit bed or other support  5b. Motor Arm, Right: 4-->No movement  6a. Motor Leg, Left: 3-->No effort against gravity, leg falls to bed immediately  6b. Motor Leg, Right: 3-->No effort against gravity, leg falls to bed immediately  7. Limb Ataxia: 0-->Absent  8. Sensory: 0-->Normal, no sensory loss  9. Best Language: 2-->Severe aphasia, all communication is through fragmentary expression, great need for inference, questioning, and guessing by the listener. Range of information that can be exchanged is limited, listener carries burden of. . . (see row details)  10. Dysarthria: 2-->Severe dysarthria, patients speech is so slurred as to be unintelligible in the absence of or out of proportion to any dysphasia, or is mute/anarthric  11. Extinction and Inattention (formerly Neglect): 1-->Visual, tactile, auditory, spatial, or personal inattention or extinction to bilateral simultaneous stimulation in one of the sensory modalities  Total (NIH Stroke Scale): 22       Modified Norman Score: 0  Millie Coma Scale:    ABCD2 Score:    [id number] Score:   HAS -BLED Score:   ICH Score:   Hunt & Jones Classification:      Hemorrhagic change of an Ischemic Stroke: Does this patient have an ischemic stroke with hemorrhagic changes? No     Neurologic Chief Complaint: Right sided weakness    Subjective:     Interval History: Patient is seen for follow-up neurological assessment and treatment recommendations:     Patient now with leukocytosis. Repeat CBC pending. CXR with pneumonitis. Rocephin ordered.      HPI, Past Medical, Family, and Social History remains the same as documented in the initial encounter.     Review of Systems   Constitutional: Negative for fever.   HENT: Positive for  drooling and trouble swallowing.    Eyes: Negative for visual disturbance.   Respiratory: Positive for cough.    Gastrointestinal: Negative for diarrhea and vomiting.   Musculoskeletal: Positive for gait problem.   Neurological: Positive for facial asymmetry, speech difficulty and weakness.   Psychiatric/Behavioral: Positive for confusion and decreased concentration.     Scheduled Meds:   albuterol-ipratropium  3 mL Nebulization Q6H WAKE    atorvastatin  40 mg Per NG tube Daily    levothyroxine  75 mcg Per NG tube Before breakfast    metoprolol tartrate  25 mg Per NG tube BID    senna-docusate 8.6-50 mg  1 tablet Per NG tube BID     Continuous Infusions:  PRN Meds:acetaminophen, Dextrose 10% Bolus, glucagon (human recombinant), glucose, glucose, insulin aspart U-100, magnesium oxide, magnesium oxide, ondansetron, potassium chloride 10%, potassium chloride 10%, potassium chloride 10%, potassium, sodium phosphates, potassium, sodium phosphates, potassium, sodium phosphates, QUEtiapine, sodium chloride 0.9%    Objective:     Vital Signs (Most Recent):  Temp: 98.6 °F (37 °C) (01/19/20 2028)  Pulse: 96 (01/19/20 2028)  Resp: 17 (01/19/20 2028)  BP: 129/68 (01/19/20 2028)  SpO2: 96 % (01/19/20 2028)  BP Location: Left arm    Vital Signs Range (Last 24H):  Temp:  [98.2 °F (36.8 °C)-98.9 °F (37.2 °C)]   Pulse:  []   Resp:  [16-20]   BP: (129-154)/(64-77)   SpO2:  [92 %-99 %]   BP Location: Left arm    Physical Exam   Constitutional: He appears well-developed and well-nourished.   HENT:   Head: Normocephalic and atraumatic.   Eyes: Pupils are equal, round, and reactive to light.   Left gaze preference    Neck: Normal range of motion.   Cardiovascular: Normal rate.   Pulmonary/Chest: Effort normal. He has rhonchi.   Abdominal: Soft.   Neurological:          Skin: Skin is warm and dry.   Nursing note and vitals reviewed.      Neurological Exam:   LOC: alert  Attention Span: poor  Language: Expressive aphasia,  Receptive aphasia  Articulation: Dysarthria  Orientation: Untestable due to severe aphasia   Visual Fields: Full  EOM (CN III, IV, VI): Gaze preference  left  Pupils (CN II, III): PERRL  Facial Sensation (CN V): Corneal reflex present Bilateral  Facial Movement (CN VII): Lower facial weakness on the Right  Motor: Arm left  Paresis: 4/5  Leg left  Paresis: 4/5  Arm right  Plegia 0/5  Leg right Paresis: 1/5  Cebellar: No evidence of appendicular or axial ataxia  Tone: Flaccid  RUE    Laboratory:  CMP:   No results for input(s): GLUCOSE, CALCIUM, ALBUMIN, PROT, NA, K, CO2, CL, BUN, CREATININE, ALKPHOS, ALT, AST, BILITOT in the last 24 hours.  CBC:   Recent Labs   Lab 01/18/20  1140   WBC 13.83*   RBC 3.90*   HGB 13.1*   HCT 40.8   PLT 97*   *   MCH 33.6*   MCHC 32.1     Lipid Panel:   No results for input(s): CHOL, LDLCALC, HDL, TRIG in the last 168 hours.  Coagulation:   Recent Labs   Lab 01/14/20  0040  01/18/20  0239   INR 2.4*   < > 2.3*   APTT 35.8*  --   --     < > = values in this interval not displayed.     Hgb A1C:   No results for input(s): HGBA1C in the last 168 hours.  TSH:   No results for input(s): TSH in the last 168 hours.    Diagnostic Results     Brain Imaging   Brain Imaging   CT Head without 1/15 (07:43)  Evolving moderate-sized region of acute infarction in the left MCA territory as above.  Distribution grossly stable from recent MRI without significant infarct extension.  No hemorrhagic conversion or new territorial infarct.     MRI Brain 1/14 (04:28)  There is interval change, there is interval development of acute ischemia/infarct along the left MCA distribution, this is superimposed on subacute ischemic change seen on the recent MRI examination  Findings concerning for occlusion of the M2 segment of the left MCA as above.    Additional chronic changes are noted.    Vessel Imaging   CTA Head and Neck 1/14 (02:43)  As on the recent brain MRA examination there is appearance of attenuation  of the left MCA branch vessels without evidence for high-grade stenosis or occlusion and otherwise there is no evidence for high-grade stenosis or occlusion of the major arterial vascular structures of the head or neck.    There is no evidence for intracranial aneurysm or AVM.    Findings consistent with acute to subacute left parietal ischemic change again noted.    Small thyroid nodules or cysts.    Cardiac Imaging  TTE 1/11  · Normal left ventricular systolic function. The estimated ejection fraction is 55%.  · Mid anteroseptal and apical moderate hypokinesia.  · Diastolic pattern consistent with atrial fibrillation observed.  · Severe left atrial enlargement.  · Normal right ventricular systolic function.  · Severe right atrial enlargement.  · Mild-to-moderate mitral regurgitation.  · Mild tricuspid regurgitation.  · The estimated PA systolic pressure is 40 mmHg.  Intermediate central venous pressure (8 mmHg).         Ambrose Rothman NP  Comprehensive Stroke Center  Department of Vascular Neurology   [facility]

## 2020-01-20 NOTE — ASSESSMENT & PLAN NOTE
80 yo man with history of HTN, a fib (on Coumadin) and hypothyroidism. Patient has been admitted to [facility] for left temp partieal infarct on 1/10. On 1/14 patient with worsening of deficits: aphasia, right side plegia and not following commands. Telestroke consult was completed by Dr. Olivares, no tpa given as INR was 2.0. [place] [facility] [place] MP.  Positive for LVO, L M1 occlusion. Patient not candidate for intervention d/t large core infarct. Patient was admitted to [facility] for close monitoring.  Coumadin held d/t size of stroke. Will switch to DOAC and start after PEG placement.     Repeat CT head completed today stable.     Antithrombotics: currently held, to start after PEG placement, IR consulted      Statins: Lipitor 40 mg daily    Aggressive risk factor modification: A-Fib, HTN     Rehab efforts: The patient has been evaluated by a stroke team provider and the therapy needs have been fully considered based off the presenting complaints and exam findings. The following therapy evaluations are needed: PT evaluate and treat, OT evaluate and treat, SLP evaluate and treat, PM&R evaluate for appropriate placement    Diagnostics ordered/pending: None     VTE prophylaxis: None: Hold pharmacologic prophylaxis in setting of large infarct. SCDs.    BP parameters: Infarct: No intervention, SBP <220

## 2020-01-20 NOTE — NURSING
Pt a/ to self, have tried speaking, but speech was incoherent, pt tried to respond when asked questions but not clearly. Hob elevated, ng tube in right nostril with isosource flowing at 60ml/ hr with a q6 150cc water bolus, condom catheter, applied with yellow urine flowing into a uremeter catheter bag, wheels locked, suction at the bedside, bed in lowest position and call light within reach

## 2020-01-21 LAB
ALBUMIN SERPL BCP-MCNC: 2.6 G/DL (ref 3.5–5.2)
ALP SERPL-CCNC: 105 U/L (ref 55–135)
ALT SERPL W/O P-5'-P-CCNC: 65 U/L (ref 10–44)
ANION GAP SERPL CALC-SCNC: 6 MMOL/L (ref 8–16)
AST SERPL-CCNC: 77 U/L (ref 10–40)
BASOPHILS # BLD AUTO: 0.06 K/UL (ref 0–0.2)
BASOPHILS NFR BLD: 0.5 % (ref 0–1.9)
BILIRUB SERPL-MCNC: 1.2 MG/DL (ref 0.1–1)
BUN SERPL-MCNC: 32 MG/DL (ref 8–23)
CALCIUM SERPL-MCNC: 9.2 MG/DL (ref 8.7–10.5)
CHLORIDE SERPL-SCNC: 104 MMOL/L (ref 95–110)
CO2 SERPL-SCNC: 28 MMOL/L (ref 23–29)
CREAT SERPL-MCNC: 0.8 MG/DL (ref 0.5–1.4)
DIFFERENTIAL METHOD: ABNORMAL
EOSINOPHIL # BLD AUTO: 0.5 K/UL (ref 0–0.5)
EOSINOPHIL NFR BLD: 4.6 % (ref 0–8)
ERYTHROCYTE [DISTWIDTH] IN BLOOD BY AUTOMATED COUNT: 15 % (ref 11.5–14.5)
EST. GFR  (AFRICAN AMERICAN): >60 ML/MIN/1.73 M^2
EST. GFR  (NON AFRICAN AMERICAN): >60 ML/MIN/1.73 M^2
GLUCOSE SERPL-MCNC: 141 MG/DL (ref 70–110)
HCT VFR BLD AUTO: 40.3 % (ref 40–54)
HGB BLD-MCNC: 13 G/DL (ref 14–18)
IMM GRANULOCYTES # BLD AUTO: 0.15 K/UL (ref 0–0.04)
IMM GRANULOCYTES NFR BLD AUTO: 1.3 % (ref 0–0.5)
INR PPP: 1.2 (ref 0.8–1.2)
INR PPP: NORMAL (ref 0.8–1.2)
LYMPHOCYTES # BLD AUTO: 0.8 K/UL (ref 1–4.8)
LYMPHOCYTES NFR BLD: 6.5 % (ref 18–48)
MAGNESIUM SERPL-MCNC: 1.7 MG/DL (ref 1.6–2.6)
MCH RBC QN AUTO: 33.7 PG (ref 27–31)
MCHC RBC AUTO-ENTMCNC: 32.3 G/DL (ref 32–36)
MCV RBC AUTO: 104 FL (ref 82–98)
MONOCYTES # BLD AUTO: 1.7 K/UL (ref 0.3–1)
MONOCYTES NFR BLD: 14 % (ref 4–15)
NEUTROPHILS # BLD AUTO: 8.6 K/UL (ref 1.8–7.7)
NEUTROPHILS NFR BLD: 73.1 % (ref 38–73)
NRBC BLD-RTO: 0 /100 WBC
PHOSPHATE SERPL-MCNC: 3.6 MG/DL (ref 2.7–4.5)
PLATELET # BLD AUTO: 136 K/UL (ref 150–350)
PMV BLD AUTO: 11.8 FL (ref 9.2–12.9)
POCT GLUCOSE: 118 MG/DL (ref 70–110)
POCT GLUCOSE: 121 MG/DL (ref 70–110)
POCT GLUCOSE: 125 MG/DL (ref 70–110)
POCT GLUCOSE: 148 MG/DL (ref 70–110)
POCT GLUCOSE: 164 MG/DL (ref 70–110)
POTASSIUM SERPL-SCNC: 4.5 MMOL/L (ref 3.5–5.1)
PROT SERPL-MCNC: 6.1 G/DL (ref 6–8.4)
PROTHROMBIN TIME: 12.3 SEC (ref 9–12.5)
PROTHROMBIN TIME: NORMAL SEC (ref 9–12.5)
RBC # BLD AUTO: 3.86 M/UL (ref 4.6–6.2)
SODIUM SERPL-SCNC: 138 MMOL/L (ref 136–145)
WBC # BLD AUTO: 11.77 K/UL (ref 3.9–12.7)

## 2020-01-21 PROCEDURE — 97530 THERAPEUTIC ACTIVITIES: CPT | Mod: CQ

## 2020-01-21 PROCEDURE — 99232 PR SUBSEQUENT HOSPITAL CARE,LEVL II: ICD-10-PCS | Mod: ,,, | Performed by: NURSE PRACTITIONER

## 2020-01-21 PROCEDURE — 27000221 HC OXYGEN, UP TO 24 HOURS

## 2020-01-21 PROCEDURE — 94640 AIRWAY INHALATION TREATMENT: CPT

## 2020-01-21 PROCEDURE — 97112 NEUROMUSCULAR REEDUCATION: CPT

## 2020-01-21 PROCEDURE — 85610 PROTHROMBIN TIME: CPT

## 2020-01-21 PROCEDURE — 99233 PR SUBSEQUENT HOSPITAL CARE,LEVL III: ICD-10-PCS | Mod: GC,,, | Performed by: PSYCHIATRY & NEUROLOGY

## 2020-01-21 PROCEDURE — 80053 COMPREHEN METABOLIC PANEL: CPT

## 2020-01-21 PROCEDURE — 94761 N-INVAS EAR/PLS OXIMETRY MLT: CPT

## 2020-01-21 PROCEDURE — 99233 SBSQ HOSP IP/OBS HIGH 50: CPT | Mod: GC,,, | Performed by: PSYCHIATRY & NEUROLOGY

## 2020-01-21 PROCEDURE — 83735 ASSAY OF MAGNESIUM: CPT

## 2020-01-21 PROCEDURE — 85610 PROTHROMBIN TIME: CPT | Mod: 91

## 2020-01-21 PROCEDURE — 11000001 HC ACUTE MED/SURG PRIVATE ROOM

## 2020-01-21 PROCEDURE — 84100 ASSAY OF PHOSPHORUS: CPT

## 2020-01-21 PROCEDURE — 85025 COMPLETE CBC W/AUTO DIFF WBC: CPT

## 2020-01-21 PROCEDURE — 94668 MNPJ CHEST WALL SBSQ: CPT

## 2020-01-21 PROCEDURE — 25000242 PHARM REV CODE 250 ALT 637 W/ HCPCS: Performed by: NURSE PRACTITIONER

## 2020-01-21 PROCEDURE — 92611 MOTION FLUOROSCOPY/SWALLOW: CPT

## 2020-01-21 PROCEDURE — 92507 TX SP LANG VOICE COMM INDIV: CPT

## 2020-01-21 PROCEDURE — 97110 THERAPEUTIC EXERCISES: CPT | Mod: CQ

## 2020-01-21 PROCEDURE — 36415 COLL VENOUS BLD VENIPUNCTURE: CPT

## 2020-01-21 PROCEDURE — 99232 SBSQ HOSP IP/OBS MODERATE 35: CPT | Mod: ,,, | Performed by: NURSE PRACTITIONER

## 2020-01-21 RX ADMIN — IPRATROPIUM BROMIDE AND ALBUTEROL SULFATE 3 ML: .5; 3 SOLUTION RESPIRATORY (INHALATION) at 07:01

## 2020-01-21 RX ADMIN — IPRATROPIUM BROMIDE AND ALBUTEROL SULFATE 3 ML: .5; 3 SOLUTION RESPIRATORY (INHALATION) at 01:01

## 2020-01-21 NOTE — PLAN OF CARE
Problem: Adult Inpatient Plan of Care  Goal: Plan of Care Review  Outcome: Ongoing, Progressing     Problem: Adult Inpatient Plan of Care  Goal: Optimal Comfort and Wellbeing  Outcome: Ongoing, Progressing     Problem: Diabetes Comorbidity  Goal: Blood Glucose Level Within Desired Range  Outcome: Ongoing, Progressing     Problem: Pain (Stroke, Ischemic/Transient Ischemic Attack)  Goal: Acceptable Pain Control  Outcome: Ongoing, Progressing

## 2020-01-21 NOTE — SUBJECTIVE & OBJECTIVE
Neurologic Chief Complaint: Right sided weakness    Subjective:     Interval History: Patient is seen for follow-up neurological assessment and treatment recommendations:     Issues with placing NG tube overnight prevented pt from receiving barium needed for PEG placement. Deferred to tomorrow with IR. NG in place this am without issues. Will need to restart AC post procedure.     HPI, Past Medical, Family, and Social History remains the same as documented in the initial encounter.     Review of Systems   Constitutional: Negative for fever.   HENT: Positive for drooling and trouble swallowing.    Eyes: Negative for visual disturbance.   Respiratory: Positive for cough.    Gastrointestinal: Negative for diarrhea and vomiting.   Musculoskeletal: Positive for gait problem.   Neurological: Positive for facial asymmetry, speech difficulty and weakness.   Psychiatric/Behavioral: Positive for confusion and decreased concentration.     Scheduled Meds:   albuterol-ipratropium  3 mL Nebulization Q6H WAKE    atorvastatin  40 mg Per NG tube Daily    barium  450 mL Per NG tube Once    cefTRIAXone (ROCEPHIN) IVPB  1 g Intravenous Q24H    levothyroxine  75 mcg Per NG tube Before breakfast    metoprolol tartrate  25 mg Per NG tube BID    senna-docusate 8.6-50 mg  1 tablet Per NG tube BID     Continuous Infusions:  PRN Meds:acetaminophen, Dextrose 10% Bolus, glucagon (human recombinant), glucose, glucose, insulin aspart U-100, ondansetron, QUEtiapine, sodium chloride 0.9%    Objective:     Vital Signs (Most Recent):  Temp: 97.8 °F (36.6 °C) (01/21/20 0750)  Pulse: 95 (01/21/20 0750)  Resp: 20 (01/21/20 0750)  BP: (!) 174/88 (01/21/20 0750)  SpO2: 96 % (01/21/20 0750)  BP Location: Left arm    Vital Signs Range (Last 24H):  Temp:  [95.9 °F (35.5 °C)-98.9 °F (37.2 °C)]   Pulse:  [72-99]   Resp:  [18-20]   BP: (136-176)/(76-93)   SpO2:  [94 %-99 %]   BP Location: Left arm    Physical Exam   Constitutional: He appears  well-developed and well-nourished.   HENT:   Head: Normocephalic and atraumatic.   Eyes: Pupils are equal, round, and reactive to light.   Left gaze preference    Neck: Normal range of motion.   Cardiovascular: Normal rate.   Pulmonary/Chest: Effort normal. He has rhonchi.   Abdominal: Soft.   Skin: Skin is warm and dry.   Nursing note and vitals reviewed.      Neurological Exam:   LOC: alert  Attention Span: poor  Language: Expressive aphasia, Receptive aphasia  Articulation: Dysarthria  Orientation: Untestable due to severe aphasia   Visual Fields: Full  EOM (CN III, IV, VI): Gaze preference  left  Pupils (CN II, III): PERRL  Facial Movement (CN VII): Lower facial weakness on the Right  Motor: Arm left  Paresis: 4/5  Leg left  Paresis: 4/5  Arm right  Plegia 0/5  Leg right Paresis: 1/5  Cebellar: No evidence of appendicular or axial ataxia  Tone: Flaccid  RUE    Laboratory:  CMP:   Recent Labs   Lab 01/21/20  0425   CALCIUM 9.2   ALBUMIN 2.6*   PROT 6.1      K 4.5   CO2 28      BUN 32*   CREATININE 0.8   ALKPHOS 105   ALT 65*   AST 77*   BILITOT 1.2*     CBC:   Recent Labs   Lab 01/21/20  0425   WBC 11.77   RBC 3.86*   HGB 13.0*   HCT 40.3   *   *   MCH 33.7*   MCHC 32.3     Lipid Panel:   No results for input(s): CHOL, LDLCALC, HDL, TRIG in the last 168 hours.  Coagulation:   Recent Labs   Lab 01/21/20  0757   INR 1.2     Hgb A1C:   No results for input(s): HGBA1C in the last 168 hours.  TSH:   No results for input(s): TSH in the last 168 hours.    Diagnostic Results     Brain Imaging   Brain Imaging   CT Head without 1/15 (07:43)  Evolving moderate-sized region of acute infarction in the left MCA territory as above.  Distribution grossly stable from recent MRI without significant infarct extension.  No hemorrhagic conversion or new territorial infarct.     MRI Brain 1/14 (04:28)  There is interval change, there is interval development of acute ischemia/infarct along the left MCA  distribution, this is superimposed on subacute ischemic change seen on the recent MRI examination  Findings concerning for occlusion of the M2 segment of the left MCA as above.    Additional chronic changes are noted.    Vessel Imaging   CTA Head and Neck 1/14 (02:43)  As on the recent brain MRA examination there is appearance of attenuation of the left MCA branch vessels without evidence for high-grade stenosis or occlusion and otherwise there is no evidence for high-grade stenosis or occlusion of the major arterial vascular structures of the head or neck.    There is no evidence for intracranial aneurysm or AVM.    Findings consistent with acute to subacute left parietal ischemic change again noted.    Small thyroid nodules or cysts.    Cardiac Imaging  TTE 1/11  · Normal left ventricular systolic function. The estimated ejection fraction is 55%.  · Mid anteroseptal and apical moderate hypokinesia.  · Diastolic pattern consistent with atrial fibrillation observed.  · Severe left atrial enlargement.  · Normal right ventricular systolic function.  · Severe right atrial enlargement.  · Mild-to-moderate mitral regurgitation.  · Mild tricuspid regurgitation.  · The estimated PA systolic pressure is 40 mmHg.  Intermediate central venous pressure (8 mmHg).

## 2020-01-21 NOTE — PLAN OF CARE
Goals reviewed and remain appropriate.  Glo Tracy OTR/L  Pager #: [phone]  1/21/2020    Problem: Occupational Therapy Goal  Goal: Occupational Therapy Goal  Description  Goals to be met by: 1/28     Patient will increase functional independence with ADLs by performing:    UE Dressing while seated EOB with Moderate Assistance.  Grooming while EOB with Moderate Assistance.  Sitting at edge of bed x10 minutes with Moderate Assistance.  Rolling to Bilateral with Moderate Assistance and use of bedrail as needed.   Supine to sit with Moderate Assistance.  Squat pivot transfers with Maximum Assistance.     Outcome: Ongoing, Progressing

## 2020-01-21 NOTE — PROGRESS NOTES
[facility]  Physical Medicine & Rehab  Progress Note    Patient Name: Chirag Thompson  MRN: [id number]  Admission Date: 1/10/2020  Length of Stay: 11 days  Attending Physician: Houston Cheema MD    Subjective:     Principal Problem:Embolic stroke involving left middle cerebral artery    Hospital Course:   1/14/20: Evaluated by PT & OT. Bed mobility totalA x 2 ppl. Sit to stand totaA- dependence.   1/16/20: Participated w/ PT & OT. Bed mobility mod/maxA- totalA. Sit to stand maxA. Sat EOB min-modA.   1/17/20: Participated with PT and SLP. Remains NPO for dysphagia. Bed mobility totalA-dependent.    Interval History 1/21/2020:  Patient is seen for follow-up rehab evaluation and recommendations: Lower level with therapy. LUE restraint in place.    HPI, Past Medical, Family, and Social History remains the same as documented in the initial encounter.    Scheduled Medications:    albuterol-ipratropium  3 mL Nebulization Q6H WAKE    atorvastatin  40 mg Per NG tube Daily    barium  450 mL Per NG tube Once    cefTRIAXone (ROCEPHIN) IVPB  1 g Intravenous Q24H    levothyroxine  75 mcg Per NG tube Before breakfast    metoprolol tartrate  25 mg Per NG tube BID    senna-docusate 8.6-50 mg  1 tablet Per NG tube BID       Diagnostic Results: Labs: Reviewed  ECG: Reviewed  CT: Reviewed    PRN Medications: acetaminophen, Dextrose 10% Bolus, glucagon (human recombinant), glucose, glucose, insulin aspart U-100, ondansetron, QUEtiapine, sodium chloride 0.9%    Review of Systems   Reason unable to perform ROS: Aphasia      Objective:     Vital Signs (Most Recent):  Temp: 97.8 °F (36.6 °C) (01/21/20 0750)  Pulse: 95 (01/21/20 0750)  Resp: 20 (01/21/20 0750)  BP: (!) 174/88 (01/21/20 0750)  SpO2: 96 % (01/21/20 0750)    Vital Signs (24h Range):  Temp:  [95.9 °F (35.5 °C)-98.9 °F (37.2 °C)] 97.8 °F (36.6 °C)  Pulse:  [72-99] 95  Resp:  [18-20] 20  SpO2:  [94 %-99 %] 96 %  BP: (136-176)/(76-93) 174/88     Physical  Exam   Constitutional: He appears well-developed and well-nourished.   HENT:   Head: Normocephalic and atraumatic.   Neck: Neck supple.   Pulmonary/Chest: Effort normal. No respiratory distress.   Abdominal:   NG tube in place   Musculoskeletal: He exhibits no deformity.   Neurological:   - LUE restraint in place  - Not following commands  - Aphasia   Skin: Skin is warm and dry.   Nursing note and vitals reviewed.           Assessment/Plan:      * Embolic stroke involving left middle cerebral artery  - CTH revealed evolving moderate-sized region of acute infarction in the left MCA territory.   - NPO PEG pending for 1/21    See hospital course for functional, cognitive/speech/language, and nutrition/swallow status.      Recommendations  -  Encourage mobility, OOB in chair at least 3 hours per day, and early ambulation as appropriate   -  PT/OT evaluate and treat  -  SLP speech and cognitive evaluate and treat  -  Monitor sleep disturbances and establish consistent sleep-wake cycle  -  Monitor for bowel and bladder dysfunction  -  Monitor for shoulder pain and subluxation  -  Monitor for spasticity  -  Monitor for and prevent skin breakdown and pressure ulcers  · Early mobility, repositioning/weight shifting every 20-30 minutes when sitting, turn patient every 2 hours, proper mattress/overlay and chair cushioning, pressure relief/heel protector boots  -  DVT prophylaxis  -  Reviewed discharge options (IP rehab, SNF, HH therapy, and OP therapy)    Aphasia  - SLP eval and treat    Will follow progress and discuss with rehab team for post acute care/rehab recommendation.    Norma Duarte NP  Department of Physical Medicine & Rehab   [facility]

## 2020-01-21 NOTE — PLAN OF CARE
Problem: Physical Therapy Goal  Goal: Physical Therapy Goal  Description  Goals to be met by: 2020     Patient will increase functional independence with mobility by performin. Supine to sit with Moderate Assistance  2. Sit to supine with Moderate Assistance  3. Sit to stand transfer with Moderate Assistance  4. Bed to chair transfer with Moderate Assistance using LRAD  5. Gait  x 15 feet with Moderate Assistance using LRAD.   6. Sitting at edge of bed x10 minutes with Contact Guard Assistance  7. Stand for 1 minute with Contact Guard Assistance using LRAD  8. Lower extremity exercise program x15 reps per handout, with assistance as needed     Outcome: Ongoing, Progressing    Discharge Recommendations: SNF    Pt requires max/total A to sit at the EOB    Goals remain appropriate.     Gisselle Truong, PTA.   [phone]   2020

## 2020-01-21 NOTE — PROGRESS NOTES
[facility]  Vascular Neurology  Comprehensive Stroke Center  Progress Note    Assessment/Plan:     * Embolic stroke involving left middle cerebral artery  80 yo man with history of HTN, a fib (on Coumadin) and hypothyroidism. Patient has been admitted to [facility] for left temp partieal infarct on 1/10. On 1/14 patient with worsening of deficits: aphasia, right side plegia and not following commands. Telestroke consult was completed by Dr. Olivares, no tpa given as INR was 2.0. Patient transferred to [facility] ED for stat CTA stroke MP.  Positive for LVO, L M1 occlusion. Patient not candidate for intervention d/t large core infarct. Patient was admitted to [facility] for close monitoring.  Coumadin held d/t size of stroke. Will switch to DOAC and start after PEG placement.     SLP reevaluation on 1/21 improved, plan for MBSS today.    Plan for PEG placement on 1/22 with IR (delayed due to problems placing NG) if MBSS failed.     Antithrombotics: currently held, to start after PEG placement on 1/21, plan to start DOAC      Statins: Lipitor 40 mg daily    Aggressive risk factor modification: A-Fib, HTN     Rehab efforts: The patient has been evaluated by a stroke team provider and the therapy needs have been fully considered based off the presenting complaints and exam findings. The following therapy evaluations are needed: PT evaluate and treat, OT evaluate and treat, SLP evaluate and treat, PM&R evaluate for appropriate placement    Diagnostics ordered/pending: None     VTE prophylaxis: None: Hold pharmacologic prophylaxis in setting of large infarct. SCDs.    BP parameters: Infarct: No intervention, SBP <220    Chronic anticoagulation  Previously on coumadin, plan to transition to DOAC post PEG placement     Cytotoxic cerebral edema  Area of cytotoxic cerebral edema identified when reviewing brain imaging in the territory of the left middle cerebral artery. There is mass effect associated with it. We will  continue to monitor the patients clinical exam for any worsening of symptoms which may indicate expansion of the stroke or the area of the edema resulting in the clinical change. The pattern is suggestive of cardio embolic etiology.        Hypercoagulable state  Restart AC post PEG tube placement  Pt previously on Coumadin but subtherapeutic     Aphasia  Due to stroke  Aggressive therapy SLP    Right spastic hemiparesis  Due to stroke  Aggressive therapy with PT/OT    Atrial fibrillation  Stroke risk factor  Rate control  Will need anticoagulation for this when stable, holding in setting of large area of infarct.  Repeat CT on 1/18 stable. Will consider restarting AC after PEG placement.     Type 2 diabetes mellitus with circulatory disorder, without long-term current use of insulin  Stroke risk factor  HgB A1C 5.1  SSI       Essential hypertension  Stroke risk factor  SBP <220         Patient admitted to neurocritical care for Left MCA syndrome, severe aphasia following extension of prior stroke.  01/15/2020: Overnight concern for extensor posturing of right arm, emergent CT this AM showing stable area of infarct, no new hemorrhage or extension.  01/16/20: No acute events overnight, neurostatus unchanged  01/17/2020: No events overnight, patient received seroquel for agitation this AM. To remain in neuro ICU for closer monitoring  01/18/2020: Patient calm and alert during today's exam. Patient to step down to stroke primary service. Breath sounds course, CXR without significant change- will continue pulmonary tolieting.   1/19/20: Patient now with leukocytosis. Repeat CBC pending. CXR with pneumonitis. Rocephin ordered.  1/20/20: CT abd/pelvis for PEG planning, scheduled for tomorrow AM with IR. Plan to restart AC (Heparin vs DOAC) post procedure  1/21/20: Issues with placing NG overnight, PEG deferred to tomorrow as pt unable to get barium. SLP re-eval, MBSS today    STROKE DOCUMENTATION   Acute Stroke Times    Last Known Normal Date: 01/13/20  Last Known Normal Time: 2315  Symptom Onset Date: 01/14/20  Symptom Onset Time: 0050  Stroke Team Called Date: 01/14/20  Stroke Team Called Time: 0105  Stroke Team Arrival Date: 01/14/20  Stroke Team Arrival Time: 0355  CT Interpretation Time: 0100    NIH Scale:          Modified [place] Score: 0  Millie Coma Scale:    ABCD2 Score:    [id number] Score:   HAS -BLED Score:   ICH Score:   Hunt & Jones Classification:      Hemorrhagic change of an Ischemic Stroke: Does this patient have an ischemic stroke with hemorrhagic changes? No     Neurologic Chief Complaint: Right sided weakness    Subjective:     Interval History: Patient is seen for follow-up neurological assessment and treatment recommendations:     Issues with placing NG tube overnight prevented pt from receiving barium needed for PEG placement. Deferred to tomorrow with IR. NG in place this am without issues. Will need to restart AC post procedure.     HPI, Past Medical, Family, and Social History remains the same as documented in the initial encounter.     Review of Systems   Constitutional: Negative for fever.   HENT: Positive for drooling and trouble swallowing.    Eyes: Negative for visual disturbance.   Respiratory: Positive for cough.    Gastrointestinal: Negative for diarrhea and vomiting.   Musculoskeletal: Positive for gait problem.   Neurological: Positive for facial asymmetry, speech difficulty and weakness.   Psychiatric/Behavioral: Positive for confusion and decreased concentration.     Scheduled Meds:   albuterol-ipratropium  3 mL Nebulization Q6H WAKE    atorvastatin  40 mg Per NG tube Daily    barium  450 mL Per NG tube Once    cefTRIAXone (ROCEPHIN) IVPB  1 g Intravenous Q24H    levothyroxine  75 mcg Per NG tube Before breakfast    metoprolol tartrate  25 mg Per NG tube BID    senna-docusate 8.6-50 mg  1 tablet Per NG tube BID     Continuous Infusions:  PRN Meds:acetaminophen, Dextrose 10% Bolus,  glucagon (human recombinant), glucose, glucose, insulin aspart U-100, ondansetron, QUEtiapine, sodium chloride 0.9%    Objective:     Vital Signs (Most Recent):  Temp: 97.8 °F (36.6 °C) (01/21/20 0750)  Pulse: 95 (01/21/20 0750)  Resp: 20 (01/21/20 0750)  BP: (!) 174/88 (01/21/20 0750)  SpO2: 96 % (01/21/20 0750)  BP Location: Left arm    Vital Signs Range (Last 24H):  Temp:  [95.9 °F (35.5 °C)-98.9 °F (37.2 °C)]   Pulse:  [72-99]   Resp:  [18-20]   BP: (136-176)/(76-93)   SpO2:  [94 %-99 %]   BP Location: Left arm    Physical Exam   Constitutional: He appears well-developed and well-nourished.   HENT:   Head: Normocephalic and atraumatic.   Eyes: Pupils are equal, round, and reactive to light.   Left gaze preference    Neck: Normal range of motion.   Cardiovascular: Normal rate.   Pulmonary/Chest: Effort normal. He has rhonchi.   Abdominal: Soft.   Skin: Skin is warm and dry.   Nursing note and vitals reviewed.      Neurological Exam:   LOC: alert  Attention Span: poor  Language: Expressive aphasia, Receptive aphasia  Articulation: Dysarthria  Orientation: Untestable due to severe aphasia   Visual Fields: Full  EOM (CN III, IV, VI): Gaze preference  left  Pupils (CN II, III): PERRL  Facial Movement (CN VII): Lower facial weakness on the Right  Motor: Arm left  Paresis: 4/5  Leg left  Paresis: 4/5  Arm right  Plegia 0/5  Leg right Paresis: 1/5  Cebellar: No evidence of appendicular or axial ataxia  Tone: Flaccid  RUE    Laboratory:  CMP:   Recent Labs   Lab 01/21/20 0425   CALCIUM 9.2   ALBUMIN 2.6*   PROT 6.1      K 4.5   CO2 28      BUN 32*   CREATININE 0.8   ALKPHOS 105   ALT 65*   AST 77*   BILITOT 1.2*     CBC:   Recent Labs   Lab 01/21/20 0425   WBC 11.77   RBC 3.86*   HGB 13.0*   HCT 40.3   *   *   MCH 33.7*   MCHC 32.3     Lipid Panel:   No results for input(s): CHOL, LDLCALC, HDL, TRIG in the last 168 hours.  Coagulation:   Recent Labs   Lab 01/21/20  0757   INR 1.2     Hgb A1C:    No results for input(s): HGBA1C in the last 168 hours.  TSH:   No results for input(s): TSH in the last 168 hours.    Diagnostic Results     Brain Imaging   Brain Imaging   CT Head without 1/15 (07:43)  Evolving moderate-sized region of acute infarction in the left MCA territory as above.  Distribution grossly stable from recent MRI without significant infarct extension.  No hemorrhagic conversion or new territorial infarct.     MRI Brain 1/14 (04:28)  There is interval change, there is interval development of acute ischemia/infarct along the left MCA distribution, this is superimposed on subacute ischemic change seen on the recent MRI examination  Findings concerning for occlusion of the M2 segment of the left MCA as above.    Additional chronic changes are noted.    Vessel Imaging   CTA Head and Neck 1/14 (02:43)  As on the recent brain MRA examination there is appearance of attenuation of the left MCA branch vessels without evidence for high-grade stenosis or occlusion and otherwise there is no evidence for high-grade stenosis or occlusion of the major arterial vascular structures of the head or neck.    There is no evidence for intracranial aneurysm or AVM.    Findings consistent with acute to subacute left parietal ischemic change again noted.    Small thyroid nodules or cysts.    Cardiac Imaging  TTE 1/11  · Normal left ventricular systolic function. The estimated ejection fraction is 55%.  · Mid anteroseptal and apical moderate hypokinesia.  · Diastolic pattern consistent with atrial fibrillation observed.  · Severe left atrial enlargement.  · Normal right ventricular systolic function.  · Severe right atrial enlargement.  · Mild-to-moderate mitral regurgitation.  · Mild tricuspid regurgitation.  · The estimated PA systolic pressure is 40 mmHg.  Intermediate central venous pressure (8 mmHg).         Quan Cruz MD  [facility]  Department of Vascular Neurology   [facility]

## 2020-01-21 NOTE — NURSING
The patient is off the floor with Thanh TERRY for Barium Swallow study.    04:00PM The patient returned to the room received report from IGNACIO patient will continue NPO status

## 2020-01-21 NOTE — PLAN OF CARE
01/21/20 1533   Discharge Reassessment   Assessment Type Discharge Planning Reassessment   Discharge Plan A Rehab   Discharge Plan B Skilled Nursing Facility   DME Needed Upon Discharge    (tbd)   Patient choice form signed by patient/caregiver N/A   Anticipated Discharge Disposition Rehab   Can the patient answer the patient profile reliably? No, cognitively impaired

## 2020-01-21 NOTE — PT/OT/SLP PROGRESS
Speech Language Pathology Treatment    Patient Name:  Chirag Thompson   MRN:  [id number]  Admitting Diagnosis: Embolic stroke involving left middle cerebral artery    Recommendations:                 General Recommendations:  Dysphagia therapy and Speech/language therapy  Diet recommendations:  NPO, Liquid Diet Level: NPO   Aspiration Precautions: Strict aspiration precautions   General Precautions: Standard, aspiration, fall, NPO, aphasia  Communication strategies:  none    Subjective     No family present  Patient goals: matthieu     Pain/Comfort:  · Pain Rating 1: 0/10  · Pain Rating Post-Intervention 1: 0/10    Objective:     Has the patient been evaluated by SLP for swallowing?   Yes  Keep patient NPO? Yes   Current Respiratory Status: room air      Pt. Seen at bedside with improved level of alertness.  Pt. Scheduled for peg tube placement today.  Receptively, he modelled 1/5 simple commands and did not follow any simple commands.  Responses to simple yes/no questions were undifferentiated vocalizations despite cues to produce head nod.  Expressively, pt. Counted to 10 in unison with therapist with 50% accuracy and produced unifferentiated vocalizations when asked to completed automatic sentences.  When asked to sing simple song, approximately 25% of words were intelligible.  Education provided re poc.       Assessment:     Chirag Thompson is a 79 y.o. male with an SLP diagnosis of Aphasia and Dysphagia.   Goals:   Multidisciplinary Problems     SLP Goals        Problem: SLP Goal    Goal Priority Disciplines Outcome   SLP Goal     SLP Ongoing, Progressing   Description:  Goals due 1/21  1.  Pt. Will participate in ongoing assessment of swallow at bedside  2.  Pt. Will participate in speech language eval /met  3.  Model simple commands with 100% accuracy  4.  Respond to simple yes/no questions with 70% accuracy  5.  Complete single words on au tomatic speech tasks with 50% accuracy                      Plan:     · Patient  to be seen:  4 x/week   · Plan of Care expires:  02/12/20  · Plan of Care reviewed with:  patient   · SLP Follow-Up:  Yes       Discharge recommendations:  nursing facility, skilled   Barriers to Discharge:  Level of Skilled Assistance Needed 24 hour    Time Tracking:     SLP Treatment Date:   01/21/20  Speech Start Time:  0830  Speech Stop Time:  0845     Speech Total Time (min):  15 min    Billable Minutes: Speech Therapy Individual 15    Julia Spann MA, CCC-SLP  01/21/2020

## 2020-01-21 NOTE — ASSESSMENT & PLAN NOTE
- CTH revealed evolving moderate-sized region of acute infarction in the left MCA territory.   - NPO PEG pending for 1/21    See hospital course for functional, cognitive/speech/language, and nutrition/swallow status.      Recommendations  -  Encourage mobility, OOB in chair at least 3 hours per day, and early ambulation as appropriate   -  PT/OT evaluate and treat  -  SLP speech and cognitive evaluate and treat  -  Monitor sleep disturbances and establish consistent sleep-wake cycle  -  Monitor for bowel and bladder dysfunction  -  Monitor for shoulder pain and subluxation  -  Monitor for spasticity  -  Monitor for and prevent skin breakdown and pressure ulcers  · Early mobility, repositioning/weight shifting every 20-30 minutes when sitting, turn patient every 2 hours, proper mattress/overlay and chair cushioning, pressure relief/heel protector boots  -  DVT prophylaxis  -  Reviewed discharge options (IP rehab, SNF, HH therapy, and OP therapy)

## 2020-01-21 NOTE — PT/OT/SLP PROGRESS
Physical Therapy Treatment    Patient Name:  Chirag Thompson   MRN:  [id number]  Admitting Diagnosis: Embolic stroke involving left middle cerebral artery  Recent Surgery: * No surgery found *      Recommendations:     Discharge Recommendations:  nursing facility, skilled   Discharge Equipment Recommendations: (TBD)   Barriers to discharge: Inaccessible home and Decreased caregiver support  Pt requiring skilled assistance at current time.     Plan:     During this hospitalization, patient to be seen 4 x/week to address the above listed problems via gait training, therapeutic activities, therapeutic exercises, neuromuscular re-education  · Plan of Care Expires:  02/11/20   Plan of Care Reviewed with: patient    This Plan of care has been discussed with the patient who was involved in its development and understands and is in agreement with the identified goals and treatment plan    Subjective     Communicated with RN (Karl) prior to session.     Patient comments: Pt without complaints  Pain/Comfort:  · Pain Rating 1: 0/10  · Location - Side 1: Right  · Location - Orientation 1: generalized  · Location 1: leg  · Pain Addressed 1: Reposition, Distraction, Cessation of Activity, Nurse notified, Other (see comments)(exercises)  · Pain Rating Post-Intervention 1: (unable to report)    Objective:     Patient found with: bed alarm, NG tube, pressure relief boots, restraints, telemetry    Patient found sup in bed upon PT entry to room, agreeable to treatment.  NO family present in the room.    General Precautions: Standard, aphasia, aspiration, fall, NPO   Orthopedic Precautions:N/A   Braces: N/A       BED MOBILITY (vc's for hand placement sequencing of task):        Rolling to the R:  Max/total A.       Rolling to the L:  Total A.        Sup > sit at the EOB:  Max/total A for trunk elevation and LE's, from L side lying.       Sit > sup:  Max A for trunk and LE's.       Scooting hips to EOB with max/mod A       Scooting hips  along the EOB to the L requiring mod/max A x2-3 scoots                SITTING AT THE EDGE OF THE BED (6 min)   Assistance Level Required: mod A for trunk/head control with L UE support and R UE in weight bearing position   Postural deviations noted: flexed trunk, rounded shoulders, PPT   Encouraged: upright posture, APT, B feet in contact with the floor      TRANSFERS        Pt not appropriate 2* decreased trunk control while sitting at the EOB    THERAPEUTIC ACTIVITIES/EXERCISES  Pt receives PROM to R LE sup in bed (ankle DF, hip flex, hip add/abd, hip IR/ER)  x10-12 reps     EDUCATION  Patient provided with daily orientation and goals of this PT session. They were educated to call for assistance and to transfer with hospital staff only.  Also, pt was educated on the effects of prolonged immobility and the importance of performing OOB activity and exercises to promote healing and reduce recovery time    Whiteboard updated with correct mobility information. RN/PCT notified.  Pt requires max/total A to sit at the EOB.    Patient left supine, with  all lines intact, call button in reach, bed alarm on, restraints reapplied at end of session and RN notified    AM-PAC 6 CLICK MOBILITY  Turning over in bed (including adjusting bedclothes, sheets and blankets)?: 2  Sitting down on and standing up from a chair with arms (e.g., wheelchair, bedside commode, etc.): 1  Moving from lying on back to sitting on the side of the bed?: 2  Moving to and from a bed to a chair (including a wheelchair)?: 1  Need to walk in hospital room?: 1  Climbing 3-5 steps with a railing?: 1  Basic Mobility Total Score: 8     Assessment:     Chirag Thompson is a 79 y.o. male admitted with a medical diagnosis of Embolic stroke involving left middle cerebral artery.  He presents with the following impairments/functional limitations:  weakness, impaired endurance, impaired sensation, impaired self care skills, impaired functional mobilty, gait  instability, impaired balance, impaired cognition, decreased coordination, decreased upper extremity function, decreased lower extremity function, decreased safety awareness, pain, abnormal tone, decreased ROM, impaired coordination, impaired fine motor, impaired skin, edema, impaired joint extensibility. requiring significant assistance and verbal cues for bed mob, scooting to/along the EOB, static sitting at the EOB 2* weakness, decreased trunk/head control, cognitive deficits.   In light of pt's current functional level and deficits, it is anticipated that pt will need to participate in an intense rehab program consisting of PT and OT in order to achieve full rehab potential to return to previous level of function and roles.  Pt remains motivated to participate in PT session and will cont to benefit from skilled PT intervention.      Rehab Prognosis:  Fair; patient would benefit from acute skilled PT services to address these deficits and reach maximum level of function.      GOALS:   Multidisciplinary Problems     Physical Therapy Goals        Problem: Physical Therapy Goal    Goal Priority Disciplines Outcome Goal Variances Interventions   Physical Therapy Goal     PT, PT/OT Ongoing, Progressing     Description:  Goals to be met by: 2020     Patient will increase functional independence with mobility by performin. Supine to sit with Moderate Assistance  2. Sit to supine with Moderate Assistance  3. Sit to stand transfer with Moderate Assistance  4. Bed to chair transfer with Moderate Assistance using LRAD  5. Gait  x 15 feet with Moderate Assistance using LRAD.   6. Sitting at edge of bed x10 minutes with Contact Guard Assistance  7. Stand for 1 minute with Contact Guard Assistance using LRAD  8. Lower extremity exercise program x15 reps per handout, with assistance as needed                      Time Tracking:     PT Received On: 20  PT Start Time: 1203     PT Stop Time: 1235  PT Total Time  (min): 32 min     Billable Minutes: Therapeutic Activity 20 and Therapeutic Exercise 12    Treatment Type: Treatment  PT/PTA: PTA     PTA Visit Number: 1       Gisselle Truong PTA.  Pager [phone]    1/20/2020    .

## 2020-01-21 NOTE — PT/OT/SLP PROGRESS
Occupational Therapy   Treatment    Name: Chirag Thompson  MRN: [id number]  Admitting Diagnosis:  Embolic stroke involving left middle cerebral artery       Recommendations:     Discharge Recommendations: nursing facility, skilled  Discharge Equipment Recommendations:  (TBD at next level of care)  Barriers to discharge:  (Patient requires skilled assist.)    Assessment:     Chirag Thompson is a 79 y.o. male with a medical diagnosis of Embolic stroke involving left middle cerebral artery. He presents with the following performance deficits affecting function: weakness, impaired endurance, impaired self care skills, impaired sensation, impaired functional mobilty, gait instability, impaired balance, visual deficits, impaired cognition, decreased upper extremity function, decreased lower extremity function, decreased ROM, edema, decreased safety awareness, impaired coordination, impaired cardiopulmonary response to activity. Patient's performance in session limited by cognitive impairments, aphasia, R neglect, R hemiplegia, and overall weakness. Patient continues to require significant assist of 2 people for bed mobility and self care tasks. At this time, patient will continue to benefit from acute skilled therapy intervention to address deficits/underlying impairments and progress towards prior level of function. After discharge, patient would benefit from continued skilled therapy intervention at SNF to progress more towards independence in ADLs and functional mobility before going home.    Rehab Prognosis:  Good/Fair; patient would benefit from acute skilled OT services to address these deficits and reach maximum level of function.       Plan:     Patient to be seen 3 x/week to address the above listed problems via self-care/home management, therapeutic activities, therapeutic exercises, neuromuscular re-education  · Plan of Care Expires: 02/13/20  · Plan of Care Reviewed with: patient    Subjective     Patient unable to  report pain but expressed pain when therapist provided gentle stretch to R LE.    Pain/Comfort:  · Pain Rating 1: 0/10    Objective:     Communicated with: RN prior to session. Patient found HOB elevated with Condom Catheter, oxygen, SCD, L UE restraint upon OT entry to room. Rehab tech present for session.    General Precautions: Standard, fall   Orthopedic Precautions:N/A   Braces: N/A     Occupational Performance:     Bed Mobility:    · Patient completed Rolling/Turning to Left with  total assistance and 2 persons  · Patient completed Rolling/Turning to Right with total assistance and 2 persons  · Patient completed Scooting/Bridging with total assistance and 2 persons  · Patient completed Supine to Sit with total assistance and 2 persons  · Patient completed Sit to Supine with total assistance and 2 persons     Functional Mobility:  · Sit <> stand not appropriate at this time  · Patient sat EOB ~8 min with assist ranging from brief periods of CGA to max assist  · Patient unable to keep R LE on floor 2* knee contraction  · Therapist assisted with placement of R UE to assist with balance and allow for weight bearing  · Patient presented with forward head and rounded shoulders requiring max verbal and tactile cues to hold up head.    Activities of Daily Living:  · Grooming: total assistance to wash face    [facility] 6 Click ADL: 6    Treatment & Education:   Therapist provided facilitation and instruction of proper body mechanics, energy conservation, and fall prevention strategies during tasks listed above.   Patient presented with L visual gaze and R neglect throughout session. Patient participated in visual scanning/tracking task and was unable to track past midline this session.   Provided PROM to R UE shoulder and elbow and positioned with pillows for edema management and joint protection.   Provided gentle stretch to R LE to inhibit flexor tone and to improve positioning in bed.   Instructed patient to use  call light to have nursing staff assist with transfers.    Educated patient on OT POC and answered all questions within OT scope of practice.   Whiteboard updated     Patient left HOB elevated with all lines intact, call button in reach, bed alarm on and L UE restraint reapplied at end of sessionEducation:      GOALS:   Multidisciplinary Problems     Occupational Therapy Goals        Problem: Occupational Therapy Goal    Goal Priority Disciplines Outcome Interventions   Occupational Therapy Goal     OT, PT/OT Ongoing, Progressing    Description:  Goals to be met by: 1/28     Patient will increase functional independence with ADLs by performing:    UE Dressing while seated EOB with Moderate Assistance.  Grooming while EOB with Moderate Assistance.  Sitting at edge of bed x10 minutes with Moderate Assistance.  Rolling to Bilateral with Moderate Assistance and use of bedrail as needed.   Supine to sit with Moderate Assistance.  Squat pivot transfers with Maximum Assistance.                      Time Tracking:     OT Date of Treatment: 01/21/20  OT Start Time: 0909  OT Stop Time: 0940  OT Total Time (min): 31 min    Billable Minutes:Neuromuscular Re-education 31    Glo Tracy OT  1/21/2020

## 2020-01-21 NOTE — NURSING
At 02:20, while rounding on my patients, I walked into this patient's room while he was attempting to pull out his NG tube. I attempted to redirect patient, but he pulled out his NG tube by the time I got to his bedside. I then began to look through PRN orders to see if there was any medication for agitation/restlessness when I saw the PRN barium on the MAR with instructions to give at 23:30. Stroke provider on call notified; given orders to place restraints, replace NG tube and get KUB, and then to administer barium through NG tube.     Myself and two other nurses attempted to insert a new NG tube; met heavy resistance followed by profuse nosebleed. Stroke provider on call notified about failed attempts to insert NG tube; instructed to give patient a break and re-attempt. WCTM. Will re-attempt NGT at a later time.

## 2020-01-21 NOTE — PT/OT/SLP PROGRESS
Physical Therapy Treatment    Patient Name:  Chirag Thompson   MRN:  [id number]  Admitting Diagnosis: Embolic stroke involving left middle cerebral artery  Recent Surgery: * No surgery found *      Recommendations:     Discharge Recommendations:  nursing facility, skilled   Discharge Equipment Recommendations: (TBD)   Barriers to discharge: Inaccessible home and Decreased caregiver support  Pt requiring skilled assistance at current time.     Plan:     During this hospitalization, patient to be seen 4 x/week to address the above listed problems via gait training, therapeutic activities, therapeutic exercises, neuromuscular re-education  · Plan of Care Expires:  02/11/20   Plan of Care Reviewed with: patient    This Plan of care has been discussed with the patient who was involved in its development and understands and is in agreement with the identified goals and treatment plan    Subjective     Communicated with RN (Yang) prior to session.     Patient comments: Pt with expressive aphasia  Pain/Comfort:  · Pain Rating 1: 0/10  · Location - Side 1: Right  · Location - Orientation 1: generalized  · Location 1: leg(during ex's)  · Pain Addressed 1: Reposition, Distraction, Cessation of Activity  · Pain Rating Post-Intervention 1: (no rating provided)    Objective:     Patient found with: bed alarm, Condom Catheter, NG tube, oxygen, pressure relief boots, restraints, telemetry    Patient found sup in bed upon PT entry to room, agreeable to treatment.  No family present in the room.    General Precautions: Standard, aphasia, aspiration, fall, NPO   Orthopedic Precautions:N/A   Braces: N/A     Pt required assistance with change of pads and pericare 2* being soiled with BM    BED MOBILITY (vc's for hand placement sequencing of task):        Rolling to the R:  Max A.       Rolling to the L:  Total A.        Sup > sit at the EOB:  NP this session 2* pt being soiled         THERAPEUTIC ACTIVITIES/EXERCISES  Pt receives PROM  to R LE sup in bed (ankle DF, hip flex, hip add/abd, hip IR/ER)  x12 reps     EDUCATION  Patient provided with daily orientation and goals of this PT session. They were educated to call for assistance and to transfer with hospital staff only.  Also, pt was educated on the effects of prolonged immobility and the importance of performing OOB activity and exercises to promote healing and reduce recovery time    Whiteboard updated with correct mobility information. RN/PCT notified.  Pt requires max/total A to sit at the EOB    Patient left sup in bed with HOB elevated, with  all lines intact, call button in reach, bed alarm on, restraints reapplied at end of session and RN notified    AM-PAC 6 CLICK MOBILITY  Turning over in bed (including adjusting bedclothes, sheets and blankets)?: 2  Sitting down on and standing up from a chair with arms (e.g., wheelchair, bedside commode, etc.): 1  Moving from lying on back to sitting on the side of the bed?: 2  Moving to and from a bed to a chair (including a wheelchair)?: 1  Need to walk in hospital room?: 1  Climbing 3-5 steps with a railing?: 1  Basic Mobility Total Score: 8     Assessment:     Chirag Thompson is a 79 y.o. male admitted with a medical diagnosis of Embolic stroke involving left middle cerebral artery.  He presents with the following impairments/functional limitations:  weakness, impaired endurance, impaired sensation, impaired self care skills, impaired functional mobilty, gait instability, impaired balance, visual deficits, impaired cognition, decreased coordination, decreased upper extremity function, decreased lower extremity function, decreased safety awareness, pain, abnormal tone, decreased ROM, impaired coordination, impaired fine motor. R hemiparesis requiring significant assistance and verbal cues for bed mob, scooting to/along the EOB, static sitting at the EOB 2* weakness, limited ROM, pain and cognitive deficits.   In light of pt's current functional  level and deficits, it is anticipated that pt will need to participate in an intense rehab program consisting of PT, OT and ST in order to achieve full rehab potential to return to previous level of function and roles.  Pt will cont to benefit from skilled PT intervention to address deficits and improve functional mobility.     Rehab Prognosis:  Fair; patient would benefit from acute skilled PT services to address these deficits and reach maximum level of function.      GOALS:   Multidisciplinary Problems     Physical Therapy Goals        Problem: Physical Therapy Goal    Goal Priority Disciplines Outcome Goal Variances Interventions   Physical Therapy Goal     PT, PT/OT Ongoing, Progressing     Description:  Goals to be met by: 2020     Patient will increase functional independence with mobility by performin. Supine to sit with Moderate Assistance  2. Sit to supine with Moderate Assistance  3. Sit to stand transfer with Moderate Assistance  4. Bed to chair transfer with Moderate Assistance using LRAD  5. Gait  x 15 feet with Moderate Assistance using LRAD.   6. Sitting at edge of bed x10 minutes with Contact Guard Assistance  7. Stand for 1 minute with Contact Guard Assistance using LRAD  8. Lower extremity exercise program x15 reps per handout, with assistance as needed                      Time Tracking:     PT Received On: 20  PT Start Time: 1157     PT Stop Time: 1224  PT Total Time (min): 27 min     Billable Minutes: Therapeutic Activity 17 and Therapeutic Exercise 10    Treatment Type: Treatment  PT/PTA: PTA     PTA Visit Number: 2       Gisselle Truong PTA.  Pager [phone]    2020    .

## 2020-01-21 NOTE — PLAN OF CARE
Problem: SLP Goal  Goal: SLP Goal  Description  Goals due 1/21  1.  Pt. Will participate in ongoing assessment of swallow at bedside  2.  Pt. Will participate in speech language eval /met  3.  Model simple commands with 100% accuracy  4.  Respond to simple yes/no questions with 70% accuracy  5.  Complete single words on au tomatic speech tasks with 50% accuracy   Outcome: Ongoing, Progressing     Patient seen for a modified barium swallow study. SLP recommending continued NPO with alternate means of nutrition/hydration/medication at this time.     Isai Champagne CCC-SLP  Speech-Language Pathology  Pager: [phone]

## 2020-01-21 NOTE — NURSING
I spoke to [facility] Stroke team he was not aware the patient did not have a NGT placed or barium for pending Peg placement. He would like the patient to have the NGT placed at this time and he will notify IR of the patient status.

## 2020-01-21 NOTE — SUBJECTIVE & OBJECTIVE
Interval History 1/21/2020:  Patient is seen for follow-up rehab evaluation and recommendations: Lower level with therapy. LUE restraint in place.    HPI, Past Medical, Family, and Social History remains the same as documented in the initial encounter.    Scheduled Medications:    albuterol-ipratropium  3 mL Nebulization Q6H WAKE    atorvastatin  40 mg Per NG tube Daily    barium  450 mL Per NG tube Once    cefTRIAXone (ROCEPHIN) IVPB  1 g Intravenous Q24H    levothyroxine  75 mcg Per NG tube Before breakfast    metoprolol tartrate  25 mg Per NG tube BID    senna-docusate 8.6-50 mg  1 tablet Per NG tube BID       Diagnostic Results: Labs: Reviewed  ECG: Reviewed  CT: Reviewed    PRN Medications: acetaminophen, Dextrose 10% Bolus, glucagon (human recombinant), glucose, glucose, insulin aspart U-100, ondansetron, QUEtiapine, sodium chloride 0.9%    Review of Systems   Reason unable to perform ROS: Aphasia      Objective:     Vital Signs (Most Recent):  Temp: 97.8 °F (36.6 °C) (01/21/20 0750)  Pulse: 95 (01/21/20 0750)  Resp: 20 (01/21/20 0750)  BP: (!) 174/88 (01/21/20 0750)  SpO2: 96 % (01/21/20 0750)    Vital Signs (24h Range):  Temp:  [95.9 °F (35.5 °C)-98.9 °F (37.2 °C)] 97.8 °F (36.6 °C)  Pulse:  [72-99] 95  Resp:  [18-20] 20  SpO2:  [94 %-99 %] 96 %  BP: (136-176)/(76-93) 174/88     Physical Exam   Constitutional: He appears well-developed and well-nourished.   HENT:   Head: Normocephalic and atraumatic.   Neck: Neck supple.   Pulmonary/Chest: Effort normal. No respiratory distress.   Abdominal:   NG tube in place   Musculoskeletal: He exhibits no deformity.   Neurological:   - LUE restraint in place  - Not following commands  - Aphasia   Skin: Skin is warm and dry.   Nursing note and vitals reviewed.

## 2020-01-21 NOTE — ASSESSMENT & PLAN NOTE
80 yo man with history of HTN, a fib (on Coumadin) and hypothyroidism. Patient has been admitted to [facility] for left temp partieal infarct on 1/10. On 1/14 patient with worsening of deficits: aphasia, right side plegia and not following commands. Telestroke consult was completed by Dr. Olivares, no tpa given as INR was 2.0. Patient transferred to [facility] ED for stat CTA stroke MP.  Positive for LVO, L M1 occlusion. Patient not candidate for intervention d/t large core infarct. Patient was admitted to [facility] for close monitoring.  Coumadin held d/t size of stroke. Will switch to DOAC and start after PEG placement.     SLP reevaluation on 1/21 improved, plan for MBSS today.    Plan for PEG placement on 1/22 with IR (delayed due to problems placing NG) if MBSS failed.     Antithrombotics: currently held, to start after PEG placement on 1/21, plan to start DOAC      Statins: Lipitor 40 mg daily    Aggressive risk factor modification: A-Fib, HTN     Rehab efforts: The patient has been evaluated by a stroke team provider and the therapy needs have been fully considered based off the presenting complaints and exam findings. The following therapy evaluations are needed: PT evaluate and treat, OT evaluate and treat, SLP evaluate and treat, PM&R evaluate for appropriate placement    Diagnostics ordered/pending: None     VTE prophylaxis: None: Hold pharmacologic prophylaxis in setting of large infarct. SCDs.    BP parameters: Infarct: No intervention, SBP <220

## 2020-01-21 NOTE — PLAN OF CARE
Problem: Physical Therapy Goal  Goal: Physical Therapy Goal  Description  Goals to be met by: 2020     Patient will increase functional independence with mobility by performin. Supine to sit with Moderate Assistance  2. Sit to supine with Moderate Assistance  3. Sit to stand transfer with Moderate Assistance  4. Bed to chair transfer with Moderate Assistance using LRAD  5. Gait  x 15 feet with Moderate Assistance using LRAD.   6. Sitting at edge of bed x10 minutes with Contact Guard Assistance  7. Stand for 1 minute with Contact Guard Assistance using LRAD  8. Lower extremity exercise program x15 reps per handout, with assistance as needed     Outcome: Ongoing, Progressing      Discharge Recommendations: SNF    Pt requires max/total A to sit at the EOB.    Goals remain appropriate.     Gisselle Truong, PTA.   [phone]   2020

## 2020-01-22 LAB
ALBUMIN SERPL BCP-MCNC: 2.6 G/DL (ref 3.5–5.2)
ALP SERPL-CCNC: 101 U/L (ref 55–135)
ALT SERPL W/O P-5'-P-CCNC: 47 U/L (ref 10–44)
ANION GAP SERPL CALC-SCNC: 11 MMOL/L (ref 8–16)
AST SERPL-CCNC: 50 U/L (ref 10–40)
BASOPHILS # BLD AUTO: 0.05 K/UL (ref 0–0.2)
BASOPHILS NFR BLD: 0.5 % (ref 0–1.9)
BILIRUB SERPL-MCNC: 1.8 MG/DL (ref 0.1–1)
BUN SERPL-MCNC: 38 MG/DL (ref 8–23)
CALCIUM SERPL-MCNC: 9.2 MG/DL (ref 8.7–10.5)
CHLORIDE SERPL-SCNC: 107 MMOL/L (ref 95–110)
CO2 SERPL-SCNC: 24 MMOL/L (ref 23–29)
CREAT SERPL-MCNC: 0.8 MG/DL (ref 0.5–1.4)
DIFFERENTIAL METHOD: ABNORMAL
EOSINOPHIL # BLD AUTO: 0.3 K/UL (ref 0–0.5)
EOSINOPHIL NFR BLD: 2.9 % (ref 0–8)
ERYTHROCYTE [DISTWIDTH] IN BLOOD BY AUTOMATED COUNT: 14.7 % (ref 11.5–14.5)
EST. GFR  (AFRICAN AMERICAN): >60 ML/MIN/1.73 M^2
EST. GFR  (NON AFRICAN AMERICAN): >60 ML/MIN/1.73 M^2
GLUCOSE SERPL-MCNC: 110 MG/DL (ref 70–110)
HCT VFR BLD AUTO: 42.8 % (ref 40–54)
HGB BLD-MCNC: 13.5 G/DL (ref 14–18)
IMM GRANULOCYTES # BLD AUTO: 0.16 K/UL (ref 0–0.04)
IMM GRANULOCYTES NFR BLD AUTO: 1.6 % (ref 0–0.5)
INR PPP: 1.3 (ref 0.8–1.2)
LYMPHOCYTES # BLD AUTO: 0.7 K/UL (ref 1–4.8)
LYMPHOCYTES NFR BLD: 6.6 % (ref 18–48)
MAGNESIUM SERPL-MCNC: 1.8 MG/DL (ref 1.6–2.6)
MCH RBC QN AUTO: 32.9 PG (ref 27–31)
MCHC RBC AUTO-ENTMCNC: 31.5 G/DL (ref 32–36)
MCV RBC AUTO: 104 FL (ref 82–98)
MONOCYTES # BLD AUTO: 1.3 K/UL (ref 0.3–1)
MONOCYTES NFR BLD: 13.1 % (ref 4–15)
NEUTROPHILS # BLD AUTO: 7.4 K/UL (ref 1.8–7.7)
NEUTROPHILS NFR BLD: 75.3 % (ref 38–73)
NRBC BLD-RTO: 0 /100 WBC
PHOSPHATE SERPL-MCNC: 3 MG/DL (ref 2.7–4.5)
PLATELET # BLD AUTO: 154 K/UL (ref 150–350)
PMV BLD AUTO: 11.3 FL (ref 9.2–12.9)
POCT GLUCOSE: 103 MG/DL (ref 70–110)
POCT GLUCOSE: 106 MG/DL (ref 70–110)
POCT GLUCOSE: 107 MG/DL (ref 70–110)
POCT GLUCOSE: 108 MG/DL (ref 70–110)
POCT GLUCOSE: 118 MG/DL (ref 70–110)
POTASSIUM SERPL-SCNC: 4.4 MMOL/L (ref 3.5–5.1)
PROT SERPL-MCNC: 6 G/DL (ref 6–8.4)
PROTHROMBIN TIME: 12.6 SEC (ref 9–12.5)
RBC # BLD AUTO: 4.1 M/UL (ref 4.6–6.2)
SODIUM SERPL-SCNC: 142 MMOL/L (ref 136–145)
WBC # BLD AUTO: 9.81 K/UL (ref 3.9–12.7)

## 2020-01-22 PROCEDURE — 99233 SBSQ HOSP IP/OBS HIGH 50: CPT | Mod: GC,,, | Performed by: PSYCHIATRY & NEUROLOGY

## 2020-01-22 PROCEDURE — 84100 ASSAY OF PHOSPHORUS: CPT

## 2020-01-22 PROCEDURE — 63600175 PHARM REV CODE 636 W HCPCS: Performed by: STUDENT IN AN ORGANIZED HEALTH CARE EDUCATION/TRAINING PROGRAM

## 2020-01-22 PROCEDURE — 83735 ASSAY OF MAGNESIUM: CPT

## 2020-01-22 PROCEDURE — 94668 MNPJ CHEST WALL SBSQ: CPT

## 2020-01-22 PROCEDURE — 63600175 PHARM REV CODE 636 W HCPCS: Performed by: NURSE PRACTITIONER

## 2020-01-22 PROCEDURE — 85610 PROTHROMBIN TIME: CPT

## 2020-01-22 PROCEDURE — 99232 SBSQ HOSP IP/OBS MODERATE 35: CPT | Mod: ,,, | Performed by: NURSE PRACTITIONER

## 2020-01-22 PROCEDURE — 27000221 HC OXYGEN, UP TO 24 HOURS

## 2020-01-22 PROCEDURE — 85025 COMPLETE CBC W/AUTO DIFF WBC: CPT

## 2020-01-22 PROCEDURE — 99233 PR SUBSEQUENT HOSPITAL CARE,LEVL III: ICD-10-PCS | Mod: GC,,, | Performed by: PSYCHIATRY & NEUROLOGY

## 2020-01-22 PROCEDURE — 97803 MED NUTRITION INDIV SUBSEQ: CPT

## 2020-01-22 PROCEDURE — 99222 PR INITIAL HOSPITAL CARE,LEVL II: ICD-10-PCS | Mod: ,,, | Performed by: SURGERY

## 2020-01-22 PROCEDURE — 25000003 PHARM REV CODE 250: Performed by: PHYSICIAN ASSISTANT

## 2020-01-22 PROCEDURE — 36415 COLL VENOUS BLD VENIPUNCTURE: CPT

## 2020-01-22 PROCEDURE — 92507 TX SP LANG VOICE COMM INDIV: CPT

## 2020-01-22 PROCEDURE — 94761 N-INVAS EAR/PLS OXIMETRY MLT: CPT

## 2020-01-22 PROCEDURE — 25000003 PHARM REV CODE 250: Performed by: NURSE PRACTITIONER

## 2020-01-22 PROCEDURE — 25000242 PHARM REV CODE 250 ALT 637 W/ HCPCS: Performed by: NURSE PRACTITIONER

## 2020-01-22 PROCEDURE — 99232 PR SUBSEQUENT HOSPITAL CARE,LEVL II: ICD-10-PCS | Mod: ,,, | Performed by: NURSE PRACTITIONER

## 2020-01-22 PROCEDURE — 97110 THERAPEUTIC EXERCISES: CPT

## 2020-01-22 PROCEDURE — 11000001 HC ACUTE MED/SURG PRIVATE ROOM

## 2020-01-22 PROCEDURE — 97530 THERAPEUTIC ACTIVITIES: CPT

## 2020-01-22 PROCEDURE — 99222 1ST HOSP IP/OBS MODERATE 55: CPT | Mod: ,,, | Performed by: SURGERY

## 2020-01-22 PROCEDURE — 94640 AIRWAY INHALATION TREATMENT: CPT

## 2020-01-22 PROCEDURE — 80053 COMPREHEN METABOLIC PANEL: CPT

## 2020-01-22 RX ORDER — SODIUM CHLORIDE 9 MG/ML
INJECTION, SOLUTION INTRAVENOUS CONTINUOUS
Status: DISCONTINUED | OUTPATIENT
Start: 2020-01-22 | End: 2020-01-25

## 2020-01-22 RX ORDER — SODIUM CHLORIDE 9 MG/ML
INJECTION, SOLUTION INTRAVENOUS CONTINUOUS
Status: DISCONTINUED | OUTPATIENT
Start: 2020-01-22 | End: 2020-01-22

## 2020-01-22 RX ORDER — METOPROLOL TARTRATE 1 MG/ML
5 INJECTION, SOLUTION INTRAVENOUS ONCE
Status: COMPLETED | OUTPATIENT
Start: 2020-01-22 | End: 2020-01-22

## 2020-01-22 RX ADMIN — CEFTRIAXONE SODIUM 1 G: 1 INJECTION, POWDER, FOR SOLUTION INTRAMUSCULAR; INTRAVENOUS at 12:01

## 2020-01-22 RX ADMIN — METOPROLOL TARTRATE 5 MG: 5 INJECTION INTRAVENOUS at 10:01

## 2020-01-22 RX ADMIN — SODIUM CHLORIDE: 0.9 INJECTION, SOLUTION INTRAVENOUS at 10:01

## 2020-01-22 RX ADMIN — IPRATROPIUM BROMIDE AND ALBUTEROL SULFATE 3 ML: .5; 3 SOLUTION RESPIRATORY (INHALATION) at 02:01

## 2020-01-22 RX ADMIN — IPRATROPIUM BROMIDE AND ALBUTEROL SULFATE 3 ML: .5; 3 SOLUTION RESPIRATORY (INHALATION) at 08:01

## 2020-01-22 RX ADMIN — IPRATROPIUM BROMIDE AND ALBUTEROL SULFATE 3 ML: .5; 3 SOLUTION RESPIRATORY (INHALATION) at 07:01

## 2020-01-22 RX ADMIN — CEFTRIAXONE SODIUM 1 G: 1 INJECTION, POWDER, FOR SOLUTION INTRAMUSCULAR; INTRAVENOUS at 11:01

## 2020-01-22 NOTE — PLAN OF CARE
Problem: Oral Intake Inadequate  Goal: Improved Oral Intake  Outcome: Ongoing, Progressing     Recommend:  1. When medically able, restart the TF of Isosource 1.5, goal rate of 60mLhr providing 2160 Kcal, 98g Prot, and 1100mL free water. 2. RD will continue to follow.  Goals: Pt to tolerate TF by RD follow up  Nutrition Goal Status: new

## 2020-01-22 NOTE — SUBJECTIVE & OBJECTIVE
Neurologic Chief Complaint: Right sided weakness    Subjective:     Interval History: Patient is seen for follow-up neurological assessment and treatment recommendations:     Pt pulled NG overnight despite restraints. Unable to place PEG tube with IR. Gen Surg consulted for assistance, plan for PEG tomorrow. Will start DOAC post procedure.     HPI, Past Medical, Family, and Social History remains the same as documented in the initial encounter.     Review of Systems   Constitutional: Negative for fever.   HENT: Positive for drooling and trouble swallowing.    Eyes: Negative for visual disturbance.   Respiratory: Positive for cough.    Gastrointestinal: Negative for diarrhea and vomiting.   Musculoskeletal: Positive for gait problem.   Neurological: Positive for facial asymmetry, speech difficulty and weakness.   Psychiatric/Behavioral: Positive for confusion and decreased concentration.     Scheduled Meds:   albuterol-ipratropium  3 mL Nebulization Q6H WAKE    atorvastatin  40 mg Per NG tube Daily    barium  450 mL Per NG tube Once    cefTRIAXone (ROCEPHIN) IVPB  1 g Intravenous Q24H    levothyroxine  75 mcg Per NG tube Before breakfast    metoprolol tartrate  25 mg Per NG tube BID    senna-docusate 8.6-50 mg  1 tablet Per NG tube BID     Continuous Infusions:   sodium chloride 0.9% 100 mL/hr at 01/22/20 1035     PRN Meds:acetaminophen, Dextrose 10% Bolus, glucagon (human recombinant), glucose, glucose, insulin aspart U-100, ondansetron, QUEtiapine, sodium chloride 0.9%    Objective:     Vital Signs (Most Recent):  Temp: 98.6 °F (37 °C) (01/22/20 1051)  Pulse: 98 (01/22/20 1200)  Resp: 20 (01/22/20 1051)  BP: 121/82 (01/22/20 1051)  SpO2: 98 % (01/22/20 1051)  BP Location: Right arm    Vital Signs Range (Last 24H):  Temp:  [97.3 °F (36.3 °C)-99.3 °F (37.4 °C)]   Pulse:  []   Resp:  [17-25]   BP: (121-146)/(74-87)   SpO2:  [94 %-98 %]   BP Location: Right arm    Physical Exam   Constitutional: He  appears well-developed and well-nourished.   HENT:   Head: Normocephalic and atraumatic.   Eyes: Pupils are equal, round, and reactive to light.   Left gaze preference    Neck: Normal range of motion.   Cardiovascular: Normal rate.   Pulmonary/Chest: Effort normal. He has rhonchi.   Abdominal: Soft.   Skin: Skin is warm and dry.   Nursing note and vitals reviewed.      Neurological Exam:   LOC: alert  Attention Span: poor  Language: Expressive aphasia, Receptive aphasia  Articulation: Dysarthria  Orientation: Untestable due to severe aphasia   Visual Fields: Full  EOM (CN III, IV, VI): Gaze preference  left  Pupils (CN II, III): PERRL  Facial Movement (CN VII): Lower facial weakness on the Right  Motor: Arm left  Paresis: 4/5  Leg left  Paresis: 4/5  Arm right  Plegia 0/5  Leg right Paresis: 1/5  Cebellar: No evidence of appendicular or axial ataxia  Tone: Flaccid  RUE    Laboratory:  CMP:   Recent Labs   Lab 01/22/20  0439   CALCIUM 9.2   ALBUMIN 2.6*   PROT 6.0      K 4.4   CO2 24      BUN 38*   CREATININE 0.8   ALKPHOS 101   ALT 47*   AST 50*   BILITOT 1.8*     CBC:   Recent Labs   Lab 01/22/20 0439   WBC 9.81   RBC 4.10*   HGB 13.5*   HCT 42.8      *   MCH 32.9*   MCHC 31.5*     Lipid Panel:   No results for input(s): CHOL, LDLCALC, HDL, TRIG in the last 168 hours.  Coagulation:   Recent Labs   Lab 01/22/20 0439   INR 1.3*     Hgb A1C:   No results for input(s): HGBA1C in the last 168 hours.  TSH:   No results for input(s): TSH in the last 168 hours.    Diagnostic Results     Brain Imaging   Brain Imaging   CT Head without 1/15 (07:43)  Evolving moderate-sized region of acute infarction in the left MCA territory as above.  Distribution grossly stable from recent MRI without significant infarct extension.  No hemorrhagic conversion or new territorial infarct.     MRI Brain 1/14 (04:28)  There is interval change, there is interval development of acute ischemia/infarct along the left MCA  distribution, this is superimposed on subacute ischemic change seen on the recent MRI examination  Findings concerning for occlusion of the M2 segment of the left MCA as above.    Additional chronic changes are noted.    Vessel Imaging   CTA Head and Neck 1/14 (02:43)  As on the recent brain MRA examination there is appearance of attenuation of the left MCA branch vessels without evidence for high-grade stenosis or occlusion and otherwise there is no evidence for high-grade stenosis or occlusion of the major arterial vascular structures of the head or neck.    There is no evidence for intracranial aneurysm or AVM.    Findings consistent with acute to subacute left parietal ischemic change again noted.    Small thyroid nodules or cysts.    Cardiac Imaging  TTE 1/11  · Normal left ventricular systolic function. The estimated ejection fraction is 55%.  · Mid anteroseptal and apical moderate hypokinesia.  · Diastolic pattern consistent with atrial fibrillation observed.  · Severe left atrial enlargement.  · Normal right ventricular systolic function.  · Severe right atrial enlargement.  · Mild-to-moderate mitral regurgitation.  · Mild tricuspid regurgitation.  · The estimated PA systolic pressure is 40 mmHg.  Intermediate central venous pressure (8 mmHg).

## 2020-01-22 NOTE — PT/OT/SLP PROGRESS
Speech Language Pathology Treatment    Patient Name:  Chirag Thompson   MRN:  [id number]  Admitting Diagnosis: Embolic stroke involving left middle cerebral artery    Recommendations:                 General Recommendations:  Dysphagia therapy and Speech/language therapy  Diet recommendations:  NPO, Liquid Diet Level: NPO   Aspiration Precautions: Strict aspiration precautions   General Precautions: Standard, aspiration, fall, NPO  Communication strategies:  none    Subjective     No family present  Patient goals: matthieu  Pain/Comfort:  · Pain Rating 1: 0/10  · Pain Rating Post-Intervention 1: 0/10    Objective:     Has the patient been evaluated by SLP for swallowing?   Yes  Keep patient NPO? Yes   Current Respiratory Status: room air        Pt. Seen at bedside and was alert/attending to his name when called..  Pt. Scheduled for peg tube placement today however unable to place ng tube again after pulled..  Receptively, he modelled 1/5 simple commands and did not follow any simple commands.  Responses to simple yes/no questions were undifferentiated vocalizations despite cues to produce head nod.  Expressively, pt. Counted to 10 in unison with therapist with 40% accuracy and produced unifferentiated vocalizations when asked to completed automatic sentences.  When asked to sing simple song, approximately 25% of words were intelligible.  Education provided re poc.       Assessment:     Chirag Thompson is a 79 y.o. male with an SLP diagnosis of Aphasia and Dysphagia.      Goals:   Multidisciplinary Problems     SLP Goals        Problem: SLP Goal    Goal Priority Disciplines Outcome   SLP Goal     SLP Ongoing, Progressing   Description:  Goals due 1/28  1.  Pt. Will participate in ongoing assessment of swallow at bedside  2.  Pt. Will participate in speech language eval /met  3.  Model simple commands with 100% accuracy  4.  Respond to simple yes/no questions with 70% accuracy  5.  Complete single words on au tomatic speech  tasks with 50% accuracy                       Plan:     · Patient to be seen:  4 x/week   · Plan of Care expires:  02/12/20  · Plan of Care reviewed with:  patient   · SLP Follow-Up:  Yes       Discharge recommendations:  nursing facility, skilled   Barriers to Discharge:  Level of Skilled Assistance Needed 24 hour    Time Tracking:     SLP Treatment Date:   01/22/20  Speech Start Time:  1030  Speech Stop Time:  1042     Speech Total Time (min):  12 min    Billable Minutes: Speech Therapy Individual 12    Julia Spann MA, CCC-SLP  01/22/2020

## 2020-01-22 NOTE — PLAN OF CARE
Problem: SLP Goal  Goal: SLP Goal  Description  Goals due 1/28  1.  Pt. Will participate in ongoing assessment of swallow at bedside  2.  Pt. Will participate in speech language eval /met  3.  Model simple commands with 100% accuracy  4.  Respond to simple yes/no questions with 70% accuracy  5.  Complete single words on au tomatic speech tasks with 50% accuracy     Outcome: Ongoing, Progressing

## 2020-01-22 NOTE — PT/OT/SLP PROGRESS
Occupational Therapy   Treatment    Name: Chirag Thompson  MRN: [id number]  Admitting Diagnosis:  Embolic stroke involving left middle cerebral artery       Recommendations:     Discharge Recommendations: nursing facility, skilled  Discharge Equipment Recommendations:  none  Barriers to discharge:   will need stretcher transport    Assessment:     Chirag Thompson is a 79 y.o. male with a medical diagnosis of Embolic stroke involving left middle cerebral artery.  He presents with aphasia, weakness, abnormal tone, decreased attention. Performance deficits affecting function are weakness, impaired endurance, impaired self care skills, impaired functional mobilty, decreased safety awareness, decreased lower extremity function, decreased upper extremity function, impaired cardiopulmonary response to activity, impaired cognition, decreased coordination, pain, impaired fine motor.     Rehab Prognosis:  Good; patient would benefit from acute skilled OT services to address these deficits and reach maximum level of function.       Plan:     Patient to be seen 3 x/week to address the above listed problems via self-care/home management, therapeutic exercises, therapeutic activities, neuromuscular re-education  · Plan of Care Expires: 02/13/20  · Plan of Care Reviewed with: patient    Subjective     Pain/Comfort:  · Pain Rating 1: 0/10  · Location - Side 1: Right  · Location - Orientation 1: generalized  · Location 1: leg  · Pain Addressed 1: Reposition, Cessation of Activity  · Pain Rating Post-Intervention 1: 0/10    Objective:     Communicated with: RN prior to session.  Patient found supine with bed alarm, Condom Catheter, NG tube, oxygen, pulse ox (continuous), pressure relief boots, restraints, telemetry upon OT entry to room.    General Precautions: Standard, aspiration, fall   Orthopedic Precautions:N/A   Braces: N/A     Occupational Performance:     Bed Mobility:    · Patient completed Rolling/Turning to Left with  total  assistance  · Patient completed Rolling/Turning to Right with total assistance  · Patient completed Scooting/Bridging with total assistance  · Patient completed Supine to Sit with total assistance  · Patient completed Sit to Supine with total assistance     Functional Mobility/Transfers:  · Functional Mobility: Pt sat EOB with mod-max A for sitting balance. Not able to progress to standing. Suspect pt had dizziness, often looked back at pillow appeared to want to lay back down.     Activities of Daily Living:  · Feeding:  total assistance    · Grooming: total assistance    · Bathing: total assistance    · Upper Body Dressing: total assistance    · Lower Body Dressing: total assistance    · Toileting: total assistance        [facility] 6 Click ADL: 6    Treatment & Education:  · Pt educated on role of OT in acute care setting.   · Assisted with ADLs and functional mobility with assist levels noted above  · PROM provided UE, LE and gentle cervical ROM.  Provided massage to R upper trapezius and posterior neck/head in preparation for ROM.   · Pt was positioned in L sidelying using hemiplegic postioning techniques. Requested waffle mattress and turning schedule to be placed above bed. Both present at the end of session. Applied heel protector boots and L UE restraint.     Patient left left sidelying with call button in reach, restraints reapplied at end of session and  RN notifiedEducation:      GOALS:   Multidisciplinary Problems     Occupational Therapy Goals        Problem: Occupational Therapy Goal    Goal Priority Disciplines Outcome Interventions   Occupational Therapy Goal     OT, PT/OT Ongoing, Progressing    Description:  Goals to be met by: 1/28     Patient will increase functional independence with ADLs by performing:    UE Dressing while seated EOB with Moderate Assistance.  Grooming while EOB with Moderate Assistance.  Sitting at edge of bed x10 minutes with Moderate Assistance.  Rolling to Bilateral with  Moderate Assistance and use of bedrail as needed.   Supine to sit with Moderate Assistance.  Squat pivot transfers with Maximum Assistance.   Pt will consistently be found positioned according to turning schedule to prevent skin breakdown.   Pt will have skin breakdown measures in place upon entry for 2 therapy sessions. (waffle mattress, heel protector boot, turning schedule)                       Time Tracking:     OT Date of Treatment: 01/22/20  OT Start Time: 1402  OT Stop Time: 1430  OT Total Time (min): 28 min    Billable Minutes:Therapeutic Activity 10  Therapeutic Exercise 18    KATELYN Gomez  1/22/2020

## 2020-01-22 NOTE — ASSESSMENT & PLAN NOTE
80 yo man with history of HTN, a fib (on Coumadin) and hypothyroidism. Patient has been admitted to [facility] for left temp partieal infarct on 1/10. On 1/14 patient with worsening of deficits: aphasia, right side plegia and not following commands. Telestroke consult was completed by Dr. Olivares, no tpa given as INR was 2.0. Patient transferred to [facility] ED for stat CTA stroke MP.  Positive for LVO, L M1 occlusion. Patient not candidate for intervention d/t large core infarct. Patient was admitted to [facility] for close monitoring.  Coumadin held d/t size of stroke. Will switch to DOAC and start after PEG placement.     SLP reevaluation on 1/21 improved, MBSS with signs of continued aspiration.     Plan for PEG placement on 1/23 with General Surgery (delayed due to problems placing NG and pt pulling NG)    Antithrombotics: currently held, to start DOAC after PEG       Statins: Lipitor 40 mg daily    Aggressive risk factor modification: A-Fib, HTN     Rehab efforts: The patient has been evaluated by a stroke team provider and the therapy needs have been fully considered based off the presenting complaints and exam findings. The following therapy evaluations are needed: PT evaluate and treat, OT evaluate and treat, SLP evaluate and treat, PM&R evaluate for appropriate placement    Diagnostics ordered/pending: None     VTE prophylaxis: None: Hold pharmacologic prophylaxis in setting of large infarct. SCDs.    BP parameters: Infarct: No intervention, SBP <220

## 2020-01-22 NOTE — NURSING
I walked into the patient room for rounding the patient was observed scooted down in bed with condom catheter in his hand and NGT was removed. The patient had blood to the left nare. Restraints readjusted at this time condom Catheter replaced. Patient cleaned and repositioned in bed.

## 2020-01-22 NOTE — PROGRESS NOTES
[facility]  Vascular Neurology  Comprehensive Stroke Center  Progress Note    Assessment/Plan:     * Embolic stroke involving left middle cerebral artery  78 yo man with history of HTN, a fib (on Coumadin) and hypothyroidism. Patient has been admitted to [facility] for left temp partieal infarct on 1/10. On 1/14 patient with worsening of deficits: aphasia, right side plegia and not following commands. Telestroke consult was completed by Dr. Olivares, no tpa given as INR was 2.0. Patient transferred to [facility] ED for stat CTA stroke MP.  Positive for LVO, L M1 occlusion. Patient not candidate for intervention d/t large core infarct. Patient was admitted to [facility] for close monitoring.  Coumadin held d/t size of stroke. Will switch to DOAC and start after PEG placement.     SLP reevaluation on 1/21 improved, MBSS with signs of continued aspiration.     Plan for PEG placement on 1/23 with General Surgery (delayed due to problems placing NG and pt pulling NG)    Antithrombotics: currently held, to start DOAC after PEG       Statins: Lipitor 40 mg daily    Aggressive risk factor modification: A-Fib, HTN     Rehab efforts: The patient has been evaluated by a stroke team provider and the therapy needs have been fully considered based off the presenting complaints and exam findings. The following therapy evaluations are needed: PT evaluate and treat, OT evaluate and treat, SLP evaluate and treat, PM&R evaluate for appropriate placement    Diagnostics ordered/pending: None     VTE prophylaxis: None: Hold pharmacologic prophylaxis in setting of large infarct. SCDs.    BP parameters: Infarct: No intervention, SBP <220    Chronic anticoagulation  Previously on coumadin, plan to transition to DOAC post PEG placement     Cytotoxic cerebral edema  Area of cytotoxic cerebral edema identified when reviewing brain imaging in the territory of the left middle cerebral artery. There is mass effect associated with it. We  will continue to monitor the patients clinical exam for any worsening of symptoms which may indicate expansion of the stroke or the area of the edema resulting in the clinical change. The pattern is suggestive of cardio embolic etiology.        Hypercoagulable state  Restart DOAC post PEG tube placement  Pt previously on Coumadin but subtherapeutic     Aphasia  Due to stroke  Aggressive therapy SLP    Right spastic hemiparesis  Due to stroke  Aggressive therapy with PT/OT    Atrial fibrillation  Stroke risk factor  Rate control  Will need anticoagulation for this when stable, holding in setting of large area of infarct.  Repeat CT on 1/18 stable. Will consider restarting AC after PEG placement.     Type 2 diabetes mellitus with circulatory disorder, without long-term current use of insulin  Stroke risk factor  HgB A1C 5.1  SSI       Essential hypertension  Stroke risk factor  SBP <220         Patient admitted to neurocritical care for Left MCA syndrome, severe aphasia following extension of prior stroke.  01/15/2020: Overnight concern for extensor posturing of right arm, emergent CT this AM showing stable area of infarct, no new hemorrhage or extension.  01/16/20: No acute events overnight, neurostatus unchanged  01/17/2020: No events overnight, patient received seroquel for agitation this AM. To remain in neuro ICU for closer monitoring  01/18/2020: Patient calm and alert during today's exam. Patient to step down to stroke primary service. Breath sounds course, CXR without significant change- will continue pulmonary tolieting.   1/19/20: Patient now with leukocytosis. Repeat CBC pending. CXR with pneumonitis. Rocephin ordered.  1/20/20: CT abd/pelvis for PEG planning, scheduled for tomorrow AM with IR. Plan to restart AC (Heparin vs DOAC) post procedure  1/21/20: Issues with placing NG overnight, PEG deferred to tomorrow as pt unable to get barium. SLP re-gurinder, MBSS today  1/22/20: NG pulled overnight, gen surgery  consulted with plan for PEG tomorrow     STROKE DOCUMENTATION   Acute Stroke Times   Last Known Normal Date: 01/13/20  Last Known Normal Time: 2315  Symptom Onset Date: 01/14/20  Symptom Onset Time: 0050  Stroke Team Called Date: 01/14/20  Stroke Team Called Time: 0105  Stroke Team Arrival Date: 01/14/20  Stroke Team Arrival Time: 0355  CT Interpretation Time: 0100    NIH Scale:  1a. Level of Consciousness: 0-->Alert, keenly responsive  1b. LOC Questions: 2-->Answers neither question correctly  1c. LOC Commands: 2-->Performs neither task correctly  2. Best Gaze: 1-->Partial gaze palsy, gaze is abnormal in one or both eyes, but forced deviation or total gaze paresis is not present  3. Visual: 0-->No visual loss  4. Facial Palsy: 1-->Minor paralysis (flattened nasolabial fold, asymmetry on smiling)  5a. Motor Arm, Left: 1-->Drift, limb holds 90 (or 45) degrees, but drifts down before full 10 seconds, does not hit bed or other support  5b. Motor Arm, Right: 4-->No movement  6a. Motor Leg, Left: 3-->No effort against gravity, leg falls to bed immediately  6b. Motor Leg, Right: 3-->No effort against gravity, leg falls to bed immediately  7. Limb Ataxia: 0-->Absent  8. Sensory: 0-->Normal, no sensory loss  9. Best Language: 2-->Severe aphasia, all communication is through fragmentary expression, great need for inference, questioning, and guessing by the listener. Range of information that can be exchanged is limited, listener carries burden of. . . (see row details)  10. Dysarthria: 2-->Severe dysarthria, patients speech is so slurred as to be unintelligible in the absence of or out of proportion to any dysphasia, or is mute/anarthric  11. Extinction and Inattention (formerly Neglect): 1-->Visual, tactile, auditory, spatial, or personal inattention or extinction to bilateral simultaneous stimulation in one of the sensory modalities  Total (NIH Stroke Scale): 22       Modified Queens Score: 0  Millie Coma Scale:    ABCD2  Score:    [id number] Score:   HAS -BLED Score:   ICH Score:   Hunt & Jones Classification:      Hemorrhagic change of an Ischemic Stroke: Does this patient have an ischemic stroke with hemorrhagic changes? No     Neurologic Chief Complaint: Right sided weakness    Subjective:     Interval History: Patient is seen for follow-up neurological assessment and treatment recommendations:     Pt pulled NG overnight despite restraints. Unable to place PEG tube with IR. Gen Surg consulted for assistance, plan for PEG tomorrow. Will start DOAC post procedure.     HPI, Past Medical, Family, and Social History remains the same as documented in the initial encounter.     Review of Systems   Constitutional: Negative for fever.   HENT: Positive for drooling and trouble swallowing.    Eyes: Negative for visual disturbance.   Respiratory: Positive for cough.    Gastrointestinal: Negative for diarrhea and vomiting.   Musculoskeletal: Positive for gait problem.   Neurological: Positive for facial asymmetry, speech difficulty and weakness.   Psychiatric/Behavioral: Positive for confusion and decreased concentration.     Scheduled Meds:   albuterol-ipratropium  3 mL Nebulization Q6H WAKE    atorvastatin  40 mg Per NG tube Daily    barium  450 mL Per NG tube Once    cefTRIAXone (ROCEPHIN) IVPB  1 g Intravenous Q24H    levothyroxine  75 mcg Per NG tube Before breakfast    metoprolol tartrate  25 mg Per NG tube BID    senna-docusate 8.6-50 mg  1 tablet Per NG tube BID     Continuous Infusions:   sodium chloride 0.9% 100 mL/hr at 01/22/20 1035     PRN Meds:acetaminophen, Dextrose 10% Bolus, glucagon (human recombinant), glucose, glucose, insulin aspart U-100, ondansetron, QUEtiapine, sodium chloride 0.9%    Objective:     Vital Signs (Most Recent):  Temp: 98.6 °F (37 °C) (01/22/20 1051)  Pulse: 98 (01/22/20 1200)  Resp: 20 (01/22/20 1051)  BP: 121/82 (01/22/20 1051)  SpO2: 98 % (01/22/20 1051)  BP Location: Right arm    Vital Signs  Range (Last 24H):  Temp:  [97.3 °F (36.3 °C)-99.3 °F (37.4 °C)]   Pulse:  []   Resp:  [17-25]   BP: (121-146)/(74-87)   SpO2:  [94 %-98 %]   BP Location: Right arm    Physical Exam   Constitutional: He appears well-developed and well-nourished.   HENT:   Head: Normocephalic and atraumatic.   Eyes: Pupils are equal, round, and reactive to light.   Left gaze preference    Neck: Normal range of motion.   Cardiovascular: Normal rate.   Pulmonary/Chest: Effort normal. He has rhonchi.   Abdominal: Soft.   Skin: Skin is warm and dry.   Nursing note and vitals reviewed.      Neurological Exam:   LOC: alert  Attention Span: poor  Language: Expressive aphasia, Receptive aphasia  Articulation: Dysarthria  Orientation: Untestable due to severe aphasia   Visual Fields: Full  EOM (CN III, IV, VI): Gaze preference  left  Pupils (CN II, III): PERRL  Facial Movement (CN VII): Lower facial weakness on the Right  Motor: Arm left  Paresis: 4/5  Leg left  Paresis: 4/5  Arm right  Plegia 0/5  Leg right Paresis: 1/5  Cebellar: No evidence of appendicular or axial ataxia  Tone: Flaccid  RUE    Laboratory:  CMP:   Recent Labs   Lab 01/22/20  0439   CALCIUM 9.2   ALBUMIN 2.6*   PROT 6.0      K 4.4   CO2 24      BUN 38*   CREATININE 0.8   ALKPHOS 101   ALT 47*   AST 50*   BILITOT 1.8*     CBC:   Recent Labs   Lab 01/22/20  0439   WBC 9.81   RBC 4.10*   HGB 13.5*   HCT 42.8      *   MCH 32.9*   MCHC 31.5*     Lipid Panel:   No results for input(s): CHOL, LDLCALC, HDL, TRIG in the last 168 hours.  Coagulation:   Recent Labs   Lab 01/22/20  0439   INR 1.3*     Hgb A1C:   No results for input(s): HGBA1C in the last 168 hours.  TSH:   No results for input(s): TSH in the last 168 hours.    Diagnostic Results     Brain Imaging   Brain Imaging   CT Head without 1/15 (07:43)  Evolving moderate-sized region of acute infarction in the left MCA territory as above.  Distribution grossly stable from recent MRI without  significant infarct extension.  No hemorrhagic conversion or new territorial infarct.     MRI Brain 1/14 (04:28)  There is interval change, there is interval development of acute ischemia/infarct along the left MCA distribution, this is superimposed on subacute ischemic change seen on the recent MRI examination  Findings concerning for occlusion of the M2 segment of the left MCA as above.    Additional chronic changes are noted.    Vessel Imaging   CTA Head and Neck 1/14 (02:43)  As on the recent brain MRA examination there is appearance of attenuation of the left MCA branch vessels without evidence for high-grade stenosis or occlusion and otherwise there is no evidence for high-grade stenosis or occlusion of the major arterial vascular structures of the head or neck.    There is no evidence for intracranial aneurysm or AVM.    Findings consistent with acute to subacute left parietal ischemic change again noted.    Small thyroid nodules or cysts.    Cardiac Imaging  TTE 1/11  · Normal left ventricular systolic function. The estimated ejection fraction is 55%.  · Mid anteroseptal and apical moderate hypokinesia.  · Diastolic pattern consistent with atrial fibrillation observed.  · Severe left atrial enlargement.  · Normal right ventricular systolic function.  · Severe right atrial enlargement.  · Mild-to-moderate mitral regurgitation.  · Mild tricuspid regurgitation.  · The estimated PA systolic pressure is 40 mmHg.  Intermediate central venous pressure (8 mmHg).         Quan Cruz MD  Comprehensive Stroke Center  Department of Vascular Neurology   [facility]

## 2020-01-22 NOTE — PROGRESS NOTES
[facility]  Physical Medicine & Rehab  Progress Note    Patient Name: Chirag Thompson  MRN: [id number]  Admission Date: 1/10/2020  Length of Stay: 12 days  Attending Physician: Houston Cheema MD    Subjective:     Principal Problem:Embolic stroke involving left middle cerebral artery    Hospital Course:   1/14/20: Evaluated by PT & OT. Bed mobility totalA x 2 ppl. Sit to stand totaA- dependence.   1/16/20: Participated w/ PT & OT. Bed mobility mod/maxA- totalA. Sit to stand maxA. Sat EOB min-modA.   1/17/20: Participated with PT and SLP. Remains NPO for dysphagia. Bed mobility totalA-dependent.  1/21/20: Participated w/ PT & OT. Bed mobility max- totalA x 2 ppl.     Interval History 1/22/2020:  Patient is seen for follow-up rehab evaluation and recommendations: Plan for PEG placement today. Participated w/ therapy yesterday.    HPI, Past Medical, Family, and Social History remains the same as documented in the initial encounter.    Scheduled Medications:    albuterol-ipratropium  3 mL Nebulization Q6H WAKE    atorvastatin  40 mg Per NG tube Daily    barium  450 mL Per NG tube Once    cefTRIAXone (ROCEPHIN) IVPB  1 g Intravenous Q24H    levothyroxine  75 mcg Per NG tube Before breakfast    metoprolol tartrate  25 mg Per NG tube BID    senna-docusate 8.6-50 mg  1 tablet Per NG tube BID       Diagnostic Results: Labs: Reviewed    PRN Medications: acetaminophen, Dextrose 10% Bolus, glucagon (human recombinant), glucose, glucose, insulin aspart U-100, ondansetron, QUEtiapine, sodium chloride 0.9%    Review of Systems   Reason unable to perform ROS: Aphasia      Objective:     Vital Signs (Most Recent):  Temp: 98.6 °F (37 °C) (01/22/20 0729)  Pulse: 78 (01/22/20 0801)  Resp: (!) 25 (01/22/20 0801)  BP: (!) 146/84 (01/22/20 0729)  SpO2: 98 % (01/22/20 0801)    Vital Signs (24h Range):  Temp:  [97.3 °F (36.3 °C)-99.3 °F (37.4 °C)] 98.6 °F (37 °C)  Pulse:  [] 78  Resp:  [16-25] 25  SpO2:  [94  %-99 %] 98 %  BP: (140-162)/(74-87) 146/84     Physical Exam   Constitutional: He appears well-developed and well-nourished.   HENT:   Head: Normocephalic and atraumatic.   Neck: Neck supple.   Pulmonary/Chest: Effort normal. No respiratory distress.   Musculoskeletal: He exhibits no deformity.   Neurological:   - Not following commands  - Aphasia   Skin: Skin is warm and dry.   Nursing note and vitals reviewed.           Assessment/Plan:      * Embolic stroke involving left middle cerebral artery  - CTH revealed evolving moderate-sized region of acute infarction in the left MCA territory.   - NPO PEG pending for 1/21    See hospital course for functional, cognitive/speech/language, and nutrition/swallow status.      Recommendations  -  Encourage mobility, OOB in chair at least 3 hours per day, and early ambulation as appropriate   -  PT/OT evaluate and treat  -  SLP speech and cognitive evaluate and treat  -  Monitor sleep disturbances and establish consistent sleep-wake cycle  -  Monitor for bowel and bladder dysfunction  -  Monitor for shoulder pain and subluxation  -  Monitor for spasticity  -  Monitor for and prevent skin breakdown and pressure ulcers  · Early mobility, repositioning/weight shifting every 20-30 minutes when sitting, turn patient every 2 hours, proper mattress/overlay and chair cushioning, pressure relief/heel protector boots  -  DVT prophylaxis  -  Reviewed discharge options (IP rehab, SNF, HH therapy, and OP therapy)    Aphasia  - SLP eval and treat    Recommend Skilled Nursing.     Norma Duarte NP  Department of Physical Medicine & Rehab   [facility]

## 2020-01-22 NOTE — SUBJECTIVE & OBJECTIVE
Interval History 1/22/2020:  Patient is seen for follow-up rehab evaluation and recommendations: Plan for PEG placement today. Participated w/ therapy yesterday.    HPI, Past Medical, Family, and Social History remains the same as documented in the initial encounter.    Scheduled Medications:    albuterol-ipratropium  3 mL Nebulization Q6H WAKE    atorvastatin  40 mg Per NG tube Daily    barium  450 mL Per NG tube Once    cefTRIAXone (ROCEPHIN) IVPB  1 g Intravenous Q24H    levothyroxine  75 mcg Per NG tube Before breakfast    metoprolol tartrate  25 mg Per NG tube BID    senna-docusate 8.6-50 mg  1 tablet Per NG tube BID       Diagnostic Results: Labs: Reviewed    PRN Medications: acetaminophen, Dextrose 10% Bolus, glucagon (human recombinant), glucose, glucose, insulin aspart U-100, ondansetron, QUEtiapine, sodium chloride 0.9%    Review of Systems   Reason unable to perform ROS: Aphasia      Objective:     Vital Signs (Most Recent):  Temp: 98.6 °F (37 °C) (01/22/20 0729)  Pulse: 78 (01/22/20 0801)  Resp: (!) 25 (01/22/20 0801)  BP: (!) 146/84 (01/22/20 0729)  SpO2: 98 % (01/22/20 0801)    Vital Signs (24h Range):  Temp:  [97.3 °F (36.3 °C)-99.3 °F (37.4 °C)] 98.6 °F (37 °C)  Pulse:  [] 78  Resp:  [16-25] 25  SpO2:  [94 %-99 %] 98 %  BP: (140-162)/(74-87) 146/84     Physical Exam   Constitutional: He appears well-developed and well-nourished.   HENT:   Head: Normocephalic and atraumatic.   Neck: Neck supple.   Pulmonary/Chest: Effort normal. No respiratory distress.   Musculoskeletal: He exhibits no deformity.   Neurological:   - Not following commands  - Aphasia   Skin: Skin is warm and dry.   Nursing note and vitals reviewed.

## 2020-01-22 NOTE — PROGRESS NOTES
"[facility]  Adult Nutrition  Progress Note    SUMMARY       Recommendations    1. When medically able, restart the TF of Isosource 1.5, goal rate of 60mLhr providing 2160 Kcal, 98g Prot, and 1100mL free water. 2. RD will continue to follow  Goals: Pt to tolerate TF by RD follow up  Nutrition Goal Status: new  Communication of RD Recs: other (comment)    Reason for Assessment    Reason For Assessment: RD follow-up  Diagnosis: stroke/CVA  Relevant Medical History: a fib, HTN, T2DM  Interdisciplinary Rounds: did not attend  General Information Comments:  Pt pulled NG tube out. PT NPO since yesterday. PEG scheduled for today. TF to start tomorrow. Continue with Isosourse 1.5 , 60mL/hr goal rate. No family in room at time of visit. Per chart pt with possible 10 lbs wt loss since may 2019. NFPE completed on 1/15, pt nourished with moderate muscle wasting in temples. No other indications of malnutrition at this time.   Nutrition Discharge Planning: unable to determine at this time    Nutrition Risk Screen    Nutrition Risk Screen: tube feeding or parenteral nutrition    Nutrition/Diet History    Patient Reported Diet/Restrictions/Preferences: soft  Spiritual, Cultural Beliefs, Zoroastrian Practices, Values that Affect Care: no  Factors Affecting Nutritional Intake: NPO    Anthropometrics    Temp: 98.6 °F (37 °C)  Height Method: Estimated  Height: 5' 10" (177.8 cm)  Height (inches): 70 in  Weight Method: Bed Scale  Weight: 99.5 kg (219 lb 5.7 oz)  Weight (lb): 219.36 lb  Ideal Body Weight (IBW), Male: 166 lb  % Ideal Body Weight, Male (lb): 129.09 %  BMI (Calculated): 31.5  BMI Grade: 30 - 34.9- obesity - grade I  Usual Body Weight (UBW), k.7 kg  % Usual Body Weight: 94.84  % Weight Change From Usual Weight: -5.36 %       Lab/Procedures/Meds    Pertinent Labs Reviewed: reviewed  Pertinent Labs Comments: alb2.6, BUN 38  Pertinent Medications Reviewed: reviewed  Pertinent Medications Comments: Statin, " [place]      Estimated/Assessed Needs    Weight Used For Calorie Calculations: 99.5 kg (219 lb 5.7 oz)  Energy Calorie Requirements (kcal): 2224  Energy Need Method: Everly-St Jeor  Protein Requirements:  g  Weight Used For Protein Calculations: 99.5 kg (219 lb 5.7 oz)  Fluid Requirements (mL): 1mL/kcal or per MD  Estimated Fluid Requirement Method: RDA Method  RDA Method (mL): 2224  CHO Requirement: 370      Nutrition Prescription Ordered    Current Diet Order: NPO  Current Nutrition Support Formula Ordered: Isosource 1.5  Current Nutrition Support Rate Ordered: 60 (ml)  Current Nutrition Support Frequency Ordered: mL/hr     Evaluation of Received Nutrient/Fluid Intake    Enteral Calories (kcal): 2160  Enteral Protein (gm): 98  Enteral (Free Water) Fluid (mL): 1100  % Kcal Needs: 102  % Protein Needs: 100  IV Fluid (mL): 1800  I/O: +4703L since admit  Energy Calories Required: meeting needs  Protein Required: meeting needs  Fluid Required: meeting needs  Comments: LBM 1/21  % Intake of Estimated Energy Needs: 0 - 25 %  % Meal Intake: NPO    Nutrition Risk    Level of Risk/Frequency of Follow-up: low(1xweek)     Assessment and Plan  Nutrition Problem  Inadequate energy intake     Related to (etiology):   NPO     Signs and Symptoms (as evidenced by):   Pt receiving <75% EEN and EPN.      Interventions(treatment strategy):  Collaboration of care with providers  Enteral Nutrition     Nutrition Diagnosis Status:  Ongoing         Monitor and Evaluation    Food and Nutrient Intake: energy intake, food and beverage intake, enteral nutrition intake  Food and Nutrient Adminstration: diet order, enteral and parenteral nutrition administration  Anthropometric Measurements: weight, weight change, body mass index  Biochemical Data, Medical Tests and Procedures: electrolyte and renal panel, gastrointestinal profile, glucose/endocrine profile, inflammatory profile, lipid profile  Nutrition-Focused Physical Findings: overall  appearance     Malnutrition Assessment                 Orbital Region (Subcutaneous Fat Loss): well nourished  Upper Arm Region (Subcutaneous Fat Loss): well nourished  Thoracic and Lumbar Region: well nourished   Mouthcard Region (Muscle Loss): moderate depletion  Clavicle Bone Region (Muscle Loss): mild depletion  Clavicle and Acromion Bone Region (Muscle Loss): mild depletion  Scapular Bone Region (Muscle Loss): mild depletion  Dorsal Hand (Muscle Loss): well nourished  Patellar Region (Muscle Loss): mild depletion  Anterior Thigh Region (Muscle Loss): mild depletion  Posterior Calf Region (Muscle Loss): mild depletion                 Nutrition Follow-Up    RD Follow-up?: Yes

## 2020-01-22 NOTE — CONSULTS
"[facility]  General Surgery  Consult Note    Inpatient consult to General Surgery  Consult performed by: Osiel Tirado MD  Consult ordered by: Quan Cruz MD  Reason for consult: "PEG placement"        Subjective:     Reason for Admission: Stroke    History of Present Illness:   Patient is an 79-year-old male with Hx of HTN, pAFib on Coumadin, OA who was admitted to the Internal Medicine service at [facility] on 1/10/2020 with complaint of expressive aphasia and subsequently transferred to the Neuro Critical Care with an acute L MCA stroke. Transferred to the floor on 1/18/2020. He has had persistent dysphagia since arrival. Failed MBSS yesterday. SLP following and recommending NPO. Had been receiving enteral feeds via NGT but pulled by patient and have been unable to replace. General Surgery consulted for durable enteral access placement.    Prior abdominal surgeries include potential "hernia repair" per EMR. No obvious midline or periumbilical incisions.  No antiplatelet or anticoagulant agents currently.      No current facility-administered medications on file prior to encounter.      Current Outpatient Medications on File Prior to Encounter   Medication Sig    allopurinol (ZYLOPRIM) 300 MG tablet Take 1 tablet by mouth.    amLODIPine (NORVASC) 5 MG tablet Take 1 tablet by mouth.    enalapril (VASOTEC) 20 MG tablet Take 1 tablet by mouth.    glipiZIDE (GLUCOTROL) 2.5 MG tablet Take 1 tablet by mouth.    levothyroxine (SYNTHROID) 75 MCG tablet Take 75 mcg by mouth once daily.    magnesium 30 mg Tab Take by mouth once.    METOPROLOL SUCCINATE ORAL Take by mouth.    spironolactone (ALDACTONE) 25 MG tablet Take 25 mg by mouth once daily.    traMADol (ULTRAM) 50 mg tablet Take 1 tablet by mouth.    warfarin (COUMADIN) 5 MG tablet Take 1 tablet by mouth.     Review of patient's allergies indicates:  No Known Allergies    Past Medical History:   Diagnosis Date "    Arthritis     Chronic a-fib     Current use of long term anticoagulation     Hypertension     Pressure ulcer of right leg, unspecified pressure ulcer stage     was going to [facility] healed now    Stroke due to embolism of left middle cerebral artery 01/10/2020    left temp parietal    Thyroid disease      Past Surgical History:   Procedure Laterality Date    HERNIA REPAIR       Family History     None        Tobacco Use    Smoking status: Never Smoker    Smokeless tobacco: Never Used   Substance and Sexual Activity    Alcohol use: Never     Frequency: Never    Drug use: Never    Sexual activity: Not on file     Review of Systems   Unable to perform secondary to current clinical status - non-verbal with no family at bedside.    Objective:     Vital Signs (Most Recent):  Temp: 98.6 °F (37 °C) (01/22/20 1051)  Pulse: 108 (01/22/20 1051)  Resp: 20 (01/22/20 1051)  BP: 121/82 (01/22/20 1051)  SpO2: 98 % (01/22/20 1051) Vital Signs (24h Range):  Temp:  [97.3 °F (36.3 °C)-99.3 °F (37.4 °C)] 98.6 °F (37 °C)  Pulse:  [] 108  Resp:  [16-25] 20  SpO2:  [94 %-99 %] 98 %  BP: (121-162)/(74-87) 121/82     Weight: 99.5 kg (219 lb 5.7 oz)  Body mass index is 31.47 kg/m².      Intake/Output Summary (Last 24 hours) at 1/22/2020 1110  Last data filed at 1/22/2020 0500  Gross per 24 hour   Intake 0 ml   Output 750 ml   Net -750 ml       Physical Exam   Constitutional:   Resting in hospital bed comfortably  No distress  No diaphoresis  Non-verbal  Does not focus on attempted conversation   HENT:   Head: Normocephalic and atraumatic.   Eyes: EOM are normal.   Neck: Neck supple. No JVD present.   Cardiovascular: Normal rate, regular rhythm and intact distal pulses.   Pulmonary/Chest: Effort normal. No respiratory distress.   Abdominal:   Soft  Non-distended  Non-tender  Small sub-centimeter umbilical hernia  Ecchymosis to lower abdomen from chemical DVT prophylaxis injections  No obvious prior surgical  incisions   Musculoskeletal: He exhibits no edema or deformity.   Neurological: He is alert.   Skin: Skin is warm and dry. No rash noted. No erythema.   Nursing note and vitals reviewed.      Significant Labs:  CBC:   Recent Labs   Lab 01/22/20 0439   WBC 9.81   RBC 4.10*   HGB 13.5*   HCT 42.8      *   MCH 32.9*   MCHC 31.5*     CMP:   Recent Labs   Lab 01/22/20 0439      CALCIUM 9.2   ALBUMIN 2.6*   PROT 6.0      K 4.4   CO2 24      BUN 38*   CREATININE 0.8   ALKPHOS 101   ALT 47*   AST 50*   BILITOT 1.8*     Coagulation:   Recent Labs   Lab 01/22/20 0439   INR 1.3*       Significant Diagnostics:  CT Abdomen/Pelvis (1/202020): Reviewed. Appears to be a good window for PEG tube placement. No ascites.      Assessment/Plan:     78 y/o male with the above medical history including L MCA stroke and persistent dysphagia in need of durable enteral access    - Relevant notes, imaging, and laboratory studies reviewed  - Tentative plan for PEG tube placement tomorrow  - Risks, benefits, alternatives to the procedure discussed with the patient who wishes to proceed  - All questions and concerns addressed  - Attempted to obtain informed consent from next of kin via phone but unable to reach them  - Will re-attempt later today      Osiel Tirado MD  Surgery Resident, PGY-V  Pager: [phone]  1/22/2020 11:10 AM

## 2020-01-23 ENCOUNTER — ANESTHESIA (OUTPATIENT)
Dept: SURGERY | Facility: HOSPITAL | Age: 80
DRG: 064 | End: 2020-01-23
Payer: MEDICARE

## 2020-01-23 ENCOUNTER — ANESTHESIA EVENT (OUTPATIENT)
Dept: SURGERY | Facility: HOSPITAL | Age: 80
DRG: 064 | End: 2020-01-23
Payer: MEDICARE

## 2020-01-23 LAB
BASOPHILS # BLD AUTO: 0.06 K/UL (ref 0–0.2)
BASOPHILS NFR BLD: 0.6 % (ref 0–1.9)
DIFFERENTIAL METHOD: ABNORMAL
EOSINOPHIL # BLD AUTO: 0.4 K/UL (ref 0–0.5)
EOSINOPHIL NFR BLD: 4.4 % (ref 0–8)
ERYTHROCYTE [DISTWIDTH] IN BLOOD BY AUTOMATED COUNT: 14.7 % (ref 11.5–14.5)
HCT VFR BLD AUTO: 43.7 % (ref 40–54)
HGB BLD-MCNC: 14 G/DL (ref 14–18)
IMM GRANULOCYTES # BLD AUTO: 0.18 K/UL (ref 0–0.04)
IMM GRANULOCYTES NFR BLD AUTO: 1.9 % (ref 0–0.5)
INR PPP: 1.3 (ref 0.8–1.2)
LYMPHOCYTES # BLD AUTO: 0.9 K/UL (ref 1–4.8)
LYMPHOCYTES NFR BLD: 9.1 % (ref 18–48)
MAGNESIUM SERPL-MCNC: 2 MG/DL (ref 1.6–2.6)
MCH RBC QN AUTO: 33.8 PG (ref 27–31)
MCHC RBC AUTO-ENTMCNC: 32 G/DL (ref 32–36)
MCV RBC AUTO: 106 FL (ref 82–98)
MONOCYTES # BLD AUTO: 1.1 K/UL (ref 0.3–1)
MONOCYTES NFR BLD: 11.4 % (ref 4–15)
NEUTROPHILS # BLD AUTO: 7.1 K/UL (ref 1.8–7.7)
NEUTROPHILS NFR BLD: 72.6 % (ref 38–73)
NRBC BLD-RTO: 0 /100 WBC
PHOSPHATE SERPL-MCNC: 2.8 MG/DL (ref 2.7–4.5)
PLATELET # BLD AUTO: 182 K/UL (ref 150–350)
PMV BLD AUTO: 10.8 FL (ref 9.2–12.9)
POCT GLUCOSE: 90 MG/DL (ref 70–110)
POCT GLUCOSE: 92 MG/DL (ref 70–110)
POCT GLUCOSE: 93 MG/DL (ref 70–110)
POCT GLUCOSE: 94 MG/DL (ref 70–110)
PROTHROMBIN TIME: 13.4 SEC (ref 9–12.5)
RBC # BLD AUTO: 4.14 M/UL (ref 4.6–6.2)
WBC # BLD AUTO: 9.72 K/UL (ref 3.9–12.7)

## 2020-01-23 PROCEDURE — 92507 TX SP LANG VOICE COMM INDIV: CPT

## 2020-01-23 PROCEDURE — 27800903 OPTIME MED/SURG SUP & DEVICES OTHER IMPLANTS: Performed by: SURGERY

## 2020-01-23 PROCEDURE — 27000221 HC OXYGEN, UP TO 24 HOURS

## 2020-01-23 PROCEDURE — 71000015 HC POSTOP RECOV 1ST HR: Performed by: SURGERY

## 2020-01-23 PROCEDURE — 82962 GLUCOSE BLOOD TEST: CPT | Performed by: SURGERY

## 2020-01-23 PROCEDURE — 85025 COMPLETE CBC W/AUTO DIFF WBC: CPT

## 2020-01-23 PROCEDURE — 37000008 HC ANESTHESIA 1ST 15 MINUTES: Performed by: SURGERY

## 2020-01-23 PROCEDURE — 25000003 PHARM REV CODE 250: Performed by: NURSE PRACTITIONER

## 2020-01-23 PROCEDURE — 25000242 PHARM REV CODE 250 ALT 637 W/ HCPCS: Performed by: NURSE PRACTITIONER

## 2020-01-23 PROCEDURE — 97110 THERAPEUTIC EXERCISES: CPT | Mod: CQ

## 2020-01-23 PROCEDURE — 85610 PROTHROMBIN TIME: CPT

## 2020-01-23 PROCEDURE — 94668 MNPJ CHEST WALL SBSQ: CPT

## 2020-01-23 PROCEDURE — 37000009 HC ANESTHESIA EA ADD 15 MINS: Performed by: SURGERY

## 2020-01-23 PROCEDURE — D9220A PRA ANESTHESIA: Mod: ANES,,, | Performed by: ANESTHESIOLOGY

## 2020-01-23 PROCEDURE — 36000706: Performed by: SURGERY

## 2020-01-23 PROCEDURE — 94640 AIRWAY INHALATION TREATMENT: CPT

## 2020-01-23 PROCEDURE — 99233 PR SUBSEQUENT HOSPITAL CARE,LEVL III: ICD-10-PCS | Mod: GC,,, | Performed by: PSYCHIATRY & NEUROLOGY

## 2020-01-23 PROCEDURE — 11000001 HC ACUTE MED/SURG PRIVATE ROOM

## 2020-01-23 PROCEDURE — D9220A PRA ANESTHESIA: Mod: CRNA,,, | Performed by: NURSE ANESTHETIST, CERTIFIED REGISTERED

## 2020-01-23 PROCEDURE — D9220A PRA ANESTHESIA: ICD-10-PCS | Mod: ANES,,, | Performed by: ANESTHESIOLOGY

## 2020-01-23 PROCEDURE — 71000044 HC DOSC ROUTINE RECOVERY FIRST HOUR: Performed by: SURGERY

## 2020-01-23 PROCEDURE — D9220A PRA ANESTHESIA: ICD-10-PCS | Mod: CRNA,,, | Performed by: NURSE ANESTHETIST, CERTIFIED REGISTERED

## 2020-01-23 PROCEDURE — 36000707: Performed by: SURGERY

## 2020-01-23 PROCEDURE — 43246 PR EGD, FLEX, W/PLCMT, GASTROSTOMY TUBE: ICD-10-PCS | Mod: ,,, | Performed by: SURGERY

## 2020-01-23 PROCEDURE — 36415 COLL VENOUS BLD VENIPUNCTURE: CPT

## 2020-01-23 PROCEDURE — 94761 N-INVAS EAR/PLS OXIMETRY MLT: CPT

## 2020-01-23 PROCEDURE — 63600175 PHARM REV CODE 636 W HCPCS: Performed by: NURSE ANESTHETIST, CERTIFIED REGISTERED

## 2020-01-23 PROCEDURE — 84100 ASSAY OF PHOSPHORUS: CPT

## 2020-01-23 PROCEDURE — 83735 ASSAY OF MAGNESIUM: CPT

## 2020-01-23 PROCEDURE — 71000016 HC POSTOP RECOV ADDL HR: Performed by: SURGERY

## 2020-01-23 PROCEDURE — 43246 EGD PLACE GASTROSTOMY TUBE: CPT | Mod: ,,, | Performed by: SURGERY

## 2020-01-23 PROCEDURE — 99233 SBSQ HOSP IP/OBS HIGH 50: CPT | Mod: GC,,, | Performed by: PSYCHIATRY & NEUROLOGY

## 2020-01-23 RX ORDER — PROPOFOL 10 MG/ML
VIAL (ML) INTRAVENOUS CONTINUOUS PRN
Status: DISCONTINUED | OUTPATIENT
Start: 2020-01-23 | End: 2020-01-23

## 2020-01-23 RX ORDER — LIDOCAINE HCL/PF 100 MG/5ML
SYRINGE (ML) INTRAVENOUS
Status: DISCONTINUED | OUTPATIENT
Start: 2020-01-23 | End: 2020-01-23

## 2020-01-23 RX ORDER — PROPOFOL 10 MG/ML
VIAL (ML) INTRAVENOUS
Status: DISCONTINUED | OUTPATIENT
Start: 2020-01-23 | End: 2020-01-23

## 2020-01-23 RX ADMIN — IPRATROPIUM BROMIDE AND ALBUTEROL SULFATE 3 ML: .5; 3 SOLUTION RESPIRATORY (INHALATION) at 08:01

## 2020-01-23 RX ADMIN — LIDOCAINE HYDROCHLORIDE 100 MG: 20 INJECTION, SOLUTION INTRAVENOUS at 08:01

## 2020-01-23 RX ADMIN — PROPOFOL 50 MG: 10 INJECTION, EMULSION INTRAVENOUS at 08:01

## 2020-01-23 RX ADMIN — IPRATROPIUM BROMIDE AND ALBUTEROL SULFATE 3 ML: .5; 3 SOLUTION RESPIRATORY (INHALATION) at 02:01

## 2020-01-23 RX ADMIN — METOPROLOL TARTRATE 25 MG: 25 TABLET ORAL at 04:01

## 2020-01-23 RX ADMIN — ATORVASTATIN CALCIUM 40 MG: 20 TABLET, FILM COATED ORAL at 04:01

## 2020-01-23 RX ADMIN — SENNOSIDES AND DOCUSATE SODIUM 1 TABLET: 8.6; 5 TABLET ORAL at 04:01

## 2020-01-23 RX ADMIN — PROPOFOL 75 MCG/KG/MIN: 10 INJECTION, EMULSION INTRAVENOUS at 08:01

## 2020-01-23 NOTE — PROGRESS NOTES
[facility]  Vascular Neurology  Comprehensive Stroke Center  Progress Note    Assessment/Plan:     * Embolic stroke involving left middle cerebral artery  80 yo man with history of HTN, a fib (on Coumadin) and hypothyroidism. Patient has been admitted to [facility] for left temp partieal infarct on 1/10. On 1/14 patient with worsening of deficits: aphasia, right side plegia and not following commands. Telestroke consult was completed by Dr. Olivares, no tpa given as INR was 2.0. Patient transferred to [facility] ED for stat CTA stroke MP.  Positive for LVO, L M1 occlusion. Patient not candidate for intervention d/t large core infarct. Patient was admitted to [facility] for close monitoring.  Coumadin held d/t size of stroke. PEG placed 1/23, will switch to DOAC and start 24hr after PEG placement.         Antithrombotics: currently held, to start DOAC 24 hr after PEG     Statins: Lipitor 40 mg daily    Aggressive risk factor modification: A-Fib, HTN     Rehab efforts: The patient has been evaluated by a stroke team provider and the therapy needs have been fully considered based off the presenting complaints and exam findings. The following therapy evaluations are needed: PT evaluate and treat, OT evaluate and treat, SLP evaluate and treat, PM&R evaluate for appropriate placement - SNF    Diagnostics ordered/pending: None     VTE prophylaxis: None: Hold pharmacologic prophylaxis in setting of large infarct. SCDs.    BP parameters: Infarct: No intervention, SBP <220    PEG (percutaneous endoscopic gastrostomy) adjustment/replacement/removal  PEG placed by general surgery 1/23  Spoke to general surgery, would desire DOAC to be started 24 hr after placement if able.   Will start tube feeds when given ok.       Chronic anticoagulation  Previously on coumadin, plan to transition to DOAC post PEG placement     Cytotoxic cerebral edema  Area of cytotoxic cerebral edema identified when reviewing brain imaging in the  territory of the left middle cerebral artery. There is mass effect associated with it. We will continue to monitor the patients clinical exam for any worsening of symptoms which may indicate expansion of the stroke or the area of the edema resulting in the clinical change. The pattern is suggestive of cardio embolic etiology.        Hypercoagulable state  Restart DOAC post PEG tube placement  Pt previously on Coumadin but subtherapeutic     Aphasia  Due to stroke  Aggressive therapy SLP    Right spastic hemiparesis  Due to stroke  Aggressive therapy with PT/OT    Atrial fibrillation  Stroke risk factor  Rate control  Will need anticoagulation for this when stable, holding in setting of large area of infarct.  Repeat CT on 1/18 stable. PEG placed 1/23, plan to start DOAC 24 hr after PEG placement.       Type 2 diabetes mellitus with circulatory disorder, without long-term current use of insulin  Stroke risk factor  HgB A1C 5.1  SSI       Essential hypertension  Stroke risk factor  SBP <220           Patient admitted to neurocritical care for Left MCA syndrome, severe aphasia following extension of prior stroke.  01/15/2020: Overnight concern for extensor posturing of right arm, emergent CT this AM showing stable area of infarct, no new hemorrhage or extension.  01/16/20: No acute events overnight, neurostatus unchanged  01/17/2020: No events overnight, patient received seroquel for agitation this AM. To remain in neuro ICU for closer monitoring  01/18/2020: Patient calm and alert during today's exam. Patient to step down to stroke primary service. Breath sounds course, CXR without significant change- will continue pulmonary tolieting.   1/19/20: Patient now with leukocytosis. Repeat CBC pending. CXR with pneumonitis. Rocephin ordered.  1/20/20: CT abd/pelvis for PEG planning, scheduled for tomorrow AM with IR. Plan to restart AC (Heparin vs DOAC) post procedure  1/21/20: Issues with placing NG overnight, PEG  deferred to tomorrow as pt unable to get barium. SLP re-eval, ANDRIA today  1/22/20: NG pulled overnight, gen surgery consulted with plan for PEG tomorrow   1/23/20: PEG placed today by general surgery, plan to start DOAC 24 hr after procedure.     STROKE DOCUMENTATION   Acute Stroke Times   Last Known Normal Date: 01/13/20  Last Known Normal Time: 2315  Symptom Onset Date: 01/14/20  Symptom Onset Time: 0050  Stroke Team Called Date: 01/14/20  Stroke Team Called Time: 0105  Stroke Team Arrival Date: 01/14/20  Stroke Team Arrival Time: 0355  CT Interpretation Time: 0100    NIH Scale:  1a. Level of Consciousness: 0-->Alert, keenly responsive  1b. LOC Questions: 2-->Answers neither question correctly  1c. LOC Commands: 2-->Performs neither task correctly  2. Best Gaze: 1-->Partial gaze palsy, gaze is abnormal in one or both eyes, but forced deviation or total gaze paresis is not present  3. Visual: 0-->No visual loss  4. Facial Palsy: 1-->Minor paralysis (flattened nasolabial fold, asymmetry on smiling)  5a. Motor Arm, Left: 1-->Drift, limb holds 90 (or 45) degrees, but drifts down before full 10 seconds, does not hit bed or other support  5b. Motor Arm, Right: 4-->No movement  6a. Motor Leg, Left: 3-->No effort against gravity, leg falls to bed immediately  6b. Motor Leg, Right: 3-->No effort against gravity, leg falls to bed immediately  7. Limb Ataxia: 0-->Absent  8. Sensory: 0-->Normal, no sensory loss  9. Best Language: 2-->Severe aphasia, all communication is through fragmentary expression, great need for inference, questioning, and guessing by the listener. Range of information that can be exchanged is limited, listener carries burden of. . . (see row details)  10. Dysarthria: 2-->Severe dysarthria, patients speech is so slurred as to be unintelligible in the absence of or out of proportion to any dysphasia, or is mute/anarthric  11. Extinction and Inattention (formerly Neglect): 1-->Visual, tactile, auditory,  spatial, or personal inattention or extinction to bilateral simultaneous stimulation in one of the sensory modalities  Total (NIH Stroke Scale): 22       Modified Blanche Score: 0  Thrall Coma Scale:    ABCD2 Score:    [id number] Score:   HAS -BLED Score:   ICH Score:   Hunt & Jones Classification:      Hemorrhagic change of an Ischemic Stroke: Does this patient have an ischemic stroke with hemorrhagic changes? No     Neurologic Chief Complaint: Right sided weakness    Subjective:     Interval History: Patient is seen for follow-up neurological assessment and treatment recommendations:     Pt pulled NG overnight despite restraints. Unable to place PEG tube with IR. Gen Surg consulted for assistance, plan for PEG tomorrow. Will start DOAC post procedure.     HPI, Past Medical, Family, and Social History remains the same as documented in the initial encounter.     Review of Systems   Constitutional: Negative for fever.   HENT: Positive for drooling and trouble swallowing.    Eyes: Negative for visual disturbance.   Respiratory: Positive for cough.    Gastrointestinal: Negative for diarrhea and vomiting.   Musculoskeletal: Positive for gait problem.   Neurological: Positive for facial asymmetry, speech difficulty and weakness.   Psychiatric/Behavioral: Positive for confusion and decreased concentration.     Scheduled Meds:   albuterol-ipratropium  3 mL Nebulization Q6H WAKE    atorvastatin  40 mg Per NG tube Daily    barium  450 mL Per NG tube Once    cefTRIAXone (ROCEPHIN) IVPB  1 g Intravenous Q24H    levothyroxine  75 mcg Per NG tube Before breakfast    metoprolol tartrate  25 mg Per NG tube BID    senna-docusate 8.6-50 mg  1 tablet Per NG tube BID     Continuous Infusions:   sodium chloride 0.9% Stopped (01/23/20 0844)     PRN Meds:acetaminophen, Dextrose 10% Bolus, glucagon (human recombinant), glucose, glucose, insulin aspart U-100, ondansetron, QUEtiapine, sodium chloride 0.9%    Objective:     Vital  Signs (Most Recent):  Temp: 97.3 °F (36.3 °C) (01/23/20 1040)  Pulse: 94 (01/23/20 1118)  Resp: 20 (01/23/20 1040)  BP: (!) 147/77 (01/23/20 1040)  SpO2: 95 % (01/23/20 1040)  BP Location: Right arm    Vital Signs Range (Last 24H):  Temp:  [97.3 °F (36.3 °C)-98.1 °F (36.7 °C)]   Pulse:  []   Resp:  [12-20]   BP: (115-159)/()   SpO2:  [95 %-100 %]   BP Location: Right arm    Physical Exam   Constitutional: He appears well-developed and well-nourished.   HENT:   Head: Normocephalic and atraumatic.   Eyes: Pupils are equal, round, and reactive to light.   Left gaze preference    Neck: Normal range of motion.   Cardiovascular: Normal rate.   Pulmonary/Chest: Effort normal. He has rhonchi.   Abdominal: Soft.   Skin: Skin is warm and dry.   Nursing note and vitals reviewed.      Neurological Exam:   LOC: alert  Attention Span: poor  Language: Expressive aphasia, Receptive aphasia  Articulation: Dysarthria  Orientation: Untestable due to severe aphasia   Visual Fields: Full  EOM (CN III, IV, VI): Gaze preference  left  Pupils (CN II, III): PERRL  Facial Movement (CN VII): Lower facial weakness on the Right  Motor: Arm left  Paresis: 4/5  Leg left  Paresis: 4/5  Arm right  Plegia 0/5  Leg right Paresis: 1/5  Cebellar: No evidence of appendicular or axial ataxia  Tone: Flaccid  RUE    Laboratory:  CMP:   No results for input(s): GLUCOSE, CALCIUM, ALBUMIN, PROT, NA, K, CO2, CL, BUN, CREATININE, ALKPHOS, ALT, AST, BILITOT in the last 24 hours.  CBC:   Recent Labs   Lab 01/23/20  0439   WBC 9.72   RBC 4.14*   HGB 14.0   HCT 43.7      *   MCH 33.8*   MCHC 32.0     Lipid Panel:   No results for input(s): CHOL, LDLCALC, HDL, TRIG in the last 168 hours.  Coagulation:   Recent Labs   Lab 01/23/20  0439   INR 1.3*     Hgb A1C:   No results for input(s): HGBA1C in the last 168 hours.  TSH:   No results for input(s): TSH in the last 168 hours.    Diagnostic Results     Brain Imaging   Brain Imaging   CT Head  without 1/15 (07:43)  Evolving moderate-sized region of acute infarction in the left MCA territory as above.  Distribution grossly stable from recent MRI without significant infarct extension.  No hemorrhagic conversion or new territorial infarct.     MRI Brain 1/14 (04:28)  There is interval change, there is interval development of acute ischemia/infarct along the left MCA distribution, this is superimposed on subacute ischemic change seen on the recent MRI examination  Findings concerning for occlusion of the M2 segment of the left MCA as above.    Additional chronic changes are noted.    Vessel Imaging   CTA Head and Neck 1/14 (02:43)  As on the recent brain MRA examination there is appearance of attenuation of the left MCA branch vessels without evidence for high-grade stenosis or occlusion and otherwise there is no evidence for high-grade stenosis or occlusion of the major arterial vascular structures of the head or neck.    There is no evidence for intracranial aneurysm or AVM.    Findings consistent with acute to subacute left parietal ischemic change again noted.    Small thyroid nodules or cysts.    Cardiac Imaging  TTE 1/11  · Normal left ventricular systolic function. The estimated ejection fraction is 55%.  · Mid anteroseptal and apical moderate hypokinesia.  · Diastolic pattern consistent with atrial fibrillation observed.  · Severe left atrial enlargement.  · Normal right ventricular systolic function.  · Severe right atrial enlargement.  · Mild-to-moderate mitral regurgitation.  · Mild tricuspid regurgitation.  · The estimated PA systolic pressure is 40 mmHg.  Intermediate central venous pressure (8 mmHg).         Alberto Aquino NP  [facility]  Department of Vascular Neurology   [facility]

## 2020-01-23 NOTE — SUBJECTIVE & OBJECTIVE
Interval History: No acute events overnight. Tube feeds off since midnight.    Medications:  Continuous Infusions:   sodium chloride 0.9% 100 mL/hr at 01/22/20 2254     Scheduled Meds:   albuterol-ipratropium  3 mL Nebulization Q6H WAKE    atorvastatin  40 mg Per NG tube Daily    barium  450 mL Per NG tube Once    cefTRIAXone (ROCEPHIN) IVPB  1 g Intravenous Q24H    levothyroxine  75 mcg Per NG tube Before breakfast    metoprolol tartrate  25 mg Per NG tube BID    senna-docusate 8.6-50 mg  1 tablet Per NG tube BID     PRN Meds:acetaminophen, Dextrose 10% Bolus, glucagon (human recombinant), glucose, glucose, insulin aspart U-100, ondansetron, QUEtiapine, sodium chloride 0.9%     Review of patient's allergies indicates:  No Known Allergies  Objective:     Vital Signs (Most Recent):  Temp: 98 °F (36.7 °C) (01/23/20 0432)  Pulse: 108 (01/23/20 0432)  Resp: 18 (01/23/20 0432)  BP: 129/76 (01/23/20 0432)  SpO2: 99 % (01/23/20 0432) Vital Signs (24h Range):  Temp:  [97.9 °F (36.6 °C)-98.6 °F (37 °C)] 98 °F (36.7 °C)  Pulse:  [] 108  Resp:  [12-25] 18  SpO2:  [95 %-100 %] 99 %  BP: (121-146)/(74-87) 129/76     Weight: 99.5 kg (219 lb 5.7 oz)  Body mass index is 31.47 kg/m².    Intake/Output - Last 3 Shifts       01/21 0700 - 01/22 0659 01/22 0700 - 01/23 0659 01/23 0700 - 01/24 0659    P.O. 0      NG/GT       Total Intake(mL/kg) 0 (0)      Urine (mL/kg/hr) 1050 (0.4) 1100 (0.5)     Stool 0 0     Total Output 1050 1100     Net -1050 -1100            Urine Occurrence  1 x     Stool Occurrence 1 x 0 x           Physical Exam   Constitutional: He appears well-developed and well-nourished. No distress.   Cardiovascular: Normal rate and regular rhythm.   Pulmonary/Chest: Effort normal.   Abdominal: Soft. He exhibits no distension. There is no tenderness.       Significant Labs:  CBC:   Recent Labs   Lab 01/23/20  0439   WBC 9.72   RBC 4.14*   HGB 14.0   HCT 43.7      *   MCH 33.8*   MCHC 32.0     CMP:    Recent Labs   Lab 01/22/20  0439      CALCIUM 9.2   ALBUMIN 2.6*   PROT 6.0      K 4.4   CO2 24      BUN 38*   CREATININE 0.8   ALKPHOS 101   ALT 47*   AST 50*   BILITOT 1.8*       Significant Diagnostics:

## 2020-01-23 NOTE — BRIEF OP NOTE
[facility]  Surgery Department  Operative Note    SUMMARY     Date of Procedure: 1/23/2020     Procedure: Procedure(s) (LRB):  EGD, WITH PEG TUBE INSERTION (N/A)     Surgeon(s) and Role:     * Quan Feng MD - Primary     * Hira Leach MD - Resident - Assisting     * Litzy Salazar MD - Resident - Assisting    Pre-Operative Diagnosis: CVA (cerebral vascular accident) [I63.9]    Post-Operative Diagnosis: Post-Op Diagnosis Codes:     * CVA (cerebral vascular accident) [I63.9]    Anesthesia: Choice    Technical Procedures Used:   Percutaneous endoscopic gastrostomy tube placement.  Normal appearing esophagus and stomach    Description of the Findings of the Procedure: as above    Complications: No    Estimated Blood Loss (EBL): Minimal           Condition: Good    Disposition: PACU - hemodynamically stable.    Hira Leach MD  General Surgery, PGY-2  [phone]

## 2020-01-23 NOTE — SUBJECTIVE & OBJECTIVE
Neurologic Chief Complaint: Right sided weakness    Subjective:     Interval History: Patient is seen for follow-up neurological assessment and treatment recommendations:     Pt pulled NG overnight despite restraints. Unable to place PEG tube with IR. Gen Surg consulted for assistance, plan for PEG tomorrow. Will start DOAC post procedure.     HPI, Past Medical, Family, and Social History remains the same as documented in the initial encounter.     Review of Systems   Constitutional: Negative for fever.   HENT: Positive for drooling and trouble swallowing.    Eyes: Negative for visual disturbance.   Respiratory: Positive for cough.    Gastrointestinal: Negative for diarrhea and vomiting.   Musculoskeletal: Positive for gait problem.   Neurological: Positive for facial asymmetry, speech difficulty and weakness.   Psychiatric/Behavioral: Positive for confusion and decreased concentration.     Scheduled Meds:   albuterol-ipratropium  3 mL Nebulization Q6H WAKE    atorvastatin  40 mg Per NG tube Daily    barium  450 mL Per NG tube Once    cefTRIAXone (ROCEPHIN) IVPB  1 g Intravenous Q24H    levothyroxine  75 mcg Per NG tube Before breakfast    metoprolol tartrate  25 mg Per NG tube BID    senna-docusate 8.6-50 mg  1 tablet Per NG tube BID     Continuous Infusions:   sodium chloride 0.9% Stopped (01/23/20 0844)     PRN Meds:acetaminophen, Dextrose 10% Bolus, glucagon (human recombinant), glucose, glucose, insulin aspart U-100, ondansetron, QUEtiapine, sodium chloride 0.9%    Objective:     Vital Signs (Most Recent):  Temp: 97.3 °F (36.3 °C) (01/23/20 1040)  Pulse: 94 (01/23/20 1118)  Resp: 20 (01/23/20 1040)  BP: (!) 147/77 (01/23/20 1040)  SpO2: 95 % (01/23/20 1040)  BP Location: Right arm    Vital Signs Range (Last 24H):  Temp:  [97.3 °F (36.3 °C)-98.1 °F (36.7 °C)]   Pulse:  []   Resp:  [12-20]   BP: (115-159)/()   SpO2:  [95 %-100 %]   BP Location: Right arm    Physical Exam   Constitutional: He  appears well-developed and well-nourished.   HENT:   Head: Normocephalic and atraumatic.   Eyes: Pupils are equal, round, and reactive to light.   Left gaze preference    Neck: Normal range of motion.   Cardiovascular: Normal rate.   Pulmonary/Chest: Effort normal. He has rhonchi.   Abdominal: Soft.   Skin: Skin is warm and dry.   Nursing note and vitals reviewed.      Neurological Exam:   LOC: alert  Attention Span: poor  Language: Expressive aphasia, Receptive aphasia  Articulation: Dysarthria  Orientation: Untestable due to severe aphasia   Visual Fields: Full  EOM (CN III, IV, VI): Gaze preference  left  Pupils (CN II, III): PERRL  Facial Movement (CN VII): Lower facial weakness on the Right  Motor: Arm left  Paresis: 4/5  Leg left  Paresis: 4/5  Arm right  Plegia 0/5  Leg right Paresis: 1/5  Cebellar: No evidence of appendicular or axial ataxia  Tone: Flaccid  RUE    Laboratory:  CMP:   No results for input(s): GLUCOSE, CALCIUM, ALBUMIN, PROT, NA, K, CO2, CL, BUN, CREATININE, ALKPHOS, ALT, AST, BILITOT in the last 24 hours.  CBC:   Recent Labs   Lab 01/23/20  0439   WBC 9.72   RBC 4.14*   HGB 14.0   HCT 43.7      *   MCH 33.8*   MCHC 32.0     Lipid Panel:   No results for input(s): CHOL, LDLCALC, HDL, TRIG in the last 168 hours.  Coagulation:   Recent Labs   Lab 01/23/20  0439   INR 1.3*     Hgb A1C:   No results for input(s): HGBA1C in the last 168 hours.  TSH:   No results for input(s): TSH in the last 168 hours.    Diagnostic Results     Brain Imaging   Brain Imaging   CT Head without 1/15 (07:43)  Evolving moderate-sized region of acute infarction in the left MCA territory as above.  Distribution grossly stable from recent MRI without significant infarct extension.  No hemorrhagic conversion or new territorial infarct.     MRI Brain 1/14 (04:28)  There is interval change, there is interval development of acute ischemia/infarct along the left MCA distribution, this is superimposed on subacute  ischemic change seen on the recent MRI examination  Findings concerning for occlusion of the M2 segment of the left MCA as above.    Additional chronic changes are noted.    Vessel Imaging   CTA Head and Neck 1/14 (02:43)  As on the recent brain MRA examination there is appearance of attenuation of the left MCA branch vessels without evidence for high-grade stenosis or occlusion and otherwise there is no evidence for high-grade stenosis or occlusion of the major arterial vascular structures of the head or neck.    There is no evidence for intracranial aneurysm or AVM.    Findings consistent with acute to subacute left parietal ischemic change again noted.    Small thyroid nodules or cysts.    Cardiac Imaging  TTE 1/11  · Normal left ventricular systolic function. The estimated ejection fraction is 55%.  · Mid anteroseptal and apical moderate hypokinesia.  · Diastolic pattern consistent with atrial fibrillation observed.  · Severe left atrial enlargement.  · Normal right ventricular systolic function.  · Severe right atrial enlargement.  · Mild-to-moderate mitral regurgitation.  · Mild tricuspid regurgitation.  · The estimated PA systolic pressure is 40 mmHg.  Intermediate central venous pressure (8 mmHg).

## 2020-01-23 NOTE — PHYSICIAN QUERY
PT Name: Chirag Thompson  MR #: [id number]    Physician Query Form - Consultant Diagnosis Clarification     CDS/: Juliet Terrazas RN, CDS               Contact information: [email or web address]  This form is a permanent document in the medical record.     Query Date: January 23, 2020      By submitting this query, we are merely seeking further clarification of documentation.  Please utilize your independent clinical judgment when addressing the question(s) below.      The Medical record contains the following:   Diagnosis Supporting Clinical Information Location in Medical Record     moderate pharyngeal/esophageal dysphagia    --Impressions  · Patient demonstrated moderate pharyngeal/esophageal dysphagia characterized by significant retrograde flow of all trials resulting penetration of nectar-thick liquids after the swallow. Patient exhibited significant pooling of trials in hypopharynx that placed him at a high risk of eventual aspiration. Retrograde flow appeared related to UES dysfunction, but UES could not be visualized 2' to shadow.      --Pre-Operative Diagnosis:  Dysphagia  --Technical Procedures Used:  EGD with percutaneous endoscopic gastrostomy   SLP MBSS Note 1/21                  PEG tube procedure note 1/23         Do you agree with the Consultants diagnosis of _pharyngeal/esophageal dysphagia__________?    [x  ] Yes   [  ] No   [  ] Clinically insignificant   [  ] Other/Clarification of findings: ________________________   [  ] Clinically undetermined

## 2020-01-23 NOTE — ASSESSMENT & PLAN NOTE
Stroke risk factor  Rate control  Will need anticoagulation for this when stable, holding in setting of large area of infarct.  Repeat CT on 1/18 stable. PEG placed 1/23, plan to start DOAC 24 hr after PEG placement.

## 2020-01-23 NOTE — PLAN OF CARE
Problem: SLP Goal  Goal: SLP Goal  Description  Goals due 1/28  1.  Pt. Will participate in ongoing assessment of swallow at bedside  2.  Pt. Will participate in speech language eval /met  3.  Model simple commands with 100% accuracy  4.  Respond to simple yes/no questions with 70% accuracy  5.  Complete single words on au tomatic speech tasks with 50% accuracy      Outcome: Ongoing, Progressing   Goals remain appropriate.     JENISE Morales, CCC-SLP  1/23/2020

## 2020-01-23 NOTE — ANESTHESIA RELEASE NOTE
"Anesthesia Release from PACU Note    Patient: Chirag Thompson    Procedure(s) Performed: Procedure(s) (LRB):  EGD, WITH PEG TUBE INSERTION (N/A)    Anesthesia type: general    Post pain: Adequate analgesia    Post assessment: no apparent anesthetic complications and tolerated procedure well    Last Vitals:   Visit Vitals  BP (!) 143/94   Pulse 95   Temp 36.3 °C (97.3 °F) (Temporal)   Resp 20   Ht 5' 10" (1.778 m)   Wt 99.5 kg (219 lb 5.7 oz)   SpO2 100%   BMI 31.47 kg/m²       Post vital signs: stable    Level of consciousness: awake    Nausea/Vomiting: no nausea/no vomiting    Complications: none    Airway Patency: patent    Respiratory: spontaneous ventilation, nasal cannula    Cardiovascular: stable and blood pressure at baseline    Hydration: euvolemic  "

## 2020-01-23 NOTE — PLAN OF CARE
Report given to Nurse Irving for patient to return to floor. Telemetry called to ensure patient is viewable on monitor. Nurse made aware restraints were not intact when patient arrived to phase II recovery from OR. Patient on 3L NC. Attempted to call nephew who is power of  to update and no response. Attempt x2.

## 2020-01-23 NOTE — TRANSFER OF CARE
"Anesthesia Transfer of Care Note    Patient: Chirag Thompson    Procedure(s) Performed: Procedure(s) (LRB):  EGD, WITH PEG TUBE INSERTION (N/A)    Patient location: PACU    Anesthesia Type: general    Transport from OR: Transported from OR on 6-10 L/min O2 by face mask with adequate spontaneous ventilation    Post pain: adequate analgesia    Post assessment: no apparent anesthetic complications    Post vital signs: stable    Level of consciousness: sedated    Nausea/Vomiting: no nausea/vomiting    Complications: none    Transfer of care protocol was followed      Last vitals:   Visit Vitals  BP (!) 157/92 (BP Location: Right arm, Patient Position: Lying)   Pulse 104   Temp 36.6 °C (97.9 °F) (Oral)   Resp 20   Ht 5' 10" (1.778 m)   Wt 99.5 kg (219 lb 5.7 oz)   SpO2 99%   BMI 31.47 kg/m²     "

## 2020-01-23 NOTE — CARE UPDATE
GENERAL SURGERY     Was able to contact patient's nephew and POA (David Thompson) regarding PEG placement. Informed consent obtained.   Will proceed with PEG placement tomorrow.   Hold tube feeds at midnight.     Buster Sanz MD  General Surgery PGYIII  [phone]

## 2020-01-23 NOTE — ASSESSMENT & PLAN NOTE
80 y/o male with the above medical history including L MCA stroke and persistent dysphagia in need of durable enteral access    Plan:  - PEG in OR today  - Rest of care per primary team  - Thank you for the consult. Call with questions

## 2020-01-23 NOTE — ANESTHESIA PREPROCEDURE EVALUATION
[facility]  Anesthesia Pre-Operative Evaluation         Patient Name: Chirag Thompson  YOB: 1940  MRN: [id number]    SUBJECTIVE:     Pre-operative evaluation for Procedure(s) (LRB):  EGD, WITH PEG TUBE INSERTION (N/A)     01/23/2020    Chirag Thompson is a 79 y.o. male w/ a significant PMHx of HTN, pAFib on Coumadin, DM II, admitted to [facility] on 1/10 for left MCA stroke. He has had persistent dysphagia since arrival. Failed MBSS. Had been receiving enteral feeds via NGT but pulled by patient and have been unable to replace. Patient now presents for PEG tube insertion.    Patient only mumbles incomprehensible sounds and does not follow commands. Consent obtained from nephew.     Patient now presents for the above procedure(s).      LDA: None documented.       Peripheral IV - Single Lumen 01/15/20 0600 Distal;Left;Posterior Forearm (Active)   Site Assessment Clean;Dry;Intact 1/22/2020  8:26 AM   Line Status Saline locked;Flushed 1/22/2020  8:26 AM   Dressing Status Clean;Dry;Intact 1/22/2020  8:26 AM   Dressing Intervention Dressing reinforced 1/21/2020  8:00 PM   Dressing Change Due 01/19/20 1/21/2020  8:00 PM   Site Change Due 01/19/20 1/20/2020  8:00 PM   Reason Not Rotated Poor venous access 1/21/2020  8:00 PM   Number of days: 7            NG/OG Tube 01/21/20 0958 nasogastric Right nostril (Active)   Number of days: 1       Male External Urinary Catheter 01/14/20 1409 Small (Active)   Collection Container Standard drainage bag 1/22/2020  8:26 AM   Securement Method secured to top of thigh w/ adhesive device 1/22/2020  8:26 AM   Skin no redness;no breakdown 1/22/2020  8:26 AM   Tolerance no signs/symptoms of discomfort 1/22/2020  8:26 AM   Output (mL) 150 mL 1/22/2020  4:00 PM   Catheter Change Date 01/22/20 1/22/2020  8:26 AM   Catheter Change Time 0811 1/22/2020  8:26 AM    Number of days: 8       Prev airway: None documented.    Drips: None documented.   sodium chloride 0.9% 100 mL/hr at 01/22/20 7407       Patient Active Problem List   Diagnosis    Embolic stroke involving left middle cerebral artery    Essential hypertension    Type 2 diabetes mellitus with circulatory disorder, without long-term current use of insulin    Atrial fibrillation    Right spastic hemiparesis    Aphasia    Acquired hypothyroidism    Hypercoagulable state    Acute ischemic left middle cerebral artery (MCA) stroke    Cytotoxic cerebral edema    Chronic anticoagulation    Oral phase dysphagia       Review of patient's allergies indicates:  No Known Allergies    Current Inpatient Medications:   albuterol-ipratropium  3 mL Nebulization Q6H WAKE    atorvastatin  40 mg Per NG tube Daily    barium  450 mL Per NG tube Once    cefTRIAXone (ROCEPHIN) IVPB  1 g Intravenous Q24H    levothyroxine  75 mcg Per NG tube Before breakfast    metoprolol tartrate  25 mg Per NG tube BID    senna-docusate 8.6-50 mg  1 tablet Per NG tube BID       No current facility-administered medications on file prior to encounter.      Current Outpatient Medications on File Prior to Encounter   Medication Sig Dispense Refill    allopurinol (ZYLOPRIM) 300 MG tablet Take 1 tablet by mouth.      amLODIPine (NORVASC) 5 MG tablet Take 1 tablet by mouth.      enalapril (VASOTEC) 20 MG tablet Take 1 tablet by mouth.      glipiZIDE (GLUCOTROL) 2.5 MG tablet Take 1 tablet by mouth.      levothyroxine (SYNTHROID) 75 MCG tablet Take 75 mcg by mouth once daily.      magnesium 30 mg Tab Take by mouth once.      METOPROLOL SUCCINATE ORAL Take by mouth.      spironolactone (ALDACTONE) 25 MG tablet Take 25 mg by mouth once daily.      traMADol (ULTRAM) 50 mg tablet Take 1 tablet by mouth.      warfarin (COUMADIN) 5 MG tablet Take 1 tablet by mouth.         Past Surgical History:   Procedure Laterality Date    HERNIA REPAIR          Social History     Socioeconomic History    Marital status: Single     Spouse name: Not on file    Number of children: Not on file    Years of education: Not on file    Highest education level: Not on file   Occupational History    Not on file   Social Needs    Financial resource strain: Not on file    Food insecurity:     Worry: Not on file     Inability: Not on file    Transportation needs:     Medical: Not on file     Non-medical: Not on file   Tobacco Use    Smoking status: Never Smoker    Smokeless tobacco: Never Used   Substance and Sexual Activity    Alcohol use: Never     Frequency: Never    Drug use: Never    Sexual activity: Not on file   Lifestyle    Physical activity:     Days per week: Not on file     Minutes per session: Not on file    Stress: Not on file   Relationships    Social connections:     Talks on phone: Not on file     Gets together: Not on file     Attends Orthodox service: Not on file     Active member of club or organization: Not on file     Attends meetings of clubs or organizations: Not on file     Relationship status: Not on file   Other Topics Concern    Not on file   Social History Narrative    Not on file       OBJECTIVE:     Vital Signs Range (Last 24H):  Temp:  [36.6 °C (97.9 °F)-37 °C (98.6 °F)]   Pulse:  []   Resp:  [12-25]   BP: (121-146)/(74-87)   SpO2:  [95 %-100 %]       Significant Labs:  Lab Results   Component Value Date    WBC 9.81 01/22/2020    HGB 13.5 (L) 01/22/2020    HCT 42.8 01/22/2020     01/22/2020    CHOL 150 01/11/2020    TRIG 46 01/11/2020    HDL 61 01/11/2020    ALT 47 (H) 01/22/2020    AST 50 (H) 01/22/2020     01/22/2020    K 4.4 01/22/2020     01/22/2020    CREATININE 0.8 01/22/2020    BUN 38 (H) 01/22/2020    CO2 24 01/22/2020    TSH 4.277 (H) 01/10/2020    INR 1.3 (H) 01/22/2020    HGBA1C 5.4 01/11/2020       Diagnostic Studies: No relevant studies.    EKG:   Results for orders placed or performed during  the hospital encounter of 01/10/20   EKG 12-lead    Collection Time: 01/16/20  4:03 PM    Narrative    Test Reason : I48.0    Vent. Rate : 095 BPM     Atrial Rate : 057 BPM     P-R Int : 000 ms          QRS Dur : 126 ms      QT Int : 370 ms       P-R-T Axes : 000 -29 -29 degrees     QTc Int : 464 ms    Baseline Artifact  Atrial fibrillation with premature ventricular or aberrantly conducted  complexes  Nonspecific intraventricular block  Nonspecific T wave abnormality  Abnormal ECG  When compared with ECG of 14-JAN-2020 04:02,  T wave inversion now evident in Inferior leads    Confirmed by GOLDY PERSAUD MD [id number]) on 1/17/2020 10:50:13 AM    Referred By: [email or web address]   SELF           Confirmed By:GOLDY PERSAUD MD       2D ECHO:  TTE:  Results for orders placed or performed during the hospital encounter of 01/10/20   Echo Color Flow Doppler? Yes   Result Value Ref Range    BSA 2.2 m2    TDI SEPTAL 0.08 m/s    LA WIDTH 5.23 cm    AORTIC VALVE CUSP SEPERATION 1.53 cm    TDI LATERAL 0.11 m/s    PV PEAK VELOCITY 1.34 cm/s    LVIDD 4.71 3.5 - 6.0 cm    IVS 1.10 0.6 - 1.1 cm    PW 0.89 0.6 - 1.1 cm    Ao root annulus 2.33 cm    LVIDS 3.25 2.1 - 4.0 cm    FS 31 28 - 44 %    LA volume 118.70 cm3    Sinus 2.98 cm    STJ 2.62 cm    Ascending aorta 2.65 cm    LV mass 163.90 g    LA size 4.74 cm    RVDD 4.00 cm    TAPSE 1.85 cm    RV S' 14.26 cm/s    Left Ventricle Relative Wall Thickness 0.38 cm    AV mean gradient 4 mmHg    AV valve area 2.09 cm2    AV Velocity Ratio 0.66     AV index (prosthetic) 0.67     MV valve area p 1/2 method 3.38 cm2    Mean e' 0.10 m/s    IVRT 0.05 msec    LVOT diameter 1.99 cm    LVOT area 3.1 cm2    LVOT peak faraz 0.88 m/s    LVOT peak VTI 19.25 cm    Ao peak faraz 1.34 m/s    Ao VTI 28.69 cm    LVOT stroke volume 59.84 cm3    AV peak gradient 7 mmHg    TR Max Faraz 2.84 m/s    MV stenosis pressure 1/2 time 65 ms    LV Systolic Volume 42.49 mL    LV Systolic Volume Index 19.7 mL/m2    LV Diastolic Volume  103.12 mL    LV Diastolic Volume Index 47.81 mL/m2    LA Volume Index 55.0 mL/m2    LV Mass Index 76 g/m2    RA Major Axis 6.53 cm    Left Atrium Minor Axis 5.53 cm    Left Atrium Major Axis 5.74 cm    Triscuspid Valve Regurgitation Peak Gradient 32 mmHg    RA Width 5.22 cm    Right Atrial Pressure (from IVC) 8 mmHg    TV rest pulmonary artery pressure 40 mmHg    Narrative    · Normal left ventricular systolic function. The estimated ejection   fraction is 55%.  · Mid anteroseptal and apical moderate hypokinesia.  · Diastolic pattern consistent with atrial fibrillation observed.  · Severe left atrial enlargement.  · Normal right ventricular systolic function.  · Severe right atrial enlargement.  · Mild-to-moderate mitral regurgitation.  · Mild tricuspid regurgitation.  · The estimated PA systolic pressure is 40 mmHg.  · Intermediate central venous pressure (8 mmHg).          CHAY:  No results found for this or any previous visit.    ASSESSMENT/PLAN:       Anesthesia Evaluation    I have reviewed the Patient Summary Reports.     I have reviewed the Medications.     Review of Systems  Anesthesia Hx:  Neg history of prior surgery. Denies Family Hx of Anesthesia complications.    Hematology/Oncology:  Hematology Normal   Oncology Normal     Cardiovascular:   Hypertension Denies CABG/stent.         Pulmonary:  Pulmonary Normal    Renal/:  Renal/ Normal     Hepatic/GI:  Hepatic/GI Normal    Neurological:   CVA, residual symptoms    Endocrine:   Diabetes, type 2 Denies Hypothyroidism.        Physical Exam  General:  Well nourished    Airway/Jaw/Neck:   Unable to assess. Patient would not open mouth when prompted    Dental:  Unable to assess. Patient would not open mouth when prompted.    Chest/Lungs:  Chest/Lungs Findings: Normal Respiratory Rate, Clear to auscultation     Heart/Vascular:  Heart Findings: Rate: Normal  Rhythm: Irregularly Irregular  Sounds: Normal  Heart murmur: negative       Mental Status:  Mental  Status Findings:  Patient mumbles incomprehensible sounds and does not follow commands.        Anesthesia Plan  Type of Anesthesia, risks & benefits discussed:  Anesthesia Type:  general  Patient's Preference:   Intra-op Monitoring Plan: standard ASA monitors  Intra-op Monitoring Plan Comments:   Post Op Pain Control Plan: per primary service following discharge from PACU and multimodal analgesia  Post Op Pain Control Plan Comments:   Induction:   IV  Beta Blocker:  Patient is on a Beta-Blocker and has received one dose within the past 24 hours (No further documentation required).       Informed Consent: Patient understands risks and agrees with Anesthesia plan.  Questions answered. Anesthesia consent signed with patient.  ASA Score: 4     Day of Surgery Review of History & Physical: I have interviewed and examined the patient. I have reviewed the patient's H&P dated:  There are no significant changes.  H&P update referred to the surgeon.         Ready For Surgery From Anesthesia Perspective.

## 2020-01-23 NOTE — PROGRESS NOTES
[facility]  General Surgery  Progress Note    Subjective:     History of Present Illness:  No notes on file    Post-Op Info:  Procedure(s) (LRB):  EGD, WITH PEG TUBE INSERTION (N/A)   Day of Surgery     Interval History: No acute events overnight. Tube feeds off since midnight.    Medications:  Continuous Infusions:   sodium chloride 0.9% 100 mL/hr at 01/22/20 2254     Scheduled Meds:   albuterol-ipratropium  3 mL Nebulization Q6H WAKE    atorvastatin  40 mg Per NG tube Daily    barium  450 mL Per NG tube Once    cefTRIAXone (ROCEPHIN) IVPB  1 g Intravenous Q24H    levothyroxine  75 mcg Per NG tube Before breakfast    metoprolol tartrate  25 mg Per NG tube BID    senna-docusate 8.6-50 mg  1 tablet Per NG tube BID     PRN Meds:acetaminophen, Dextrose 10% Bolus, glucagon (human recombinant), glucose, glucose, insulin aspart U-100, ondansetron, QUEtiapine, sodium chloride 0.9%     Review of patient's allergies indicates:  No Known Allergies  Objective:     Vital Signs (Most Recent):  Temp: 98 °F (36.7 °C) (01/23/20 0432)  Pulse: 108 (01/23/20 0432)  Resp: 18 (01/23/20 0432)  BP: 129/76 (01/23/20 0432)  SpO2: 99 % (01/23/20 0432) Vital Signs (24h Range):  Temp:  [97.9 °F (36.6 °C)-98.6 °F (37 °C)] 98 °F (36.7 °C)  Pulse:  [] 108  Resp:  [12-25] 18  SpO2:  [95 %-100 %] 99 %  BP: (121-146)/(74-87) 129/76     Weight: 99.5 kg (219 lb 5.7 oz)  Body mass index is 31.47 kg/m².    Intake/Output - Last 3 Shifts       01/21 0700 - 01/22 0659 01/22 0700 - 01/23 0659 01/23 0700 - 01/24 0659    P.O. 0      NG/GT       Total Intake(mL/kg) 0 (0)      Urine (mL/kg/hr) 1050 (0.4) 1100 (0.5)     Stool 0 0     Total Output 1050 1100     Net -1050 -1100            Urine Occurrence  1 x     Stool Occurrence 1 x 0 x           Physical Exam   Constitutional: He appears well-developed and well-nourished. No distress.   Cardiovascular: Normal rate and regular rhythm.   Pulmonary/Chest: Effort normal.    Abdominal: Soft. He exhibits no distension. There is no tenderness.       Significant Labs:  CBC:   Recent Labs   Lab 01/23/20  0439   WBC 9.72   RBC 4.14*   HGB 14.0   HCT 43.7      *   MCH 33.8*   MCHC 32.0     CMP:   Recent Labs   Lab 01/22/20  0439      CALCIUM 9.2   ALBUMIN 2.6*   PROT 6.0      K 4.4   CO2 24      BUN 38*   CREATININE 0.8   ALKPHOS 101   ALT 47*   AST 50*   BILITOT 1.8*       Significant Diagnostics:      Assessment/Plan:     * Embolic stroke involving left middle cerebral artery  78 y/o male with the above medical history including L MCA stroke and persistent dysphagia in need of durable enteral access    Plan:  - PEG in OR today  - Rest of care per primary team  - Thank you for the consult. Call with questions        Marycruz Doyle MD  General Surgery  [facility]

## 2020-01-23 NOTE — PT/OT/SLP PROGRESS
Physical Therapy Treatment    Patient Name:  Chirag Thompson   MRN:  [id number]  Admitting Diagnosis: Embolic stroke involving left middle cerebral artery  Recent Surgery: Procedure(s) (LRB):  EGD, WITH PEG TUBE INSERTION (N/A) Day of Surgery    Recommendations:     Discharge Recommendations:  nursing facility, skilled   Discharge Equipment Recommendations: none   Barriers to discharge: Inaccessible home and Decreased caregiver support Pt requiring skilled assistance at current time.     Plan:     During this hospitalization, patient to be seen 4 x/week to address the above listed problems via gait training, therapeutic activities, therapeutic exercises, neuromuscular re-education  · Plan of Care Expires:  02/11/20   Plan of Care Reviewed with: patient    This Plan of care has been discussed with the patient who was involved in its development and understands and is in agreement with the identified goals and treatment plan    Subjective     Communicated with RN (Maria Fernanda) prior to session.     Patient comments: Pt non-verbal, however, he communicates via head nods and facial expressions  Pain/Comfort:  · Pain Rating 1: 0/10  · Pain Rating Post-Intervention 1: 0/10    Objective:     Patient found with: bed alarm, oxygen, pressure relief boots, SCD, telemetry, Condom Catheter    Patient found sup in bed upon PT entry to room, agreeable to treatment.  No family present in the room.    General Precautions: Standard, aphasia, aspiration, fall, NPO   Orthopedic Precautions:N/A   Braces: N/A       BED MOBILITY       NP this date 2* pt had PEG placement.    THERAPEUTIC ACTIVITIES/EXERCISES  Pt receives PROM to R LE sup in bed (ankle DF, hip flex, hip add/abd, hip IR/ER)  x13-15 reps     Pt performs L LE AAROM ther ex's while sup in bed x13-15 reps with vc's    PTA notices pt bleeding from R lower leg (lateral aspect) and notifies RN      EDUCATION  Patient provided with daily orientation and goals of this PT session. They were  educated to call for assistance and to transfer with hospital staff only.  Also, pt was educated on the effects of prolonged immobility and the importance of performing OOB activity and exercises to promote healing and reduce recovery time    Whiteboard updated with correct mobility information. RN/PCT notified.  Pt requires mod/max A to sit at the EOB.    Patient left sup in bed with HOB elevated and R UE supported on blankets, with  all lines intact, call button in reach, bed alarm on and RN notified    AM-PAC 6 CLICK MOBILITY  Turning over in bed (including adjusting bedclothes, sheets and blankets)?: 2  Sitting down on and standing up from a chair with arms (e.g., wheelchair, bedside commode, etc.): 1  Moving from lying on back to sitting on the side of the bed?: 2  Moving to and from a bed to a chair (including a wheelchair)?: 1  Need to walk in hospital room?: 1  Climbing 3-5 steps with a railing?: 1  Basic Mobility Total Score: 8     Assessment:     Chirag Thompson is a 79 y.o. male admitted with a medical diagnosis of Embolic stroke involving left middle cerebral artery.  He presents with the following impairments/functional limitations:  weakness, impaired endurance, impaired sensation, impaired self care skills, impaired functional mobilty, gait instability, impaired balance, impaired cognition, decreased coordination, decreased upper extremity function, decreased lower extremity function, decreased safety awareness, abnormal tone, decreased ROM, impaired coordination, impaired fine motor, impaired skin, edema, impaired cardiopulmonary response to activity, impaired joint extensibility, impaired muscle length. R hemiparesis requiring significant assistance and verbal cues for bed mob, scooting to/along the EOB, static sitting 2* weakness, tightness/tone, decreased attention to the R and cognitive deficits.   In light of pt's current functional level and deficits, it is anticipated that pt will need to  participate in an intense rehab program consisting of PT, OT and ST in order to achieve full rehab potential to return to previous level of function and roles.  Pt remains motivated to participate in PT session and will cont to benefit from skilled PT intervention.    Rehab Prognosis:  Fair; patient would benefit from acute skilled PT services to address these deficits and reach maximum level of function.      GOALS:   Multidisciplinary Problems     Physical Therapy Goals        Problem: Physical Therapy Goal    Goal Priority Disciplines Outcome Goal Variances Interventions   Physical Therapy Goal     PT, PT/OT Ongoing, Progressing     Description:  Goals to be met by: 2020     Patient will increase functional independence with mobility by performin. Supine to sit with Moderate Assistance  2. Sit to supine with Moderate Assistance  3. Sit to stand transfer with Moderate Assistance  4. Bed to chair transfer with Moderate Assistance using LRAD  5. Gait  x 15 feet with Moderate Assistance using LRAD.   6. Sitting at edge of bed x10 minutes with Contact Guard Assistance  7. Stand for 1 minute with Contact Guard Assistance using LRAD  8. Lower extremity exercise program x15 reps per handout, with assistance as needed                      Time Tracking:     PT Received On: 20  PT Start Time: 1542     PT Stop Time: 1600  PT Total Time (min): 18 min     Billable Minutes: Therapeutic Exercise 18    Treatment Type: Treatment  PT/PTA: PTA     PTA Visit Number: 3       Gisselle Truong PTA.  Pager [phone]    2020    .

## 2020-01-23 NOTE — OP NOTE
[facility]  Surgery Department  Operative Note    SUMMARY     Date of Procedure: 1/23/2020     Procedure: Procedure(s) (LRB):  EGD, WITH PEG TUBE INSERTION (N/A)     Surgeon(s) and Role:     * Quan Feng MD - Primary     * Hira Leach MD - Resident - Assisting     * Litzy Salazar MD - Resident - Assisting        Pre-Operative Diagnosis:  Dysphagia                          Post-Operative Diagnosis: Same  Anesthesia: Choice    Technical Procedures Used:  EGD with percutaneous endoscopic gastrostomy    Description of the Findings of the Procedure:  Following induction of adequate anesthesia, the oral endoscope was passed down through the cricopharyngeus and into the esophagus.  We traversed a normal esophagus stomach and duodenum and then withdrew back into the stomach and insufflated bring the light to the anterior abdominal wall which was prepped and draped with ChloraPrep.  We made a transverse incision over the light placed an Angiocath through this incision into the lumen of the stomach and passed a wire which was grasped with the endoscopic snare and brought out in a retrograde fashion.  We attached a 20 [place] PEG tube utilizing the pull technique brought out through the anterior abdominal wall. We then reinserted the scope and noted the PEG to be in good position without evidence of complication.  We with withdrew the scope and then attached the PEG to the anterior abdominal wall with the bumper apparatus applied sterile dressings and placed on gravity drainage.  The patient tolerated the procedure well.    Significant Surgical Tasks Conducted by the Assistant(s), if Applicable:     Complications: No    Estimated Blood Loss (EBL): 5 ml           Implants: * No implants in log *    Specimens:   Specimen (12h ago, onward)    None                  Condition: Good    Disposition: PACU - hemodynamically stable.    Attestation: I was present and scrubbed for the entire  procedure.

## 2020-01-23 NOTE — ASSESSMENT & PLAN NOTE
PEG placed by general surgery 1/23  Spoke to general surgery, would desire DOAC to be started 24 hr after placement if able.   Will start tube feeds when given ok.

## 2020-01-23 NOTE — ANESTHESIA POSTPROCEDURE EVALUATION
Anesthesia Post Evaluation    Patient: Chirag Thompson    Procedure(s) Performed: Procedure(s) (LRB):  EGD, WITH PEG TUBE INSERTION (N/A)    Final Anesthesia Type: general    Patient location during evaluation: [facility]  Patient participation: Yes- Able to Participate  Level of consciousness: awake  Post-procedure vital signs: reviewed and stable  Pain management: adequate  Airway patency: patent    PONV status at discharge: No PONV  Anesthetic complications: no      Cardiovascular status: blood pressure returned to baseline and stable  Respiratory status: spontaneous ventilation and nasal cannula  Hydration status: euvolemic  Follow-up not needed.          Vitals Value Taken Time   /75 1/23/2020  9:45 AM   Temp 36.3 °C (97.3 °F) 1/23/2020  9:00 AM   Pulse 103 1/23/2020  9:46 AM   Resp 21 1/23/2020  9:46 AM   SpO2 97 % 1/23/2020  9:46 AM   Vitals shown include unvalidated device data.      No case tracking events are documented in the log.      Pain/Erica Score: Erica Score: 8 (1/23/2020  9:30 AM)

## 2020-01-23 NOTE — PLAN OF CARE
Problem: Adult Inpatient Plan of Care  Goal: Plan of Care Review  Outcome: Ongoing, Progressing  Goal: Patient-Specific Goal (Individualization)  Description  Admit Date:  1/14/2020    Admit Dx:[facility]    Past Medical History:  No date: Arthritis  No date: Chronic a-fib  No date: Current use of long term anticoagulation  No date: Hypertension  No date: Pressure ulcer of right leg, unspecified pressure ulcer stage      Comment:  was going to [facility] healed now  01/10/2020: Stroke due to embolism of left middle cerebral artery      Comment:  left temp parietal  No date: Thyroid disease    Past Surgical History:  No date: HERNIA REPAIR    Individualization:   1. Patient likes to be covered with 2 blankets.    Restraints: Bilateral wrist restrain applied 1/14/20 @ 1910by Chucky TERRY          Outcome: Ongoing, Progressing     Problem: Diabetes Comorbidity  Goal: Blood Glucose Level Within Desired Range  Outcome: Ongoing, Progressing     Problem: Skin Injury Risk Increased  Goal: Skin Health and Integrity  Outcome: Ongoing, Progressing     Problem: Fall Injury Risk  Goal: Absence of Fall and Fall-Related Injury  Outcome: Ongoing, Progressing     Problem: Communication Impairment (Stroke, Ischemic/Transient Ischemic Attack)  Goal: Improved Communication Skills  Outcome: Ongoing, Progressing     Problem: Restraint, Nonbehavioral (Nonviolent)  Goal: Personal Dignity and Safety Maintained  Outcome: Ongoing, Progressing     Problem: Aspiration (Enteral Nutrition)  Goal: Absence of Aspiration Signs/Symptoms  Outcome: Ongoing, Progressing     Problem: Oral Intake Inadequate  Goal: Improved Oral Intake  Outcome: Ongoing, Progressing     Recv'd pt in bed not alert and oriented. Pt nonverbal. POC reviewed with pt. VSS throughout shift.  Fall/safety precautions implemented and maintained. Blood glucose monitored. No acute events overnight. Pt progressing toward goals. Pt denies pain or other needs. Bed locked in lowest  position. Side rails up x4. Call bell within reach. Will continue to monitor. See flowsheets for full assessments.

## 2020-01-23 NOTE — PROGRESS NOTES
Pt arrived to pre op area ready to go to OR. Vitals complete, pt aphasic. Nurses stated pt name, pt nodded yes, nurse stated pt  pt nodded yes, nurse stated procedure pt having done, pt nodded yes. To OR.

## 2020-01-23 NOTE — PLAN OF CARE
Problem: Physical Therapy Goal  Goal: Physical Therapy Goal  Description  Goals to be met by: 2020     Patient will increase functional independence with mobility by performin. Supine to sit with Moderate Assistance  2. Sit to supine with Moderate Assistance  3. Sit to stand transfer with Moderate Assistance  4. Bed to chair transfer with Moderate Assistance using LRAD  5. Gait  x 15 feet with Moderate Assistance using LRAD.   6. Sitting at edge of bed x10 minutes with Contact Guard Assistance  7. Stand for 1 minute with Contact Guard Assistance using LRAD  8. Lower extremity exercise program x15 reps per handout, with assistance as needed     Outcome: Ongoing, Progressing      Discharge Recommendations: SNF    Pt requires mod/max A to sit at the EOB.    Goals remain appropriate.     Gisselle Truong, PTA.   [phone]   2020

## 2020-01-23 NOTE — ASSESSMENT & PLAN NOTE
78 yo man with history of HTN, a fib (on Coumadin) and hypothyroidism. Patient has been admitted to [facility] for left temp partieal infarct on 1/10. On 1/14 patient with worsening of deficits: aphasia, right side plegia and not following commands. Telestroke consult was completed by Dr. Olivares, no tpa given as INR was 2.0. [place] [facility] [place] MP.  Positive for LVO, L M1 occlusion. Patient not candidate for intervention d/t large core infarct. Patient was admitted to [facility] for close monitoring.  Coumadin held d/t size of stroke. PEG placed 1/23, will switch to DOAC and start 24hr after PEG placement.         Antithrombotics: currently held, to start DOAC 24 hr after PEG     Statins: Lipitor 40 mg daily    Aggressive risk factor modification: A-Fib, HTN     Rehab efforts: The patient has been evaluated by a stroke team provider and the therapy needs have been fully considered based off the presenting complaints and exam findings. The following therapy evaluations are needed: PT evaluate and treat, OT evaluate and treat, SLP evaluate and treat, PM&R evaluate for appropriate placement - SNF    Diagnostics ordered/pending: None     VTE prophylaxis: None: Hold pharmacologic prophylaxis in setting of large infarct. SCDs.    BP parameters: Infarct: No intervention, SBP <220

## 2020-01-23 NOTE — PT/OT/SLP PROGRESS
"Speech Language Pathology Treatment    Patient Name:  Chirag Thompson   MRN:  [id number]  Admitting Diagnosis: Embolic stroke involving left middle cerebral artery    Recommendations:                 General Recommendations:  Dysphagia therapy and Speech/language therapy  Diet recommendations:  NPO, Liquid Diet Level: NPO   Aspiration Precautions: Strict aspiration precautions   General Precautions: Standard, aphasia, aspiration, fall, NPO  Communication strategies:  yes/no questions only and provide increased time to answer    Subjective     " down"  Patient goals: unable to state    Pain/Comfort:  · Pain Rating 1: 0/10  · Pain Rating Post-Intervention 1: 0/10    Objective:     Has the patient been evaluated by SLP for swallowing?   Yes  Keep patient NPO? Yes   Current Respiratory Status: room air      Pt seen bedside this pm. Peg placed this am. Pt awake/alert. He was able to count to 5 with therapist and then perseverated on "7". He sang a familiar song with therapist. He completed automatic phrases withy 20% acc and automatic sentences with 25%acc. He did not attempt to ID objects in a f=2. He did no model any commands. Responses to simple y.n questions were undifferentiated. White board updated. No family present.  Assessment:     Chirag Thompson is a 79 y.o. male with an SLP diagnosis of Aphasia and Dysphagia.  He presents with progress towards goals.    Goals:   Multidisciplinary Problems     SLP Goals        Problem: SLP Goal    Goal Priority Disciplines Outcome   SLP Goal     SLP Ongoing, Progressing   Description:  Goals due 1/28  1.  Pt. Will participate in ongoing assessment of swallow at bedside  2.  Pt. Will participate in speech language eval /met  3.  Model simple commands with 100% accuracy  4.  Respond to simple yes/no questions with 70% accuracy  5.  Complete single words on au tomatic speech tasks with 50% accuracy                       Plan:     · Patient to be seen:  4 x/week   · Plan of Care " expires:  02/12/20  · Plan of Care reviewed with:  patient   · SLP Follow-Up:  Yes       Discharge recommendations:  nursing facility, skilled   Barriers to Discharge:  None    Time Tracking:     SLP Treatment Date:   01/23/20  Speech Start Time:  1300  Speech Stop Time:  1313     Speech Total Time (min):  13 min    Billable Minutes: Speech Therapy Individual 13    JENISE Morales, CCC-SLP  01/23/2020

## 2020-01-23 NOTE — PLAN OF CARE
Post-PEG tube placement instructions:  PEG placed 1/23/2020 at 0845  Leave PEG tube to gravity after surgery.  Ok to use tube for medications 6 hours after surgery. Clamp tube for 30 mins after medication administration.  Tube feeds to be started 24 hrs after PEG tube has been placed.  Please call for bleeding or malfunction of PEG tube.    Hira Leach MD  General Surgery, PGY-2  [phone]

## 2020-01-24 LAB
ALBUMIN SERPL BCP-MCNC: 2.6 G/DL (ref 3.5–5.2)
ALP SERPL-CCNC: 88 U/L (ref 55–135)
ALT SERPL W/O P-5'-P-CCNC: 41 U/L (ref 10–44)
ANION GAP SERPL CALC-SCNC: 9 MMOL/L (ref 8–16)
AST SERPL-CCNC: 45 U/L (ref 10–40)
BASOPHILS # BLD AUTO: 0.09 K/UL (ref 0–0.2)
BASOPHILS NFR BLD: 0.9 % (ref 0–1.9)
BILIRUB SERPL-MCNC: 1.5 MG/DL (ref 0.1–1)
BUN SERPL-MCNC: 41 MG/DL (ref 8–23)
CALCIUM SERPL-MCNC: 8.9 MG/DL (ref 8.7–10.5)
CHLORIDE SERPL-SCNC: 112 MMOL/L (ref 95–110)
CO2 SERPL-SCNC: 25 MMOL/L (ref 23–29)
CREAT SERPL-MCNC: 0.9 MG/DL (ref 0.5–1.4)
DIFFERENTIAL METHOD: ABNORMAL
EOSINOPHIL # BLD AUTO: 0.2 K/UL (ref 0–0.5)
EOSINOPHIL NFR BLD: 1.8 % (ref 0–8)
ERYTHROCYTE [DISTWIDTH] IN BLOOD BY AUTOMATED COUNT: 14.8 % (ref 11.5–14.5)
EST. GFR  (AFRICAN AMERICAN): >60 ML/MIN/1.73 M^2
EST. GFR  (NON AFRICAN AMERICAN): >60 ML/MIN/1.73 M^2
GLUCOSE SERPL-MCNC: 105 MG/DL (ref 70–110)
HCT VFR BLD AUTO: 43 % (ref 40–54)
HGB BLD-MCNC: 13.3 G/DL (ref 14–18)
IMM GRANULOCYTES # BLD AUTO: 0.16 K/UL (ref 0–0.04)
IMM GRANULOCYTES NFR BLD AUTO: 1.6 % (ref 0–0.5)
INR PPP: 1.5 (ref 0.8–1.2)
LYMPHOCYTES # BLD AUTO: 0.7 K/UL (ref 1–4.8)
LYMPHOCYTES NFR BLD: 7.1 % (ref 18–48)
MAGNESIUM SERPL-MCNC: 2.1 MG/DL (ref 1.6–2.6)
MCH RBC QN AUTO: 33.2 PG (ref 27–31)
MCHC RBC AUTO-ENTMCNC: 30.9 G/DL (ref 32–36)
MCV RBC AUTO: 107 FL (ref 82–98)
MONOCYTES # BLD AUTO: 1.1 K/UL (ref 0.3–1)
MONOCYTES NFR BLD: 11.2 % (ref 4–15)
NEUTROPHILS # BLD AUTO: 7.8 K/UL (ref 1.8–7.7)
NEUTROPHILS NFR BLD: 77.4 % (ref 38–73)
NRBC BLD-RTO: 0 /100 WBC
PHOSPHATE SERPL-MCNC: 3.2 MG/DL (ref 2.7–4.5)
PLATELET # BLD AUTO: 181 K/UL (ref 150–350)
PMV BLD AUTO: 10.6 FL (ref 9.2–12.9)
POCT GLUCOSE: 103 MG/DL (ref 70–110)
POCT GLUCOSE: 129 MG/DL (ref 70–110)
POCT GLUCOSE: 97 MG/DL (ref 70–110)
POCT GLUCOSE: 98 MG/DL (ref 70–110)
POCT GLUCOSE: 99 MG/DL (ref 70–110)
POTASSIUM SERPL-SCNC: 4.3 MMOL/L (ref 3.5–5.1)
PROT SERPL-MCNC: 6.1 G/DL (ref 6–8.4)
PROTHROMBIN TIME: 14.3 SEC (ref 9–12.5)
RBC # BLD AUTO: 4.01 M/UL (ref 4.6–6.2)
SODIUM SERPL-SCNC: 146 MMOL/L (ref 136–145)
WBC # BLD AUTO: 10.05 K/UL (ref 3.9–12.7)

## 2020-01-24 PROCEDURE — 99900035 HC TECH TIME PER 15 MIN (STAT)

## 2020-01-24 PROCEDURE — 25000003 PHARM REV CODE 250: Performed by: NURSE PRACTITIONER

## 2020-01-24 PROCEDURE — 84100 ASSAY OF PHOSPHORUS: CPT

## 2020-01-24 PROCEDURE — 36415 COLL VENOUS BLD VENIPUNCTURE: CPT

## 2020-01-24 PROCEDURE — 97112 NEUROMUSCULAR REEDUCATION: CPT | Mod: CQ

## 2020-01-24 PROCEDURE — 94668 MNPJ CHEST WALL SBSQ: CPT

## 2020-01-24 PROCEDURE — 99233 SBSQ HOSP IP/OBS HIGH 50: CPT | Mod: ,,, | Performed by: PSYCHIATRY & NEUROLOGY

## 2020-01-24 PROCEDURE — 97530 THERAPEUTIC ACTIVITIES: CPT

## 2020-01-24 PROCEDURE — 97112 NEUROMUSCULAR REEDUCATION: CPT

## 2020-01-24 PROCEDURE — 27000221 HC OXYGEN, UP TO 24 HOURS

## 2020-01-24 PROCEDURE — 83735 ASSAY OF MAGNESIUM: CPT

## 2020-01-24 PROCEDURE — 85610 PROTHROMBIN TIME: CPT

## 2020-01-24 PROCEDURE — 94761 N-INVAS EAR/PLS OXIMETRY MLT: CPT

## 2020-01-24 PROCEDURE — 97530 THERAPEUTIC ACTIVITIES: CPT | Mod: CQ

## 2020-01-24 PROCEDURE — 97110 THERAPEUTIC EXERCISES: CPT | Mod: CQ

## 2020-01-24 PROCEDURE — 85025 COMPLETE CBC W/AUTO DIFF WBC: CPT

## 2020-01-24 PROCEDURE — 25000242 PHARM REV CODE 250 ALT 637 W/ HCPCS: Performed by: NURSE PRACTITIONER

## 2020-01-24 PROCEDURE — 99233 PR SUBSEQUENT HOSPITAL CARE,LEVL III: ICD-10-PCS | Mod: ,,, | Performed by: PSYCHIATRY & NEUROLOGY

## 2020-01-24 PROCEDURE — 11000001 HC ACUTE MED/SURG PRIVATE ROOM

## 2020-01-24 PROCEDURE — 63600175 PHARM REV CODE 636 W HCPCS: Performed by: NURSE PRACTITIONER

## 2020-01-24 PROCEDURE — 94640 AIRWAY INHALATION TREATMENT: CPT

## 2020-01-24 PROCEDURE — 80053 COMPREHEN METABOLIC PANEL: CPT

## 2020-01-24 RX ORDER — CEFTRIAXONE 1 G/1
1 INJECTION, POWDER, FOR SOLUTION INTRAMUSCULAR; INTRAVENOUS
Status: COMPLETED | OUTPATIENT
Start: 2020-01-25 | End: 2020-01-26

## 2020-01-24 RX ADMIN — IPRATROPIUM BROMIDE AND ALBUTEROL SULFATE 3 ML: .5; 3 SOLUTION RESPIRATORY (INHALATION) at 02:01

## 2020-01-24 RX ADMIN — APIXABAN 5 MG: 5 TABLET, FILM COATED ORAL at 05:01

## 2020-01-24 RX ADMIN — ATORVASTATIN CALCIUM 40 MG: 20 TABLET, FILM COATED ORAL at 09:01

## 2020-01-24 RX ADMIN — SENNOSIDES AND DOCUSATE SODIUM 1 TABLET: 8.6; 5 TABLET ORAL at 09:01

## 2020-01-24 RX ADMIN — IPRATROPIUM BROMIDE AND ALBUTEROL SULFATE 3 ML: .5; 3 SOLUTION RESPIRATORY (INHALATION) at 07:01

## 2020-01-24 RX ADMIN — CEFTRIAXONE SODIUM 1 G: 1 INJECTION, POWDER, FOR SOLUTION INTRAMUSCULAR; INTRAVENOUS at 12:01

## 2020-01-24 RX ADMIN — METOPROLOL TARTRATE 25 MG: 25 TABLET ORAL at 09:01

## 2020-01-24 RX ADMIN — LEVOTHYROXINE SODIUM 75 MCG: 25 TABLET ORAL at 06:01

## 2020-01-24 RX ADMIN — APIXABAN 5 MG: 5 TABLET, FILM COATED ORAL at 09:01

## 2020-01-24 NOTE — ASSESSMENT & PLAN NOTE
78 yo man with history of HTN, a fib (on Coumadin) and hypothyroidism. Patient has been admitted to [facility] for left temp partieal infarct on 1/10. On 1/14 patient with worsening of deficits: aphasia, right side plegia and not following commands. Telestroke consult was completed by Dr. Olivares, no tpa given as INR was 2.0. Patient [place] [facility] [place] CTA stroke MP.  Positive for LVO, L M1 occlusion. Patient not candidate for intervention d/t large core infarct. Patient was admitted to [facility] for close monitoring.  Coumadin held d/t size of stroke. PEG placed 1/23,. Eliquis and TF started ib 1/24.       Antithrombotics: Eliquis      Statins: Lipitor 40 mg daily    Aggressive risk factor modification: A-Fib, HTN     Rehab efforts: The patient has been evaluated by a stroke team provider and the therapy needs have been fully considered based off the presenting complaints and exam findings. The following therapy evaluations are needed: PT evaluate and treat, OT evaluate and treat, SLP evaluate and treat, PM&R evaluate for appropriate placement - SNF    Diagnostics ordered/pending: None     VTE prophylaxis: None: Hold pharmacologic prophylaxis in setting of large infarct. SCDs.    BP parameters: Infarct: No intervention, SBP <220

## 2020-01-24 NOTE — PLAN OF CARE
M for David Thompson (nephew) to return my call. Call back number provided. [phone]. To discuss SNF choices. Limited information given in VM as it was generic with no identifying name.     Loni Thorne RN  Case Management  Ext: [phone]  01/24/2020  11:23 AM

## 2020-01-24 NOTE — PROGRESS NOTES
[facility]  Vascular Neurology  Comprehensive Stroke Center  Progress Note    Assessment/Plan:     * Embolic stroke involving left middle cerebral artery  78 yo man with history of HTN, a fib (on Coumadin) and hypothyroidism. Patient has been admitted to [facility] for left temp partieal infarct on 1/10. On 1/14 patient with worsening of deficits: aphasia, right side plegia and not following commands. Telestroke consult was completed by Dr. Olivares, no tpa given as INR was 2.0. Patient transferred to [facility] ED for stat CTA stroke MP.  Positive for LVO, L M1 occlusion. Patient not candidate for intervention d/t large core infarct. Patient was admitted to [facility] for close monitoring.  Coumadin held d/t size of stroke. PEG placed 1/23,. Eliquis and TF started ib 1/24.       Antithrombotics: Eliquis      Statins: Lipitor 40 mg daily    Aggressive risk factor modification: A-Fib, HTN     Rehab efforts: The patient has been evaluated by a stroke team provider and the therapy needs have been fully considered based off the presenting complaints and exam findings. The following therapy evaluations are needed: PT evaluate and treat, OT evaluate and treat, SLP evaluate and treat, PM&R evaluate for appropriate placement - SNF    Diagnostics ordered/pending: None     VTE prophylaxis: None: Hold pharmacologic prophylaxis in setting of large infarct. SCDs.    BP parameters: Infarct: No intervention, SBP <220    PEG (percutaneous endoscopic gastrostomy) adjustment/replacement/removal  PEG placed by general surgery 1/23    Patient 24 hour s/p PEG. Eliquis started. TF started.     Chronic anticoagulation  Previously on coumadin, transitioned to Eliquis on 1/24    Cytotoxic cerebral edema  Area of cytotoxic cerebral edema identified when reviewing brain imaging in the territory of the left middle cerebral artery. There is mass effect associated with it. We will continue to monitor the patients clinical exam for any  worsening of symptoms which may indicate expansion of the stroke or the area of the edema resulting in the clinical change. The pattern is suggestive of cardio embolic etiology.        Hypercoagulable state  Pt previously on Coumadin but subtherapeutic     Eliquis started on 1/24    Aphasia  Due to stroke  Aggressive therapy SLP    Right spastic hemiparesis  Due to stroke  Aggressive therapy with PT/OT    Atrial fibrillation  Stroke risk factor  Rate control  Repeat CT on 1/18 stable. PEG placed 1/23,  Eliquis started on 1/24         Type 2 diabetes mellitus with circulatory disorder, without long-term current use of insulin  Stroke risk factor  HgB A1C 5.1  SSI       Essential hypertension  Stroke risk factor  SBP <220           Patient admitted to neurocritical care for Left MCA syndrome, severe aphasia following extension of prior stroke.  01/15/2020: Overnight concern for extensor posturing of right arm, emergent CT this AM showing stable area of infarct, no new hemorrhage or extension.  01/16/20: No acute events overnight, neurostatus unchanged  01/17/2020: No events overnight, patient received seroquel for agitation this AM. To remain in neuro ICU for closer monitoring  01/18/2020: Patient calm and alert during today's exam. Patient to step down to stroke primary service. Breath sounds course, CXR without significant change- will continue pulmonary tolieting.   1/19/20: Patient now with leukocytosis. Repeat CBC pending. CXR with pneumonitis. Rocephin ordered.  1/20/20: CT abd/pelvis for PEG planning, scheduled for tomorrow AM with IR. Plan to restart AC (Heparin vs DOAC) post procedure  1/21/20: Issues with placing NG overnight, PEG deferred to tomorrow as pt unable to get barium. SLP re-eval, MBSS today  1/22/20: NG pulled overnight, gen surgery consulted with plan for PEG tomorrow   1/23/20: PEG placed today by general surgery, plan to start DOAC 24 hr after procedure.   1/24/20: Patient 24 hour s/p PEG.  Eliquis started. TF started.     STROKE DOCUMENTATION   Acute Stroke Times   Last Known Normal Date: 01/13/20  Last Known Normal Time: 2315  Symptom Onset Date: 01/14/20  Symptom Onset Time: 0050  Stroke Team Called Date: 01/14/20  Stroke Team Called Time: 0105  Stroke Team Arrival Date: 01/14/20  Stroke Team Arrival Time: 0355  CT Interpretation Time: 0100    NIH Scale:  1a. Level of Consciousness: 0-->Alert, keenly responsive  1b. LOC Questions: 2-->Answers neither question correctly  1c. LOC Commands: 2-->Performs neither task correctly  2. Best Gaze: 1-->Partial gaze palsy, gaze is abnormal in one or both eyes, but forced deviation or total gaze paresis is not present  3. Visual: 0-->No visual loss  4. Facial Palsy: 1-->Minor paralysis (flattened nasolabial fold, asymmetry on smiling)  5a. Motor Arm, Left: 1-->Drift, limb holds 90 (or 45) degrees, but drifts down before full 10 seconds, does not hit bed or other support  5b. Motor Arm, Right: 4-->No movement  6a. Motor Leg, Left: 3-->No effort against gravity, leg falls to bed immediately  6b. Motor Leg, Right: 3-->No effort against gravity, leg falls to bed immediately  7. Limb Ataxia: 0-->Absent  8. Sensory: 0-->Normal, no sensory loss  9. Best Language: 2-->Severe aphasia, all communication is through fragmentary expression, great need for inference, questioning, and guessing by the listener. Range of information that can be exchanged is limited, listener carries burden of. . . (see row details)  10. Dysarthria: 2-->Severe dysarthria, patients speech is so slurred as to be unintelligible in the absence of or out of proportion to any dysphasia, or is mute/anarthric  11. Extinction and Inattention (formerly Neglect): 1-->Visual, tactile, auditory, spatial, or personal inattention or extinction to bilateral simultaneous stimulation in one of the sensory modalities  Total (NIH Stroke Scale): 22       Modified Blanche Score: 0  Millie Coma Scale:    ABCD2 Score:     [id number] Score:   HAS -BLED Score:   ICH Score:   Hunt & Jones Classification:      Hemorrhagic change of an Ischemic Stroke: Does this patient have an ischemic stroke with hemorrhagic changes? No     Neurologic Chief Complaint: Right sided weakness    Subjective:     Interval History: Patient is seen for follow-up neurological assessment and treatment recommendations:     Patient 24 hour s/p PEG. Eliquis started. TF started. .     HPI, Past Medical, Family, and Social History remains the same as documented in the initial encounter.     Review of Systems   Constitutional: Negative for fever.   HENT: Positive for drooling and trouble swallowing.    Eyes: Negative for visual disturbance.   Respiratory: Positive for cough.    Gastrointestinal: Negative for diarrhea and vomiting.   Musculoskeletal: Positive for gait problem.   Neurological: Positive for facial asymmetry, speech difficulty and weakness.   Psychiatric/Behavioral: Positive for confusion and decreased concentration.     Scheduled Meds:   albuterol-ipratropium  3 mL Nebulization Q6H WAKE    apixaban  5 mg Per G Tube BID    atorvastatin  40 mg Per NG tube Daily    barium  450 mL Per NG tube Once    [START ON 1/25/2020] cefTRIAXone (ROCEPHIN) IVPB  1 g Intravenous Q24H    levothyroxine  75 mcg Per NG tube Before breakfast    metoprolol tartrate  25 mg Per NG tube BID    senna-docusate 8.6-50 mg  1 tablet Per NG tube BID     Continuous Infusions:   sodium chloride 0.9% Stopped (01/23/20 0844)     PRN Meds:acetaminophen, Dextrose 10% Bolus, glucagon (human recombinant), glucose, glucose, insulin aspart U-100, ondansetron, QUEtiapine, sodium chloride 0.9%    Objective:     Vital Signs (Most Recent):  Temp: 97.8 °F (36.6 °C) (01/24/20 1214)  Pulse: 109 (01/24/20 1214)  Resp: 16 (01/24/20 1214)  BP: 134/85 (01/24/20 1214)  SpO2: 97 % (01/24/20 1214)  BP Location: Left arm    Vital Signs Range (Last 24H):  Temp:  [97.3 °F (36.3 °C)-98.3 °F (36.8 °C)]    Pulse:  []   Resp:  [16-18]   BP: (120-168)/(71-85)   SpO2:  [94 %-99 %]   BP Location: Left arm    Physical Exam   Constitutional: He appears well-developed and well-nourished.   HENT:   Head: Normocephalic and atraumatic.   Eyes: Pupils are equal, round, and reactive to light.   Left gaze preference    Neck: Normal range of motion.   Cardiovascular: Normal rate.   Pulmonary/Chest: Effort normal. He has rhonchi.   Abdominal: Soft.   Skin: Skin is warm and dry.   Nursing note and vitals reviewed.      Neurological Exam:   LOC: alert  Attention Span: poor  Language: Expressive aphasia, Receptive aphasia  Articulation: Dysarthria  Orientation: Untestable due to severe aphasia   Visual Fields: Full  EOM (CN III, IV, VI): Gaze preference  left  Pupils (CN II, III): PERRL  Facial Movement (CN VII): Lower facial weakness on the Right  Motor: Arm left  Paresis: 4/5  Leg left  Paresis: 4/5  Arm right  Plegia 0/5  Leg right Paresis: 1/5  Cebellar: No evidence of appendicular or axial ataxia  Tone: Flaccid  RUE    Laboratory:  CMP:   Recent Labs   Lab 01/24/20  0827   CALCIUM 8.9   ALBUMIN 2.6*   PROT 6.1   *   K 4.3   CO2 25   *   BUN 41*   CREATININE 0.9   ALKPHOS 88   ALT 41   AST 45*   BILITOT 1.5*     CBC:   Recent Labs   Lab 01/24/20  0409   WBC 10.05   RBC 4.01*   HGB 13.3*   HCT 43.0      *   MCH 33.2*   MCHC 30.9*     Lipid Panel:   No results for input(s): CHOL, LDLCALC, HDL, TRIG in the last 168 hours.  Coagulation:   Recent Labs   Lab 01/24/20  0409   INR 1.5*     Hgb A1C:   No results for input(s): HGBA1C in the last 168 hours.  TSH:   No results for input(s): TSH in the last 168 hours.    Diagnostic Results     Brain Imaging   Brain Imaging   CT Head without 1/15 (07:43)  Evolving moderate-sized region of acute infarction in the left MCA territory as above.  Distribution grossly stable from recent MRI without significant infarct extension.  No hemorrhagic conversion or new  territorial infarct.     MRI Brain 1/14 (04:28)  There is interval change, there is interval development of acute ischemia/infarct along the left MCA distribution, this is superimposed on subacute ischemic change seen on the recent MRI examination  Findings concerning for occlusion of the M2 segment of the left MCA as above.    Additional chronic changes are noted.    Vessel Imaging   CTA Head and Neck 1/14 (02:43)  As on the recent brain MRA examination there is appearance of attenuation of the left MCA branch vessels without evidence for high-grade stenosis or occlusion and otherwise there is no evidence for high-grade stenosis or occlusion of the major arterial vascular structures of the head or neck.    There is no evidence for intracranial aneurysm or AVM.    Findings consistent with acute to subacute left parietal ischemic change again noted.    Small thyroid nodules or cysts.    Cardiac Imaging  TTE 1/11  · Normal left ventricular systolic function. The estimated ejection fraction is 55%.  · Mid anteroseptal and apical moderate hypokinesia.  · Diastolic pattern consistent with atrial fibrillation observed.  · Severe left atrial enlargement.  · Normal right ventricular systolic function.  · Severe right atrial enlargement.  · Mild-to-moderate mitral regurgitation.  · Mild tricuspid regurgitation.  · The estimated PA systolic pressure is 40 mmHg.  Intermediate central venous pressure (8 mmHg).         Ambrose Rothman NP  Comprehensive Stroke Center  Department of Vascular Neurology   [facility]

## 2020-01-24 NOTE — NURSING
Patient had EGD with PEG placement today. PEG to gravity. Gave medications via PEG tube . Flushed with 100 ml water. Clamped off for 1 hour. Pt's  Tube able to be used tomorrow. Drainage from PEG tube was 150 ml.  Head of bed is elevated at 35-45 degree angle. Tolerating well without difficulty.

## 2020-01-24 NOTE — PLAN OF CARE
Problem: Physical Therapy Goal  Goal: Physical Therapy Goal  Description  Goals to be met by: 2020     Patient will increase functional independence with mobility by performin. Supine to sit with Moderate Assistance  2. Sit to supine with Moderate Assistance  3. Sit to stand transfer with Moderate Assistance  4. Bed to chair transfer with Moderate Assistance using LRAD  5. Gait  x 15 feet with Moderate Assistance using LRAD.   6. Sitting at edge of bed x10 minutes with Contact Guard Assistance  7. Stand for 1 minute with Contact Guard Assistance using LRAD  8. Lower extremity exercise program x15 reps per handout, with assistance as needed     Outcome: Ongoing, Progressing    Discharge Recommendations: SNF    Pt requires max A to sit at the EOB.    Goals remain appropriate.     Gisselle Truong, PTA.   [phone]   2020

## 2020-01-24 NOTE — PROGRESS NOTES
GENERAL SURGERY     Patient seen and examined. No acute events overnight  PEG being used for medications without issue  OK to start trickle tube feeds today and advance as tolerated  Please call with any issues    Buster Sanz MD  General Surgery PGYIII  [phone]

## 2020-01-24 NOTE — PT/OT/SLP PROGRESS
Physical Therapy Treatment    Patient Name:  Chirag Thompson   MRN:  [id number]  Admitting Diagnosis: Embolic stroke involving left middle cerebral artery  Recent Surgery: Procedure(s) (LRB):  EGD, WITH PEG TUBE INSERTION (N/A) 1 Day Post-Op    Recommendations:     Discharge Recommendations:  nursing facility, skilled   Discharge Equipment Recommendations: (TBD)   Barriers to discharge: Inaccessible home and Decreased caregiver support  Pt requiring skilled assistance at current time.     Plan:     During this hospitalization, patient to be seen 4 x/week to address the above listed problems via gait training, therapeutic activities, therapeutic exercises, neuromuscular re-education  · Plan of Care Expires:  02/11/20   Plan of Care Reviewed with: patient    This Plan of care has been discussed with the patient who was involved in its development and understands and is in agreement with the identified goals and treatment plan    Subjective     Communicated with RN (Lyla) prior to session.     Patient comments: Pt non-verbal, however, he communicates via head nods, facial expressions  Pain/Comfort:  · Pain Rating 1: (Pt in NAD)  · Pain Rating Post-Intervention 1: (Pt in NAD)    Objective:     Patient found with: bed alarm, oxygen, pressure relief boots, Condom Catheter, telemetry, PEG Tube(to drain bag)    Patient found sup in bed with HOB elevated upon PT entry to room, agreeable to treatment.  No family present in the room.    General Precautions: Standard, aphasia, aspiration, fall, NPO   Orthopedic Precautions:N/A   Braces: N/A       BED MOBILITY (vc's for hand placement sequencing of task):        Rolling to the R:  NT.       Rolling to the L:  Max A.        Sup > sit at the EOB:  Mod/max A for trunk elevation and LE's.       Sit > sup:  Mod/max A for trunk and LE's.       Scooting hips to EOB with mod/max A       Scooting hips along the EOB to the L requiring max A x3 scoots                SITTING AT THE EDGE OF  THE BED (10 min)   Assistance Level Required: mod to min A for trunk/head control with L UE support and R UE in weight bearing position   Postural deviations noted: flexed trunk, PPT, forward head, decreased attention to the R   Encouraged: upright posture, APT, midline orientation, B feet in contact with the floor, R UE in weight bearing position        TRANSFERS         Pt not appropriate 2* decreased trunk control while sitting at the EOB    THERAPEUTIC ACTIVITIES/EXERCISES  Pt receives PROM to R LE sup in bed (ankle DF, hip flex, hip add/abd, hip IR/ER)  x12 reps     Pt performs L LE AAROM ther ex's while sup in bed x12 reps with vc's    EDUCATION  Patient provided with daily orientation and goals of this PT session. They were educated to call for assistance and to transfer with hospital staff only.  Also, pt was educated on the effects of prolonged immobility and the importance of performing OOB activity and exercises to promote healing and reduce recovery time    Whiteboard updated with correct mobility information. RN/PCT notified.  Pt requires max A to sit at the EOB.    Patient left sup in bed with HOB elevated and R UE supported on pillow, with  all lines intact, call button in reach, bed alarm on and RN notified    AM-PAC 6 CLICK MOBILITY  Turning over in bed (including adjusting bedclothes, sheets and blankets)?: 2  Sitting down on and standing up from a chair with arms (e.g., wheelchair, bedside commode, etc.): 1  Moving from lying on back to sitting on the side of the bed?: 2  Moving to and from a bed to a chair (including a wheelchair)?: 1  Need to walk in hospital room?: 1  Climbing 3-5 steps with a railing?: 1  Basic Mobility Total Score: 8     Assessment:     Chirag Thompson is a 79 y.o. male admitted with a medical diagnosis of Embolic stroke involving left middle cerebral artery.  He presents with the following impairments/functional limitations:  weakness, impaired endurance, impaired sensation,  impaired self care skills, impaired functional mobilty, impaired balance, visual deficits, impaired cognition, decreased coordination, decreased upper extremity function, decreased lower extremity function, decreased safety awareness, abnormal tone, decreased ROM, impaired coordination, impaired fine motor, impaired skin, edema, impaired cardiopulmonary response to activity, impaired joint extensibility. R hemiparesis requiring significant assistance and verbal cues for bed mob, scooting to/along the EOB, static sitting at the EOB 2* weakness, increased tone, fatigue and cognitive deficits.   In light of pt's current functional level and deficits, it is anticipated that pt will need to participate in an intense rehab program consisting of PT, OT and ST in order to achieve full rehab potential to return to previous level of function and roles.  Pt remains motivated to participate in PT session and will cont to benefit from skilled PT intervention.    Rehab Prognosis:  Fair; patient would benefit from acute skilled PT services to address these deficits and reach maximum level of function.      GOALS:   Multidisciplinary Problems     Physical Therapy Goals        Problem: Physical Therapy Goal    Goal Priority Disciplines Outcome Goal Variances Interventions   Physical Therapy Goal     PT, PT/OT Ongoing, Progressing     Description:  Goals to be met by: 2020     Patient will increase functional independence with mobility by performin. Supine to sit with Moderate Assistance  2. Sit to supine with Moderate Assistance  3. Sit to stand transfer with Moderate Assistance  4. Bed to chair transfer with Moderate Assistance using LRAD  5. Gait  x 15 feet with Moderate Assistance using LRAD.   6. Sitting at edge of bed x10 minutes with Contact Guard Assistance  7. Stand for 1 minute with Contact Guard Assistance using LRAD  8. Lower extremity exercise program x15 reps per handout, with assistance as needed                       Time Tracking:     PT Received On: 01/24/20  PT Start Time: 1405     PT Stop Time: 1438  PT Total Time (min): 33 min     Billable Minutes: Therapeutic Activity 15, Therapeutic Exercise 13 and Neuromuscular Re-education 10    Treatment Type: Treatment  PT/PTA: PTA     PTA Visit Number: 4       Gisselle Truong PTA.  Pager [phone]    1/24/2020    .

## 2020-01-24 NOTE — PLAN OF CARE
Attempted to call family with SNF choices, generic voicemail, no pt info given. LVM to please return my call, number provided     01/24/20 5344   Discharge Reassessment   Assessment Type Discharge Planning Reassessment   Provided patient/caregiver education on the expected discharge date and the discharge plan No   Do you have any problems affording any of your prescribed medications? No   Discharge Plan A Rehab   Discharge Plan B Skilled Nursing Facility   DME Needed Upon Discharge  other (see comments)  (td\)   Patient choice form signed by patient/caregiver N/A   Anticipated Discharge Disposition Rehab   Can the patient answer the patient profile reliably? No, cognitively impaired   How does the patient rate their overall health at the present time? Good   Describe the patient's ability to walk at the present time. Does not walk or unable to take any steps at all   How often would a person be available to care for the patient? Often   Number of comorbid conditions (as recorded on the chart) Four   Post-Acute Status   Post-Acute Authorization Placement   Post-Acute Placement Status Family Barriers  (rec's changed from IRF to SNF attempted to call family with NSF rec's no answer, LVM)   Discharge Delays (!) Patient and Family Barriers     Loni Thorne RN  Case Management  Ext: [phone]  01/24/2020  2:50 PM

## 2020-01-24 NOTE — PLAN OF CARE
Problem: Adult Inpatient Plan of Care  Goal: Plan of Care Review  Outcome: Ongoing, Progressing  Goal: Patient-Specific Goal (Individualization)  Description  Admit Date:  1/14/2020    Admit Dx:LMCA    Past Medical History:  No date: Arthritis  No date: Chronic a-fib  No date: Current use of long term anticoagulation  No date: Hypertension  No date: Pressure ulcer of right leg, unspecified pressure ulcer stage      Comment:  was going to [facility] healed now  01/10/2020: Stroke due to embolism of left middle cerebral artery      Comment:  left temp parietal  No date: Thyroid disease    Past Surgical History:  No date: HERNIA REPAIR    Individualization:   1. Patient likes to be covered with 2 blankets.    Restraints: Bilateral wrist restrain applied 1/14/20 @ 1910by Chucky TERRY          Outcome: Ongoing, Progressing  Goal: Optimal Comfort and Wellbeing  Outcome: Ongoing, Progressing     Problem: Diabetes Comorbidity  Goal: Blood Glucose Level Within Desired Range  Outcome: Ongoing, Progressing     Problem: Skin Injury Risk Increased  Goal: Skin Health and Integrity  Outcome: Ongoing, Progressing     Problem: Fall Injury Risk  Goal: Absence of Fall and Fall-Related Injury  Outcome: Ongoing, Progressing     Problem: Communication Impairment (Stroke, Ischemic/Transient Ischemic Attack)  Goal: Improved Communication Skills  Outcome: Ongoing, Progressing     Problem: Aspiration (Enteral Nutrition)  Goal: Absence of Aspiration Signs/Symptoms  Outcome: Ongoing, Progressing     Problem: Oral Intake Inadequate  Goal: Improved Oral Intake  Outcome: Ongoing, Progressing     Recv'd pt in bed. POC reviewed with pt. Pt nods head yes in understanding. Questions and concerns addressed.  VSS throughout shift.  Fall/safety precautions implemented and maintained. Blood glucose monitored. No acute events overnight. Pt progressing toward goals. Pt denies pain or other needs. Bed locked in lowest position. Side rails up x4. Call  bell within reach. Will continue to monitor. See flowsheets for full assessments.

## 2020-01-24 NOTE — PT/OT/SLP PROGRESS
Occupational Therapy   Treatment    Name: Chirag Thompson  MRN: [id number]  Admitting Diagnosis:  Embolic stroke involving left middle cerebral artery  1 Day Post-Op    Recommendations:     Discharge Recommendations: nursing facility, skilled  Discharge Equipment Recommendations:  other (see comments)(TBD at next level of care)  Barriers to discharge:  Other (Comment)(Pt requires increased assistance at current functional level )    Assessment:     Chirag Thompson is a 79 y.o. male with a medical diagnosis of Embolic stroke involving left middle cerebral artery.  He presents with performance deficits affecting function are weakness, impaired functional mobilty, impaired self care skills, impaired endurance, gait instability, impaired balance, visual deficits, impaired cognition, decreased coordination, decreased upper extremity function, decreased lower extremity function, decreased safety awareness, pain, impaired muscle length, impaired fine motor, decreased ROM, impaired coordination. Pt would benefit from continued skilled acute OT services in order to maximize independence and safety with ADLs and functional mobility to ensure safe return to PLOF in the least restrictive environment.    Rehab Prognosis:  Good; patient would benefit from acute skilled OT services to address these deficits and reach maximum level of function.       Plan:     Patient to be seen 3 x/week to address the above listed problems via therapeutic activities, therapeutic exercises, self-care/home management, neuromuscular re-education  · Plan of Care Expires: 02/13/20  · Plan of Care Reviewed with: patient    Subjective     Pain/Comfort:  · Pain Rating 1: 0/10(Pt unable to verbalize if in pain )  · Pain Rating Post-Intervention 1: 0/10    Objective:     Communicated with: RN prior to session.  Patient found HOB elevated with bed alarm, oxygen, pressure relief boots, SCD, telemetry, Condom Catheter, PEG Tube upon OT entry to room. Pt agreeable  "to therapy session via head nod "yes". Rehab tech present.     General Precautions: Standard, aphasia, aspiration, fall, NPO   Orthopedic Precautions:N/A   Braces: N/A     Occupational Performance:     Bed Mobility:    · Patient completed Rolling/Turning to Left with  total assistance  · Patient completed Scooting/Bridging with maximal assistance and for anterior scooting towards EOB and total A x 2 persons via drawsheet towards HOB with bed in trendelenburg   · Patient completed Supine to Sit with total assistance and 2 persons  · Patient completed Sit to Supine with total assistance     Functional Mobility/Transfers:  · Static Sitting Balance: pt tolerated sitting EOB for ~7 mins with min A <> max A with brief bouts of CGA for static sitting.   · Pt required max A <> total A to maintain neutral spine and forward gaze but pt overall with R lateral gaze. Total A with verbal and tactile cues to bring cervical spine in neutral position.   · Pt attempting to scoot forward and potential stand (Pt leaning forward and pushing off bed with R hand) but not safe to perform at this time due to decreased safety awareness, impaired command following, and impaired EOB sitting balance.   · Increased forward flexion and slouched posturing   · Total A for thoracic extension.     Activities of Daily Living:  · Lower Body Dressing: total assistance to don/doff B  socks     [facility] 6 Click ADL: 6    Treatment & Education:  - Pt educated on role of OT, POC, and goals for therapy.    - Redness noted to pt's PIP joint on R big toes with risk of potential skin  breakdown. RN notified of redness and heel protector boots placed back on pt after therapy session.   - L UE PROM provided while sitting EOB   - Pt placed in chair position after therapy session with B UEs supported on pillow and neutral spine. RN notified to turn pt to the L after 2 hours to prevent worsening skin breakdown.   - Increased time for repositioning pt in bed for " comfort   - Pt completed ADLs and functional mobility for treatment session as noted above   - Pt verbalized understanding. Pt expressed no further concerns/questions.  - whiteboard updated       Patient left with bed in chair position with all lines intact, call button in reach, bed alarm on and RN notifiedEducation:      GOALS:   Multidisciplinary Problems     Occupational Therapy Goals        Problem: Occupational Therapy Goal    Goal Priority Disciplines Outcome Interventions   Occupational Therapy Goal     OT, PT/OT Ongoing, Progressing    Description:  Goals to be met by: 1/28     Patient will increase functional independence with ADLs by performing:    UE Dressing while seated EOB with Moderate Assistance.  Grooming while EOB with Moderate Assistance.  Sitting at edge of bed x10 minutes with Moderate Assistance.  Rolling to Bilateral with Moderate Assistance and use of bedrail as needed.   Supine to sit with Moderate Assistance.  Squat pivot transfers with Maximum Assistance.   Pt will consistently be found positioned according to turning schedule to prevent skin breakdown.   Pt will have skin breakdown measures in place upon entry for 2 therapy sessions. (waffle mattress, heel protector boot, turning schedule)                       Time Tracking:     OT Date of Treatment: 01/24/20  OT Start Time: 1314  OT Stop Time: 1337  OT Total Time (min): 23 min    Billable Minutes:Therapeutic Activity 12  Neuromuscular Re-education 11    Hawa Mace, OT  1/24/2020

## 2020-01-24 NOTE — ASSESSMENT & PLAN NOTE
Stroke risk factor  Rate control  Repeat CT on 1/18 stable. PEG placed 1/23,  Eliquis started on 1/24

## 2020-01-24 NOTE — PLAN OF CARE
Problem: Occupational Therapy Goal  Goal: Occupational Therapy Goal  Description  Goals to be met by: 1/28     Patient will increase functional independence with ADLs by performing:    UE Dressing while seated EOB with Moderate Assistance.  Grooming while EOB with Moderate Assistance.  Sitting at edge of bed x10 minutes with Moderate Assistance.  Rolling to Bilateral with Moderate Assistance and use of bedrail as needed.   Supine to sit with Moderate Assistance.  Squat pivot transfers with Maximum Assistance.   Pt will consistently be found positioned according to turning schedule to prevent skin breakdown.   Pt will have skin breakdown measures in place upon entry for 2 therapy sessions. (waffle mattress, heel protector boot, turning schedule)      Outcome: Ongoing, Progressing  Hawa Mace, OTR/L  Pager: [phone]  1/24/2020

## 2020-01-24 NOTE — SUBJECTIVE & OBJECTIVE
Neurologic Chief Complaint: Right sided weakness    Subjective:     Interval History: Patient is seen for follow-up neurological assessment and treatment recommendations:     Patient 24 hour s/p PEG. Eliquis started. TF started. .     HPI, Past Medical, Family, and Social History remains the same as documented in the initial encounter.     Review of Systems   Constitutional: Negative for fever.   HENT: Positive for drooling and trouble swallowing.    Eyes: Negative for visual disturbance.   Respiratory: Positive for cough.    Gastrointestinal: Negative for diarrhea and vomiting.   Musculoskeletal: Positive for gait problem.   Neurological: Positive for facial asymmetry, speech difficulty and weakness.   Psychiatric/Behavioral: Positive for confusion and decreased concentration.     Scheduled Meds:   albuterol-ipratropium  3 mL Nebulization Q6H WAKE    apixaban  5 mg Per G Tube BID    atorvastatin  40 mg Per NG tube Daily    barium  450 mL Per NG tube Once    [START ON 1/25/2020] cefTRIAXone (ROCEPHIN) IVPB  1 g Intravenous Q24H    levothyroxine  75 mcg Per NG tube Before breakfast    metoprolol tartrate  25 mg Per NG tube BID    senna-docusate 8.6-50 mg  1 tablet Per NG tube BID     Continuous Infusions:   sodium chloride 0.9% Stopped (01/23/20 0844)     PRN Meds:acetaminophen, Dextrose 10% Bolus, glucagon (human recombinant), glucose, glucose, insulin aspart U-100, ondansetron, QUEtiapine, sodium chloride 0.9%    Objective:     Vital Signs (Most Recent):  Temp: 97.8 °F (36.6 °C) (01/24/20 1214)  Pulse: 109 (01/24/20 1214)  Resp: 16 (01/24/20 1214)  BP: 134/85 (01/24/20 1214)  SpO2: 97 % (01/24/20 1214)  BP Location: Left arm    Vital Signs Range (Last 24H):  Temp:  [97.3 °F (36.3 °C)-98.3 °F (36.8 °C)]   Pulse:  []   Resp:  [16-18]   BP: (120-168)/(71-85)   SpO2:  [94 %-99 %]   BP Location: Left arm    Physical Exam   Constitutional: He appears well-developed and well-nourished.   HENT:   Head:  Normocephalic and atraumatic.   Eyes: Pupils are equal, round, and reactive to light.   Left gaze preference    Neck: Normal range of motion.   Cardiovascular: Normal rate.   Pulmonary/Chest: Effort normal. He has rhonchi.   Abdominal: Soft.   Skin: Skin is warm and dry.   Nursing note and vitals reviewed.      Neurological Exam:   LOC: alert  Attention Span: poor  Language: Expressive aphasia, Receptive aphasia  Articulation: Dysarthria  Orientation: Untestable due to severe aphasia   Visual Fields: Full  EOM (CN III, IV, VI): Gaze preference  left  Pupils (CN II, III): PERRL  Facial Movement (CN VII): Lower facial weakness on the Right  Motor: Arm left  Paresis: 4/5  Leg left  Paresis: 4/5  Arm right  Plegia 0/5  Leg right Paresis: 1/5  Cebellar: No evidence of appendicular or axial ataxia  Tone: Flaccid  RUE    Laboratory:  CMP:   Recent Labs   Lab 01/24/20  0827   CALCIUM 8.9   ALBUMIN 2.6*   PROT 6.1   *   K 4.3   CO2 25   *   BUN 41*   CREATININE 0.9   ALKPHOS 88   ALT 41   AST 45*   BILITOT 1.5*     CBC:   Recent Labs   Lab 01/24/20  0409   WBC 10.05   RBC 4.01*   HGB 13.3*   HCT 43.0      *   MCH 33.2*   MCHC 30.9*     Lipid Panel:   No results for input(s): CHOL, LDLCALC, HDL, TRIG in the last 168 hours.  Coagulation:   Recent Labs   Lab 01/24/20  0409   INR 1.5*     Hgb A1C:   No results for input(s): HGBA1C in the last 168 hours.  TSH:   No results for input(s): TSH in the last 168 hours.    Diagnostic Results     Brain Imaging   Brain Imaging   CT Head without 1/15 (07:43)  Evolving moderate-sized region of acute infarction in the left MCA territory as above.  Distribution grossly stable from recent MRI without significant infarct extension.  No hemorrhagic conversion or new territorial infarct.     MRI Brain 1/14 (04:28)  There is interval change, there is interval development of acute ischemia/infarct along the left MCA distribution, this is superimposed on subacute ischemic  change seen on the recent MRI examination  Findings concerning for occlusion of the M2 segment of the left MCA as above.    Additional chronic changes are noted.    Vessel Imaging   CTA Head and Neck 1/14 (02:43)  As on the recent brain MRA examination there is appearance of attenuation of the left MCA branch vessels without evidence for high-grade stenosis or occlusion and otherwise there is no evidence for high-grade stenosis or occlusion of the major arterial vascular structures of the head or neck.    There is no evidence for intracranial aneurysm or AVM.    Findings consistent with acute to subacute left parietal ischemic change again noted.    Small thyroid nodules or cysts.    Cardiac Imaging  TTE 1/11  · Normal left ventricular systolic function. The estimated ejection fraction is 55%.  · Mid anteroseptal and apical moderate hypokinesia.  · Diastolic pattern consistent with atrial fibrillation observed.  · Severe left atrial enlargement.  · Normal right ventricular systolic function.  · Severe right atrial enlargement.  · Mild-to-moderate mitral regurgitation.  · Mild tricuspid regurgitation.  · The estimated PA systolic pressure is 40 mmHg.  Intermediate central venous pressure (8 mmHg).

## 2020-01-25 LAB
BASOPHILS # BLD AUTO: 0.08 K/UL (ref 0–0.2)
BASOPHILS NFR BLD: 0.8 % (ref 0–1.9)
DIFFERENTIAL METHOD: ABNORMAL
EOSINOPHIL # BLD AUTO: 0.4 K/UL (ref 0–0.5)
EOSINOPHIL NFR BLD: 3.7 % (ref 0–8)
ERYTHROCYTE [DISTWIDTH] IN BLOOD BY AUTOMATED COUNT: 14.9 % (ref 11.5–14.5)
HCT VFR BLD AUTO: 41.7 % (ref 40–54)
HGB BLD-MCNC: 12.8 G/DL (ref 14–18)
IMM GRANULOCYTES # BLD AUTO: 0.17 K/UL (ref 0–0.04)
IMM GRANULOCYTES NFR BLD AUTO: 1.7 % (ref 0–0.5)
INR PPP: 1.7 (ref 0.8–1.2)
LYMPHOCYTES # BLD AUTO: 0.9 K/UL (ref 1–4.8)
LYMPHOCYTES NFR BLD: 8.9 % (ref 18–48)
MAGNESIUM SERPL-MCNC: 2.1 MG/DL (ref 1.6–2.6)
MCH RBC QN AUTO: 33.6 PG (ref 27–31)
MCHC RBC AUTO-ENTMCNC: 30.7 G/DL (ref 32–36)
MCV RBC AUTO: 109 FL (ref 82–98)
MONOCYTES # BLD AUTO: 1 K/UL (ref 0.3–1)
MONOCYTES NFR BLD: 9.7 % (ref 4–15)
NEUTROPHILS # BLD AUTO: 7.4 K/UL (ref 1.8–7.7)
NEUTROPHILS NFR BLD: 75.2 % (ref 38–73)
NRBC BLD-RTO: 0 /100 WBC
PHOSPHATE SERPL-MCNC: 2.8 MG/DL (ref 2.7–4.5)
PLATELET # BLD AUTO: 175 K/UL (ref 150–350)
PMV BLD AUTO: 10.5 FL (ref 9.2–12.9)
POCT GLUCOSE: 134 MG/DL (ref 70–110)
POCT GLUCOSE: 134 MG/DL (ref 70–110)
POCT GLUCOSE: 135 MG/DL (ref 70–110)
POCT GLUCOSE: 142 MG/DL (ref 70–110)
POCT GLUCOSE: 153 MG/DL (ref 70–110)
POCT GLUCOSE: 159 MG/DL (ref 70–110)
POCT GLUCOSE: 178 MG/DL (ref 70–110)
PROTHROMBIN TIME: 16.6 SEC (ref 9–12.5)
RBC # BLD AUTO: 3.81 M/UL (ref 4.6–6.2)
WBC # BLD AUTO: 9.84 K/UL (ref 3.9–12.7)

## 2020-01-25 PROCEDURE — 27000221 HC OXYGEN, UP TO 24 HOURS

## 2020-01-25 PROCEDURE — 63600175 PHARM REV CODE 636 W HCPCS: Performed by: NURSE PRACTITIONER

## 2020-01-25 PROCEDURE — 36415 COLL VENOUS BLD VENIPUNCTURE: CPT

## 2020-01-25 PROCEDURE — 11000001 HC ACUTE MED/SURG PRIVATE ROOM

## 2020-01-25 PROCEDURE — 99233 PR SUBSEQUENT HOSPITAL CARE,LEVL III: ICD-10-PCS | Mod: ,,, | Performed by: PSYCHIATRY & NEUROLOGY

## 2020-01-25 PROCEDURE — 25000003 PHARM REV CODE 250: Performed by: STUDENT IN AN ORGANIZED HEALTH CARE EDUCATION/TRAINING PROGRAM

## 2020-01-25 PROCEDURE — 85025 COMPLETE CBC W/AUTO DIFF WBC: CPT

## 2020-01-25 PROCEDURE — 83735 ASSAY OF MAGNESIUM: CPT

## 2020-01-25 PROCEDURE — 85610 PROTHROMBIN TIME: CPT

## 2020-01-25 PROCEDURE — 25000003 PHARM REV CODE 250: Performed by: NURSE PRACTITIONER

## 2020-01-25 PROCEDURE — 84100 ASSAY OF PHOSPHORUS: CPT

## 2020-01-25 PROCEDURE — 94668 MNPJ CHEST WALL SBSQ: CPT

## 2020-01-25 PROCEDURE — 99233 SBSQ HOSP IP/OBS HIGH 50: CPT | Mod: ,,, | Performed by: PSYCHIATRY & NEUROLOGY

## 2020-01-25 PROCEDURE — 25000242 PHARM REV CODE 250 ALT 637 W/ HCPCS: Performed by: NURSE PRACTITIONER

## 2020-01-25 PROCEDURE — 94640 AIRWAY INHALATION TREATMENT: CPT

## 2020-01-25 PROCEDURE — 94761 N-INVAS EAR/PLS OXIMETRY MLT: CPT

## 2020-01-25 RX ORDER — POLYETHYLENE GLYCOL 3350 17 G/17G
17 POWDER, FOR SOLUTION ORAL DAILY
Status: DISCONTINUED | OUTPATIENT
Start: 2020-01-25 | End: 2020-01-30 | Stop reason: HOSPADM

## 2020-01-25 RX ADMIN — CEFTRIAXONE SODIUM 1 G: 1 INJECTION, POWDER, FOR SOLUTION INTRAMUSCULAR; INTRAVENOUS at 12:01

## 2020-01-25 RX ADMIN — SENNOSIDES AND DOCUSATE SODIUM 1 TABLET: 8.6; 5 TABLET ORAL at 09:01

## 2020-01-25 RX ADMIN — INSULIN ASPART 1 UNITS: 100 INJECTION, SOLUTION INTRAVENOUS; SUBCUTANEOUS at 11:01

## 2020-01-25 RX ADMIN — APIXABAN 5 MG: 5 TABLET, FILM COATED ORAL at 08:01

## 2020-01-25 RX ADMIN — POLYETHYLENE GLYCOL 3350 17 G: 17 POWDER, FOR SOLUTION ORAL at 05:01

## 2020-01-25 RX ADMIN — LEVOTHYROXINE SODIUM 75 MCG: 25 TABLET ORAL at 08:01

## 2020-01-25 RX ADMIN — METOPROLOL TARTRATE 25 MG: 25 TABLET ORAL at 08:01

## 2020-01-25 RX ADMIN — METOPROLOL TARTRATE 25 MG: 25 TABLET ORAL at 09:01

## 2020-01-25 RX ADMIN — APIXABAN 5 MG: 5 TABLET, FILM COATED ORAL at 09:01

## 2020-01-25 RX ADMIN — INSULIN ASPART 1 UNITS: 100 INJECTION, SOLUTION INTRAVENOUS; SUBCUTANEOUS at 09:01

## 2020-01-25 RX ADMIN — IPRATROPIUM BROMIDE AND ALBUTEROL SULFATE 3 ML: .5; 3 SOLUTION RESPIRATORY (INHALATION) at 08:01

## 2020-01-25 RX ADMIN — IPRATROPIUM BROMIDE AND ALBUTEROL SULFATE 3 ML: .5; 3 SOLUTION RESPIRATORY (INHALATION) at 07:01

## 2020-01-25 RX ADMIN — ATORVASTATIN CALCIUM 40 MG: 20 TABLET, FILM COATED ORAL at 08:01

## 2020-01-25 RX ADMIN — QUETIAPINE FUMARATE 25 MG: 25 TABLET ORAL at 12:01

## 2020-01-25 RX ADMIN — IPRATROPIUM BROMIDE AND ALBUTEROL SULFATE 3 ML: .5; 3 SOLUTION RESPIRATORY (INHALATION) at 01:01

## 2020-01-25 RX ADMIN — SENNOSIDES AND DOCUSATE SODIUM 1 TABLET: 8.6; 5 TABLET ORAL at 08:01

## 2020-01-25 NOTE — ASSESSMENT & PLAN NOTE
78 yo man with history of HTN, a fib (on Coumadin) and hypothyroidism. Patient has been admitted to [facility] for left temp partieal infarct on 1/10. On 1/14 patient with worsening of deficits: aphasia, right side plegia and not following commands. Telestroke consult was completed by Dr. Olivares, no tpa given as INR was 2.0. Patient transferred to [facility] ED for stat CTA stroke MP.  Positive for LVO, L M1 occlusion. Patient not candidate for intervention d/t large core infarct. Patient was admitted to [facility] for close monitoring.  Coumadin held d/t size of stroke. PEG placed 1/23,. Eliquis and TF started on 1/24.     Neuro exam stable.        Antithrombotics: Eliquis      Statins: Lipitor 40 mg daily    Aggressive risk factor modification: A-Fib, HTN     Rehab efforts: The patient has been evaluated by a stroke team provider and the therapy needs have been fully considered based off the presenting complaints and exam findings. The following therapy evaluations are needed: PT evaluate and treat, OT evaluate and treat, SLP evaluate and treat, PM&R evaluate for appropriate placement - SNF    Diagnostics ordered/pending: None     VTE prophylaxis: None: Hold pharmacologic prophylaxis in setting of large infarct. SCDs.    BP parameters: Infarct: No intervention, SBP <220

## 2020-01-25 NOTE — NURSING
Patient VSS, alert, unable to assess orientation d/t aphasia. Patient has developed multiple skin tears on BLL, and reddened area on rt bunion. All areas cleansed and covered with protective drsg. Penis irritated and condom catheter removed. Barrier cream applied on penis, scrotum and sacral area. Protective drsg applied to sacral. Pt turned and repositioned with good oral care q2hrs. Heel boots in place. Patient on waffle mattress. Tube feed infusing at goal rate 60ml/hr. Patient has not had bm since 1/21. Scheduled senna and prn miralax given with pending effect. No other issues at this time. Family into visit. Continue POC.

## 2020-01-25 NOTE — PLAN OF CARE
POC reviewed with patient and nephew. Patient is aphasic. Nephew verbalized understanding. VSS. No events events throughout shift. Safety measures maintained will continue to monitor.    Problem: Adult Inpatient Plan of Care  Goal: Plan of Care Review  Outcome: Ongoing, Progressing   Problem: Skin Injury Risk Increased  Goal: Skin Health and Integrity  Outcome: Ongoing, Progressing

## 2020-01-25 NOTE — PROGRESS NOTES
[facility]  Vascular Neurology  Comprehensive Stroke Center  Progress Note    Assessment/Plan:     * Embolic stroke involving left middle cerebral artery  78 yo man with history of HTN, a fib (on Coumadin) and hypothyroidism. Patient has been admitted to [facility] for left temp partieal infarct on 1/10. On 1/14 patient with worsening of deficits: aphasia, right side plegia and not following commands. Telestroke consult was completed by Dr. Olivares, no tpa given as INR was 2.0. Patient transferred to [facility] ED for stat CTA stroke MP.  Positive for LVO, L M1 occlusion. Patient not candidate for intervention d/t large core infarct. Patient was admitted to [facility] for close monitoring.  Coumadin held d/t size of stroke. PEG placed 1/23,. Eliquis and TF started on 1/24.     Neuro exam stable.        Antithrombotics: Eliquis      Statins: Lipitor 40 mg daily    Aggressive risk factor modification: A-Fib, HTN     Rehab efforts: The patient has been evaluated by a stroke team provider and the therapy needs have been fully considered based off the presenting complaints and exam findings. The following therapy evaluations are needed: PT evaluate and treat, OT evaluate and treat, SLP evaluate and treat, PM&R evaluate for appropriate placement - SNF    Diagnostics ordered/pending: None     VTE prophylaxis: None: Hold pharmacologic prophylaxis in setting of large infarct. SCDs.    BP parameters: Infarct: No intervention, SBP <220    PEG (percutaneous endoscopic gastrostomy) adjustment/replacement/removal  PEG placed by general surgery 1/23      Patient now at goal on TF- tolerating well.     Chronic anticoagulation  Previously on coumadin, transitioned to Eliquis on 1/24    Cytotoxic cerebral edema  Area of cytotoxic cerebral edema identified when reviewing brain imaging in the territory of the left middle cerebral artery. There is mass effect associated with it. We will continue to monitor the patients  clinical exam for any worsening of symptoms which may indicate expansion of the stroke or the area of the edema resulting in the clinical change. The pattern is suggestive of cardio embolic etiology.        Hypercoagulable state  Pt previously on Coumadin but subtherapeutic     Eliquis started on 1/24    Aphasia  Due to stroke  Aggressive therapy SLP    Right spastic hemiparesis  Due to stroke  Aggressive therapy with PT/OT    Atrial fibrillation  Stroke risk factor  Rate control  Repeat CT on 1/18 stable. PEG placed 1/23,  Eliquis started on 1/24         Type 2 diabetes mellitus with circulatory disorder, without long-term current use of insulin  Stroke risk factor  HgB A1C 5.1  SSI       Essential hypertension  Stroke risk factor  SBP <160    Patient at goal on current regimen            Patient admitted to neurocritical care for Left MCA syndrome, severe aphasia following extension of prior stroke.  01/15/2020: Overnight concern for extensor posturing of right arm, emergent CT this AM showing stable area of infarct, no new hemorrhage or extension.  01/16/20: No acute events overnight, neurostatus unchanged  01/17/2020: No events overnight, patient received seroquel for agitation this AM. To remain in neuro ICU for closer monitoring  01/18/2020: Patient calm and alert during today's exam. Patient to step down to stroke primary service. Breath sounds course, CXR without significant change- will continue pulmonary tolieting.   1/19/20: Patient now with leukocytosis. Repeat CBC pending. CXR with pneumonitis. Rocephin ordered.  1/20/20: CT abd/pelvis for PEG planning, scheduled for tomorrow AM with IR. Plan to restart AC (Heparin vs DOAC) post procedure  1/21/20: Issues with placing NG overnight, PEG deferred to tomorrow as pt unable to get barium. SLP re-evANDRIA romero today  1/22/20: NG pulled overnight, gen surgery consulted with plan for PEG tomorrow   1/23/20: PEG placed today by general surgery, plan to start DOAC  24 hr after procedure.   1/24/20: Patient 24 hour s/p PEG. Eliquis started. TF started.   1/25/20: Neuro exam stable. Patient now at goal on TF- tolerating well.     STROKE DOCUMENTATION   Acute Stroke Times   Last Known Normal Date: 01/13/20  Last Known Normal Time: 2315  Symptom Onset Date: 01/14/20  Symptom Onset Time: 0050  Stroke Team Called Date: 01/14/20  Stroke Team Called Time: 0105  Stroke Team Arrival Date: 01/14/20  Stroke Team Arrival Time: 0355  CT Interpretation Time: 0100    NIH Scale:  1a. Level of Consciousness: 0-->Alert, keenly responsive  1b. LOC Questions: 2-->Answers neither question correctly  1c. LOC Commands: 2-->Performs neither task correctly  2. Best Gaze: 1-->Partial gaze palsy, gaze is abnormal in one or both eyes, but forced deviation or total gaze paresis is not present  3. Visual: 0-->No visual loss  4. Facial Palsy: 1-->Minor paralysis (flattened nasolabial fold, asymmetry on smiling)  5a. Motor Arm, Left: 1-->Drift, limb holds 90 (or 45) degrees, but drifts down before full 10 seconds, does not hit bed or other support  5b. Motor Arm, Right: 4-->No movement  6a. Motor Leg, Left: 3-->No effort against gravity, leg falls to bed immediately  6b. Motor Leg, Right: 2-->Some effort against gravity, leg falls to bed by 5 secs, but has some effort against gravity  7. Limb Ataxia: 0-->Absent  8. Sensory: 0-->Normal, no sensory loss  9. Best Language: 2-->Severe aphasia, all communication is through fragmentary expression, great need for inference, questioning, and guessing by the listener. Range of information that can be exchanged is limited, listener carries burden of. . . (see row details)  10. Dysarthria: 2-->Severe dysarthria, patients speech is so slurred as to be unintelligible in the absence of or out of proportion to any dysphasia, or is mute/anarthric  11. Extinction and Inattention (formerly Neglect): 0-->No abnormality  Total (NIH Stroke Scale): 20       Modified [place] Score:  0  [place] Coma Scale:    ABCD2 Score:    [id number] Score:   HAS -BLED Score:   ICH Score:   Hunt & Jones Classification:      Hemorrhagic change of an Ischemic Stroke: Does this patient have an ischemic stroke with hemorrhagic changes? No     Neurologic Chief Complaint: Right sided weakness    Subjective:     Interval History: Patient is seen for follow-up neurological assessment and treatment recommendations:     Neuro exam stable. Patient now at goal on TF- tolerating well.     HPI, Past Medical, Family, and Social History remains the same as documented in the initial encounter.     Review of Systems   Constitutional: Negative for fever.   HENT: Positive for drooling and trouble swallowing.    Eyes: Negative for visual disturbance.   Respiratory: Positive for cough.    Gastrointestinal: Negative for diarrhea and vomiting.   Musculoskeletal: Positive for gait problem.   Neurological: Positive for facial asymmetry, speech difficulty and weakness.   Psychiatric/Behavioral: Positive for confusion and decreased concentration.     Scheduled Meds:   albuterol-ipratropium  3 mL Nebulization Q6H WAKE    apixaban  5 mg Per G Tube BID    atorvastatin  40 mg Per NG tube Daily    barium  450 mL Per NG tube Once    cefTRIAXone (ROCEPHIN) IVPB  1 g Intravenous Q24H    levothyroxine  75 mcg Per NG tube Before breakfast    metoprolol tartrate  25 mg Per NG tube BID    senna-docusate 8.6-50 mg  1 tablet Per NG tube BID     Continuous Infusions:   sodium chloride 0.9% Stopped (01/23/20 0844)     PRN Meds:acetaminophen, Dextrose 10% Bolus, glucagon (human recombinant), glucose, glucose, insulin aspart U-100, ondansetron, QUEtiapine, sodium chloride 0.9%    Objective:     Vital Signs (Most Recent):  Temp: 98.7 °F (37.1 °C) (01/25/20 0723)  Pulse: 80 (01/25/20 0725)  Resp: 20 (01/25/20 0723)  BP: 121/70 (01/25/20 0723)  SpO2: (!) 94 % (01/25/20 0800)  BP Location: Left arm    Vital Signs Range (Last 24H):  Temp:  [96.5 °F  (35.8 °C)-98.8 °F (37.1 °C)]   Pulse:  []   Resp:  [15-20]   BP: (121-137)/(70-95)   SpO2:  [94 %-99 %]   BP Location: Left arm    Physical Exam   Constitutional: He appears well-developed and well-nourished.   HENT:   Head: Normocephalic and atraumatic.   Eyes: Pupils are equal, round, and reactive to light.   Left gaze preference    Neck: Normal range of motion.   Cardiovascular: Normal rate.   Pulmonary/Chest: Effort normal. He has rhonchi.   Abdominal: Soft.   Skin: Skin is warm and dry.   Nursing note and vitals reviewed.      Neurological Exam:   LOC: alert  Attention Span: poor  Language: Expressive aphasia, Receptive aphasia  Articulation: Dysarthria  Orientation: Untestable due to severe aphasia   Visual Fields: Full  EOM (CN III, IV, VI): Gaze preference  left  Pupils (CN II, III): PERRL  Facial Movement (CN VII): Lower facial weakness on the Right  Motor: Arm left  Paresis: 4/5  Leg left  Paresis: 4/5  Arm right  Plegia 0/5  Leg right Paresis: 1/5  Cebellar: No evidence of appendicular or axial ataxia  Tone: Flaccid  RUE    Laboratory:  CMP:   No results for input(s): GLUCOSE, CALCIUM, ALBUMIN, PROT, NA, K, CO2, CL, BUN, CREATININE, ALKPHOS, ALT, AST, BILITOT in the last 24 hours.  CBC:   Recent Labs   Lab 01/25/20  0457   WBC 9.84   RBC 3.81*   HGB 12.8*   HCT 41.7      *   MCH 33.6*   MCHC 30.7*     Lipid Panel:   No results for input(s): CHOL, LDLCALC, HDL, TRIG in the last 168 hours.  Coagulation:   Recent Labs   Lab 01/25/20  0457   INR 1.7*     Hgb A1C:   No results for input(s): HGBA1C in the last 168 hours.  TSH:   No results for input(s): TSH in the last 168 hours.    Diagnostic Results     Brain Imaging   CT Head without 1/15 (07:43)  Evolving moderate-sized region of acute infarction in the left MCA territory as above.  Distribution grossly stable from recent MRI without significant infarct extension.  No hemorrhagic conversion or new territorial infarct.     MRI Brain 1/14  (04:28)  There is interval change, there is interval development of acute ischemia/infarct along the left MCA distribution, this is superimposed on subacute ischemic change seen on the recent MRI examination  Findings concerning for occlusion of the M2 segment of the left MCA as above.    Additional chronic changes are noted.    Vessel Imaging   CTA Head and Neck 1/14 (02:43)  As on the recent brain MRA examination there is appearance of attenuation of the left MCA branch vessels without evidence for high-grade stenosis or occlusion and otherwise there is no evidence for high-grade stenosis or occlusion of the major arterial vascular structures of the head or neck.    There is no evidence for intracranial aneurysm or AVM.    Findings consistent with acute to subacute left parietal ischemic change again noted.    Small thyroid nodules or cysts.    Cardiac Imaging  TTE 1/11  · Normal left ventricular systolic function. The estimated ejection fraction is 55%.  · Mid anteroseptal and apical moderate hypokinesia.  · Diastolic pattern consistent with atrial fibrillation observed.  · Severe left atrial enlargement.  · Normal right ventricular systolic function.  · Severe right atrial enlargement.  · Mild-to-moderate mitral regurgitation.  · Mild tricuspid regurgitation.  · The estimated PA systolic pressure is 40 mmHg.  Intermediate central venous pressure (8 mmHg).         Ambrose Rothman NP  Comprehensive Stroke Center  Department of Vascular Neurology   [facility]

## 2020-01-25 NOTE — SUBJECTIVE & OBJECTIVE
Neurologic Chief Complaint: Right sided weakness    Subjective:     Interval History: Patient is seen for follow-up neurological assessment and treatment recommendations:     Neuro exam stable. Patient now at goal on TF- tolerating well.     HPI, Past Medical, Family, and Social History remains the same as documented in the initial encounter.     Review of Systems   Constitutional: Negative for fever.   HENT: Positive for drooling and trouble swallowing.    Eyes: Negative for visual disturbance.   Respiratory: Positive for cough.    Gastrointestinal: Negative for diarrhea and vomiting.   Musculoskeletal: Positive for gait problem.   Neurological: Positive for facial asymmetry, speech difficulty and weakness.   Psychiatric/Behavioral: Positive for confusion and decreased concentration.     Scheduled Meds:   albuterol-ipratropium  3 mL Nebulization Q6H WAKE    apixaban  5 mg Per G Tube BID    atorvastatin  40 mg Per NG tube Daily    barium  450 mL Per NG tube Once    cefTRIAXone (ROCEPHIN) IVPB  1 g Intravenous Q24H    levothyroxine  75 mcg Per NG tube Before breakfast    metoprolol tartrate  25 mg Per NG tube BID    senna-docusate 8.6-50 mg  1 tablet Per NG tube BID     Continuous Infusions:   sodium chloride 0.9% Stopped (01/23/20 0844)     PRN Meds:acetaminophen, Dextrose 10% Bolus, glucagon (human recombinant), glucose, glucose, insulin aspart U-100, ondansetron, QUEtiapine, sodium chloride 0.9%    Objective:     Vital Signs (Most Recent):  Temp: 98.7 °F (37.1 °C) (01/25/20 0723)  Pulse: 80 (01/25/20 0725)  Resp: 20 (01/25/20 0723)  BP: 121/70 (01/25/20 0723)  SpO2: (!) 94 % (01/25/20 0800)  BP Location: Left arm    Vital Signs Range (Last 24H):  Temp:  [96.5 °F (35.8 °C)-98.8 °F (37.1 °C)]   Pulse:  []   Resp:  [15-20]   BP: (121-137)/(70-95)   SpO2:  [94 %-99 %]   BP Location: Left arm    Physical Exam   Constitutional: He appears well-developed and well-nourished.   HENT:   Head: Normocephalic  and atraumatic.   Eyes: Pupils are equal, round, and reactive to light.   Left gaze preference    Neck: Normal range of motion.   Cardiovascular: Normal rate.   Pulmonary/Chest: Effort normal. He has rhonchi.   Abdominal: Soft.   Skin: Skin is warm and dry.   Nursing note and vitals reviewed.      Neurological Exam:   LOC: alert  Attention Span: poor  Language: Expressive aphasia, Receptive aphasia  Articulation: Dysarthria  Orientation: Untestable due to severe aphasia   Visual Fields: Full  EOM (CN III, IV, VI): Gaze preference  left  Pupils (CN II, III): PERRL  Facial Movement (CN VII): Lower facial weakness on the Right  Motor: Arm left  Paresis: 4/5  Leg left  Paresis: 4/5  Arm right  Plegia 0/5  Leg right Paresis: 1/5  Cebellar: No evidence of appendicular or axial ataxia  Tone: Flaccid  RUE    Laboratory:  CMP:   No results for input(s): GLUCOSE, CALCIUM, ALBUMIN, PROT, NA, K, CO2, CL, BUN, CREATININE, ALKPHOS, ALT, AST, BILITOT in the last 24 hours.  CBC:   Recent Labs   Lab 01/25/20  0457   WBC 9.84   RBC 3.81*   HGB 12.8*   HCT 41.7      *   MCH 33.6*   MCHC 30.7*     Lipid Panel:   No results for input(s): CHOL, LDLCALC, HDL, TRIG in the last 168 hours.  Coagulation:   Recent Labs   Lab 01/25/20 0457   INR 1.7*     Hgb A1C:   No results for input(s): HGBA1C in the last 168 hours.  TSH:   No results for input(s): TSH in the last 168 hours.    Diagnostic Results     Brain Imaging   CT Head without 1/15 (07:43)  Evolving moderate-sized region of acute infarction in the left MCA territory as above.  Distribution grossly stable from recent MRI without significant infarct extension.  No hemorrhagic conversion or new territorial infarct.     MRI Brain 1/14 (04:28)  There is interval change, there is interval development of acute ischemia/infarct along the left MCA distribution, this is superimposed on subacute ischemic change seen on the recent MRI examination  Findings concerning for occlusion of  the M2 segment of the left MCA as above.    Additional chronic changes are noted.    Vessel Imaging   CTA Head and Neck 1/14 (02:43)  As on the recent brain MRA examination there is appearance of attenuation of the left MCA branch vessels without evidence for high-grade stenosis or occlusion and otherwise there is no evidence for high-grade stenosis or occlusion of the major arterial vascular structures of the head or neck.    There is no evidence for intracranial aneurysm or AVM.    Findings consistent with acute to subacute left parietal ischemic change again noted.    Small thyroid nodules or cysts.    Cardiac Imaging  TTE 1/11  · Normal left ventricular systolic function. The estimated ejection fraction is 55%.  · Mid anteroseptal and apical moderate hypokinesia.  · Diastolic pattern consistent with atrial fibrillation observed.  · Severe left atrial enlargement.  · Normal right ventricular systolic function.  · Severe right atrial enlargement.  · Mild-to-moderate mitral regurgitation.  · Mild tricuspid regurgitation.  · The estimated PA systolic pressure is 40 mmHg.  Intermediate central venous pressure (8 mmHg).

## 2020-01-26 PROBLEM — R09.02 OXYGEN DESATURATION: Status: ACTIVE | Noted: 2020-01-26

## 2020-01-26 PROBLEM — K59.00 CONSTIPATION: Status: ACTIVE | Noted: 2020-01-26

## 2020-01-26 LAB
BASOPHILS # BLD AUTO: 0.13 K/UL (ref 0–0.2)
BASOPHILS NFR BLD: 1.1 % (ref 0–1.9)
DIFFERENTIAL METHOD: ABNORMAL
EOSINOPHIL # BLD AUTO: 0.7 K/UL (ref 0–0.5)
EOSINOPHIL NFR BLD: 6.2 % (ref 0–8)
ERYTHROCYTE [DISTWIDTH] IN BLOOD BY AUTOMATED COUNT: 14.7 % (ref 11.5–14.5)
HCT VFR BLD AUTO: 43.1 % (ref 40–54)
HGB BLD-MCNC: 13.6 G/DL (ref 14–18)
IMM GRANULOCYTES # BLD AUTO: 0.31 K/UL (ref 0–0.04)
IMM GRANULOCYTES NFR BLD AUTO: 2.7 % (ref 0–0.5)
INR PPP: 1.5 (ref 0.8–1.2)
LYMPHOCYTES # BLD AUTO: 0.9 K/UL (ref 1–4.8)
LYMPHOCYTES NFR BLD: 7.6 % (ref 18–48)
MAGNESIUM SERPL-MCNC: 1.9 MG/DL (ref 1.6–2.6)
MCH RBC QN AUTO: 34 PG (ref 27–31)
MCHC RBC AUTO-ENTMCNC: 31.6 G/DL (ref 32–36)
MCV RBC AUTO: 108 FL (ref 82–98)
MONOCYTES # BLD AUTO: 1 K/UL (ref 0.3–1)
MONOCYTES NFR BLD: 8.7 % (ref 4–15)
NEUTROPHILS # BLD AUTO: 8.5 K/UL (ref 1.8–7.7)
NEUTROPHILS NFR BLD: 73.7 % (ref 38–73)
NRBC BLD-RTO: 0 /100 WBC
PHOSPHATE SERPL-MCNC: 2.6 MG/DL (ref 2.7–4.5)
PLATELET # BLD AUTO: 182 K/UL (ref 150–350)
PMV BLD AUTO: 10.5 FL (ref 9.2–12.9)
POCT GLUCOSE: 138 MG/DL (ref 70–110)
POCT GLUCOSE: 146 MG/DL (ref 70–110)
POCT GLUCOSE: 173 MG/DL (ref 70–110)
POCT GLUCOSE: 188 MG/DL (ref 70–110)
POCT GLUCOSE: 197 MG/DL (ref 70–110)
PROTHROMBIN TIME: 14.4 SEC (ref 9–12.5)
RBC # BLD AUTO: 4 M/UL (ref 4.6–6.2)
WBC # BLD AUTO: 11.54 K/UL (ref 3.9–12.7)

## 2020-01-26 PROCEDURE — 99900035 HC TECH TIME PER 15 MIN (STAT)

## 2020-01-26 PROCEDURE — 94668 MNPJ CHEST WALL SBSQ: CPT

## 2020-01-26 PROCEDURE — 63600175 PHARM REV CODE 636 W HCPCS: Performed by: NURSE PRACTITIONER

## 2020-01-26 PROCEDURE — 25000242 PHARM REV CODE 250 ALT 637 W/ HCPCS: Performed by: NURSE PRACTITIONER

## 2020-01-26 PROCEDURE — 27000221 HC OXYGEN, UP TO 24 HOURS

## 2020-01-26 PROCEDURE — 25000003 PHARM REV CODE 250: Performed by: NURSE PRACTITIONER

## 2020-01-26 PROCEDURE — 36415 COLL VENOUS BLD VENIPUNCTURE: CPT

## 2020-01-26 PROCEDURE — 94761 N-INVAS EAR/PLS OXIMETRY MLT: CPT

## 2020-01-26 PROCEDURE — 85025 COMPLETE CBC W/AUTO DIFF WBC: CPT

## 2020-01-26 PROCEDURE — 94640 AIRWAY INHALATION TREATMENT: CPT

## 2020-01-26 PROCEDURE — 11000001 HC ACUTE MED/SURG PRIVATE ROOM

## 2020-01-26 PROCEDURE — 99233 SBSQ HOSP IP/OBS HIGH 50: CPT | Mod: ,,, | Performed by: PSYCHIATRY & NEUROLOGY

## 2020-01-26 PROCEDURE — 99233 PR SUBSEQUENT HOSPITAL CARE,LEVL III: ICD-10-PCS | Mod: ,,, | Performed by: PSYCHIATRY & NEUROLOGY

## 2020-01-26 PROCEDURE — 85610 PROTHROMBIN TIME: CPT

## 2020-01-26 PROCEDURE — 84100 ASSAY OF PHOSPHORUS: CPT

## 2020-01-26 PROCEDURE — 83735 ASSAY OF MAGNESIUM: CPT

## 2020-01-26 RX ORDER — SYRING-NEEDL,DISP,INSUL,0.3 ML 29 G X1/2"
296 SYRINGE, EMPTY DISPOSABLE MISCELLANEOUS
Status: COMPLETED | OUTPATIENT
Start: 2020-01-26 | End: 2020-01-26

## 2020-01-26 RX ORDER — PSEUDOEPHEDRINE/ACETAMINOPHEN 30MG-500MG
100 TABLET ORAL
Status: COMPLETED | OUTPATIENT
Start: 2020-01-26 | End: 2020-01-26

## 2020-01-26 RX ADMIN — INSULIN ASPART 2 UNITS: 100 INJECTION, SOLUTION INTRAVENOUS; SUBCUTANEOUS at 06:01

## 2020-01-26 RX ADMIN — MAGNESIUM CITRATE 296 ML: 1.75 LIQUID ORAL at 01:01

## 2020-01-26 RX ADMIN — INSULIN ASPART 1 UNITS: 100 INJECTION, SOLUTION INTRAVENOUS; SUBCUTANEOUS at 08:01

## 2020-01-26 RX ADMIN — ATORVASTATIN CALCIUM 40 MG: 20 TABLET, FILM COATED ORAL at 08:01

## 2020-01-26 RX ADMIN — LEVOTHYROXINE SODIUM 75 MCG: 25 TABLET ORAL at 08:01

## 2020-01-26 RX ADMIN — IPRATROPIUM BROMIDE AND ALBUTEROL SULFATE 3 ML: .5; 3 SOLUTION RESPIRATORY (INHALATION) at 01:01

## 2020-01-26 RX ADMIN — CEFTRIAXONE SODIUM 1 G: 1 INJECTION, POWDER, FOR SOLUTION INTRAMUSCULAR; INTRAVENOUS at 01:01

## 2020-01-26 RX ADMIN — METOPROLOL TARTRATE 25 MG: 25 TABLET ORAL at 08:01

## 2020-01-26 RX ADMIN — IPRATROPIUM BROMIDE AND ALBUTEROL SULFATE 3 ML: .5; 3 SOLUTION RESPIRATORY (INHALATION) at 08:01

## 2020-01-26 RX ADMIN — Medication 100 ML: at 01:01

## 2020-01-26 RX ADMIN — SENNOSIDES AND DOCUSATE SODIUM 1 TABLET: 8.6; 5 TABLET ORAL at 08:01

## 2020-01-26 RX ADMIN — APIXABAN 5 MG: 5 TABLET, FILM COATED ORAL at 08:01

## 2020-01-26 RX ADMIN — POLYETHYLENE GLYCOL 3350 17 G: 17 POWDER, FOR SOLUTION ORAL at 08:01

## 2020-01-26 NOTE — NURSING
Patient trialed on room air this am but O2 sats dropped to 81. 1L O2 reapplied and O2 back up to >95%. MD notified.

## 2020-01-26 NOTE — PLAN OF CARE
POC reviewed with patient. Pt aphasic and requires reinforcement. No acute events today. Pt progressing toward goals. Will continue to monitor. See flowsheets for full assessments and VS info.

## 2020-01-26 NOTE — SUBJECTIVE & OBJECTIVE
Neurologic Chief Complaint: Right sided weakness    Subjective:     Interval History: Patient is seen for follow-up neurological assessment and treatment recommendations:     Neuro exam stable. Patient with decreased bowel sounds and taut abdomen. KUB without obstruction. Patient LBM on 1/21, brown bomb ordered.     HPI, Past Medical, Family, and Social History remains the same as documented in the initial encounter.     Review of Systems   Constitutional: Negative for fever.   HENT: Positive for drooling and trouble swallowing.    Eyes: Negative for visual disturbance.   Respiratory: Positive for cough.    Gastrointestinal: Positive for constipation. Negative for diarrhea and vomiting.   Musculoskeletal: Positive for gait problem.   Neurological: Positive for facial asymmetry, speech difficulty and weakness.   Psychiatric/Behavioral: Positive for confusion and decreased concentration.     Scheduled Meds:   albuterol-ipratropium  3 mL Nebulization Q6H WAKE    apixaban  5 mg Per G Tube BID    atorvastatin  40 mg Per NG tube Daily    barium  450 mL Per NG tube Once    glycerin 99.5%  100 mL Rectal ED 1 Time    And    magnesium citrate  296 mL Rectal ED 1 Time    And    sodium chloride 0.9%  500 mL Rectal ED 1 Time    levothyroxine  75 mcg Per NG tube Before breakfast    metoprolol tartrate  25 mg Per NG tube BID    polyethylene glycol  17 g Per G Tube Daily    senna-docusate 8.6-50 mg  1 tablet Per NG tube BID     Continuous Infusions:    PRN Meds:acetaminophen, Dextrose 10% Bolus, glucagon (human recombinant), glucose, glucose, insulin aspart U-100, ondansetron, QUEtiapine, sodium chloride 0.9%    Objective:     Vital Signs (Most Recent):  Temp: 98.2 °F (36.8 °C) (01/26/20 1157)  Pulse: 81 (01/26/20 1157)  Resp: 18 (01/26/20 1157)  BP: (!) 151/85 (01/26/20 1157)  SpO2: 99 % (01/26/20 1157)  BP Location: Right arm    Vital Signs Range (Last 24H):  Temp:  [97.3 °F (36.3 °C)-98.5 °F (36.9 °C)]   Pulse:   []   Resp:  [16-18]   BP: (118-151)/(65-85)   SpO2:  [81 %-99 %]   BP Location: Right arm    Physical Exam   Constitutional: He appears well-developed and well-nourished.   HENT:   Head: Normocephalic and atraumatic.   Eyes: Pupils are equal, round, and reactive to light.   Left gaze preference    Neck: Normal range of motion.   Cardiovascular: Normal rate.   Pulmonary/Chest: Effort normal. He has rhonchi.   Abdominal: Soft.   Skin: Skin is warm and dry.   Nursing note and vitals reviewed.      Neurological Exam:   LOC: alert  Attention Span: poor  Language: Expressive aphasia, Receptive aphasia  Articulation: Dysarthria  Orientation: Untestable due to severe aphasia   Visual Fields: Full  EOM (CN III, IV, VI): Gaze preference  left  Pupils (CN II, III): PERRL  Facial Movement (CN VII): Lower facial weakness on the Right  Motor: Arm left  Paresis: 4/5  Leg left  Paresis: 4/5  Arm right  Plegia 0/5  Leg right Paresis: 1/5  Cebellar: No evidence of appendicular or axial ataxia  Tone: Flaccid  RUE    Laboratory:  CMP:   No results for input(s): GLUCOSE, CALCIUM, ALBUMIN, PROT, NA, K, CO2, CL, BUN, CREATININE, ALKPHOS, ALT, AST, BILITOT in the last 24 hours.  CBC:   Recent Labs   Lab 01/26/20  0447   WBC 11.54   RBC 4.00*   HGB 13.6*   HCT 43.1      *   MCH 34.0*   MCHC 31.6*     Lipid Panel:   No results for input(s): CHOL, LDLCALC, HDL, TRIG in the last 168 hours.  Coagulation:   Recent Labs   Lab 01/26/20  0447   INR 1.5*     Hgb A1C:   No results for input(s): HGBA1C in the last 168 hours.  TSH:   No results for input(s): TSH in the last 168 hours.    Diagnostic Results     Brain Imaging   CT Head without 1/15 (07:43)  Evolving moderate-sized region of acute infarction in the left MCA territory as above.  Distribution grossly stable from recent MRI without significant infarct extension.  No hemorrhagic conversion or new territorial infarct.     MRI Brain 1/14 (04:28)  There is interval change,  there is interval development of acute ischemia/infarct along the left MCA distribution, this is superimposed on subacute ischemic change seen on the recent MRI examination  Findings concerning for occlusion of the M2 segment of the left MCA as above.    Additional chronic changes are noted.    Vessel Imaging   CTA Head and Neck 1/14 (02:43)  As on the recent brain MRA examination there is appearance of attenuation of the left MCA branch vessels without evidence for high-grade stenosis or occlusion and otherwise there is no evidence for high-grade stenosis or occlusion of the major arterial vascular structures of the head or neck.    There is no evidence for intracranial aneurysm or AVM.    Findings consistent with acute to subacute left parietal ischemic change again noted.    Small thyroid nodules or cysts.    Cardiac Imaging  TTE 1/11  · Normal left ventricular systolic function. The estimated ejection fraction is 55%.  · Mid anteroseptal and apical moderate hypokinesia.  · Diastolic pattern consistent with atrial fibrillation observed.  · Severe left atrial enlargement.  · Normal right ventricular systolic function.  · Severe right atrial enlargement.  · Mild-to-moderate mitral regurgitation.  · Mild tricuspid regurgitation.  · The estimated PA systolic pressure is 40 mmHg.  Intermediate central venous pressure (8 mmHg).

## 2020-01-26 NOTE — ASSESSMENT & PLAN NOTE
Patient trialed without supplemental oxygen- O2 sat dropped to 81. 1L nasal cannula reapplied, patient now 96%

## 2020-01-26 NOTE — ASSESSMENT & PLAN NOTE
80 yo man with history of HTN, a fib (on Coumadin) and hypothyroidism. Patient has been admitted to [facility] for left temp partieal infarct on 1/10. On 1/14 patient with worsening of deficits: aphasia, right side plegia and not following commands. Telestroke consult was completed by Dr. Olivares, no tpa given as INR was 2.0. Patient transferred to [facility] ED for stat CTA stroke MP.  Positive for LVO, L M1 occlusion. Patient not candidate for intervention d/t large core infarct. Patient was admitted to [facility] for close monitoring.  Coumadin held d/t size of stroke. PEG placed 1/23,. Eliquis and TF started on 1/24.     Neuro exam unchanged. Brown bomb ordered for constipation.        Antithrombotics: Eliquis      Statins: Lipitor 40 mg daily    Aggressive risk factor modification: A-Fib, HTN     Rehab efforts: The patient has been evaluated by a stroke team provider and the therapy needs have been fully considered based off the presenting complaints and exam findings. The following therapy evaluations are needed: PT evaluate and treat, OT evaluate and treat, SLP evaluate and treat, PM&R evaluate for appropriate placement - SNF    Diagnostics ordered/pending: None     VTE prophylaxis: None: Hold pharmacologic prophylaxis in setting of large infarct. SCDs.    BP parameters: Infarct: No intervention, SBP <220

## 2020-01-26 NOTE — ASSESSMENT & PLAN NOTE
Patient with decreased bowel sounds and taut abdomen. KUB without obstruction. Patient LBM on 1/21, brown bomb ordered.

## 2020-01-26 NOTE — PROGRESS NOTES
[facility]  Vascular Neurology  Comprehensive Stroke Center  Progress Note    Assessment/Plan:     * Embolic stroke involving left middle cerebral artery  80 yo man with history of HTN, a fib (on Coumadin) and hypothyroidism. Patient has been admitted to [facility] for left temp partieal infarct on 1/10. On 1/14 patient with worsening of deficits: aphasia, right side plegia and not following commands. Telestroke consult was completed by Dr. Olivares, no tpa given as INR was 2.0. Patient transferred to [facility] ED for stat CTA stroke MP.  Positive for LVO, L M1 occlusion. Patient not candidate for intervention d/t large core infarct. Patient was admitted to [facility] for close monitoring.  Coumadin held d/t size of stroke. PEG placed 1/23,. Eliquis and TF started on 1/24.     Neuro exam unchanged. Brown bomb ordered for constipation.        Antithrombotics: Eliquis      Statins: Lipitor 40 mg daily    Aggressive risk factor modification: A-Fib, HTN     Rehab efforts: The patient has been evaluated by a stroke team provider and the therapy needs have been fully considered based off the presenting complaints and exam findings. The following therapy evaluations are needed: PT evaluate and treat, OT evaluate and treat, SLP evaluate and treat, PM&R evaluate for appropriate placement - SNF    Diagnostics ordered/pending: None     VTE prophylaxis: None: Hold pharmacologic prophylaxis in setting of large infarct. SCDs.    BP parameters: Infarct: No intervention, SBP <220    Oxygen desaturation  Patient trialed without supplemental oxygen- O2 sat dropped to 81. 1L nasal cannula reapplied, patient now 96%    Constipation   Patient with decreased bowel sounds and taut abdomen. KUB without obstruction. Patient LBM on 1/21, brown bomb ordered.         PEG (percutaneous endoscopic gastrostomy) adjustment/replacement/removal  PEG placed by general surgery 1/23      Patient now at goal on TF- tolerating well.     Chronic  anticoagulation  Previously on coumadin, transitioned to Eliquis on 1/24    Cytotoxic cerebral edema  Area of cytotoxic cerebral edema identified when reviewing brain imaging in the territory of the left middle cerebral artery. There is mass effect associated with it. We will continue to monitor the patients clinical exam for any worsening of symptoms which may indicate expansion of the stroke or the area of the edema resulting in the clinical change. The pattern is suggestive of cardio embolic etiology.        Hypercoagulable state  Pt previously on Coumadin but subtherapeutic     Eliquis started on 1/24    Aphasia  Due to stroke  Aggressive therapy SLP    Right spastic hemiparesis  Due to stroke  Aggressive therapy with PT/OT    Atrial fibrillation  Stroke risk factor  Rate control  Repeat CT on 1/18 stable. PEG placed 1/23,  Eliquis started on 1/24         Type 2 diabetes mellitus with circulatory disorder, without long-term current use of insulin  Stroke risk factor  HgB A1C 5.1  SSI       Essential hypertension  Stroke risk factor  SBP <160    Patient at goal on current regimen            Patient admitted to neurocritical care for Left MCA syndrome, severe aphasia following extension of prior stroke.  01/15/2020: Overnight concern for extensor posturing of right arm, emergent CT this AM showing stable area of infarct, no new hemorrhage or extension.  01/16/20: No acute events overnight, neurostatus unchanged  01/17/2020: No events overnight, patient received seroquel for agitation this AM. To remain in neuro ICU for closer monitoring  01/18/2020: Patient calm and alert during today's exam. Patient to step down to stroke primary service. Breath sounds course, CXR without significant change- will continue pulmonary tolieting.   1/19/20: Patient now with leukocytosis. Repeat CBC pending. CXR with pneumonitis. Rocephin ordered.  1/20/20: CT abd/pelvis for PEG planning, scheduled for tomorrow AM with IR. Plan to  restart AC (Heparin vs DOAC) post procedure  1/21/20: Issues with placing NG overnight, PEG deferred to tomorrow as pt unable to get barium. SLP re-ANDRIA fairchild today  1/22/20: NG pulled overnight, gen surgery consulted with plan for PEG tomorrow   1/23/20: PEG placed today by general surgery, plan to start DOAC 24 hr after procedure.   1/24/20: Patient 24 hour s/p PEG. Eliquis started. TF started.   1/25/20: Neuro exam stable. Patient now at goal on TF- tolerating well.   1/26/20: Neuro exam stable. Patient with decreased bowel sounds and taut abdomen. KUB without obstruction. Patient LBM on 1/21, brown bomb ordered.     STROKE DOCUMENTATION   Acute Stroke Times   Last Known Normal Date: 01/13/20  Last Known Normal Time: 2315  Symptom Onset Date: 01/14/20  Symptom Onset Time: 0050  Stroke Team Called Date: 01/14/20  Stroke Team Called Time: 0105  Stroke Team Arrival Date: 01/14/20  Stroke Team Arrival Time: 0355  CT Interpretation Time: 0100    NIH Scale:  1a. Level of Consciousness: 0-->Alert, keenly responsive  1b. LOC Questions: 2-->Answers neither question correctly  1c. LOC Commands: 2-->Performs neither task correctly  2. Best Gaze: 1-->Partial gaze palsy, gaze is abnormal in one or both eyes, but forced deviation or total gaze paresis is not present  3. Visual: 0-->No visual loss  4. Facial Palsy: 1-->Minor paralysis (flattened nasolabial fold, asymmetry on smiling)  5a. Motor Arm, Left: 1-->Drift, limb holds 90 (or 45) degrees, but drifts down before full 10 seconds, does not hit bed or other support  5b. Motor Arm, Right: 4-->No movement  6a. Motor Leg, Left: 3-->No effort against gravity, leg falls to bed immediately  6b. Motor Leg, Right: 2-->Some effort against gravity, leg falls to bed by 5 secs, but has some effort against gravity  7. Limb Ataxia: 0-->Absent  8. Sensory: 0-->Normal, no sensory loss  9. Best Language: 2-->Severe aphasia, all communication is through fragmentary expression, great need for  inference, questioning, and guessing by the listener. Range of information that can be exchanged is limited, listener carries burden of. . . (see row details)  10. Dysarthria: 2-->Severe dysarthria, patients speech is so slurred as to be unintelligible in the absence of or out of proportion to any dysphasia, or is mute/anarthric  11. Extinction and Inattention (formerly Neglect): 0-->No abnormality  Total (NIH Stroke Scale): 20       Modified Durham Score: 0  Millie Coma Scale:    ABCD2 Score:    [id number] Score:   HAS -BLED Score:   ICH Score:   Hunt & Jones Classification:      Hemorrhagic change of an Ischemic Stroke: Does this patient have an ischemic stroke with hemorrhagic changes? No     Neurologic Chief Complaint: Right sided weakness    Subjective:     Interval History: Patient is seen for follow-up neurological assessment and treatment recommendations:     Neuro exam stable. Patient with decreased bowel sounds and taut abdomen. KUB without obstruction. Patient LBM on 1/21, brown bomb ordered.     HPI, Past Medical, Family, and Social History remains the same as documented in the initial encounter.     Review of Systems   Constitutional: Negative for fever.   HENT: Positive for drooling and trouble swallowing.    Eyes: Negative for visual disturbance.   Respiratory: Positive for cough.    Gastrointestinal: Positive for constipation. Negative for diarrhea and vomiting.   Musculoskeletal: Positive for gait problem.   Neurological: Positive for facial asymmetry, speech difficulty and weakness.   Psychiatric/Behavioral: Positive for confusion and decreased concentration.     Scheduled Meds:   albuterol-ipratropium  3 mL Nebulization Q6H WAKE    apixaban  5 mg Per G Tube BID    atorvastatin  40 mg Per NG tube Daily    barium  450 mL Per NG tube Once    glycerin 99.5%  100 mL Rectal ED 1 Time    And    magnesium citrate  296 mL Rectal ED 1 Time    And    sodium chloride 0.9%  500 mL Rectal ED 1 Time     levothyroxine  75 mcg Per NG tube Before breakfast    metoprolol tartrate  25 mg Per NG tube BID    polyethylene glycol  17 g Per G Tube Daily    senna-docusate 8.6-50 mg  1 tablet Per NG tube BID     Continuous Infusions:    PRN Meds:acetaminophen, Dextrose 10% Bolus, glucagon (human recombinant), glucose, glucose, insulin aspart U-100, ondansetron, QUEtiapine, sodium chloride 0.9%    Objective:     Vital Signs (Most Recent):  Temp: 98.2 °F (36.8 °C) (01/26/20 1157)  Pulse: 81 (01/26/20 1157)  Resp: 18 (01/26/20 1157)  BP: (!) 151/85 (01/26/20 1157)  SpO2: 99 % (01/26/20 1157)  BP Location: Right arm    Vital Signs Range (Last 24H):  Temp:  [97.3 °F (36.3 °C)-98.5 °F (36.9 °C)]   Pulse:  []   Resp:  [16-18]   BP: (118-151)/(65-85)   SpO2:  [81 %-99 %]   BP Location: Right arm    Physical Exam   Constitutional: He appears well-developed and well-nourished.   HENT:   Head: Normocephalic and atraumatic.   Eyes: Pupils are equal, round, and reactive to light.   Left gaze preference    Neck: Normal range of motion.   Cardiovascular: Normal rate.   Pulmonary/Chest: Effort normal. He has rhonchi.   Abdominal: Soft.   Skin: Skin is warm and dry.   Nursing note and vitals reviewed.      Neurological Exam:   LOC: alert  Attention Span: poor  Language: Expressive aphasia, Receptive aphasia  Articulation: Dysarthria  Orientation: Untestable due to severe aphasia   Visual Fields: Full  EOM (CN III, IV, VI): Gaze preference  left  Pupils (CN II, III): PERRL  Facial Movement (CN VII): Lower facial weakness on the Right  Motor: Arm left  Paresis: 4/5  Leg left  Paresis: 4/5  Arm right  Plegia 0/5  Leg right Paresis: 1/5  Cebellar: No evidence of appendicular or axial ataxia  Tone: Flaccid  RUE    Laboratory:  CMP:   No results for input(s): GLUCOSE, CALCIUM, ALBUMIN, PROT, NA, K, CO2, CL, BUN, CREATININE, ALKPHOS, ALT, AST, BILITOT in the last 24 hours.  CBC:   Recent Labs   Lab 01/26/20  0447   WBC 11.54   RBC 4.00*    HGB 13.6*   HCT 43.1      *   MCH 34.0*   MCHC 31.6*     Lipid Panel:   No results for input(s): CHOL, LDLCALC, HDL, TRIG in the last 168 hours.  Coagulation:   Recent Labs   Lab 01/26/20  0447   INR 1.5*     Hgb A1C:   No results for input(s): HGBA1C in the last 168 hours.  TSH:   No results for input(s): TSH in the last 168 hours.    Diagnostic Results     Brain Imaging   CT Head without 1/15 (07:43)  Evolving moderate-sized region of acute infarction in the left MCA territory as above.  Distribution grossly stable from recent MRI without significant infarct extension.  No hemorrhagic conversion or new territorial infarct.     MRI Brain 1/14 (04:28)  There is interval change, there is interval development of acute ischemia/infarct along the left MCA distribution, this is superimposed on subacute ischemic change seen on the recent MRI examination  Findings concerning for occlusion of the M2 segment of the left MCA as above.    Additional chronic changes are noted.    Vessel Imaging   CTA Head and Neck 1/14 (02:43)  As on the recent brain MRA examination there is appearance of attenuation of the left MCA branch vessels without evidence for high-grade stenosis or occlusion and otherwise there is no evidence for high-grade stenosis or occlusion of the major arterial vascular structures of the head or neck.    There is no evidence for intracranial aneurysm or AVM.    Findings consistent with acute to subacute left parietal ischemic change again noted.    Small thyroid nodules or cysts.    Cardiac Imaging  TTE 1/11  · Normal left ventricular systolic function. The estimated ejection fraction is 55%.  · Mid anteroseptal and apical moderate hypokinesia.  · Diastolic pattern consistent with atrial fibrillation observed.  · Severe left atrial enlargement.  · Normal right ventricular systolic function.  · Severe right atrial enlargement.  · Mild-to-moderate mitral regurgitation.  · Mild tricuspid  regurgitation.  · The estimated PA systolic pressure is 40 mmHg.  Intermediate central venous pressure (8 mmHg).         Ambrose Rothman NP  [facility]  Department of Vascular Neurology   [facility]

## 2020-01-27 PROBLEM — K59.00 CONSTIPATION: Status: RESOLVED | Noted: 2020-01-26 | Resolved: 2020-01-27

## 2020-01-27 LAB
ALBUMIN SERPL BCP-MCNC: 2.6 G/DL (ref 3.5–5.2)
ALP SERPL-CCNC: 100 U/L (ref 55–135)
ALT SERPL W/O P-5'-P-CCNC: 40 U/L (ref 10–44)
ANION GAP SERPL CALC-SCNC: 6 MMOL/L (ref 8–16)
AST SERPL-CCNC: 44 U/L (ref 10–40)
BASOPHILS # BLD AUTO: 0.14 K/UL (ref 0–0.2)
BASOPHILS NFR BLD: 1 % (ref 0–1.9)
BILIRUB SERPL-MCNC: 1.3 MG/DL (ref 0.1–1)
BUN SERPL-MCNC: 41 MG/DL (ref 8–23)
CALCIUM SERPL-MCNC: 9.6 MG/DL (ref 8.7–10.5)
CHLORIDE SERPL-SCNC: 112 MMOL/L (ref 95–110)
CO2 SERPL-SCNC: 29 MMOL/L (ref 23–29)
CREAT SERPL-MCNC: 0.9 MG/DL (ref 0.5–1.4)
DIFFERENTIAL METHOD: ABNORMAL
EOSINOPHIL # BLD AUTO: 0.6 K/UL (ref 0–0.5)
EOSINOPHIL NFR BLD: 4.2 % (ref 0–8)
ERYTHROCYTE [DISTWIDTH] IN BLOOD BY AUTOMATED COUNT: 15.3 % (ref 11.5–14.5)
EST. GFR  (AFRICAN AMERICAN): >60 ML/MIN/1.73 M^2
EST. GFR  (NON AFRICAN AMERICAN): >60 ML/MIN/1.73 M^2
GLUCOSE SERPL-MCNC: 118 MG/DL (ref 70–110)
GLUCOSE SERPL-MCNC: 185 MG/DL (ref 70–110)
HCT VFR BLD AUTO: 48.8 % (ref 40–54)
HGB BLD-MCNC: 14.9 G/DL (ref 14–18)
IMM GRANULOCYTES # BLD AUTO: 0.34 K/UL (ref 0–0.04)
IMM GRANULOCYTES NFR BLD AUTO: 2.4 % (ref 0–0.5)
INR PPP: 1.3 (ref 0.8–1.2)
LYMPHOCYTES # BLD AUTO: 1 K/UL (ref 1–4.8)
LYMPHOCYTES NFR BLD: 7.3 % (ref 18–48)
MCH RBC QN AUTO: 33.4 PG (ref 27–31)
MCHC RBC AUTO-ENTMCNC: 30.5 G/DL (ref 32–36)
MCV RBC AUTO: 109 FL (ref 82–98)
MONOCYTES # BLD AUTO: 0.9 K/UL (ref 0.3–1)
MONOCYTES NFR BLD: 6.2 % (ref 4–15)
NEUTROPHILS # BLD AUTO: 11 K/UL (ref 1.8–7.7)
NEUTROPHILS NFR BLD: 78.9 % (ref 38–73)
NRBC BLD-RTO: 0 /100 WBC
PLATELET # BLD AUTO: 189 K/UL (ref 150–350)
PMV BLD AUTO: 10.7 FL (ref 9.2–12.9)
POCT GLUCOSE: 162 MG/DL (ref 70–110)
POCT GLUCOSE: 167 MG/DL (ref 70–110)
POCT GLUCOSE: 176 MG/DL (ref 70–110)
POCT GLUCOSE: 182 MG/DL (ref 70–110)
POCT GLUCOSE: 188 MG/DL (ref 70–110)
POTASSIUM SERPL-SCNC: 4.7 MMOL/L (ref 3.5–5.1)
PROT SERPL-MCNC: 5.9 G/DL (ref 6–8.4)
PROTHROMBIN TIME: 13.3 SEC (ref 9–12.5)
RBC # BLD AUTO: 4.46 M/UL (ref 4.6–6.2)
SODIUM SERPL-SCNC: 147 MMOL/L (ref 136–145)
WBC # BLD AUTO: 13.93 K/UL (ref 3.9–12.7)

## 2020-01-27 PROCEDURE — 80053 COMPREHEN METABOLIC PANEL: CPT

## 2020-01-27 PROCEDURE — 97530 THERAPEUTIC ACTIVITIES: CPT | Mod: CQ

## 2020-01-27 PROCEDURE — 94761 N-INVAS EAR/PLS OXIMETRY MLT: CPT

## 2020-01-27 PROCEDURE — 11000001 HC ACUTE MED/SURG PRIVATE ROOM

## 2020-01-27 PROCEDURE — 27000221 HC OXYGEN, UP TO 24 HOURS

## 2020-01-27 PROCEDURE — 99233 SBSQ HOSP IP/OBS HIGH 50: CPT | Mod: ,,, | Performed by: PSYCHIATRY & NEUROLOGY

## 2020-01-27 PROCEDURE — 99900035 HC TECH TIME PER 15 MIN (STAT)

## 2020-01-27 PROCEDURE — 85025 COMPLETE CBC W/AUTO DIFF WBC: CPT

## 2020-01-27 PROCEDURE — 94668 MNPJ CHEST WALL SBSQ: CPT

## 2020-01-27 PROCEDURE — 97112 NEUROMUSCULAR REEDUCATION: CPT | Mod: CQ

## 2020-01-27 PROCEDURE — 94640 AIRWAY INHALATION TREATMENT: CPT

## 2020-01-27 PROCEDURE — 25000242 PHARM REV CODE 250 ALT 637 W/ HCPCS: Performed by: NURSE PRACTITIONER

## 2020-01-27 PROCEDURE — 63600175 PHARM REV CODE 636 W HCPCS: Performed by: NURSE PRACTITIONER

## 2020-01-27 PROCEDURE — 25000003 PHARM REV CODE 250: Performed by: NURSE PRACTITIONER

## 2020-01-27 PROCEDURE — 92507 TX SP LANG VOICE COMM INDIV: CPT

## 2020-01-27 PROCEDURE — 99233 PR SUBSEQUENT HOSPITAL CARE,LEVL III: ICD-10-PCS | Mod: ,,, | Performed by: PSYCHIATRY & NEUROLOGY

## 2020-01-27 PROCEDURE — 92526 ORAL FUNCTION THERAPY: CPT

## 2020-01-27 PROCEDURE — 25000003 PHARM REV CODE 250: Performed by: PHYSICIAN ASSISTANT

## 2020-01-27 PROCEDURE — 85610 PROTHROMBIN TIME: CPT

## 2020-01-27 PROCEDURE — 36415 COLL VENOUS BLD VENIPUNCTURE: CPT

## 2020-01-27 RX ORDER — SODIUM,POTASSIUM PHOSPHATES 280-250MG
1 POWDER IN PACKET (EA) ORAL ONCE
Status: COMPLETED | OUTPATIENT
Start: 2020-01-27 | End: 2020-01-27

## 2020-01-27 RX ADMIN — INSULIN ASPART 1 UNITS: 100 INJECTION, SOLUTION INTRAVENOUS; SUBCUTANEOUS at 08:01

## 2020-01-27 RX ADMIN — IPRATROPIUM BROMIDE AND ALBUTEROL SULFATE 3 ML: .5; 3 SOLUTION RESPIRATORY (INHALATION) at 12:01

## 2020-01-27 RX ADMIN — APIXABAN 5 MG: 5 TABLET, FILM COATED ORAL at 08:01

## 2020-01-27 RX ADMIN — METOPROLOL TARTRATE 25 MG: 25 TABLET ORAL at 10:01

## 2020-01-27 RX ADMIN — INSULIN ASPART 2 UNITS: 100 INJECTION, SOLUTION INTRAVENOUS; SUBCUTANEOUS at 12:01

## 2020-01-27 RX ADMIN — IPRATROPIUM BROMIDE AND ALBUTEROL SULFATE 3 ML: .5; 3 SOLUTION RESPIRATORY (INHALATION) at 07:01

## 2020-01-27 RX ADMIN — INSULIN ASPART 2 UNITS: 100 INJECTION, SOLUTION INTRAVENOUS; SUBCUTANEOUS at 10:01

## 2020-01-27 RX ADMIN — INSULIN ASPART 1 UNITS: 100 INJECTION, SOLUTION INTRAVENOUS; SUBCUTANEOUS at 03:01

## 2020-01-27 RX ADMIN — METOPROLOL TARTRATE 25 MG: 25 TABLET ORAL at 08:01

## 2020-01-27 RX ADMIN — SENNOSIDES AND DOCUSATE SODIUM 1 TABLET: 8.6; 5 TABLET ORAL at 08:01

## 2020-01-27 RX ADMIN — POTASSIUM & SODIUM PHOSPHATES POWDER PACK 280-160-250 MG 1 PACKET: 280-160-250 PACK at 09:01

## 2020-01-27 RX ADMIN — APIXABAN 5 MG: 5 TABLET, FILM COATED ORAL at 10:01

## 2020-01-27 RX ADMIN — ATORVASTATIN CALCIUM 40 MG: 20 TABLET, FILM COATED ORAL at 10:01

## 2020-01-27 RX ADMIN — LEVOTHYROXINE SODIUM 75 MCG: 25 TABLET ORAL at 09:01

## 2020-01-27 NOTE — PT/OT/SLP PROGRESS
Speech Language Pathology Treatment    Patient Name:  Chirag Thompson   MRN:  [id number]  Admitting Diagnosis: Embolic stroke involving left middle cerebral artery    Recommendations:                 General Recommendations:  Dysphagia therapy and Speech/language therapy  Diet recommendations:  NPO, Liquid Diet Level: NPO   Aspiration Precautions: Strict aspiration precautions   General Precautions: Standard, aspiration, aphasia, fall  Communication strategies:  none    Subjective     No family present  Patient goals: matthieu    Pain/Comfort:  · Pain Rating 1: 0/10  · Pain Rating Post-Intervention 1: 0/10    Objective:     Has the patient been evaluated by SLP for swallowing?   Yes  Keep patient NPO? Yes   Current Respiratory Status: room air      Pt. Seen at bedside and was alert.  Receptively, he modelled 4/5 commands and yes/no response was undifferentiated/inaccurate.  No initiation of pointing, when asked to id object by named in field of 2.    Expressively, he counted to 10 with 40% accuracy given max cues.  Undifferentiated unintelligible vocalizaitons elicited when asked to complete automatic sentences.  Pt. Did produced unintelligible vocalizations when asked to sing with therapist.  Hob was raised to 90 degree angle and pt. Was given 1/2 teaspoon of water x10 trials following oral care.  Pt. Readily removed the bolus from the spoon and initiated pharyngeal swallow without delayed with mild anterior loss of bolus on left side of mouth.  Laryngeal elevation appeared adequate to palpation.  No coughing,throat clearing elicited following the swallow.      Assessment:     Chirag Thompson is a 79 y.o. male with an SLP diagnosis of Aphasia and Dysphagia.      Goals:   Multidisciplinary Problems     SLP Goals        Problem: SLP Goal    Goal Priority Disciplines Outcome   SLP Goal     SLP Ongoing, Progressing   Description:  Goals due 1/28  1.  Pt. Will participate in ongoing assessment of swallow at bedside  2.  Pt. Will  participate in speech language eval /met  3.  Model simple commands with 100% accuracy  4.  Respond to simple yes/no questions with 70% accuracy  5.  Complete single words on au tomatic speech tasks with 50% accuracy                       Plan:     · Patient to be seen:  4 x/week   · Plan of Care expires:  02/12/20  · Plan of Care reviewed with:  patient   · SLP Follow-Up:  No       Discharge recommendations:  nursing facility, skilled   Barriers to Discharge:  Level of Skilled Assistance Needed 24 hour    Time Tracking:     SLP Treatment Date:   01/27/20  Speech Start Time:  0840  Speech Stop Time:  0856     Speech Total Time (min):  16 min    Billable Minutes: Speech Therapy Individual 8 and Treatment Swallowing Dysfunction 8    Julia Spann MA, CCC-SLP  01/27/2020

## 2020-01-27 NOTE — SUBJECTIVE & OBJECTIVE
Neurologic Chief Complaint: Right sided weakness    Subjective:     Interval History: Patient is seen for follow-up neurological assessment and treatment recommendations:     Neuro exam stable. Patient with decreased bowel sounds and taut abdomen. KUB without obstruction. Patient LBM on 1/21, brown bomb ordered.     HPI, Past Medical, Family, and Social History remains the same as documented in the initial encounter.     Review of Systems   Constitutional: Negative for chills and fever.   HENT: Positive for drooling and trouble swallowing.    Eyes: Negative for visual disturbance.   Respiratory: Positive for cough. Negative for shortness of breath.    Gastrointestinal: Negative for constipation and vomiting.   Musculoskeletal: Positive for arthralgias and gait problem.   Neurological: Positive for facial asymmetry, speech difficulty and weakness.   Psychiatric/Behavioral: Positive for confusion and decreased concentration.     Scheduled Meds:   albuterol-ipratropium  3 mL Nebulization Q6H WAKE    apixaban  5 mg Per G Tube BID    atorvastatin  40 mg Per NG tube Daily    barium  450 mL Per NG tube Once    levothyroxine  75 mcg Per NG tube Before breakfast    metoprolol tartrate  25 mg Per NG tube BID    polyethylene glycol  17 g Per G Tube Daily    senna-docusate 8.6-50 mg  1 tablet Per NG tube BID     Continuous Infusions:    PRN Meds:acetaminophen, Dextrose 10% Bolus, glucagon (human recombinant), glucose, glucose, insulin aspart U-100, ondansetron, QUEtiapine, sodium chloride 0.9%    Objective:     Vital Signs (Most Recent):  Temp: 96.3 °F (35.7 °C) (01/27/20 1101)  Pulse: 83 (01/27/20 1244)  Resp: 19 (01/27/20 1244)  BP: (!) 167/79 (01/27/20 1101)  SpO2: 98 % (01/27/20 1244)  BP Location: Right arm    Vital Signs Range (Last 24H):  Temp:  [96.3 °F (35.7 °C)-98.8 °F (37.1 °C)]   Pulse:  []   Resp:  [16-19]   BP: (117-167)/(77-85)   SpO2:  [94 %-98 %]   BP Location: Right arm    Physical Exam    Constitutional: He appears well-developed and well-nourished.   HENT:   Head: Normocephalic and atraumatic.   Eyes: Pupils are equal, round, and reactive to light.   Left gaze preference    Neck: Normal range of motion.   Cardiovascular: Normal rate.   Pulmonary/Chest: Effort normal. He has rhonchi.   Abdominal: Soft.   Skin: Skin is warm and dry.   Nursing note and vitals reviewed.      Neurological Exam:   LOC: alert  Attention Span: poor  Language: Expressive aphasia, Receptive aphasia  Articulation: Dysarthria  Orientation: Untestable due to severe aphasia   Visual Fields: Full  EOM (CN III, IV, VI): Gaze preference  left  Pupils (CN II, III): PERRL  Facial Movement (CN VII): Lower facial weakness on the Right  Motor: Arm left  Paresis: 4/5  Leg left  Paresis: 4/5  Arm right  Plegia 0/5  Leg right Paresis: 1/5  Cebellar: No evidence of appendicular or axial ataxia  Tone: Flaccid  RUE    Laboratory:  CMP:   Recent Labs   Lab 01/27/20  0832   CALCIUM 9.6   ALBUMIN 2.6*   PROT 5.9*   *   K 4.7   CO2 29   *   BUN 41*   CREATININE 0.9   ALKPHOS 100   ALT 40   AST 44*   BILITOT 1.3*     CBC:   Recent Labs   Lab 01/27/20  0519   WBC 13.93*   RBC 4.46*   HGB 14.9   HCT 48.8      *   MCH 33.4*   MCHC 30.5*     Lipid Panel:   No results for input(s): CHOL, LDLCALC, HDL, TRIG in the last 168 hours.  Coagulation:   Recent Labs   Lab 01/27/20  0519   INR 1.3*     Hgb A1C:   No results for input(s): HGBA1C in the last 168 hours.  TSH:   No results for input(s): TSH in the last 168 hours.    Diagnostic Results     Brain Imaging   CT Head without 1/15 (07:43)  Evolving moderate-sized region of acute infarction in the left MCA territory as above.  Distribution grossly stable from recent MRI without significant infarct extension.  No hemorrhagic conversion or new territorial infarct.     MRI Brain 1/14 (04:28)  There is interval change, there is interval development of acute ischemia/infarct along the  left MCA distribution, this is superimposed on subacute ischemic change seen on the recent MRI examination  Findings concerning for occlusion of the M2 segment of the left MCA as above.    Additional chronic changes are noted.    Vessel Imaging   CTA Head and Neck 1/14 (02:43)  As on the recent brain MRA examination there is appearance of attenuation of the left MCA branch vessels without evidence for high-grade stenosis or occlusion and otherwise there is no evidence for high-grade stenosis or occlusion of the major arterial vascular structures of the head or neck.    There is no evidence for intracranial aneurysm or AVM.    Findings consistent with acute to subacute left parietal ischemic change again noted.    Small thyroid nodules or cysts.    Cardiac Imaging  TTE 1/11  · Normal left ventricular systolic function. The estimated ejection fraction is 55%.  · Mid anteroseptal and apical moderate hypokinesia.  · Diastolic pattern consistent with atrial fibrillation observed.  · Severe left atrial enlargement.  · Normal right ventricular systolic function.  · Severe right atrial enlargement.  · Mild-to-moderate mitral regurgitation.  · Mild tricuspid regurgitation.  · The estimated PA systolic pressure is 40 mmHg.  Intermediate central venous pressure (8 mmHg).

## 2020-01-27 NOTE — ASSESSMENT & PLAN NOTE
80 yo man with history of HTN, a fib (on Coumadin) and hypothyroidism. Patient has been admitted to [facility] for left temp partieal infarct on 1/10. On 1/14 patient with worsening of deficits: aphasia, right side plegia and not following commands. Telestroke consult was completed by Dr. Olivares, no tpa given as INR was 2.0. Patient transferred to [facility] [place] [facility] ED for stat CTA stroke MP.  Positive for LVO, L M1 occlusion. Patient not candidate for intervention d/t large core infarct. Patient was admitted to [facility] for close monitoring.  Coumadin held d/t size of stroke. PEG placed 1/23,. Eliquis and TF started on 1/24.           Antithrombotics: Eliquis 5mg BID     Statins: Lipitor 40 mg daily    Aggressive risk factor modification: A-Fib, HTN     Rehab efforts: The patient has been evaluated by a stroke team provider and the therapy needs have been fully considered based off the presenting complaints and exam findings. The following therapy evaluations are needed: PT evaluate and treat, OT evaluate and treat, SLP evaluate and treat, PM&R evaluate for appropriate placement - SNF    Diagnostics ordered/pending: None     VTE prophylaxis: Eliquis, multiple skin tears unable to place SCDs     BP parameters: Infarct: No intervention, SBP <160

## 2020-01-27 NOTE — PT/OT/SLP PROGRESS
Physical Therapy Treatment    Patient Name:  Chirag Thompson   MRN:  [id number]  Admitting Diagnosis: Embolic stroke involving left middle cerebral artery  Recent Surgery: Procedure(s) (LRB):  EGD, WITH PEG TUBE INSERTION (N/A) 4 Days Post-Op    Recommendations:     Discharge Recommendations:  nursing facility, skilled   Discharge Equipment Recommendations: (TBD)   Barriers to discharge: Inaccessible home and Decreased caregiver support  Pt requiring skilled assistance at current time.     Plan:     During this hospitalization, patient to be seen 4 x/week to address the above listed problems via gait training, therapeutic activities, therapeutic exercises, neuromuscular re-education  · Plan of Care Expires:  02/11/20   Plan of Care Reviewed with: patient    This Plan of care has been discussed with the patient who was involved in its development and understands and is in agreement with the identified goals and treatment plan    Subjective     Communicated with RN (Izzy) prior to session.     Patient comments: Pt non-verbal  Pain/Comfort:  · Pain Rating 1: (pt in NAD)  · Pain Rating Post-Intervention 1: (Pt in NAD)    Objective:     Patient found with: bed alarm, oxygen, pressure relief boots, telemetry, PEG Tube(abdominal binder, waffle pad)    Patient found sup in bed with HOB elevated upon PT entry to room, agreeable to treatment.  No family present in the room.    General Precautions: Standard, aphasia, aspiration, fall, NPO   Orthopedic Precautions:N/A   Braces: N/A       BED MOBILITY (vc's for hand placement sequencing of task):        Rolling to the R:  NT.       Rolling to the L:  Max/total A.        Sup > sit at the EOB:  Max A for trunk elevation and LE's, exiting on the L side.       Sit > sup:  Max A for trunk and LE's.       Scooting hips to EOB with max A                SITTING AT THE EDGE OF THE BED (10 min)   Assistance Level Required: initially with mod A then progressing to min A for trunk/head  control with L UE support and R UE in weight bearing position   Postural deviations noted: flexed trunk, PPT, rounded shoulders, decreased attention to the R   Encouraged: upright posture, APT, B feet in contact with the floor, attention to the R      TRANSFERS  (vc's for hand placement, sequencing of task and safety)   Patient completed Sit <> Stand Transfer from EOB with max/total A of 2 for hip elevation, R knee ext and support to the R UE with no assistive device x2 trial(s)   Patient completed Stand <> Sit Transfer to EOB with max A for controlled descent with no assistive device     STANDING       Pt tolerates static standing with no AD requiring max A of 2 for hip ext, R knee ext and support to R UE x10 sec at a time x2 trials with vc's for upright posture.      GAIT: NP 2* pt with poor hip ext and limited endurance in standing     THERAPEUTIC ACTIVITIES/EXERCISES  Pt receives stretching to R LE while sitting at the EOB    EDUCATION  Education provided to pt  regarding: postural control, weight shift, attention to the R.    They were provided and educated on proper positioning in supine and in sitting with support of affected R UE in order to increase awareness of it and to decrease the effects of immobility, specifically edema and pain.     Whiteboard updated with correct mobility information. RN/PCT notified.  Pt requires max/total A to sit at the EOB.    Patient left sup in bed with HOB elevated and R UE supported on pillow, with  all lines intact, call button in reach, bed alarm on and RN notified    AM-PAC 6 CLICK MOBILITY  Turning over in bed (including adjusting bedclothes, sheets and blankets)?: 2  Sitting down on and standing up from a chair with arms (e.g., wheelchair, bedside commode, etc.): 2  Moving from lying on back to sitting on the side of the bed?: 2  Moving to and from a bed to a chair (including a wheelchair)?: 1  Need to walk in hospital room?: 1  Climbing 3-5 steps with a railing?:  1  Basic Mobility Total Score: 9     Assessment:     Chirag Thompson is a 79 y.o. male admitted with a medical diagnosis of Embolic stroke involving left middle cerebral artery.  He presents with the following impairments/functional limitations:  weakness, impaired endurance, impaired sensation, impaired self care skills, impaired functional mobilty, gait instability, impaired balance, impaired cognition, decreased coordination, decreased upper extremity function, decreased lower extremity function, decreased safety awareness, abnormal tone, decreased ROM, impaired coordination, impaired fine motor, impaired skin, impaired cardiopulmonary response to activity, impaired joint extensibility, impaired muscle length. R hemiparesis requiring significant assistance and verbal cues for bed mob, scooting to/along the EOB, static sitting, sit < > stand, static standing 2* weakness, limited ROM, cognitive deficits.   In light of pt's current functional level and deficits, it is anticipated that pt will need to participate in an intense rehab program consisting of PT, OT and ST in order to achieve full rehab potential to return to previous level of function and roles.  Pt will cont to benefit from skilled PT intervention to address deficits and improve functional mobility.     Rehab Prognosis:  Fair; patient would benefit from acute skilled PT services to address these deficits and reach maximum level of function.      GOALS:   Multidisciplinary Problems     Physical Therapy Goals        Problem: Physical Therapy Goal    Goal Priority Disciplines Outcome Goal Variances Interventions   Physical Therapy Goal     PT, PT/OT Ongoing, Progressing     Description:  Goals to be met by: 2020     Patient will increase functional independence with mobility by performin. Supine to sit with Moderate Assistance  2. Sit to supine with Moderate Assistance  3. Sit to stand transfer with Moderate Assistance  4. Bed to chair transfer  with Moderate Assistance using LRAD  5. Gait  x 15 feet with Moderate Assistance using LRAD.   6. Sitting at edge of bed x10 minutes with Contact Guard Assistance  7. Stand for 1 minute with Contact Guard Assistance using LRAD  8. Lower extremity exercise program x15 reps per handout, with assistance as needed                      Time Tracking:     PT Received On: 01/27/20  PT Start Time: 1055     PT Stop Time: 1119  PT Total Time (min): 24 min     Billable Minutes: Therapeutic Activity 14 and Neuromuscular Re-education 10    Treatment Type: Treatment  PT/PTA: PTA     PTA Visit Number: 5       Gisselle Truong PTA.  Pager [phone]    1/27/2020    .

## 2020-01-27 NOTE — PLAN OF CARE
POC reviewed with patient. Patient is aphasic. Patient is progressing towards goals. VSS. No acute events during shift. Safety measures maintained. Will continue to monitor.    Problem: Adult Inpatient Plan of Care  Goal: Plan of Care Review  Outcome: Ongoing, Progressing   Problem: Skin Injury Risk Increased  Goal: Skin Health and Integrity  Outcome: Ongoing, Progressing

## 2020-01-27 NOTE — PLAN OF CARE
01/27/20 1610   Post-Acute Status   Post-Acute Authorization Placement   Post-Acute Placement Status Patient List Provided  (verbally reviewed choices with Nephew via phone)   Discharge Delays (!) Patient and Family Barriers     Loni Thorne RN  Case Management  Ext: [phone]  01/27/2020  4:11 PM

## 2020-01-27 NOTE — PLAN OF CARE
Problem: Physical Therapy Goal  Goal: Physical Therapy Goal  Description  Goals to be met by: 2020     Patient will increase functional independence with mobility by performin. Supine to sit with Moderate Assistance  2. Sit to supine with Moderate Assistance  3. Sit to stand transfer with Moderate Assistance  4. Bed to chair transfer with Moderate Assistance using LRAD  5. Gait  x 15 feet with Moderate Assistance using LRAD.   6. Sitting at edge of bed x10 minutes with Contact Guard Assistance  7. Stand for 1 minute with Contact Guard Assistance using LRAD  8. Lower extremity exercise program x15 reps per handout, with assistance as needed     Outcome: Ongoing, Progressing      Discharge Recommendations: SNF    Pt requires max/total A to sit at the EOB.    Goals remain appropriate.     Gisselle Truong, PTA.   [phone]   2020

## 2020-01-27 NOTE — PROGRESS NOTES
[facility]  Vascular Neurology  Comprehensive Stroke Center  Progress Note    Assessment/Plan:     * Embolic stroke involving left middle cerebral artery  80 yo man with history of HTN, a fib (on Coumadin) and hypothyroidism. Patient has been admitted to [facility] for left temp partieal infarct on 1/10. On 1/14 patient with worsening of deficits: aphasia, right side plegia and not following commands. Telestroke consult was completed by Dr. Oliavres, no tpa given as INR was 2.0. Patient transferred to [facility] ED for stat CTA stroke MP.  Positive for LVO, L M1 occlusion. Patient not candidate for intervention d/t large core infarct. Patient was admitted to [facility] for close monitoring.  Coumadin held d/t size of stroke. PEG placed 1/23,. Eliquis and TF started on 1/24.      Antithrombotics: Eliquis 5mg BID     Statins: Lipitor 40 mg daily    Aggressive risk factor modification: A-Fib, HTN     Rehab efforts: The patient has been evaluated by a stroke team provider and the therapy needs have been fully considered based off the presenting complaints and exam findings. The following therapy evaluations are needed: PT evaluate and treat, OT evaluate and treat, SLP evaluate and treat, PM&R evaluate for appropriate placement - SNF    Diagnostics ordered/pending: None     VTE prophylaxis: Eliquis, multiple skin tears unable to place SCDs     BP parameters: Infarct: No intervention, SBP <160    Oxygen desaturation  Patient trialed without supplemental oxygen- O2 sat dropped to 81. 1L nasal cannula reapplied, patient now 96%  Unsure etiology, CXR radiology read WNL.  No SOB working with therapy     PEG (percutaneous endoscopic gastrostomy) adjustment/replacement/removal  PEG placed by general surgery  Patient now at goal on TF- tolerating well.     Chronic anticoagulation  Previously on coumadin, transitioned to Eliquis on 1/24    Cytotoxic cerebral edema  Area of cytotoxic cerebral edema identified when  reviewing brain imaging in the territory of the left middle cerebral artery. There is mass effect associated with it. We will continue to monitor the patients clinical exam for any worsening of symptoms which may indicate expansion of the stroke or the area of the edema resulting in the clinical change. The pattern is suggestive of cardio embolic etiology.        Hypercoagulable state  Pt previously on Coumadin but subtherapeutic   Eliquis started on 1/24    Aphasia  Due to stroke  Aggressive therapy SLP    Right spastic hemiparesis  Due to stroke  Aggressive therapy with PT/OT    Atrial fibrillation  Stroke risk factor  Rate control  Repeat CT on 1/18 stable. PEG placed 1/23,  Eliquis started on 1/24         Type 2 diabetes mellitus with circulatory disorder, without long-term current use of insulin  Stroke risk factor  HgB A1C 5.1  SSI       Essential hypertension  Stroke risk factor  SBP <160  Patient at goal on current regimen            Patient admitted to neurocritical care for Left MCA syndrome, severe aphasia following extension of prior stroke.  01/15/2020: Overnight concern for extensor posturing of right arm, emergent CT this AM showing stable area of infarct, no new hemorrhage or extension.  01/16/20: No acute events overnight, neurostatus unchanged  01/17/2020: No events overnight, patient received seroquel for agitation this AM. To remain in neuro ICU for closer monitoring  01/18/2020: Patient calm and alert during today's exam. Patient to step down to stroke primary service. Breath sounds course, CXR without significant change- will continue pulmonary tolieting.   1/19/20: Patient now with leukocytosis. Repeat CBC pending. CXR with pneumonitis. Rocephin ordered.  1/20/20: CT abd/pelvis for PEG planning, scheduled for tomorrow AM with IR. Plan to restart AC (Heparin vs DOAC) post procedure  1/21/20: Issues with placing NG overnight, PEG deferred to tomorrow as pt unable to get barium. SLP re-eval,  MBSS today  1/22/20: NG pulled overnight, gen surgery consulted with plan for PEG tomorrow   1/23/20: PEG placed today by general surgery, plan to start DOAC 24 hr after procedure.   1/24/20: Patient 24 hour s/p PEG. Eliquis started. TF started.   1/25/20: Neuro exam stable. Patient now at goal on TF- tolerating well.   1/26/20: Neuro exam stable. Patient with decreased bowel sounds and taut abdomen. KUB without obstruction. Patient LBM on 1/21, brown bomb ordered.   1/27 multiple bowel movements overnight after receiving bowel regimen.  Neutraphos added to PEG tube.  Mild leukocytosis on labs today.  CXR did not show evidence of pneumonia.  Multiple skin tears and could be the etiology of leukocytosis.  Will continue to monitor     STROKE DOCUMENTATION   Acute Stroke Times   Last Known Normal Date: 01/13/20  Last Known Normal Time: 2315  Symptom Onset Date: 01/14/20  Symptom Onset Time: 0050  Stroke Team Called Date: 01/14/20  Stroke Team Called Time: 0105  Stroke Team Arrival Date: 01/14/20  Stroke Team Arrival Time: 0355  CT Interpretation Time: 0100    NIH Scale:  1a. Level of Consciousness: 0-->Alert, keenly responsive  1b. LOC Questions: 2-->Answers neither question correctly  1c. LOC Commands: 2-->Performs neither task correctly  2. Best Gaze: 1-->Partial gaze palsy, gaze is abnormal in one or both eyes, but forced deviation or total gaze paresis is not present  3. Visual: 0-->No visual loss  4. Facial Palsy: 1-->Minor paralysis (flattened nasolabial fold, asymmetry on smiling)  5a. Motor Arm, Left: 1-->Drift, limb holds 90 (or 45) degrees, but drifts down before full 10 seconds, does not hit bed or other support  5b. Motor Arm, Right: 4-->No movement  6a. Motor Leg, Left: 3-->No effort against gravity, leg falls to bed immediately  6b. Motor Leg, Right: 4-->No movement  7. Limb Ataxia: 0-->Absent  8. Sensory: 0-->Normal, no sensory loss  9. Best Language: 2-->Severe aphasia, all communication is through  fragmentary expression, great need for inference, questioning, and guessing by the listener. Range of information that can be exchanged is limited, listener carries burden of. . . (see row details)  10. Dysarthria: 2-->Severe dysarthria, patients speech is so slurred as to be unintelligible in the absence of or out of proportion to any dysphasia, or is mute/anarthric  11. Extinction and Inattention (formerly Neglect): 0-->No abnormality  Total (NIH Stroke Scale): 22       Modified [place] Score: 0  Millie Coma Scale:    ABCD2 Score:    [id number] Score:   HAS -BLED Score:   ICH Score:   Hunt & Jones Classification:      Hemorrhagic change of an Ischemic Stroke: Does this patient have an ischemic stroke with hemorrhagic changes? No     Neurologic Chief Complaint: Right sided weakness    Subjective:     Interval History: Patient is seen for follow-up neurological assessment and treatment recommendations:     Neuro exam stable. Patient with decreased bowel sounds and taut abdomen. KUB without obstruction. Patient LBM on 1/21, brown bomb ordered.     HPI, Past Medical, Family, and Social History remains the same as documented in the initial encounter.     Review of Systems   Constitutional: Negative for chills and fever.   HENT: Positive for drooling and trouble swallowing.    Eyes: Negative for visual disturbance.   Respiratory: Positive for cough. Negative for shortness of breath.    Gastrointestinal: Negative for constipation and vomiting.   Musculoskeletal: Positive for arthralgias and gait problem.   Neurological: Positive for facial asymmetry, speech difficulty and weakness.   Psychiatric/Behavioral: Positive for confusion and decreased concentration.     Scheduled Meds:   albuterol-ipratropium  3 mL Nebulization Q6H WAKE    apixaban  5 mg Per G Tube BID    atorvastatin  40 mg Per NG tube Daily    barium  450 mL Per NG tube Once    levothyroxine  75 mcg Per NG tube Before breakfast    metoprolol tartrate   25 mg Per NG tube BID    polyethylene glycol  17 g Per G Tube Daily    senna-docusate 8.6-50 mg  1 tablet Per NG tube BID     Continuous Infusions:    PRN Meds:acetaminophen, Dextrose 10% Bolus, glucagon (human recombinant), glucose, glucose, insulin aspart U-100, ondansetron, QUEtiapine, sodium chloride 0.9%    Objective:     Vital Signs (Most Recent):  Temp: 96.3 °F (35.7 °C) (01/27/20 1101)  Pulse: 83 (01/27/20 1244)  Resp: 19 (01/27/20 1244)  BP: (!) 167/79 (01/27/20 1101)  SpO2: 98 % (01/27/20 1244)  BP Location: Right arm    Vital Signs Range (Last 24H):  Temp:  [96.3 °F (35.7 °C)-98.8 °F (37.1 °C)]   Pulse:  []   Resp:  [16-19]   BP: (117-167)/(77-85)   SpO2:  [94 %-98 %]   BP Location: Right arm    Physical Exam   Constitutional: He appears well-developed and well-nourished.   HENT:   Head: Normocephalic and atraumatic.   Eyes: Pupils are equal, round, and reactive to light.   Left gaze preference    Neck: Normal range of motion.   Cardiovascular: Normal rate.   Pulmonary/Chest: Effort normal. He has rhonchi.   Abdominal: Soft.   Skin: Skin is warm and dry.   Nursing note and vitals reviewed.      Neurological Exam:   LOC: alert  Attention Span: poor  Language: Expressive aphasia, Receptive aphasia  Articulation: Dysarthria  Orientation: Untestable due to severe aphasia   Visual Fields: Full  EOM (CN III, IV, VI): Gaze preference  left  Pupils (CN II, III): PERRL  Facial Movement (CN VII): Lower facial weakness on the Right  Motor: Arm left  Paresis: 4/5  Leg left  Paresis: 4/5  Arm right  Plegia 0/5  Leg right Paresis: 1/5  Cebellar: No evidence of appendicular or axial ataxia  Tone: Flaccid  RUE    Laboratory:  CMP:   Recent Labs   Lab 01/27/20  0832   CALCIUM 9.6   ALBUMIN 2.6*   PROT 5.9*   *   K 4.7   CO2 29   *   BUN 41*   CREATININE 0.9   ALKPHOS 100   ALT 40   AST 44*   BILITOT 1.3*     CBC:   Recent Labs   Lab 01/27/20  0519   WBC 13.93*   RBC 4.46*   HGB 14.9   HCT 48.8   PLT  189   *   MCH 33.4*   MCHC 30.5*     Lipid Panel:   No results for input(s): CHOL, LDLCALC, HDL, TRIG in the last 168 hours.  Coagulation:   Recent Labs   Lab 01/27/20  0519   INR 1.3*     Hgb A1C:   No results for input(s): HGBA1C in the last 168 hours.  TSH:   No results for input(s): TSH in the last 168 hours.    Diagnostic Results     Brain Imaging   CT Head without 1/15 (07:43)  Evolving moderate-sized region of acute infarction in the left MCA territory as above.  Distribution grossly stable from recent MRI without significant infarct extension.  No hemorrhagic conversion or new territorial infarct.     MRI Brain 1/14 (04:28)  There is interval change, there is interval development of acute ischemia/infarct along the left MCA distribution, this is superimposed on subacute ischemic change seen on the recent MRI examination  Findings concerning for occlusion of the M2 segment of the left MCA as above.    Additional chronic changes are noted.    Vessel Imaging   CTA Head and Neck 1/14 (02:43)  As on the recent brain MRA examination there is appearance of attenuation of the left MCA branch vessels without evidence for high-grade stenosis or occlusion and otherwise there is no evidence for high-grade stenosis or occlusion of the major arterial vascular structures of the head or neck.    There is no evidence for intracranial aneurysm or AVM.    Findings consistent with acute to subacute left parietal ischemic change again noted.    Small thyroid nodules or cysts.    Cardiac Imaging  TTE 1/11  · Normal left ventricular systolic function. The estimated ejection fraction is 55%.  · Mid anteroseptal and apical moderate hypokinesia.  · Diastolic pattern consistent with atrial fibrillation observed.  · Severe left atrial enlargement.  · Normal right ventricular systolic function.  · Severe right atrial enlargement.  · Mild-to-moderate mitral regurgitation.  · Mild tricuspid regurgitation.  · The estimated PA  systolic pressure is 40 mmHg.  Intermediate central venous pressure (8 mmHg).         Modesta Mejia PA-C  [facility]  Department of Vascular Neurology   [facility]

## 2020-01-27 NOTE — PLAN OF CARE
Problem: Diabetes Comorbidity  Goal: Blood Glucose Level Within Desired Range  Outcome: Ongoing, Progressing  Intervention: Maintain Glycemic Control  Flowsheets (Taken 1/27/2020 1717)  Glycemic Management: blood glucose monitoring; insulin dose matched to carbohydrate intake     Problem: Skin Injury Risk Increased  Goal: Skin Health and Integrity  Outcome: Ongoing, Progressing  Intervention: Optimize Skin Protection  Flowsheets (Taken 1/27/2020 1717)  Pressure Reduction Techniques: frequent weight shift encouraged  Pressure Reduction Devices: heel offloading device utilized  Head of Bed (HOB): HOB at 45 degrees  Intervention: Promote and Optimize Oral Intake  Flowsheets (Taken 1/27/2020 1717)  Oral Nutrition Promotion: rest periods promoted; medicated     Problem: Fall Injury Risk  Goal: Absence of Fall and Fall-Related Injury  Outcome: Ongoing, Progressing     Problem: Adjustment to Illness (Stroke, Ischemic/Transient Ischemic Attack)  Goal: Optimal Coping  Outcome: Ongoing, Progressing     Problem: Cerebral Tissue Perfusion Risk (Stroke, Ischemic/Transient Ischemic Attack)  Goal: Optimal Cerebral Tissue Perfusion  Outcome: Ongoing, Progressing

## 2020-01-27 NOTE — ASSESSMENT & PLAN NOTE
Patient trialed without supplemental oxygen- O2 sat dropped to 81. 1L nasal cannula reapplied, patient now 96%  Unsure etiology, CXR radiology read WNL.  No SOB working with therapy

## 2020-01-27 NOTE — PLAN OF CARE
Problem: SLP Goal  Goal: SLP Goal  Description  Goals due 1/28  1.  Pt. Will participate in ongoing assessment of swallow at bedside  2.  Pt. Will participate in speech language eval /met  3.  Model simple commands with 100% accuracy  4.  Respond to simple yes/no questions with 70% accuracy  5.  Complete single words on au tomatic speech tasks with 50% accuracy      1/27/2020 0950 by Julia Spann MA, CCC-SLP  Outcome: Ongoing, Progressing  1/27/2020 0948 by Julia Spann MA, CCC-SLP  Outcome: Ongoing, Progressing

## 2020-01-28 LAB
BASOPHILS # BLD AUTO: 0.12 K/UL (ref 0–0.2)
BASOPHILS NFR BLD: 0.9 % (ref 0–1.9)
DIFFERENTIAL METHOD: ABNORMAL
EOSINOPHIL # BLD AUTO: 0.8 K/UL (ref 0–0.5)
EOSINOPHIL NFR BLD: 5.6 % (ref 0–8)
ERYTHROCYTE [DISTWIDTH] IN BLOOD BY AUTOMATED COUNT: 15.5 % (ref 11.5–14.5)
HCT VFR BLD AUTO: 46.2 % (ref 40–54)
HGB BLD-MCNC: 15 G/DL (ref 14–18)
IMM GRANULOCYTES # BLD AUTO: 0.29 K/UL (ref 0–0.04)
IMM GRANULOCYTES NFR BLD AUTO: 2.1 % (ref 0–0.5)
INR PPP: 1.2 (ref 0.8–1.2)
LYMPHOCYTES # BLD AUTO: 1 K/UL (ref 1–4.8)
LYMPHOCYTES NFR BLD: 7.4 % (ref 18–48)
MCH RBC QN AUTO: 33.8 PG (ref 27–31)
MCHC RBC AUTO-ENTMCNC: 32.5 G/DL (ref 32–36)
MCV RBC AUTO: 104 FL (ref 82–98)
MONOCYTES # BLD AUTO: 0.9 K/UL (ref 0.3–1)
MONOCYTES NFR BLD: 6.9 % (ref 4–15)
NEUTROPHILS # BLD AUTO: 10.4 K/UL (ref 1.8–7.7)
NEUTROPHILS NFR BLD: 77.1 % (ref 38–73)
NRBC BLD-RTO: 0 /100 WBC
PLATELET # BLD AUTO: 116 K/UL (ref 150–350)
PMV BLD AUTO: 12.2 FL (ref 9.2–12.9)
POCT GLUCOSE: 161 MG/DL (ref 70–110)
POCT GLUCOSE: 177 MG/DL (ref 70–110)
POCT GLUCOSE: 182 MG/DL (ref 70–110)
POCT GLUCOSE: 192 MG/DL (ref 70–110)
POCT GLUCOSE: 193 MG/DL (ref 70–110)
PROTHROMBIN TIME: 12.4 SEC (ref 9–12.5)
RBC # BLD AUTO: 4.44 M/UL (ref 4.6–6.2)
WBC # BLD AUTO: 13.55 K/UL (ref 3.9–12.7)

## 2020-01-28 PROCEDURE — 97112 NEUROMUSCULAR REEDUCATION: CPT

## 2020-01-28 PROCEDURE — 25000003 PHARM REV CODE 250: Performed by: NURSE PRACTITIONER

## 2020-01-28 PROCEDURE — 85610 PROTHROMBIN TIME: CPT

## 2020-01-28 PROCEDURE — 94640 AIRWAY INHALATION TREATMENT: CPT

## 2020-01-28 PROCEDURE — 97530 THERAPEUTIC ACTIVITIES: CPT

## 2020-01-28 PROCEDURE — 99233 PR SUBSEQUENT HOSPITAL CARE,LEVL III: ICD-10-PCS | Mod: ,,, | Performed by: PSYCHIATRY & NEUROLOGY

## 2020-01-28 PROCEDURE — 27000221 HC OXYGEN, UP TO 24 HOURS

## 2020-01-28 PROCEDURE — 94761 N-INVAS EAR/PLS OXIMETRY MLT: CPT

## 2020-01-28 PROCEDURE — 97535 SELF CARE MNGMENT TRAINING: CPT

## 2020-01-28 PROCEDURE — 94668 MNPJ CHEST WALL SBSQ: CPT

## 2020-01-28 PROCEDURE — 25000242 PHARM REV CODE 250 ALT 637 W/ HCPCS: Performed by: NURSE PRACTITIONER

## 2020-01-28 PROCEDURE — 99233 SBSQ HOSP IP/OBS HIGH 50: CPT | Mod: ,,, | Performed by: PSYCHIATRY & NEUROLOGY

## 2020-01-28 PROCEDURE — 36415 COLL VENOUS BLD VENIPUNCTURE: CPT

## 2020-01-28 PROCEDURE — 85025 COMPLETE CBC W/AUTO DIFF WBC: CPT

## 2020-01-28 PROCEDURE — 92507 TX SP LANG VOICE COMM INDIV: CPT

## 2020-01-28 PROCEDURE — 11000001 HC ACUTE MED/SURG PRIVATE ROOM

## 2020-01-28 PROCEDURE — 92526 ORAL FUNCTION THERAPY: CPT

## 2020-01-28 RX ADMIN — LEVOTHYROXINE SODIUM 75 MCG: 25 TABLET ORAL at 06:01

## 2020-01-28 RX ADMIN — INSULIN ASPART 1 UNITS: 100 INJECTION, SOLUTION INTRAVENOUS; SUBCUTANEOUS at 09:01

## 2020-01-28 RX ADMIN — POLYETHYLENE GLYCOL 3350 17 G: 17 POWDER, FOR SOLUTION ORAL at 09:01

## 2020-01-28 RX ADMIN — INSULIN ASPART 2 UNITS: 100 INJECTION, SOLUTION INTRAVENOUS; SUBCUTANEOUS at 05:01

## 2020-01-28 RX ADMIN — INSULIN ASPART 2 UNITS: 100 INJECTION, SOLUTION INTRAVENOUS; SUBCUTANEOUS at 06:01

## 2020-01-28 RX ADMIN — METOPROLOL TARTRATE 25 MG: 25 TABLET ORAL at 09:01

## 2020-01-28 RX ADMIN — APIXABAN 5 MG: 5 TABLET, FILM COATED ORAL at 09:01

## 2020-01-28 RX ADMIN — ATORVASTATIN CALCIUM 40 MG: 20 TABLET, FILM COATED ORAL at 09:01

## 2020-01-28 RX ADMIN — IPRATROPIUM BROMIDE AND ALBUTEROL SULFATE 3 ML: .5; 3 SOLUTION RESPIRATORY (INHALATION) at 09:01

## 2020-01-28 RX ADMIN — INSULIN ASPART 1 UNITS: 100 INJECTION, SOLUTION INTRAVENOUS; SUBCUTANEOUS at 01:01

## 2020-01-28 RX ADMIN — SENNOSIDES AND DOCUSATE SODIUM 1 TABLET: 8.6; 5 TABLET ORAL at 09:01

## 2020-01-28 RX ADMIN — IPRATROPIUM BROMIDE AND ALBUTEROL SULFATE 3 ML: .5; 3 SOLUTION RESPIRATORY (INHALATION) at 01:01

## 2020-01-28 RX ADMIN — IPRATROPIUM BROMIDE AND ALBUTEROL SULFATE 3 ML: .5; 3 SOLUTION RESPIRATORY (INHALATION) at 07:01

## 2020-01-28 NOTE — PT/OT/SLP PROGRESS
Physical Therapy Treatment    Patient Name:  Chirag Thompson   MRN:  [id number]    Recommendations:     Discharge Recommendations:  nursing facility, skilled   Discharge Equipment Recommendations: wheelchair, 3-in-1 commode     Assessment:     Chirag Thompson is a 79 y.o. male admitted with a medical diagnosis of Embolic stroke involving left middle cerebral artery.  He presents with the following impairments/functional limitations:  weakness, impaired endurance, impaired sensation, impaired self care skills, impaired functional mobilty, impaired balance, impaired cognition, decreased upper extremity function, decreased lower extremity function, pain, impaired fine motor, impaired coordination, impaired cardiopulmonary response to activity.    Pt's alertness increased throughout session. Daily orientation provided. Pt with R inattention. Pt aphasic but was able to count to 10 with PT with 80% accuracy x 3 trials. Pt able to follow some simple one step commands when not over stimulated. Pt continues with R hemiparesis. Pt required total A x 2 persons for bed mobility and was dependent to stand (small clearance from bed). Pt sat EOB x 20 minutes with CGA - mod A. Worked extensively on sitting balance, attention to R side, initiation of R LE movement, upright posture, command following, and weightbearing through R LE. Recommend discharge to SNF once pt is medical stable.    Rehab Prognosis: Good; patient would benefit from acute skilled PT services to address these deficits and reach maximum level of function.    Recent Surgery: Procedure(s) (LRB):  EGD, WITH PEG TUBE INSERTION (N/A) 5 Days Post-Op    Plan:     During this hospitalization, patient to be seen 4 x/week to address the identified rehab impairments via gait training, therapeutic activities, therapeutic exercises, neuromuscular re-education and progress toward the following goals:    · Plan of Care Expires:  02/14/20    Subjective     Chief Complaint: unable to  assess 2nd to aphasia   Patient/Family Comments/goals: unable to assess   Pain/Comfort:  · Pain Rating 1: 0/10  · Pain Rating Post-Intervention 1: 0/10      Objective:     Communicated with RN prior to session.  Patient found HOB elevated with telemetry, PEG Tube, peripheral IV upon PT entry to room.     General Precautions: Standard, fall   Orthopedic Precautions:N/A   Braces: N/A     Functional Mobility:  · Bed Mobility:     · Rolling Left:  total assistance  · Rolling Right: total assistance  · Scooting to place B feet on floor: total assistance   · Lateral scooting L: total A x 2 persons   · Supine to Sit: total assistance and of 2 persons   · Pt with initiation of R LE moving towards L EOB  · Sit to Supine: total assistance and of 2 persons  · Transfers:     · Sit to Stand:  dependence  X 2 persons  · Pt with slight clearance from EOB  · R foot and knee blocked   · Gait: not performed 2nd to pt unable to stand       AM-PAC 6 CLICK MOBILITY  Turning over in bed (including adjusting bedclothes, sheets and blankets)?: 2  Sitting down on and standing up from a chair with arms (e.g., wheelchair, bedside commode, etc.): 2  Moving from lying on back to sitting on the side of the bed?: 2  Moving to and from a bed to a chair (including a wheelchair)?: 1  Need to walk in hospital room?: 1  Climbing 3-5 steps with a railing?: 1  Basic Mobility Total Score: 9       Therapeutic Activities and Exercises:  Educated pt on PT role/POC  Daily orientation provided    Sitting EOB x 20 minutes with CGA to mod A  · Worked extensively on sitting balance with facilitation of weightbearing of R UE and R LE  · Worked extensively on upright posture. Pt able to hold head/neck in neutral and verbally count to 10 (80% accuracy) with PT x 3 trials (OT working on posture and weight bearing and external rotation through R UE)  · B LE PROM x 5 re: LAQs and ankle pumps while getting pt to look at R LE (no clonus)  · Reaching tasks, ADLs,  "positioning, and attention to task with OT  · Pt verbalizing nonsensical stream of sounds throughout session except for counting with PT and occasionally "yes" "no"     Patient left HOB elevated with all lines intact, call button in reach and RN notified..    GOALS:   Multidisciplinary Problems     Physical Therapy Goals        Problem: Physical Therapy Goal    Goal Priority Disciplines Outcome Goal Variances Interventions   Physical Therapy Goal     PT, PT/OT Ongoing, Progressing     Description:  Goals to be met by: 2020     Patient will increase functional independence with mobility by performin. Supine to sit with Moderate Assistance - not met  2. Sit to supine with Moderate Assistance - not met   3. Sit to stand transfer with Moderate Assistance - not met  4. Bed to chair transfer with Moderate Assistance using LRAD - not met  5. Gait  x 15 feet with Moderate Assistance using LRAD. - not met  6. Sitting at edge of bed x10 minutes with Contact Guard Assistance - not met  7. Stand for 1 minute with Contact Guard Assistance using LRAD - not met  8. Lower extremity exercise program x15 reps per handout, with assistance as needed - not met                       Time Tracking:     PT Received On: 20  PT Start Time: 1032     PT Stop Time: 1102  PT Total Time (min): 30 min     Billable Minutes: Therapeutic Activity 23    Treatment Type: Treatment  PT/PTA: PT     PTA Visit Number: 5     Liz Ricardo PT, DPT  2020  [phone]    "

## 2020-01-28 NOTE — PROGRESS NOTES
[facility]  Vascular Neurology  Comprehensive Stroke Center  Progress Note    Assessment/Plan:     * Embolic stroke involving left middle cerebral artery  80 yo man with history of HTN, a fib (on Coumadin) and hypothyroidism. Patient has been admitted to [facility] for left temp partieal infarct on 1/10. On 1/14 patient with worsening of deficits: aphasia, right side plegia and not following commands. Telestroke consult was completed by Dr. Olivares, no tpa given as INR was 2.0. Patient transferred to [facility] ED for stat CTA stroke MP.  Positive for LVO, L M1 occlusion. Patient not candidate for intervention d/t large core infarct. Patient was admitted to [facility] for close monitoring.  Coumadin held d/t size of stroke. PEG placed 1/23,. Eliquis and TF started on 1/24.         Antithrombotics: Eliquis 5mg BID     Statins: Lipitor 40 mg daily    Aggressive risk factor modification: A-Fib, HTN     Rehab efforts: The patient has been evaluated by a stroke team provider and the therapy needs have been fully considered based off the presenting complaints and exam findings. The following therapy evaluations are needed: PT evaluate and treat, OT evaluate and treat, SLP evaluate and treat, PM&R evaluate for appropriate placement - SNF    Diagnostics ordered/pending: None     VTE prophylaxis: Eliquis, multiple skin tears unable to place SCDs     BP parameters: Infarct: No intervention, SBP <160    Oxygen desaturation  Patient trialed without supplemental oxygen- O2 sat dropped to 81. 1L nasal cannula reapplied, patient now 96%  Unsure etiology, CXR radiology read WNL.  No SOB working with therapy     PEG (percutaneous endoscopic gastrostomy) adjustment/replacement/removal  PEG placed by general surgery  Patient now at goal on TF- tolerating well.     Chronic anticoagulation  Previously on coumadin, transitioned to Eliquis on 1/24    Cytotoxic cerebral edema  Area of cytotoxic cerebral edema identified when  reviewing brain imaging in the territory of the left middle cerebral artery. There is mass effect associated with it. We will continue to monitor the patients clinical exam for any worsening of symptoms which may indicate expansion of the stroke or the area of the edema resulting in the clinical change. The pattern is suggestive of cardio embolic etiology.        Hypercoagulable state  Pt previously on Coumadin but subtherapeutic   Eliquis started on 1/24    Aphasia  Due to stroke  Aggressive therapy SLP    Right spastic hemiparesis  Due to stroke  Aggressive therapy with PT/OT    Atrial fibrillation  Stroke risk factor  Rate control  Repeat CT on 1/18 stable. PEG placed 1/23,  Eliquis started on 1/24         Type 2 diabetes mellitus with circulatory disorder, without long-term current use of insulin  Stroke risk factor  HgB A1C 5.1  SSI       Essential hypertension  Stroke risk factor  SBP <160  Patient at goal on current regimen            Patient admitted to neurocritical care for Left MCA syndrome, severe aphasia following extension of prior stroke.  01/15/2020: Overnight concern for extensor posturing of right arm, emergent CT this AM showing stable area of infarct, no new hemorrhage or extension.  01/16/20: No acute events overnight, neurostatus unchanged  01/17/2020: No events overnight, patient received seroquel for agitation this AM. To remain in neuro ICU for closer monitoring  01/18/2020: Patient calm and alert during today's exam. Patient to step down to stroke primary service. Breath sounds course, CXR without significant change- will continue pulmonary tolieting.   1/19/20: Patient now with leukocytosis. Repeat CBC pending. CXR with pneumonitis. Rocephin ordered.  1/20/20: CT abd/pelvis for PEG planning, scheduled for tomorrow AM with IR. Plan to restart AC (Heparin vs DOAC) post procedure  1/21/20: Issues with placing NG overnight, PEG deferred to tomorrow as pt unable to get barium. SLP re-eval,  MBSS today  1/22/20: NG pulled overnight, gen surgery consulted with plan for PEG tomorrow   1/23/20: PEG placed today by general surgery, plan to start DOAC 24 hr after procedure.   1/24/20: Patient 24 hour s/p PEG. Eliquis started. TF started.   1/25/20: Neuro exam stable. Patient now at goal on TF- tolerating well.   1/26/20: Neuro exam stable. Patient with decreased bowel sounds and taut abdomen. KUB without obstruction. Patient LBM on 1/21, brown bomb ordered.   1/27 multiple bowel movements overnight after receiving bowel regimen.  Neutraphos added to PEG tube.  Mild leukocytosis on labs today.  CXR did not show evidence of pneumonia.  Multiple skin tears and could be the etiology of leukocytosis.  Will continue to monitor   1/28/20 working well with therapy.  Waiting for placement to SNF.      STROKE DOCUMENTATION   Acute Stroke Times   Last Known Normal Date: 01/13/20  Last Known Normal Time: 2315  Symptom Onset Date: 01/14/20  Symptom Onset Time: 0050  Stroke Team Called Date: 01/14/20  Stroke Team Called Time: 0105  Stroke Team Arrival Date: 01/14/20  Stroke Team Arrival Time: 0355  CT Interpretation Time: 0100    NIH Scale:  1a. Level of Consciousness: 0-->Alert, keenly responsive  1b. LOC Questions: 2-->Answers neither question correctly  1c. LOC Commands: 2-->Performs neither task correctly  2. Best Gaze: 1-->Partial gaze palsy, gaze is abnormal in one or both eyes, but forced deviation or total gaze paresis is not present  3. Visual: 0-->No visual loss  4. Facial Palsy: 1-->Minor paralysis (flattened nasolabial fold, asymmetry on smiling)  5a. Motor Arm, Left: 1-->Drift, limb holds 90 (or 45) degrees, but drifts down before full 10 seconds, does not hit bed or other support  5b. Motor Arm, Right: 4-->No movement  6a. Motor Leg, Left: 3-->No effort against gravity, leg falls to bed immediately  6b. Motor Leg, Right: 4-->No movement  7. Limb Ataxia: 0-->Absent  8. Sensory: 0-->Normal, no sensory loss  9.  Best Language: 2-->Severe aphasia, all communication is through fragmentary expression, great need for inference, questioning, and guessing by the listener. Range of information that can be exchanged is limited, listener carries burden of. . . (see row details)  10. Dysarthria: 2-->Severe dysarthria, patients speech is so slurred as to be unintelligible in the absence of or out of proportion to any dysphasia, or is mute/anarthric  11. Extinction and Inattention (formerly Neglect): 0-->No abnormality  Total (NIH Stroke Scale): 22       Modified Blanche Score: 0  Millie Coma Scale:    ABCD2 Score:    [id number] Score:   HAS -BLED Score:   ICH Score:   Hunt & Jones Classification:      Hemorrhagic change of an Ischemic Stroke: Does this patient have an ischemic stroke with hemorrhagic changes? No     Neurologic Chief Complaint: Right sided weakness    Subjective:     Interval History: Patient is seen for follow-up neurological assessment and treatment recommendations: working well with therapy.  Waiting for placement to SNF.    HPI, Past Medical, Family, and Social History remains the same as documented in the initial encounter.     Review of Systems   Constitutional: Negative for chills and fever.   HENT: Positive for trouble swallowing. Negative for drooling.    Eyes: Negative for visual disturbance.   Respiratory: Negative for cough and shortness of breath.    Gastrointestinal: Negative for constipation and vomiting.   Musculoskeletal: Positive for arthralgias and gait problem.   Neurological: Positive for facial asymmetry, speech difficulty and weakness.   Psychiatric/Behavioral: Positive for confusion and decreased concentration.     Scheduled Meds:   albuterol-ipratropium  3 mL Nebulization Q6H WAKE    apixaban  5 mg Per G Tube BID    atorvastatin  40 mg Per NG tube Daily    barium  450 mL Per NG tube Once    levothyroxine  75 mcg Per NG tube Before breakfast    metoprolol tartrate  25 mg Per NG tube BID     polyethylene glycol  17 g Per G Tube Daily    senna-docusate 8.6-50 mg  1 tablet Per NG tube BID     Continuous Infusions:    PRN Meds:acetaminophen, Dextrose 10% Bolus, glucagon (human recombinant), glucose, glucose, insulin aspart U-100, ondansetron, QUEtiapine, sodium chloride 0.9%    Objective:     Vital Signs (Most Recent):  Temp: 98 °F (36.7 °C) (01/28/20 1605)  Pulse: 83 (01/28/20 1605)  Resp: 16 (01/28/20 1605)  BP: (!) 112/58 (01/28/20 1605)  SpO2: (!) 92 % (01/28/20 1605)  BP Location: Left arm    Vital Signs Range (Last 24H):  Temp:  [97.5 °F (36.4 °C)-98.4 °F (36.9 °C)]   Pulse:  []   Resp:  [16-20]   BP: (108-134)/(58-86)   SpO2:  [92 %-99 %]   BP Location: Left arm    Physical Exam   Constitutional: He appears well-developed and well-nourished.   HENT:   Head: Normocephalic and atraumatic.   Eyes: Pupils are equal, round, and reactive to light.   Left gaze preference    Neck: Normal range of motion.   Cardiovascular: Normal rate.   Pulmonary/Chest: No tachypnea. No respiratory distress.   Abdominal: Soft.   Skin: Skin is warm and dry.   Nursing note and vitals reviewed.      Neurological Exam:   LOC: alert  Attention Span: poor  Language: Expressive aphasia, Receptive aphasia  Articulation: Dysarthria  Orientation: Untestable due to severe aphasia   Visual Fields: Full  EOM (CN III, IV, VI): Gaze preference  left  Pupils (CN II, III): PERRL  Facial Movement (CN VII): Lower facial weakness on the Right  Motor: Arm left  Paresis: 4/5  Leg left  Paresis: 4/5  Arm right  Plegia 0/5  Leg right Paresis: 1/5  Cebellar: No evidence of appendicular or axial ataxia  Tone: Flaccid  RUE    Laboratory:  CMP:   No results for input(s): GLUCOSE, CALCIUM, ALBUMIN, PROT, NA, K, CO2, CL, BUN, CREATININE, ALKPHOS, ALT, AST, BILITOT in the last 24 hours.  CBC:   Recent Labs   Lab 01/28/20  0421   WBC 13.55*   RBC 4.44*   HGB 15.0   HCT 46.2   *   *   MCH 33.8*   MCHC 32.5     Lipid Panel:   No  results for input(s): CHOL, LDLCALC, HDL, TRIG in the last 168 hours.  Coagulation:   Recent Labs   Lab 01/28/20  0421   INR 1.2     Hgb A1C:   No results for input(s): HGBA1C in the last 168 hours.  TSH:   No results for input(s): TSH in the last 168 hours.    Diagnostic Results     Brain Imaging   CT Head without 1/15 (07:43)  Evolving moderate-sized region of acute infarction in the left MCA territory as above.  Distribution grossly stable from recent MRI without significant infarct extension.  No hemorrhagic conversion or new territorial infarct.     MRI Brain 1/14 (04:28)  There is interval change, there is interval development of acute ischemia/infarct along the left MCA distribution, this is superimposed on subacute ischemic change seen on the recent MRI examination  Findings concerning for occlusion of the M2 segment of the left MCA as above.    Additional chronic changes are noted.    Vessel Imaging   CTA Head and Neck 1/14 (02:43)  As on the recent brain MRA examination there is appearance of attenuation of the left MCA branch vessels without evidence for high-grade stenosis or occlusion and otherwise there is no evidence for high-grade stenosis or occlusion of the major arterial vascular structures of the head or neck.    There is no evidence for intracranial aneurysm or AVM.    Findings consistent with acute to subacute left parietal ischemic change again noted.    Small thyroid nodules or cysts.    Cardiac Imaging  TTE 1/11  · Normal left ventricular systolic function. The estimated ejection fraction is 55%.  · Mid anteroseptal and apical moderate hypokinesia.  · Diastolic pattern consistent with atrial fibrillation observed.  · Severe left atrial enlargement.  · Normal right ventricular systolic function.  · Severe right atrial enlargement.  · Mild-to-moderate mitral regurgitation.  · Mild tricuspid regurgitation.  · The estimated PA systolic pressure is 40 mmHg.  Intermediate central venous pressure  (8 mmHg).         Modesta Mejia PA-C  Comprehensive Stroke Center  Department of Vascular Neurology   [facility]

## 2020-01-28 NOTE — PLAN OF CARE
01/28/20 1204   Post-Acute Status   Post-Acute Authorization Placement   Post-Acute Placement Status Pending State Certification       PASSR completed and 142 called in to the state. Awaiting 142 to be sent to the  to complete referrals for NH placement.   in contact with CM and Medical staff. Will continue to follow and offer support as needed.     Quentin Snider LMSW  [facility]   Ext. [phone]

## 2020-01-28 NOTE — PLAN OF CARE
Goals updated per POC. KATELYN Ivey 1/28/2020   Problem: Occupational Therapy Goal  Goal: Occupational Therapy Goal  Description  Goals to be met by: 2/4 (revision completed 1/28)    Patient will increase functional independence with ADLs by performing:    UE Dressing while seated EOB with Moderate Assistance.  Grooming while EOB with Moderate Assistance. Met  *revised: with set up and SBA for oral care.  Sitting at edge of bed x10 minutes with Moderate Assistance. Met  *revised: CGA for 15 min for dynamic, functional task.  Rolling to Bilateral with Moderate Assistance and use of bedrail as needed.   Supine to sit with Moderate Assistance.  Squat pivot transfers with Maximum Assistance.   Pt will consistently be found positioned according to turning schedule to prevent skin breakdown.   Pt will have skin breakdown measures in place upon entry for 2 therapy sessions. (waffle mattress, heel protector boot, turning schedule).       Outcome: Ongoing, Progressing

## 2020-01-28 NOTE — PT/OT/SLP PROGRESS
"Speech Language Pathology Treatment    Patient Name:  Chirag Thompson   MRN:  [id number]  Admitting Diagnosis: Embolic stroke involving left middle cerebral artery    Recommendations:                 General Recommendations:  Dysphagia therapy and Speech/language therapy  Diet recommendations:  NPO, Liquid Diet Level: NPO   Aspiration Precautions: Strict aspiration precautions   General Precautions: Standard, aphasia, aspiration, fall, NPO, vision impaired  Communication strategies:  yes/no questions only and provide increased time to answer    Subjective     " ok"  Patient goals: uto    Pain/Comfort:  · Pain Rating 1: 0/10  · Pain Rating Post-Intervention 1: 0/10    Objective:     Has the patient been evaluated by SLP for swallowing?   Yes  Keep patient NPO? Yes   Current Respiratory Status: room air      Pt seen bedside with no family present. Pt awake/alert. He was able to state " ok" x2 spontaneously. Most other utterances were unintelligible. He count to 5 with therapist but had difficulty with 5-10. He was able to sing happy birthday and hum the tune for another familiar song. He completed 10% of automatic phrases and with cues 20%. He was unable to complete any automatic sentences accurately but did state clearly " November" instead of December x1. Responses to simple, personal y/n questions were undifferentiated; however, during conversation he did nod his head yes appropriately x2. He did not attempt to ID objects in a f=2 and modeled simple commands with 10% acc. Pt given teaspoon of water x1 and allowed to sip from a cup x2. Pt with anterior bolus loss. Swallow delay varied. Pt attempted a throat clear on 1/3 swallows but unable to produce strong cough/throat clear. Slight wet vocal quality noted x1. White board updated.     Assessment:     Chirag Thompson is a 79 y.o. male with an SLP diagnosis of Aphasia and Dysphagia.  He presents with good participation and progress towards goals.    Goals: "   Multidisciplinary Problems     SLP Goals        Problem: SLP Goal    Goal Priority Disciplines Outcome   SLP Goal     SLP Ongoing, Progressing   Description:  Goals remain appropriate.  To be met 2/4  1.  Pt. Will participate in ongoing assessment of swallow at bedside  2.  Pt. Will participate in speech language eval /met  3.  Model simple commands with 100% accuracy  4.  Respond to simple yes/no questions with 70% accuracy  5.  Complete single words on au tomatic speech tasks with 50% accuracy                         Plan:     · Patient to be seen:  4 x/week   · Plan of Care expires:  02/12/20  · Plan of Care reviewed with:  patient   · SLP Follow-Up:  Yes       Discharge recommendations:  nursing facility, skilled       Time Tracking:     SLP Treatment Date:   01/28/20  Speech Start Time:  1100  Speech Stop Time:  1117     Speech Total Time (min):  17 min    Billable Minutes: Speech Therapy Individual 9 and Treatment Swallowing Dysfunction 8    JENISE Morales, CCC-SLP  01/28/2020

## 2020-01-28 NOTE — PLAN OF CARE
Problem: Physical Therapy Goal  Goal: Physical Therapy Goal  Description  Goals to be met by: 2020     Patient will increase functional independence with mobility by performin. Supine to sit with Moderate Assistance - not met  2. Sit to supine with Moderate Assistance - not met   3. Sit to stand transfer with Moderate Assistance - not met  4. Bed to chair transfer with Moderate Assistance using LRAD - not met  5. Gait  x 15 feet with Moderate Assistance using LRAD. - not met  6. Sitting at edge of bed x10 minutes with Contact Guard Assistance - not met  7. Stand for 1 minute with Contact Guard Assistance using LRAD - not met  8. Lower extremity exercise program x15 reps per handout, with assistance as needed - not met      Outcome: Ongoing, Progressing     Treatment completed and no goals met. Goals appropriate

## 2020-01-28 NOTE — ASSESSMENT & PLAN NOTE
80 yo man with history of HTN, a fib (on Coumadin) and hypothyroidism. Patient has been admitted to [facility] for left temp partieal infarct on 1/10. On 1/14 patient with worsening of deficits: aphasia, right side plegia and not following commands. Telestroke consult was completed by Dr. Olivares, no tpa given as INR was 2.0. Patient transferred to [facility] [place] for stat CTA stroke MP.  Positive for LVO, L M1 occlusion. Patient not candidate for intervention d/t large core infarct. Patient was admitted to [facility] for close monitoring.  Coumadin held d/t size of stroke. PEG placed 1/23,. Eliquis and TF started on 1/24.            Antithrombotics: Eliquis 5mg BID     Statins: Lipitor 40 mg daily    Aggressive risk factor modification: A-Fib, HTN     Rehab efforts: The patient has been evaluated by a stroke team provider and the therapy needs have been fully considered based off the presenting complaints and exam findings. The following therapy evaluations are needed: PT evaluate and treat, OT evaluate and treat, SLP evaluate and treat, PM&R evaluate for appropriate placement - SNF    Diagnostics ordered/pending: None     VTE prophylaxis: Eliquis, multiple skin tears unable to place SCDs     BP parameters: Infarct: No intervention, SBP <160

## 2020-01-28 NOTE — PT/OT/SLP PROGRESS
"Occupational Therapy   Treatment    Name: Chirag Thompson  MRN: [id number]  Admitting Diagnosis:  Embolic stroke involving left middle cerebral artery  5 Days Post-Op    Recommendations:     Discharge Recommendations: nursing facility, skilled  Discharge Equipment Recommendations:  wheelchair, 3-in-1 commode  Barriers to discharge:  (level of skilled assistance required)    Assessment:     Chirag Thompson is a 79 y.o. male with a medical diagnosis of Embolic stroke involving left middle cerebral artery.  He presents with R hemiparesis, aphasia and overall decline in his functional indep with all prior roles and routines. He demo good motivation and increased active participation throughout session. He would best benefit from SNF at post acute level of care to max functional outcomes and best safety. Performance deficits affecting function are weakness, impaired endurance, impaired self care skills, impaired functional mobilty, gait instability, impaired balance, decreased upper extremity function, decreased lower extremity function, visual deficits, decreased safety awareness, impaired cognition, impaired sensation, impaired fine motor, impaired skin, impaired cardiopulmonary response to activity.     Rehab Prognosis:  Fair; patient would benefit from acute skilled OT services to address these deficits and reach maximum level of function.       Plan:     Patient to be seen 3 x/week to address the above listed problems via self-care/home management, therapeutic activities, therapeutic exercises, neuromuscular re-education, cognitive retraining, wheelchair management/training  · Plan of Care Expires: 02/13/20  · Plan of Care Reviewed with: patient    Subjective   Pt reported with cervical control, "1, 2, 3, 4, 5, 6 , 11, 11, 7..."  Pain/Comfort:  · Pain Rating 1: 0/10  · Pain Rating Post-Intervention 1: 0/10    Objective:     Communicated with: RN prior to session. Completed with PT as co-tx   Patient found HOB elevated " with bed alarm, PEG Tube, telemetry, pressure relief boots(abd binder) upon OT entry to room.    General Precautions: Standard, aphasia, aspiration, fall, vision impaired, NPO   Orthopedic Precautions:N/A   Braces: N/A     Occupational Performance:     Bed Mobility:    · Patient completed Rolling/Turning to Left with  maximal assistance and with side rail  · Patient completed Rolling/Turning to Right with moderate assistance and with side rail  · Patient completed Scooting/Bridging with maximal assistance and 2 persons  · Patient completed Supine to Sit with maximal assistance  · Patient completed Sit to Supine with maximal assistance and 2 persons     Functional Mobility/Transfers:  · Patient completed Sit <> Stand Transfer with dependence  with  2 person assist; x3 trials from EOB    · Unable to achieve thoracic extension and upright posture   · Lateral scooting to L x3 trials with total(A) x2 persons    Activities of Daily Living:  · Feeding:  NPO; PEG    · Grooming: minimum assistance with setup; seated EOB to wash face with L UE. facilitation for R UE weight bearing  · Upper Body Dressing: total assistance lynda technique to doff and don gown from EOB  · Toileting: dependence brief    [facility] 6 Click ADL: 8    Treatment & Education:  -Pt alert with eyes open 100% of session; re edu on therapy role in care; provided pt with verbal daily orientation; pt following minimal simple step verbal commands  -light turned on and blinds opened for session   -EOB ~20 min duration with CGA-mod(A) for postural control; increased assistance with > L attention and fatigue  *requiring mod cues and added time for R side attention   *prolonged stretch and facilitation for R UE in extended arm weight bearing with R wrist extension-- completed L UE reaching task x4 reps for crossing midline and increased weight shift  *facilitation for pronation and supination in weight bearing   *facilitation and cues for midline orientation with  neutral cervical spine; able to self assist ~10 sec duration before fatigue  -Communication board updated; no family at bedside for education   -completed pressure relief as stated on chart; R UE in elevation and extension with abduction and open palm    Patient left right sidelying with all lines intact, call button in reach, bed alarm on and PCT notifiedEducation:      GOALS:   Multidisciplinary Problems     Occupational Therapy Goals        Problem: Occupational Therapy Goal    Goal Priority Disciplines Outcome Interventions   Occupational Therapy Goal     OT, PT/OT Ongoing, Progressing    Description:  Goals to be met by: 2/4 (revision completed 1/28)    Patient will increase functional independence with ADLs by performing:    UE Dressing while seated EOB with Moderate Assistance.  Grooming while EOB with Moderate Assistance. Met  *revised: with set up and SBA for oral care.  Sitting at edge of bed x10 minutes with Moderate Assistance. Met  *revised: CGA for 15 min for dynamic, functional task.  Rolling to Bilateral with Moderate Assistance and use of bedrail as needed.   Supine to sit with Moderate Assistance.  Squat pivot transfers with Maximum Assistance.   Pt will consistently be found positioned according to turning schedule to prevent skin breakdown.   Pt will have skin breakdown measures in place upon entry for 2 therapy sessions. (waffle mattress, heel protector boot, turning schedule).                        Time Tracking:     OT Date of Treatment: 01/28/20  OT Start Time: 1029  OT Stop Time: 1101  OT Total Time (min): 32 min    Billable Minutes:Self Care/Home Management 10  Neuromuscular Re-education 12    KATELYN Ivey  1/28/2020

## 2020-01-28 NOTE — PLAN OF CARE
POC reviewed with patient this shift. Alert but remains aphasic.  Does minimally follow some commands but speech incomprehensible.  Respirations  unlabored.  Skin w/d.  Incontinent of b/b.  Rach-care provided by staff.  Turned/repositioned Q2H/PRN for relief.  Peg-tube remains intact with feeding continued as ordered.  CBG monitoring continues as ordered with SS insulin administered as needed.  No s/s of hypo/hyperglycemia noted.  VSS.  See flowsheet for full assessment.  No s/s of pain or discomfort at this time.  WCTM.

## 2020-01-28 NOTE — SUBJECTIVE & OBJECTIVE
Neurologic Chief Complaint: Right sided weakness    Subjective:     Interval History: Patient is seen for follow-up neurological assessment and treatment recommendations: working well with therapy.  Waiting for placement to SNF.    HPI, Past Medical, Family, and Social History remains the same as documented in the initial encounter.     Review of Systems   Constitutional: Negative for chills and fever.   HENT: Positive for trouble swallowing. Negative for drooling.    Eyes: Negative for visual disturbance.   Respiratory: Negative for cough and shortness of breath.    Gastrointestinal: Negative for constipation and vomiting.   Musculoskeletal: Positive for arthralgias and gait problem.   Neurological: Positive for facial asymmetry, speech difficulty and weakness.   Psychiatric/Behavioral: Positive for confusion and decreased concentration.     Scheduled Meds:   albuterol-ipratropium  3 mL Nebulization Q6H WAKE    apixaban  5 mg Per G Tube BID    atorvastatin  40 mg Per NG tube Daily    barium  450 mL Per NG tube Once    levothyroxine  75 mcg Per NG tube Before breakfast    metoprolol tartrate  25 mg Per NG tube BID    polyethylene glycol  17 g Per G Tube Daily    senna-docusate 8.6-50 mg  1 tablet Per NG tube BID     Continuous Infusions:    PRN Meds:acetaminophen, Dextrose 10% Bolus, glucagon (human recombinant), glucose, glucose, insulin aspart U-100, ondansetron, QUEtiapine, sodium chloride 0.9%    Objective:     Vital Signs (Most Recent):  Temp: 98 °F (36.7 °C) (01/28/20 1605)  Pulse: 83 (01/28/20 1605)  Resp: 16 (01/28/20 1605)  BP: (!) 112/58 (01/28/20 1605)  SpO2: (!) 92 % (01/28/20 1605)  BP Location: Left arm    Vital Signs Range (Last 24H):  Temp:  [97.5 °F (36.4 °C)-98.4 °F (36.9 °C)]   Pulse:  []   Resp:  [16-20]   BP: (108-134)/(58-86)   SpO2:  [92 %-99 %]   BP Location: Left arm    Physical Exam   Constitutional: He appears well-developed and well-nourished.   HENT:   Head: Normocephalic  and atraumatic.   Eyes: Pupils are equal, round, and reactive to light.   Left gaze preference    Neck: Normal range of motion.   Cardiovascular: Normal rate.   Pulmonary/Chest: No tachypnea. No respiratory distress.   Abdominal: Soft.   Skin: Skin is warm and dry.   Nursing note and vitals reviewed.      Neurological Exam:   LOC: alert  Attention Span: poor  Language: Expressive aphasia, Receptive aphasia  Articulation: Dysarthria  Orientation: Untestable due to severe aphasia   Visual Fields: Full  EOM (CN III, IV, VI): Gaze preference  left  Pupils (CN II, III): PERRL  Facial Movement (CN VII): Lower facial weakness on the Right  Motor: Arm left  Paresis: 4/5  Leg left  Paresis: 4/5  Arm right  Plegia 0/5  Leg right Paresis: 1/5  Cebellar: No evidence of appendicular or axial ataxia  Tone: Flaccid  RUE    Laboratory:  CMP:   No results for input(s): GLUCOSE, CALCIUM, ALBUMIN, PROT, NA, K, CO2, CL, BUN, CREATININE, ALKPHOS, ALT, AST, BILITOT in the last 24 hours.  CBC:   Recent Labs   Lab 01/28/20 0421   WBC 13.55*   RBC 4.44*   HGB 15.0   HCT 46.2   *   *   MCH 33.8*   MCHC 32.5     Lipid Panel:   No results for input(s): CHOL, LDLCALC, HDL, TRIG in the last 168 hours.  Coagulation:   Recent Labs   Lab 01/28/20 0421   INR 1.2     Hgb A1C:   No results for input(s): HGBA1C in the last 168 hours.  TSH:   No results for input(s): TSH in the last 168 hours.    Diagnostic Results     Brain Imaging   CT Head without 1/15 (07:43)  Evolving moderate-sized region of acute infarction in the left MCA territory as above.  Distribution grossly stable from recent MRI without significant infarct extension.  No hemorrhagic conversion or new territorial infarct.     MRI Brain 1/14 (04:28)  There is interval change, there is interval development of acute ischemia/infarct along the left MCA distribution, this is superimposed on subacute ischemic change seen on the recent MRI examination  Findings concerning for  occlusion of the M2 segment of the left MCA as above.    Additional chronic changes are noted.    Vessel Imaging   CTA Head and Neck 1/14 (02:43)  As on the recent brain MRA examination there is appearance of attenuation of the left MCA branch vessels without evidence for high-grade stenosis or occlusion and otherwise there is no evidence for high-grade stenosis or occlusion of the major arterial vascular structures of the head or neck.    There is no evidence for intracranial aneurysm or AVM.    Findings consistent with acute to subacute left parietal ischemic change again noted.    Small thyroid nodules or cysts.    Cardiac Imaging  TTE 1/11  · Normal left ventricular systolic function. The estimated ejection fraction is 55%.  · Mid anteroseptal and apical moderate hypokinesia.  · Diastolic pattern consistent with atrial fibrillation observed.  · Severe left atrial enlargement.  · Normal right ventricular systolic function.  · Severe right atrial enlargement.  · Mild-to-moderate mitral regurgitation.  · Mild tricuspid regurgitation.  · The estimated PA systolic pressure is 40 mmHg.  Intermediate central venous pressure (8 mmHg).

## 2020-01-28 NOTE — PLAN OF CARE
01/28/20 1202   Post-Acute Status   Post-Acute Authorization Placement   Post-Acute Placement Status Referrals Sent       Referrals sent to:    [facility]:  [facility]:  [facility]:    Awaiting acceptance and auth.  SW in contact with CM and Medical staff. Will continue to follow and offer support as needed.     Quentin Snider, CE  [facility]   Ext. [phone]

## 2020-01-28 NOTE — PROGRESS NOTES
PT 6th Visit    PT/PTA met face to face to discuss patient's treatment plan and progress towards established goals.  Treatment will be continued as described in initial report/eval and progress notes.  Patient will be seen by physical therapist every sixth visit and minimally once per month. See progress note dated 1/28 for details.    Liz Ricardo, PT, DPT  1/28/2020  [phone]  '

## 2020-01-29 PROBLEM — R09.02 OXYGEN DESATURATION: Status: RESOLVED | Noted: 2020-01-26 | Resolved: 2020-01-29

## 2020-01-29 LAB
BASOPHILS # BLD AUTO: 0.12 K/UL (ref 0–0.2)
BASOPHILS NFR BLD: 0.9 % (ref 0–1.9)
DIFFERENTIAL METHOD: ABNORMAL
EOSINOPHIL # BLD AUTO: 0.9 K/UL (ref 0–0.5)
EOSINOPHIL NFR BLD: 7.2 % (ref 0–8)
ERYTHROCYTE [DISTWIDTH] IN BLOOD BY AUTOMATED COUNT: 15.6 % (ref 11.5–14.5)
HCT VFR BLD AUTO: 41.6 % (ref 40–54)
HGB BLD-MCNC: 13.7 G/DL (ref 14–18)
IMM GRANULOCYTES # BLD AUTO: 0.24 K/UL (ref 0–0.04)
IMM GRANULOCYTES NFR BLD AUTO: 1.9 % (ref 0–0.5)
INR PPP: 1.3 (ref 0.8–1.2)
LYMPHOCYTES # BLD AUTO: 1.2 K/UL (ref 1–4.8)
LYMPHOCYTES NFR BLD: 9.4 % (ref 18–48)
MCH RBC QN AUTO: 34.8 PG (ref 27–31)
MCHC RBC AUTO-ENTMCNC: 32.9 G/DL (ref 32–36)
MCV RBC AUTO: 106 FL (ref 82–98)
MONOCYTES # BLD AUTO: 0.9 K/UL (ref 0.3–1)
MONOCYTES NFR BLD: 6.9 % (ref 4–15)
NEUTROPHILS # BLD AUTO: 9.5 K/UL (ref 1.8–7.7)
NEUTROPHILS NFR BLD: 73.7 % (ref 38–73)
NRBC BLD-RTO: 0 /100 WBC
PLATELET # BLD AUTO: 126 K/UL (ref 150–350)
PMV BLD AUTO: 11.6 FL (ref 9.2–12.9)
POCT GLUCOSE: 113 MG/DL (ref 70–110)
POCT GLUCOSE: 185 MG/DL (ref 70–110)
POCT GLUCOSE: 188 MG/DL (ref 70–110)
POCT GLUCOSE: 208 MG/DL (ref 70–110)
POCT GLUCOSE: 213 MG/DL (ref 70–110)
PROTHROMBIN TIME: 12.7 SEC (ref 9–12.5)
RBC # BLD AUTO: 3.94 M/UL (ref 4.6–6.2)
WBC # BLD AUTO: 12.82 K/UL (ref 3.9–12.7)

## 2020-01-29 PROCEDURE — 97112 NEUROMUSCULAR REEDUCATION: CPT

## 2020-01-29 PROCEDURE — 25000242 PHARM REV CODE 250 ALT 637 W/ HCPCS: Performed by: NURSE PRACTITIONER

## 2020-01-29 PROCEDURE — 97112 NEUROMUSCULAR REEDUCATION: CPT | Mod: CQ

## 2020-01-29 PROCEDURE — 97116 GAIT TRAINING THERAPY: CPT | Mod: CQ

## 2020-01-29 PROCEDURE — 99233 PR SUBSEQUENT HOSPITAL CARE,LEVL III: ICD-10-PCS | Mod: ,,, | Performed by: PSYCHIATRY & NEUROLOGY

## 2020-01-29 PROCEDURE — 92507 TX SP LANG VOICE COMM INDIV: CPT

## 2020-01-29 PROCEDURE — 97803 MED NUTRITION INDIV SUBSEQ: CPT

## 2020-01-29 PROCEDURE — 94761 N-INVAS EAR/PLS OXIMETRY MLT: CPT

## 2020-01-29 PROCEDURE — 94640 AIRWAY INHALATION TREATMENT: CPT

## 2020-01-29 PROCEDURE — 85610 PROTHROMBIN TIME: CPT

## 2020-01-29 PROCEDURE — 97535 SELF CARE MNGMENT TRAINING: CPT

## 2020-01-29 PROCEDURE — 36415 COLL VENOUS BLD VENIPUNCTURE: CPT

## 2020-01-29 PROCEDURE — 85025 COMPLETE CBC W/AUTO DIFF WBC: CPT

## 2020-01-29 PROCEDURE — 99900035 HC TECH TIME PER 15 MIN (STAT)

## 2020-01-29 PROCEDURE — 92526 ORAL FUNCTION THERAPY: CPT

## 2020-01-29 PROCEDURE — 25000003 PHARM REV CODE 250: Performed by: NURSE PRACTITIONER

## 2020-01-29 PROCEDURE — 11000001 HC ACUTE MED/SURG PRIVATE ROOM

## 2020-01-29 PROCEDURE — 99233 SBSQ HOSP IP/OBS HIGH 50: CPT | Mod: ,,, | Performed by: PSYCHIATRY & NEUROLOGY

## 2020-01-29 RX ADMIN — INSULIN ASPART 4 UNITS: 100 INJECTION, SOLUTION INTRAVENOUS; SUBCUTANEOUS at 06:01

## 2020-01-29 RX ADMIN — IPRATROPIUM BROMIDE AND ALBUTEROL SULFATE 3 ML: .5; 3 SOLUTION RESPIRATORY (INHALATION) at 08:01

## 2020-01-29 RX ADMIN — IPRATROPIUM BROMIDE AND ALBUTEROL SULFATE 3 ML: .5; 3 SOLUTION RESPIRATORY (INHALATION) at 07:01

## 2020-01-29 RX ADMIN — APIXABAN 5 MG: 5 TABLET, FILM COATED ORAL at 09:01

## 2020-01-29 RX ADMIN — METOPROLOL TARTRATE 25 MG: 25 TABLET ORAL at 08:01

## 2020-01-29 RX ADMIN — APIXABAN 5 MG: 5 TABLET, FILM COATED ORAL at 08:01

## 2020-01-29 RX ADMIN — LEVOTHYROXINE SODIUM 75 MCG: 25 TABLET ORAL at 06:01

## 2020-01-29 RX ADMIN — INSULIN ASPART 2 UNITS: 100 INJECTION, SOLUTION INTRAVENOUS; SUBCUTANEOUS at 12:01

## 2020-01-29 RX ADMIN — INSULIN ASPART 2 UNITS: 100 INJECTION, SOLUTION INTRAVENOUS; SUBCUTANEOUS at 06:01

## 2020-01-29 RX ADMIN — ATORVASTATIN CALCIUM 40 MG: 20 TABLET, FILM COATED ORAL at 09:01

## 2020-01-29 RX ADMIN — METOPROLOL TARTRATE 25 MG: 25 TABLET ORAL at 09:01

## 2020-01-29 RX ADMIN — INSULIN ASPART 2 UNITS: 100 INJECTION, SOLUTION INTRAVENOUS; SUBCUTANEOUS at 08:01

## 2020-01-29 RX ADMIN — IPRATROPIUM BROMIDE AND ALBUTEROL SULFATE 3 ML: .5; 3 SOLUTION RESPIRATORY (INHALATION) at 01:01

## 2020-01-29 NOTE — SUBJECTIVE & OBJECTIVE
Neurologic Chief Complaint: Right sided weakness    Subjective:     Interval History: Patient is seen for follow-up neurological assessment and treatment recommendations: NAEON     HPI, Past Medical, Family, and Social History remains the same as documented in the initial encounter.     Review of Systems   Constitutional: Negative for chills and fever.   HENT: Positive for trouble swallowing. Negative for drooling.    Eyes: Negative for visual disturbance.   Respiratory: Negative for cough and shortness of breath.    Gastrointestinal: Negative for constipation and vomiting.   Musculoskeletal: Positive for arthralgias and gait problem.   Neurological: Positive for facial asymmetry, speech difficulty and weakness.   Psychiatric/Behavioral: Positive for confusion and decreased concentration.     Scheduled Meds:   albuterol-ipratropium  3 mL Nebulization Q6H WAKE    apixaban  5 mg Per G Tube BID    atorvastatin  40 mg Per NG tube Daily    barium  450 mL Per NG tube Once    levothyroxine  75 mcg Per NG tube Before breakfast    metoprolol tartrate  25 mg Per NG tube BID    polyethylene glycol  17 g Per G Tube Daily    senna-docusate 8.6-50 mg  1 tablet Per NG tube BID     Continuous Infusions:    PRN Meds:acetaminophen, Dextrose 10% Bolus, glucagon (human recombinant), glucose, glucose, insulin aspart U-100, ondansetron, QUEtiapine, sodium chloride 0.9%    Objective:     Vital Signs (Most Recent):  Temp: 97.5 °F (36.4 °C) (01/29/20 1212)  Pulse: 80 (01/29/20 1326)  Resp: 20 (01/29/20 1326)  BP: 126/76 (01/29/20 1212)  SpO2: 95 % (01/29/20 1326)  BP Location: Left arm    Vital Signs Range (Last 24H):  Temp:  [97.5 °F (36.4 °C)-98 °F (36.7 °C)]   Pulse:  []   Resp:  [16-22]   BP: (111-140)/(58-76)   SpO2:  [92 %-97 %]   BP Location: Left arm    Physical Exam   Constitutional: He appears well-developed and well-nourished.   HENT:   Head: Normocephalic and atraumatic.   Eyes: Pupils are equal, round, and  reactive to light.   Left gaze preference    Neck: Normal range of motion.   Cardiovascular: Normal rate.   Pulmonary/Chest: No tachypnea. No respiratory distress.   Abdominal: Soft.   Skin: Skin is warm and dry.   Nursing note and vitals reviewed.      Neurological Exam:   LOC: alert  Attention Span: poor  Language: Expressive aphasia, Receptive aphasia  Articulation: Dysarthria  Orientation: Untestable due to severe aphasia   Visual Fields: Full  EOM (CN III, IV, VI): Gaze preference  left  Pupils (CN II, III): PERRL  Facial Movement (CN VII): Lower facial weakness on the Right  Motor: Arm left  Paresis: 4/5  Leg left  Paresis: 4/5  Arm right  Plegia 0/5  Leg right Paresis: 1/5  Cebellar: No evidence of appendicular or axial ataxia  Tone: Flaccid  RUE    Laboratory:  CMP:   No results for input(s): GLUCOSE, CALCIUM, ALBUMIN, PROT, NA, K, CO2, CL, BUN, CREATININE, ALKPHOS, ALT, AST, BILITOT in the last 24 hours.  CBC:   Recent Labs   Lab 01/29/20  0626   WBC 12.82*   RBC 3.94*   HGB 13.7*   HCT 41.6   *   *   MCH 34.8*   MCHC 32.9     Lipid Panel:   No results for input(s): CHOL, LDLCALC, HDL, TRIG in the last 168 hours.  Coagulation:   Recent Labs   Lab 01/29/20  0626   INR 1.3*     Hgb A1C:   No results for input(s): HGBA1C in the last 168 hours.  TSH:   No results for input(s): TSH in the last 168 hours.    Diagnostic Results     Brain Imaging   CT Head without 1/15 (07:43)  Evolving moderate-sized region of acute infarction in the left MCA territory as above.  Distribution grossly stable from recent MRI without significant infarct extension.  No hemorrhagic conversion or new territorial infarct.     MRI Brain 1/14 (04:28)  There is interval change, there is interval development of acute ischemia/infarct along the left MCA distribution, this is superimposed on subacute ischemic change seen on the recent MRI examination  Findings concerning for occlusion of the M2 segment of the left MCA as  above.    Additional chronic changes are noted.    Vessel Imaging   CTA Head and Neck 1/14 (02:43)  As on the recent brain MRA examination there is appearance of attenuation of the left MCA branch vessels without evidence for high-grade stenosis or occlusion and otherwise there is no evidence for high-grade stenosis or occlusion of the major arterial vascular structures of the head or neck.    There is no evidence for intracranial aneurysm or AVM.    Findings consistent with acute to subacute left parietal ischemic change again noted.    Small thyroid nodules or cysts.    Cardiac Imaging  TTE 1/11  · Normal left ventricular systolic function. The estimated ejection fraction is 55%.  · Mid anteroseptal and apical moderate hypokinesia.  · Diastolic pattern consistent with atrial fibrillation observed.  · Severe left atrial enlargement.  · Normal right ventricular systolic function.  · Severe right atrial enlargement.  · Mild-to-moderate mitral regurgitation.  · Mild tricuspid regurgitation.  · The estimated PA systolic pressure is 40 mmHg.  Intermediate central venous pressure (8 mmHg).

## 2020-01-29 NOTE — PT/OT/SLP PROGRESS
Physical Therapy Treatment    Patient Name:  Chirag Thompson   MRN:  [id number]  Admitting Diagnosis: Embolic stroke involving left middle cerebral artery  Recent Surgery: Procedure(s) (LRB):  EGD, WITH PEG TUBE INSERTION (N/A) 6 Days Post-Op    Recommendations:     Discharge Recommendations:  nursing facility, skilled   Discharge Equipment Recommendations: bedside commode, wheelchair   Barriers to discharge: Decreased caregiver support  Pt requiring skilled assistance at current time.     Plan:     During this hospitalization, patient to be seen 4 x/week to address the above listed problems via gait training, therapeutic activities, therapeutic exercises, neuromuscular re-education  · Plan of Care Expires:  02/14/20   Plan of Care Reviewed with: patient    This Plan of care has been discussed with the patient who was involved in its development and understands and is in agreement with the identified goals and treatment plan    Subjective     Communicated with RN (Kavya) prior to session.     Patient comments: Pt non-verbal during session, he communicates via head nods and facial expressions  Pain/Comfort:  · Pain Rating 1: 0/10(pt in NAD)  · Pain Rating Post-Intervention 1: 0/10(pt in NAD)    Objective:     Patient found with: bed alarm, PEG Tube, pressure relief boots, telemetry(waffle pad, abdominal binder)    Patient found sup in bed upon PT entry to room, agreeable to treatment.  No family present in the room.    General Precautions: Standard, fall   Orthopedic Precautions:N/A   Braces: N/A       BED MOBILITY (vc's for hand placement sequencing of task):        Rolling to the R:  Mod/max A.       Rolling to the L:  Max A.        Sup > sit at the EOB:  Max A for trunk elevation and LE's.       Sit > sup:  Mod A for trunk and LE's.       Scooting hips to EOB with mod/max A       Scooting hips along the EOB to the L requiring mod/max A x2-3 scoots                      SITTING AT THE EDGE OF THE BED (14  min)   Assistance Level Required: initially with mod A then min A for trunk/head control and R UE with L UE support   Postural deviations noted: flexed trunk, PPT, rounded shoulders, decreased attention to the R   Encouraged: upright posture, midline orientation, APT, B feet in contact with the floor, R UE in weight bearing position        TRANSFERS  (vc's for hand placement, sequencing of task and safety)   Patient completed Sit <> Stand Transfer from EOB with max A of 2 (tech assists with R UE) for hip elevation, R knee ext, balance and R UE support with no assistive device x2 trial(s).  Pt unable achieve full erect posture, however, hips cleared the EOB   Patient completed Stand <> Sit Transfer to EOB with max A of 2 for controlled descent with no assistive device     THERAPEUTIC ACTIVITIES/EXERCISES  Pt receives PROM to R LE sup in bed (ankle DF, hip flex, hip add/abd, hip IR/ER)  x10-12 reps     EDUCATION  Education provided to pt regarding: postural control, weight shift, attention to the R.    They were provided and educated on proper positioning in supine and in sitting with support of affected R UE in order to increase awareness of it and to decrease the effects of immobility, specifically edema and pain.     Whiteboard updated with correct mobility information. RN/PCT notified.  Pt requires max A of 2 to sit at the EOB.    Patient left sup in bed with R UE supported on pillow, with  all lines intact, call button in reach, bed alarm on and RN notified    AM-PAC 6 CLICK MOBILITY  Turning over in bed (including adjusting bedclothes, sheets and blankets)?: 2  Sitting down on and standing up from a chair with arms (e.g., wheelchair, bedside commode, etc.): 2  Moving from lying on back to sitting on the side of the bed?: 2  Moving to and from a bed to a chair (including a wheelchair)?: 1  Need to walk in hospital room?: 1  Climbing 3-5 steps with a railing?: 1  Basic Mobility Total Score: 9     Assessment:      Chirag Thompson is a 79 y.o. male admitted with a medical diagnosis of Embolic stroke involving left middle cerebral artery.  He presents with the following impairments/functional limitations:  weakness, impaired endurance, impaired sensation, impaired self care skills, impaired functional mobilty, gait instability, impaired balance, impaired cognition, decreased coordination, decreased upper extremity function, decreased lower extremity function, decreased safety awareness, abnormal tone, decreased ROM, impaired coordination, impaired fine motor, impaired skin, edema, impaired cardiopulmonary response to activity. R hemiparesis requiring significant assistance and verbal cues for bed mob, scooting to/along the EOB, sit < > stand, static/dynamic sitting, standing 2* weakness, R post lean, decreased attention to the R, fatigue.   In light of pt's current functional level and deficits, it is anticipated that pt will need to participate in an intense rehab program consisting of PT, OT and ST in order to achieve full rehab potential to return to previous level of function and roles.  Pt remains motivated to participate in PT session and will cont to benefit from skilled PT intervention.      Rehab Prognosis:  Fair to Good; patient would benefit from acute skilled PT services to address these deficits and reach maximum level of function.      GOALS:   Multidisciplinary Problems     Physical Therapy Goals        Problem: Physical Therapy Goal    Goal Priority Disciplines Outcome Goal Variances Interventions   Physical Therapy Goal     PT, PT/OT Ongoing, Progressing     Description:  Goals to be met by: 2020     Patient will increase functional independence with mobility by performin. Supine to sit with Moderate Assistance - not met  2. Sit to supine with Moderate Assistance - not met   3. Sit to stand transfer with Moderate Assistance - not met  4. Bed to chair transfer with Moderate Assistance using LRAD -  not met  5. Gait  x 15 feet with Moderate Assistance using LRAD. - not met  6. Sitting at edge of bed x10 minutes with Contact Guard Assistance - not met  7. Stand for 1 minute with Contact Guard Assistance using LRAD - not met  8. Lower extremity exercise program x15 reps per handout, with assistance as needed - not met                       Time Tracking:     PT Received On: 01/29/20  PT Start Time: 1137     PT Stop Time: 1208  PT Total Time (min): 31 min     Billable Minutes: Therapeutic Activity 17 and Neuromuscular Re-education 14    Treatment Type: Treatment  PT/PTA: PTA     PTA Visit Number: 1       Gisselle Truong PTA.  Pager [phone]    1/29/2020    .

## 2020-01-29 NOTE — PLAN OF CARE
Problem: SLP Goal  Goal: SLP Goal  Description  Goals remain appropriate.  To be met 2/4  1.  Pt. Will participate in ongoing assessment of swallow at bedside  2.  Pt. Will participate in speech language eval /met  3.  Model simple commands with 100% accuracy  4.  Respond to simple yes/no questions with 70% accuracy  5.  Complete single words on au tomatic speech tasks with 50% accuracy        Outcome: Ongoing, Progressing

## 2020-01-29 NOTE — PLAN OF CARE
01/29/20 1601   Discharge Reassessment   Assessment Type Discharge Planning Assessment   Provided patient/caregiver education on the expected discharge date and the discharge plan Yes   Do you have any problems affording any of your prescribed medications? No   Discharge Plan A Rehab   Discharge Plan B Skilled Nursing Facility   DME Needed Upon Discharge  other (see comments)  (tbd)   Patient choice form signed by patient/caregiver N/A   Anticipated Discharge Disposition SNF   Can the patient answer the patient profile reliably? No, cognitively impaired   Describe the patient's ability to walk at the present time. Does not walk or unable to take any steps at all   Post-Acute Status   Post-Acute Authorization Placement   Post-Acute Placement Status Pending Payor Review   Discharge Delays None known at this time     Loni Thorne RN  Case Management  Ext: [phone]  01/29/2020  4:03 PM

## 2020-01-29 NOTE — ASSESSMENT & PLAN NOTE
78 yo man with history of HTN, a fib (on Coumadin) and hypothyroidism. Patient has been admitted to [facility] for left temporal partieal infarct on 1/10. On 1/14 patient with worsening of deficits: aphasia, right side plegia and not following commands. Telestroke consult was completed by Dr. Olivares, no tpa given as INR was 2.0. Patient transferred to [facility] ED for stat CTA stroke MP.  Positive for LVO, L M1 occlusion. Patient not candidate for intervention d/t large core infarct. Patient was admitted to [facility] for close monitoring.  Coumadin held d/t size of stroke. PEG placed 1/23,. Eliquis and TF started on 1/24.          Antithrombotics: Eliquis 5mg BID     Statins: Lipitor 40 mg daily    Aggressive risk factor modification: A-Fib, HTN     Rehab efforts: The patient has been evaluated by a stroke team provider and the therapy needs have been fully considered based off the presenting complaints and exam findings. The following therapy evaluations are needed: PT evaluate and treat, OT evaluate and treat, SLP evaluate and treat, PM&R evaluate for appropriate placement - SNF    Diagnostics ordered/pending: None     VTE prophylaxis: Eliquis, multiple skin tears unable to place SCDs     BP parameters: Infarct: No intervention, SBP <160

## 2020-01-29 NOTE — PLAN OF CARE
Goals remain appropriate. KATELYN Ivey 1/29/2020   Problem: Occupational Therapy Goal  Goal: Occupational Therapy Goal  Description  Goals to be met by: 2/4 (revision completed 1/28)    Patient will increase functional independence with ADLs by performing:    UE Dressing while seated EOB with Moderate Assistance.  Grooming while EOB with Moderate Assistance. Met  *revised: with set up and SBA for oral care.  Sitting at edge of bed x10 minutes with Moderate Assistance. Met  *revised: CGA for 15 min for dynamic, functional task.  Rolling to Bilateral with Moderate Assistance and use of bedrail as needed.   Supine to sit with Moderate Assistance.  Squat pivot transfers with Maximum Assistance.   Pt will consistently be found positioned according to turning schedule to prevent skin breakdown.   Pt will have skin breakdown measures in place upon entry for 2 therapy sessions. (waffle mattress, heel protector boot, turning schedule).       Outcome: Ongoing, Progressing

## 2020-01-29 NOTE — PROGRESS NOTES
"[facility]  Adult Nutrition  Progress Note    SUMMARY       Recommendations     1. Continue TF with Isosource 1.5 @60ml/hr (provides 2160 kcal, 67g pro, 748ml free water). Additional 250ml water flush Q6hrs or per MD. Hold for residuals >500ml.  Goals: 1. Pt's intake to meet % EEN and EPN x 7 days.  Nutrition Goal Status: progressing towards goal  Communication of RD Recs: other (comment)    Reason for Assessment    Reason For Assessment: RD follow-up  Diagnosis: stroke/CVA  Relevant Medical History: a fib, HTN, T2DM  Interdisciplinary Rounds: attended  General Information Comments: Pt s/p PEG , tolerating TF well at goal. Weight is stable, but pt had several days with little to no nutrition r/t need for PEG.   Repeated NFPE: pt has additional muscle wasting and has mild malnutrition, is at risk for moderate malnutrition.  Nutrition Discharge Planning: Pt to tolerate TF at goal rate: Isosource 1.5 @60ml/hr.    Nutrition Risk Screen    Nutrition Risk Screen: tube feeding or parenteral nutrition    Nutrition/Diet History    Patient Reported Diet/Restrictions/Preferences: soft  Spiritual, Cultural Beliefs, Sabianist Practices, Values that Affect Care: no  Factors Affecting Nutritional Intake: NPO    Anthropometrics    Temp: 97.5 °F (36.4 °C)  Height Method: Estimated  Height: 5' 10" (177.8 cm)  Height (inches): 70 in  Weight Method: Bed Scale  Weight: 98.4 kg (216 lb 14.9 oz)  Weight (lb): 216.93 lb  Ideal Body Weight (IBW), Male: 166 lb  % Ideal Body Weight, Male (lb): 129.09 %  BMI (Calculated): 31.1  BMI Grade: 30 - 34.9- obesity - grade I  Usual Body Weight (UBW), k.7 kg  % Usual Body Weight: 94.84  % Weight Change From Usual Weight: -5.36 %       Lab/Procedures/Meds    Pertinent Labs Reviewed: reviewed  Pertinent Labs Comments: Na 147, Cl 112, BUN 61, Glu 185, T. Bili 1.3, Alb 2.6  Pertinent Medications Reviewed: reviewed  Pertinent Medications Comments: statin, " levothyroxine    Physical Findings/Assessment         Estimated/Assessed Needs    Weight Used For Calorie Calculations: 99.5 kg (219 lb 5.7 oz)  Energy Calorie Requirements (kcal): 2224  Energy Need Method: [place]-St Jeor  Protein Requirements:  g  Weight Used For Protein Calculations: 99.5 kg (219 lb 5.7 oz)  Fluid Requirements (mL): 1mL/kcal or per MD  Estimated Fluid Requirement Method: RDA Method  RDA Method (mL): 2224  CHO Requirement: 370      Nutrition Prescription Ordered    Current Diet Order: NPO  Current Nutrition Support Formula Ordered: Isosource 1.5  Current Nutrition Support Rate Ordered: 60 (ml)  Current Nutrition Support Frequency Ordered: mL/hr     Evaluation of Received Nutrient/Fluid Intake    Enteral Calories (kcal): 2160  Enteral Protein (gm): 98  Enteral (Free Water) Fluid (mL): 1100  % Kcal Needs: 102  % Protein Needs: 100  IV Fluid (mL): 0  I/O: +5.5L since admission  Energy Calories Required: meeting needs  Protein Required: meeting needs  Fluid Required: not meeting needs  Comments: LBM 1/29  % Intake of Estimated Energy Needs: 75 - 100 %  % Meal Intake: NPO    Nutrition Risk    Level of Risk/Frequency of Follow-up: low(1xweek)     Assessment and Plan    Nutrition Problem  Increased energy needs    Related to (etiology):   Mild malnutrition    Signs and Symptoms (as evidenced by):   Pt with mild to moderate muscle wasting and fat depletion    Interventions(treatment strategy):  Collaboration of care with providers.  Enteral nutrition: Isosource 1.5 @60ml/hr x 24 hours    Nutrition Diagnosis Status:   New       Monitor and Evaluation    Food and Nutrient Intake: energy intake, enteral nutrition intake  Food and Nutrient Adminstration: enteral and parenteral nutrition administration  Anthropometric Measurements: weight, weight change, body mass index  Biochemical Data, Medical Tests and Procedures: electrolyte and renal panel, gastrointestinal profile, glucose/endocrine profile,  inflammatory profile, lipid profile  Nutrition-Focused Physical Findings: overall appearance     Malnutrition Assessment                 Orbital Region (Subcutaneous Fat Loss): mild depletion  Upper Arm Region (Subcutaneous Fat Loss): mild depletion  Thoracic and Lumbar Region: well nourished   [place] Region (Muscle Loss): moderate depletion  Clavicle Bone Region (Muscle Loss): mild depletion  Clavicle and Acromion Bone Region (Muscle Loss): well nourished  Scapular Bone Region (Muscle Loss): well nourished  Dorsal Hand (Muscle Loss): moderate depletion  Patellar Region (Muscle Loss): mild depletion  Anterior Thigh Region (Muscle Loss): mild depletion  Posterior Calf Region (Muscle Loss): mild depletion                 Nutrition Follow-Up    RD Follow-up?: Yes

## 2020-01-29 NOTE — PROGRESS NOTES
[facility]  Vascular Neurology  Comprehensive Stroke Center  Progress Note    Assessment/Plan:     * Embolic stroke involving left middle cerebral artery  80 yo man with history of HTN, a fib (on Coumadin) and hypothyroidism. Patient has been admitted to [facility] for left temporal partieal infarct on 1/10. On 1/14 patient with worsening of deficits: aphasia, right side plegia and not following commands. Telestroke consult was completed by Dr. Olivares, no tpa given as INR was 2.0. Patient transferred to [facility] ED for stat CTA stroke MP.  Positive for LVO, L M1 occlusion. Patient not candidate for intervention d/t large core infarct. Patient was admitted to [facility] for close monitoring.  Coumadin held d/t size of stroke. PEG placed 1/23,. Eliquis and TF started on 1/24.          Antithrombotics: Eliquis 5mg BID     Statins: Lipitor 40 mg daily    Aggressive risk factor modification: A-Fib, HTN     Rehab efforts: The patient has been evaluated by a stroke team provider and the therapy needs have been fully considered based off the presenting complaints and exam findings. The following therapy evaluations are needed: PT evaluate and treat, OT evaluate and treat, SLP evaluate and treat, PM&R evaluate for appropriate placement - SNF    Diagnostics ordered/pending: None     VTE prophylaxis: Eliquis, multiple skin tears unable to place SCDs     BP parameters: Infarct: No intervention, SBP <160    PEG (percutaneous endoscopic gastrostomy) adjustment/replacement/removal  PEG placed by general surgery  Patient now at goal on TF- tolerating well.     Oral phase dysphagia  Due to stroke   SLP therapy  PEG placed     Chronic anticoagulation  Previously on coumadin, transitioned to Eliquis on 1/24    Cytotoxic cerebral edema  Area of cytotoxic cerebral edema identified when reviewing brain imaging in the territory of the left middle cerebral artery. There is mass effect associated with it. We will continue to  monitor the patients clinical exam for any worsening of symptoms which may indicate expansion of the stroke or the area of the edema resulting in the clinical change. The pattern is suggestive of cardio embolic etiology.        Hypercoagulable state  Pt previously on Coumadin but subtherapeutic   Eliquis started on 1/24    Aphasia  Due to stroke  Aggressive therapy SLP    Right spastic hemiparesis  Due to stroke  Aggressive therapy with PT/OT    Atrial fibrillation  Stroke risk factor  Rate control  Repeat CT on 1/18 stable. PEG placed 1/23,  Eliquis started on 1/24         Type 2 diabetes mellitus with circulatory disorder, without long-term current use of insulin  Stroke risk factor  HgB A1C 5.1  SSI       Essential hypertension  Stroke risk factor  SBP <160  Patient at goal on current regimen            Patient admitted to neurocritical care for Left MCA syndrome, severe aphasia following extension of prior stroke.  01/15/2020: Overnight concern for extensor posturing of right arm, emergent CT this AM showing stable area of infarct, no new hemorrhage or extension.  01/16/20: No acute events overnight, neurostatus unchanged  01/17/2020: No events overnight, patient received seroquel for agitation this AM. To remain in neuro ICU for closer monitoring  01/18/2020: Patient calm and alert during today's exam. Patient to step down to stroke primary service. Breath sounds course, CXR without significant change- will continue pulmonary tolieting.   1/19/20: Patient now with leukocytosis. Repeat CBC pending. CXR with pneumonitis. Rocephin ordered.  1/20/20: CT abd/pelvis for PEG planning, scheduled for tomorrow AM with IR. Plan to restart AC (Heparin vs DOAC) post procedure  1/21/20: Issues with placing NG overnight, PEG deferred to tomorrow as pt unable to get barium. SLP re-[place]RI[place] fairchild today  1/22/20: NG pulled overnight, gen surgery consulted with plan for PEG tomorrow   1/23/20: PEG placed today by general surgery,  plan to start DOAC 24 hr after procedure.   1/24/20: Patient 24 hour s/p PEG. Eliquis started. TF started.   1/25/20: Neuro exam stable. Patient now at goal on TF- tolerating well.   1/26/20: Neuro exam stable. Patient with decreased bowel sounds and taut abdomen. KUB without obstruction. Patient LBM on 1/21, brown bomb ordered.   1/27 multiple bowel movements overnight after receiving bowel regimen.  Neutraphos added to PEG tube.  Mild leukocytosis on labs today.  CXR did not show evidence of pneumonia.  Multiple skin tears and could be the etiology of leukocytosis.  Will continue to monitor   1/28/20 working well with therapy.  Waiting for placement to SNF.        STROKE DOCUMENTATION   Acute Stroke Times   Last Known Normal Date: 01/13/20  Last Known Normal Time: 2315  Symptom Onset Date: 01/14/20  Symptom Onset Time: 0050  Stroke Team Called Date: 01/14/20  Stroke Team Called Time: 0105  Stroke Team Arrival Date: 01/14/20  Stroke Team Arrival Time: 0355  CT Interpretation Time: 0100    NIH Scale:  1a. Level of Consciousness: 0-->Alert, keenly responsive  1b. LOC Questions: 2-->Answers neither question correctly  1c. LOC Commands: 2-->Performs neither task correctly  2. Best Gaze: 1-->Partial gaze palsy, gaze is abnormal in one or both eyes, but forced deviation or total gaze paresis is not present  3. Visual: 0-->No visual loss  4. Facial Palsy: 1-->Minor paralysis (flattened nasolabial fold, asymmetry on smiling)  5a. Motor Arm, Left: 1-->Drift, limb holds 90 (or 45) degrees, but drifts down before full 10 seconds, does not hit bed or other support  5b. Motor Arm, Right: 4-->No movement  6a. Motor Leg, Left: 3-->No effort against gravity, leg falls to bed immediately  6b. Motor Leg, Right: 4-->No movement  7. Limb Ataxia: 0-->Absent  8. Sensory: 0-->Normal, no sensory loss  9. Best Language: 2-->Severe aphasia, all communication is through fragmentary expression, great need for inference, questioning, and  guessing by the listener. Range of information that can be exchanged is limited, listener carries burden of. . . (see row details)  10. Dysarthria: 2-->Severe dysarthria, patients speech is so slurred as to be unintelligible in the absence of or out of proportion to any dysphasia, or is mute/anarthric  11. Extinction and Inattention (formerly Neglect): 0-->No abnormality  Total (NIH Stroke Scale): 22       Modified [place] Score: 0  Gore Coma Scale:    ABCD2 Score:    [id number] Score:   HAS -BLED Score:   ICH Score:   Hunt & Jones Classification:      Hemorrhagic change of an Ischemic Stroke: Does this patient have an ischemic stroke with hemorrhagic changes? No     Neurologic Chief Complaint: Right sided weakness    Subjective:     Interval History: Patient is seen for follow-up neurological assessment and treatment recommendations: UNA     HPI, Past Medical, Family, and Social History remains the same as documented in the initial encounter.     Review of Systems   Constitutional: Negative for chills and fever.   HENT: Positive for trouble swallowing. Negative for drooling.    Eyes: Negative for visual disturbance.   Respiratory: Negative for cough and shortness of breath.    Gastrointestinal: Negative for constipation and vomiting.   Musculoskeletal: Positive for arthralgias and gait problem.   Neurological: Positive for facial asymmetry, speech difficulty and weakness.   Psychiatric/Behavioral: Positive for confusion and decreased concentration.     Scheduled Meds:   albuterol-ipratropium  3 mL Nebulization Q6H WAKE    apixaban  5 mg Per G Tube BID    atorvastatin  40 mg Per NG tube Daily    barium  450 mL Per NG tube Once    levothyroxine  75 mcg Per NG tube Before breakfast    metoprolol tartrate  25 mg Per NG tube BID    polyethylene glycol  17 g Per G Tube Daily    senna-docusate 8.6-50 mg  1 tablet Per NG tube BID     Continuous Infusions:    PRN Meds:acetaminophen, Dextrose 10% Bolus, glucagon  (human recombinant), glucose, glucose, insulin aspart U-100, ondansetron, QUEtiapine, sodium chloride 0.9%    Objective:     Vital Signs (Most Recent):  Temp: 97.5 °F (36.4 °C) (01/29/20 1212)  Pulse: 80 (01/29/20 1326)  Resp: 20 (01/29/20 1326)  BP: 126/76 (01/29/20 1212)  SpO2: 95 % (01/29/20 1326)  BP Location: Left arm    Vital Signs Range (Last 24H):  Temp:  [97.5 °F (36.4 °C)-98 °F (36.7 °C)]   Pulse:  []   Resp:  [16-22]   BP: (111-140)/(58-76)   SpO2:  [92 %-97 %]   BP Location: Left arm    Physical Exam   Constitutional: He appears well-developed and well-nourished.   HENT:   Head: Normocephalic and atraumatic.   Eyes: Pupils are equal, round, and reactive to light.   Left gaze preference    Neck: Normal range of motion.   Cardiovascular: Normal rate.   Pulmonary/Chest: No tachypnea. No respiratory distress.   Abdominal: Soft.   Skin: Skin is warm and dry.   Nursing note and vitals reviewed.      Neurological Exam:   LOC: alert  Attention Span: poor  Language: Expressive aphasia, Receptive aphasia  Articulation: Dysarthria  Orientation: Untestable due to severe aphasia   Visual Fields: Full  EOM (CN III, IV, VI): Gaze preference  left  Pupils (CN II, III): PERRL  Facial Movement (CN VII): Lower facial weakness on the Right  Motor: Arm left  Paresis: 4/5  Leg left  Paresis: 4/5  Arm right  Plegia 0/5  Leg right Paresis: 1/5  Cebellar: No evidence of appendicular or axial ataxia  Tone: Flaccid  RUE    Laboratory:  CMP:   No results for input(s): GLUCOSE, CALCIUM, ALBUMIN, PROT, NA, K, CO2, CL, BUN, CREATININE, ALKPHOS, ALT, AST, BILITOT in the last 24 hours.  CBC:   Recent Labs   Lab 01/29/20  0626   WBC 12.82*   RBC 3.94*   HGB 13.7*   HCT 41.6   *   *   MCH 34.8*   MCHC 32.9     Lipid Panel:   No results for input(s): CHOL, LDLCALC, HDL, TRIG in the last 168 hours.  Coagulation:   Recent Labs   Lab 01/29/20  0626   INR 1.3*     Hgb A1C:   No results for input(s): HGBA1C in the last 168  hours.  TSH:   No results for input(s): TSH in the last 168 hours.    Diagnostic Results     Brain Imaging   CT Head without 1/15 (07:43)  Evolving moderate-sized region of acute infarction in the left MCA territory as above.  Distribution grossly stable from recent MRI without significant infarct extension.  No hemorrhagic conversion or new territorial infarct.     MRI Brain 1/14 (04:28)  There is interval change, there is interval development of acute ischemia/infarct along the left MCA distribution, this is superimposed on subacute ischemic change seen on the recent MRI examination  Findings concerning for occlusion of the M2 segment of the left MCA as above.    Additional chronic changes are noted.    Vessel Imaging   CTA Head and Neck 1/14 (02:43)  As on the recent brain MRA examination there is appearance of attenuation of the left MCA branch vessels without evidence for high-grade stenosis or occlusion and otherwise there is no evidence for high-grade stenosis or occlusion of the major arterial vascular structures of the head or neck.    There is no evidence for intracranial aneurysm or AVM.    Findings consistent with acute to subacute left parietal ischemic change again noted.    Small thyroid nodules or cysts.    Cardiac Imaging  TTE 1/11  · Normal left ventricular systolic function. The estimated ejection fraction is 55%.  · Mid anteroseptal and apical moderate hypokinesia.  · Diastolic pattern consistent with atrial fibrillation observed.  · Severe left atrial enlargement.  · Normal right ventricular systolic function.  · Severe right atrial enlargement.  · Mild-to-moderate mitral regurgitation.  · Mild tricuspid regurgitation.  · The estimated PA systolic pressure is 40 mmHg.  Intermediate central venous pressure (8 mmHg).         Modesta Mejia PA-C  [facility]  Department of Vascular Neurology   [facility]

## 2020-01-29 NOTE — PLAN OF CARE
Problem: Physical Therapy Goal  Goal: Physical Therapy Goal  Description  Goals to be met by: 2020     Patient will increase functional independence with mobility by performin. Supine to sit with Moderate Assistance - not met  2. Sit to supine with Moderate Assistance - not met   3. Sit to stand transfer with Moderate Assistance - not met  4. Bed to chair transfer with Moderate Assistance using LRAD - not met  5. Gait  x 15 feet with Moderate Assistance using LRAD. - not met  6. Sitting at edge of bed x10 minutes with Contact Guard Assistance - not met  7. Stand for 1 minute with Contact Guard Assistance using LRAD - not met  8. Lower extremity exercise program x15 reps per handout, with assistance as needed - not met      Outcome: Ongoing, Progressing      Discharge Recommendations: Rehab    Pt requires max A of 2 to sit at the EOB.    Goals remain appropriate.     Gisselle Truong, PTA.   [phone]   2020

## 2020-01-29 NOTE — PLAN OF CARE
Problem: Adult Inpatient Plan of Care  Goal: Plan of Care Review  Outcome: Ongoing, Progressing   Recommendations      1. Continue TF with Isosource 1.5 @60ml/hr (provides 2160 kcal, 67g pro, 748ml free water). Additional 250ml water flush Q6hrs or per MD. Hold for residuals >500ml.  Goals: 1. Pt's intake to meet % EEN and EPN x 7 days.  Nutrition Goal Status: progressing towards goal  Communication of RD Recs: other (comment)

## 2020-01-29 NOTE — PLAN OF CARE
Problem: Adult Inpatient Plan of Care  Goal: Plan of Care Review  Outcome: Ongoing, Progressing     POC reviewed with patient this shift.  Quiet uneventful shift.  Remains alert but aphasic.  Respirations unlabored.  Skin w/d.  Continues to be incontinent of b/b.  Rach-care provided per staff.  Turned/repositioned Q2H/PRN for relief.  Peg-tube remains intact with feedings continued as ordered.  Tolerated feedings/flushes well with no residual noted.  CBG monitoring continues with insulin administered as ordered.  No s/s of hypo/hyperglycemia.  VSS.  See flowsheet for full assessment.  No s/s of pain or discomfort noted.  WCTM.

## 2020-01-29 NOTE — PT/OT/SLP PROGRESS
Speech Language Pathology Treatment    Patient Name:  Chirag Thompson   MRN:  [id number]  Admitting Diagnosis: Embolic stroke involving left middle cerebral artery    Recommendations:                 General Recommendations:  Dysphagia therapy and Speech/language therapy  Diet recommendations:  NPO, Liquid Diet Level: NPO   Aspiration Precautions: Strict aspiration precautions   General Precautions: Standard, fall  Communication strategies:  none    Subjective     No family present  Patient goals: matthieu    Pain/Comfort:  · Pain Rating 1: 0/10  · Pain Rating Post-Intervention 1: 0/10    Objective:     Has the patient been evaluated by SLP for swallowing?   Yes  Keep patient NPO? Yes   Current Respiratory Status: room air      Pt. Seen at bedside and was alert.  Receptively, he modelled 3/5 commands and yes/no response was undifferentiated/inaccurate.  No initiation of pointing, when asked to id object by named in field of 2.    Expressively, he counted to 10 with 30% accuracy given max cues.  Undifferentiated unintelligible vocalizaitons elicited when asked to complete automatic sentences.  Pt. Did produced unintelligible vocalizations when asked to sing with therapist approx 25 % of time  He did not cough/throat clear on command .  Hob was raised to 90 degree angle and pt. Was given 1/2 teaspoon of water x10 trials following oral care.  Pt. Readily removed the bolus from the spoon and initiated pharyngeal swallow without delayed with mild anterior loss of bolus on left side of mouth.  Laryngeal elevation appeared adequate to palpation.  No coughing,throat clearing elicited following the swallow however pt. Remains high risk of aspiration.          Assessment:     Chirag Thompson is a 79 y.o. male with an SLP diagnosis of Aphasia and Dysphagia.  He presents with receptive and expressive aphasia and severe dysphagia.    Goals:   Multidisciplinary Problems     SLP Goals        Problem: SLP Goal    Goal Priority Disciplines  Outcome   SLP Goal     SLP Ongoing, Progressing   Description:  Goals remain appropriate.  To be met 2/4  1.  Pt. Will participate in ongoing assessment of swallow at bedside  2.  Pt. Will participate in speech language eval /met  3.  Model simple commands with 100% accuracy  4.  Respond to simple yes/no questions with 70% accuracy  5.  Complete single words on au tomatic speech tasks with 50% accuracy                         Plan:     · Patient to be seen:  4 x/week   · Plan of Care expires:  02/12/20  · Plan of Care reviewed with:  patient   · SLP Follow-Up:  Yes       Discharge recommendations:  nursing facility, skilled   Barriers to Discharge:  Level of Skilled Assistance Needed 24 hour     Time Tracking:     SLP Treatment Date:   01/29/20  Speech Start Time:  0810  Speech Stop Time:  0826     Speech Total Time (min):  16 min    Billable Minutes: Speech Therapy Individual 8 and Treatment Swallowing Dysfunction 8    Julia Spann MA, CCC-SLP  01/29/2020

## 2020-01-29 NOTE — PT/OT/SLP PROGRESS
Occupational Therapy   Treatment    Name: Chirag Thompson  MRN: [id number]  Admitting Diagnosis:  Embolic stroke involving left middle cerebral artery  6 Days Post-Op    Recommendations:     Discharge Recommendations: nursing facility, skilled  Discharge Equipment Recommendations:  wheelchair, 3-in-1 commode  Barriers to discharge:  (level of skilled assistance required)    Assessment:     Chirag Thompson is a 79 y.o. male with a medical diagnosis of Embolic stroke involving left middle cerebral artery.  He presents with cont active engagement in OT sessions at this time. Cont to rec SNF at post acute level of care. Performance deficits affecting function are weakness, impaired endurance, impaired self care skills, impaired functional mobilty, gait instability, impaired balance, decreased upper extremity function, decreased lower extremity function, visual deficits, decreased safety awareness, impaired cognition, impaired sensation, impaired fine motor, impaired skin, impaired cardiopulmonary response to activity.     Rehab Prognosis:  Good; patient would benefit from acute skilled OT services to address these deficits and reach maximum level of function.       Plan:     Patient to be seen 3 x/week to address the above listed problems via self-care/home management, therapeutic activities, therapeutic exercises, neuromuscular re-education, cognitive retraining, wheelchair management/training  · Plan of Care Expires: 02/13/20  · Plan of Care Reviewed with: patient    Subjective     Pain/Comfort:  · Pain Rating 1: 0/10  · Pain Rating Post-Intervention 1: 0/10    Objective:     Communicated with: RN prior to session. Completed with rehab emir burnett.    Patient found HOB elevated with bed alarm, PEG Tube, telemetry, pressure relief boots(abd binder) upon OT entry to room.    General Precautions: Standard, aphasia, aspiration, fall, vision impaired, NPO   Orthopedic Precautions:N/A     Occupational Performance:     Bed  Mobility:    · Patient completed Rolling/Turning to Left with  maximal assistance and with side rail  · Patient completed Rolling/Turning to Right with total assistance and with side rail  · Patient completed Scooting/Bridging with total assistance and 2 persons  · Patient completed Supine to Sit with maximal assistance and 2 persons  · Patient completed Sit to Supine with maximal assistance and 2 persons     Functional Mobility/Transfers:  · Lateral scooting to L x3 trials with max(A) x2 persons    Activities of Daily Living:  · Feeding:  NPO; PEG    · Grooming: seated EOB with min(A) for postural control; set up for washing face with L hand. mod(A) for comb use with L hand    · Upper Body Dressing: total assistance lynda technique EOB  · Lower Body Dressing: dependence B socks EOB    [facility] 6 Click ADL: 8    Treatment & Education:  -Pt alert with eyes open 100%of session; following minimal simple step commands with visual cues  -re edu on OT role in care; provided verbal daily orientation; shade open of session   -EOB ~18 min with min-mod(A) for postural control; verbal and tactile facilitation and cues for thoracic extension and neutral cervical spine  *facilitation for R UE extended arm weight bearing; facilitation for pronation and supination with extended arm   *mod cues for R side visual attention and cervical rotation to R  *L UE reaching tasks for crossing midline and weight shift  *R forearm weight bearing x2 with return to midline with moderate assistance  -return to supine in upright position ; positioning for pressure relief via wedge; R UE in elevation and extension with abduction and open palm  -Communication board updated; no family at bedside for education       Patient left left sidelying with all lines intact, call button in reach, bed alarm on and PCT presentEducation:      GOALS:   Multidisciplinary Problems     Occupational Therapy Goals        Problem: Occupational Therapy Goal    Goal Priority  Disciplines Outcome Interventions   Occupational Therapy Goal     OT, PT/OT Ongoing, Progressing    Description:  Goals to be met by: 2/4 (revision completed 1/28)    Patient will increase functional independence with ADLs by performing:    UE Dressing while seated EOB with Moderate Assistance.  Grooming while EOB with Moderate Assistance. Met  *revised: with set up and SBA for oral care.  Sitting at edge of bed x10 minutes with Moderate Assistance. Met  *revised: CGA for 15 min for dynamic, functional task.  Rolling to Bilateral with Moderate Assistance and use of bedrail as needed.   Supine to sit with Moderate Assistance.  Squat pivot transfers with Maximum Assistance.   Pt will consistently be found positioned according to turning schedule to prevent skin breakdown.   Pt will have skin breakdown measures in place upon entry for 2 therapy sessions. (waffle mattress, heel protector boot, turning schedule).                        Time Tracking:     OT Date of Treatment: 01/29/20  OT Start Time: 0832  OT Stop Time: 0900  OT Total Time (min): 28 min    Billable Minutes:Self Care/Home Management 10  Neuromuscular Re-education 15    KATELYN Ivey  1/29/2020

## 2020-01-30 VITALS
TEMPERATURE: 99 F | OXYGEN SATURATION: 93 % | WEIGHT: 216.94 LBS | DIASTOLIC BLOOD PRESSURE: 64 MMHG | SYSTOLIC BLOOD PRESSURE: 111 MMHG | BODY MASS INDEX: 31.06 KG/M2 | HEART RATE: 109 BPM | HEIGHT: 70 IN | RESPIRATION RATE: 18 BRPM

## 2020-01-30 PROBLEM — R23.9 ALTERATION IN SKIN INTEGRITY: Status: ACTIVE | Noted: 2020-01-30

## 2020-01-30 LAB
ALBUMIN SERPL BCP-MCNC: 2.5 G/DL (ref 3.5–5.2)
ALP SERPL-CCNC: 108 U/L (ref 55–135)
ALT SERPL W/O P-5'-P-CCNC: 40 U/L (ref 10–44)
ANION GAP SERPL CALC-SCNC: 6 MMOL/L (ref 8–16)
AST SERPL-CCNC: 49 U/L (ref 10–40)
BASOPHILS # BLD AUTO: 0.11 K/UL (ref 0–0.2)
BASOPHILS NFR BLD: 0.8 % (ref 0–1.9)
BILIRUB SERPL-MCNC: 1.4 MG/DL (ref 0.1–1)
BUN SERPL-MCNC: 41 MG/DL (ref 8–23)
CALCIUM SERPL-MCNC: 9.2 MG/DL (ref 8.7–10.5)
CHLORIDE SERPL-SCNC: 117 MMOL/L (ref 95–110)
CO2 SERPL-SCNC: 28 MMOL/L (ref 23–29)
CREAT SERPL-MCNC: 0.9 MG/DL (ref 0.5–1.4)
DIFFERENTIAL METHOD: ABNORMAL
EOSINOPHIL # BLD AUTO: 0.9 K/UL (ref 0–0.5)
EOSINOPHIL NFR BLD: 6.4 % (ref 0–8)
ERYTHROCYTE [DISTWIDTH] IN BLOOD BY AUTOMATED COUNT: 15.8 % (ref 11.5–14.5)
EST. GFR  (AFRICAN AMERICAN): >60 ML/MIN/1.73 M^2
EST. GFR  (NON AFRICAN AMERICAN): >60 ML/MIN/1.73 M^2
GLUCOSE SERPL-MCNC: 178 MG/DL (ref 70–110)
HCT VFR BLD AUTO: 44.7 % (ref 40–54)
HGB BLD-MCNC: 13.6 G/DL (ref 14–18)
IMM GRANULOCYTES # BLD AUTO: 0.17 K/UL (ref 0–0.04)
IMM GRANULOCYTES NFR BLD AUTO: 1.2 % (ref 0–0.5)
INR PPP: 1.2 (ref 0.8–1.2)
LYMPHOCYTES # BLD AUTO: 1.1 K/UL (ref 1–4.8)
LYMPHOCYTES NFR BLD: 7.7 % (ref 18–48)
MAGNESIUM SERPL-MCNC: 1.8 MG/DL (ref 1.6–2.6)
MCH RBC QN AUTO: 33.4 PG (ref 27–31)
MCHC RBC AUTO-ENTMCNC: 30.4 G/DL (ref 32–36)
MCV RBC AUTO: 110 FL (ref 82–98)
MONOCYTES # BLD AUTO: 1 K/UL (ref 0.3–1)
MONOCYTES NFR BLD: 7 % (ref 4–15)
NEUTROPHILS # BLD AUTO: 10.6 K/UL (ref 1.8–7.7)
NEUTROPHILS NFR BLD: 76.9 % (ref 38–73)
NRBC BLD-RTO: 0 /100 WBC
PLATELET # BLD AUTO: 150 K/UL (ref 150–350)
PMV BLD AUTO: 11.4 FL (ref 9.2–12.9)
POCT GLUCOSE: 174 MG/DL (ref 70–110)
POCT GLUCOSE: 186 MG/DL (ref 70–110)
POCT GLUCOSE: 207 MG/DL (ref 70–110)
POCT GLUCOSE: 231 MG/DL (ref 70–110)
POTASSIUM SERPL-SCNC: 4.3 MMOL/L (ref 3.5–5.1)
PROT SERPL-MCNC: 5.6 G/DL (ref 6–8.4)
PROTHROMBIN TIME: 12.4 SEC (ref 9–12.5)
RBC # BLD AUTO: 4.07 M/UL (ref 4.6–6.2)
SODIUM SERPL-SCNC: 151 MMOL/L (ref 136–145)
WBC # BLD AUTO: 13.83 K/UL (ref 3.9–12.7)

## 2020-01-30 PROCEDURE — 85025 COMPLETE CBC W/AUTO DIFF WBC: CPT

## 2020-01-30 PROCEDURE — 80053 COMPREHEN METABOLIC PANEL: CPT

## 2020-01-30 PROCEDURE — 93005 ELECTROCARDIOGRAM TRACING: CPT

## 2020-01-30 PROCEDURE — 30200315 PPD INTRADERMAL TEST REV CODE 302: Performed by: PSYCHIATRY & NEUROLOGY

## 2020-01-30 PROCEDURE — 25000003 PHARM REV CODE 250: Performed by: NURSE PRACTITIONER

## 2020-01-30 PROCEDURE — 93010 EKG 12-LEAD: ICD-10-PCS | Mod: ,,, | Performed by: INTERNAL MEDICINE

## 2020-01-30 PROCEDURE — 99233 SBSQ HOSP IP/OBS HIGH 50: CPT | Mod: ,,, | Performed by: PSYCHIATRY & NEUROLOGY

## 2020-01-30 PROCEDURE — 94668 MNPJ CHEST WALL SBSQ: CPT

## 2020-01-30 PROCEDURE — 83735 ASSAY OF MAGNESIUM: CPT

## 2020-01-30 PROCEDURE — 86580 TB INTRADERMAL TEST: CPT | Performed by: PSYCHIATRY & NEUROLOGY

## 2020-01-30 PROCEDURE — 36415 COLL VENOUS BLD VENIPUNCTURE: CPT

## 2020-01-30 PROCEDURE — 85610 PROTHROMBIN TIME: CPT

## 2020-01-30 PROCEDURE — 99233 PR SUBSEQUENT HOSPITAL CARE,LEVL III: ICD-10-PCS | Mod: ,,, | Performed by: PSYCHIATRY & NEUROLOGY

## 2020-01-30 PROCEDURE — 94761 N-INVAS EAR/PLS OXIMETRY MLT: CPT

## 2020-01-30 PROCEDURE — 94640 AIRWAY INHALATION TREATMENT: CPT

## 2020-01-30 PROCEDURE — 93010 ELECTROCARDIOGRAM REPORT: CPT | Mod: ,,, | Performed by: INTERNAL MEDICINE

## 2020-01-30 PROCEDURE — 25000242 PHARM REV CODE 250 ALT 637 W/ HCPCS: Performed by: NURSE PRACTITIONER

## 2020-01-30 RX ORDER — IPRATROPIUM BROMIDE AND ALBUTEROL SULFATE 2.5; .5 MG/3ML; MG/3ML
3 SOLUTION RESPIRATORY (INHALATION) EVERY 6 HOURS PRN
Qty: 1 BOX | Refills: 0
Start: 2020-01-30 | End: 2021-01-29

## 2020-01-30 RX ORDER — BALSAM PERU/CASTOR OIL
OINTMENT (GRAM) TOPICAL 2 TIMES DAILY
Status: DISCONTINUED | OUTPATIENT
Start: 2020-01-30 | End: 2020-01-30 | Stop reason: HOSPADM

## 2020-01-30 RX ORDER — ATORVASTATIN CALCIUM 40 MG/1
40 TABLET, FILM COATED ORAL DAILY
Qty: 90 TABLET | Refills: 3
Start: 2020-01-31 | End: 2021-01-30

## 2020-01-30 RX ORDER — AMOXICILLIN 250 MG
1 CAPSULE ORAL 2 TIMES DAILY PRN
Start: 2020-01-30

## 2020-01-30 RX ORDER — LEVOTHYROXINE SODIUM 75 UG/1
75 TABLET ORAL DAILY
Start: 2020-01-30

## 2020-01-30 RX ORDER — METOPROLOL TARTRATE 25 MG/1
50 TABLET, FILM COATED ORAL 2 TIMES DAILY
Status: DISCONTINUED | OUTPATIENT
Start: 2020-01-30 | End: 2020-01-30 | Stop reason: HOSPADM

## 2020-01-30 RX ORDER — ALLOPURINOL 300 MG/1
300 TABLET ORAL DAILY
Start: 2020-01-30

## 2020-01-30 RX ORDER — METOPROLOL TARTRATE 25 MG/1
25 TABLET, FILM COATED ORAL 2 TIMES DAILY
Qty: 60 TABLET | Refills: 11 | Status: ON HOLD
Start: 2020-01-30 | End: 2020-02-11 | Stop reason: SDUPTHER

## 2020-01-30 RX ORDER — POLYETHYLENE GLYCOL 3350 17 G/17G
17 POWDER, FOR SOLUTION ORAL DAILY PRN
Refills: 0
Start: 2020-01-30

## 2020-01-30 RX ORDER — QUETIAPINE FUMARATE 25 MG/1
25 TABLET, FILM COATED ORAL NIGHTLY PRN
Status: ON HOLD
Start: 2020-01-30 | End: 2020-02-11 | Stop reason: HOSPADM

## 2020-01-30 RX ADMIN — INSULIN ASPART 4 UNITS: 100 INJECTION, SOLUTION INTRAVENOUS; SUBCUTANEOUS at 06:01

## 2020-01-30 RX ADMIN — IPRATROPIUM BROMIDE AND ALBUTEROL SULFATE 3 ML: .5; 3 SOLUTION RESPIRATORY (INHALATION) at 11:01

## 2020-01-30 RX ADMIN — APIXABAN 5 MG: 5 TABLET, FILM COATED ORAL at 09:01

## 2020-01-30 RX ADMIN — TUBERCULIN PURIFIED PROTEIN DERIVATIVE 5 UNITS: 5 INJECTION, SOLUTION INTRADERMAL at 03:01

## 2020-01-30 RX ADMIN — ATORVASTATIN CALCIUM 40 MG: 20 TABLET, FILM COATED ORAL at 09:01

## 2020-01-30 RX ADMIN — INSULIN ASPART 1 UNITS: 100 INJECTION, SOLUTION INTRAVENOUS; SUBCUTANEOUS at 02:01

## 2020-01-30 RX ADMIN — METOPROLOL TARTRATE 25 MG: 25 TABLET ORAL at 09:01

## 2020-01-30 RX ADMIN — INSULIN ASPART 2 UNITS: 100 INJECTION, SOLUTION INTRAVENOUS; SUBCUTANEOUS at 05:01

## 2020-01-30 RX ADMIN — INSULIN ASPART 4 UNITS: 100 INJECTION, SOLUTION INTRAVENOUS; SUBCUTANEOUS at 11:01

## 2020-01-30 RX ADMIN — IPRATROPIUM BROMIDE AND ALBUTEROL SULFATE 3 ML: .5; 3 SOLUTION RESPIRATORY (INHALATION) at 07:01

## 2020-01-30 RX ADMIN — LEVOTHYROXINE SODIUM 75 MCG: 25 TABLET ORAL at 06:01

## 2020-01-30 NOTE — PLAN OF CARE
Problem: Adult Inpatient Plan of Care  Goal: Plan of Care Review  Outcome: Ongoing, Progressing     Problem: Fall Injury Risk  Goal: Absence of Fall and Fall-Related Injury  Outcome: Ongoing, Progressing     Problem: Wound  Goal: Optimal Wound Healing  Outcome: Ongoing, Progressing  Intervention: Promote Effective Wound Healing  Flowsheets (Taken 1/30/2020 0354)  Sleep/Rest Enhancement: awakenings minimized  Pain Management Interventions: care clustered; pillow support provided; position adjusted     Patient is globally aphasic. Patient is able to follow some commands. POC reviewed with patient. Patient's breathing is unlabored with equal chest expansion. Patient has multiple bruises and a pressure injury to his left gluteal area. Patient has a PEG tube to the LUQ with Isosource 1.5 going at 60ml/hr.  Patient has right sided deficits with a facial droop. Patient voids per incontinence pads. Bed in lowest position,bed alarm on, side rails up x3, no complaints or signs of distress. WCTM.

## 2020-01-30 NOTE — PROGRESS NOTES
Consulted to see for left buttock wound    Upon assessment noted multiple wounds and skin concerns. Pt is immobile and requires full assist for repositioning.  Right leg with scattered crusted abrasions to the extremity . Will order Triad hydrophilic wound dressing to all of the crusted lesions on the leg and covered with foam dressing.   Sacral area with suspected deep tissue injury that appears to be acquired during this hospital stay. Will order low air loss specialty bed for the patient .  venelex ointment recommended to the area BID and prn.   Nutritional consult .   Dicussed findings with Norma Pedraza PA-C and she agrees with wound care recommendations.      01/30/20 1600   Pain/Comfort/Sleep   Preferred Pain Scale rFLACC (Revised Face Legs Arms Cry Consolability Scale)   Cognitive   Level of Consciousness (AVPU) alert   Orientation other (see comments)  (TAMMY,aphasic)   Speech aphasic - global (both)   Neuro   Swallowing Signs/Symptoms other (see comments)  (NPO,PEG)   Pupils   Pupil Size Left 3 mm   Pupil Shape Left round   Pupil Reaction Left brisk;equal   Pupil Size Right 3 mm   Pupil Shape Right round   Pupil Reaction Right brisk;equal   [place] Coma Scale   Best Eye Response 4-->(E4) spontaneous   Best Motor Response 5-->(M5) localizes pain   Best Verbal Response 3-->(V3) inappropriate words   Millie Coma Scale Score 12   Motor Response   LUE Motor Response spontaneous movements   RUE Motor Response no effort against gravity   LLE Motor Response some effort against gravity   RLE Motor Response no effort against gravity   Hand /Ankle Strength   Hand , Left weak   Hand , Right absent   Dorsiflexion, Left weak   Dorsiflexion, Right absent   Plantarflexion, Left weak   Plantarflexion, Right absent   LUE Neurovascular Assessment   Sensation other (see comments)  (TAMMY)   RUE Neurovascular Assessment   Sensation other (see comments)  (TAMMY)   LLE Neurovascular Assessment   Sensation other (see  comments)  (TAMMY)   RLE Neurovascular Assessment   Sensation other (see comments)  (TAMMY)   Pulse Radial   Left Radial Pulse 2+ (normal)   Right Radial Pulse 2+ (normal)   Pulse Dorsalis Pedis   Left Dorsalis Pedis Pulse 2+ (normal)   Right Dorsalis Pedis Pulse 2+ (normal)        Wound 01/24/20 0800 Abrasion(s) anterior Knee   Date First Assessed/Time First Assessed: 01/24/20 0800   Pre-existing: Yes  Primary Wound Type: Abrasion(s)  Side: Right  Orientation: anterior  Location: Knee   Wound Image    Wound WDL ex   Dressing Appearance Open to air   Drainage Amount None   Drainage Characteristics/Odor No odor   Appearance Dry  (crusted abrasion)   Tissue loss description Partial thickness   Periwound Area Intact   Wound Edges Open   Wound Length (cm) 1.5 cm   Wound Width (cm) 2 cm   Wound Depth (cm) 0.1 cm   Wound Volume (cm^3) 0.3 cm^3   Wound Surface Area (cm^2) 3 cm^2   Care Cleansed with:;Wound cleanser   Dressing Applied;Foam        Wound 01/30/20 1200 Abrasion(s) lower Leg   Date First Assessed/Time First Assessed: 01/30/20 1200   Pre-existing: No  Primary Wound Type: Abrasion(s)  Side: Right  Orientation: lower  Location: Leg   Wound Image      Wound WDL ex   Dressing Appearance Open to air;No dressing   Drainage Amount None   Drainage Characteristics/Odor No odor   Appearance   (crusted brown abrasions)   Periwound Area Intact   Wound Edges Open   Wound Length (cm) 7 cm   Wound Width (cm) 0.7 cm   Wound Depth (cm) 0.1 cm  (adjacent 2x3cm crusted abrasion)   Wound Volume (cm^3) 0.49 cm^3   Wound Surface Area (cm^2) 4.9 cm^2   Care Cleansed with:;Sterile normal saline   Dressing Applied;Foam        Pressure Injury 01/29/20 2100 Left Sacral spine Suspected DTPI   Date First Assessed/Time First Assessed: 01/29/20 2100   Pressure Injury Present on Admission: suspected hospital acquired  Side: Left  Location: Sacral spine  Is this injury device related?: No  Staging: Suspected DTPI   Wound Image      Staging  Suspected DTPI   Drainage Amount None   Drainage Characteristics/Odor No odor   Appearance Purple;Maroon;Red;Moist;Blistered   Periwound Area Intact   Wound Edges Open;Irregular   Wound Length (cm) 10 cm   Wound Width (cm) 8 cm   Wound Depth (cm) 0.1 cm   Wound Volume (cm^3) 8 cm^3   Wound Surface Area (cm^2) 80 cm^2   Care Cleansed with:;Sterile normal saline;Applied:;Skin Barrier   Dressing Applied;Foam  (venelex ordered)   Safety   Safety Precautions emergency equipment at bedside   Safety Management   Patient Rounds bed in low position;bed wheels locked;call light in patient/parent reach;clutter free environment maintained;ID band on;placement of personal items at bedside;toileting offered;visualized patient   Safety Promotion/Fall Prevention bed alarm set;Fall Risk reviewed with patient/family;nonskid shoes/socks when out of bed;side rails raised x 2   Daily Care   Activity Management activity adjusted per tolerance;activity clustered for rest period;bedrest maintained per order   Positioning   Body Position positioned/repositioned independently   Head of Bed (HOB) HOB at 30-45 degrees   Positioning/Transfer Devices pillows;in use     Gely Mcelroy RN CWON  [id number]

## 2020-01-30 NOTE — PLAN OF CARE
Patient to discharge to [facility] today.     AVILA arranged stretcher transport via [facility]. Requested  time is 6:15PM.  Requested  time does not guarantee arrival time.      Nurse call report to [phone]. Patient going to # [id number].     SW notified nurse of the above.      01/30/20 3014   Post-Acute Status   Post-Acute Authorization Placement   Post-Acute Placement Status Set-up Complete     Keli Martinez, LCSW [facility]  [id number]

## 2020-01-30 NOTE — PLAN OF CARE
AVILA assigned to case today 1/30/2020. AVILA will assist team with DC needs. AVILA in communication with CM.    [facility] has received insurance auth. AVILA re-sent PASRR to state to obtain 142.     01/30/20 2112   Post-Acute Status   Post-Acute Authorization Placement   Post-Acute Placement Status Authorization Obtained     Keli Martinez, LCSW [facility]  [id number]

## 2020-01-30 NOTE — PLAN OF CARE
Updated orders sent to [facility]. Patient ok to still transfer.     Loni Thorne RN  Case Management  Ext: [phone]  01/30/2020  5:17 PM

## 2020-01-30 NOTE — PLAN OF CARE
Patient requires transport via stretcher/EMS.            Norma Pedraza PA-C  Vascular Neurology  [phone]

## 2020-01-30 NOTE — CONSULTS
Tube feed bolus recommendation requested:    Recommendation    1. For bolus feeds, provide 6 boxes Isosource 1.5 daily:    Provide 2 boxes at mealtimes with 150ml water flush before and after each feeding. Or, if patient has difficulty tolerating 2 boxes at a time, provide 1 box every 2 hours with 75ml water flush before and after each box.    Provides 2250 kcal, 102g protein, 1140ml free water. Additional water flushes should total 900ml/day or per MD.    2. Hold for residuals >500ml.    Addendum for Jevity 1.5 bolus recommendation:    1. For bolus feeds, provide 6 boxes Jevity 1.5 daily:    Provide 2 boxes at mealtimes with 150ml water flush before and after each feeding. Or, if patient has difficulty tolerating 2 boxes at a time, provide 1 box every 2 hours with 75ml water flush before and after each box. Additional water flushes should total 900ml/day or per MD.    Provides 2130 kcal, 91g protein, 1081ml free water.    2. Hold for residuals >500ml.    Jesica Nair, RD  Ext [phone]

## 2020-01-30 NOTE — PLAN OF CARE
[facility]     Department of Vascular Neurology     [address] [place] [address]     [phone] after hours         NURSING HOME ORDERS    01/30/2020    Admit to Nursing Home:  Skilled Bed                                                  Diagnoses:  Active Hospital Problems    Diagnosis  POA    *Embolic stroke involving left middle cerebral artery [I63.412]  Yes    PEG (percutaneous endoscopic gastrostomy) adjustment/replacement/removal [Z43.1]  Not Applicable    Oral phase dysphagia [R13.11]  Yes    Right spastic hemiparesis [G81.11]  Yes    Aphasia [R47.01]  Yes    Acquired hypothyroidism [E03.9]  Yes    Hypercoagulable state [D68.59]  Yes    Acute ischemic left middle cerebral artery (MCA) stroke [I63.512]  Yes    Cytotoxic cerebral edema [G93.6]  Yes    Chronic anticoagulation [Z79.01]  Not Applicable    Essential hypertension [I10]  Yes     Chronic    Type 2 diabetes mellitus with circulatory disorder, 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]  Yes     Chronic    Atrial fibrillation [I48.91]  Yes      Resolved Hospital Problems    Diagnosis Date Resolved POA    Constipation [K59.00] 01/27/2020 Yes    Oxygen desaturation [R09.02] 01/29/2020 No    Hyponatremia [E87.1] 01/20/2020 Yes       Patient is homebound due to:  Embolic stroke involving left middle cerebral artery    Allergies:Review of patient's allergies indicates:  No Known Allergies    Vitals:         Every shift (Skilled Nursing patients)         Diet: NPO, please use peg tube for medications and nutrition    Please maintain abdominal binder place to avoid peg tube removal    Jevity 1.5 bolus recommendation:     1. For bolus feeds, provide 6 boxes Jevity 1.5 daily:     Provide 2 boxes at mealtimes with 150ml water flush before and after each feeding. Or, if patient has difficulty tolerating 2 boxes at a time, provide 1 box every 2 hours with 75ml water flush before and after  each box. Additional water flushes should total 900ml/day or per MD.     Provides 2130 kcal, 91g protein, 1081ml free water.     2. Hold for residuals >500ml.    Acitivities:      - Up in a chair each morning as tolerated      LABS:  Na+ lab every other day until hypernatremia resolved      Nursing Precautions:     - Aspiration precautions:             -  Upright during tube feeding            -  Suction at bedside          - Fall precautions per nursing home protocol   - Seizure precaution per retirement protocol   - Decubitus precautions:        -  for positioning   - Pressure reducing foam mattress   - Turn patient every two hours. Use wedge pillows to anchor patient    Wound care  Abrasions R leg  -triad hydropilic to wound and apply foam dressing. Change every Monday and Thursday    Sacral 8x10 evolving deep tissue injury  -Apply venelex BID  -Patient needs low air loss bed with turning schedule, elevated heels    CONSULTS:     Physical Therapy to evaluate and treat     Occupational Therapy to evaluate and treat     Speech Therapy  to evaluate and treat     Nutrition to evaluate and recommend diet      MISCELLANEOUS CARE:               Patient will need follow up in [facility] in 4-6 weeks ([phone])     PEG Care:  Clean site every 24 hours     Routine Skin for Bedridden Patients:  Apply moisture barrier cream to all    skin folds and wet areas in perineal area daily and after baths and                           all bowel movements.                         Medications: Discontinue all previous medication orders, if any. See new list below.    Patient's home glipizide 2.5 discontinued at discharge due to A1C 5.4  Patient's home amlodipine 5 mg and enalapril 20 mg due to BP within range at discharge     Chirag Thompson   Home Medication Instructions ALEX:[id number]    Printed on:01/30/20 7591   Medication Information                      albuterol-ipratropium (DUO-NEB) 2.5 mg-0.5 mg/3 mL  nebulizer solution  Take 3 mLs by nebulization every 6 (six) hours as needed for Wheezing. Rescue             allopurinol (ZYLOPRIM) 300 MG tablet  1 tablet (300 mg total) by Per G Tube route once daily.             apixaban (ELIQUIS) 5 mg Tab  1 tablet (5 mg total) by Per G Tube route 2 (two) times daily.             atorvastatin (LIPITOR) 40 MG tablet  1 tablet (40 mg total) by Per G Tube route once daily.             levothyroxine (SYNTHROID) 75 MCG tablet  1 tablet (75 mcg total) by Per G Tube route once daily.             metoprolol tartrate (LOPRESSOR) 50 MG tablet  1 tablet (25 mg total) by Per G Tube route 2 (two) times daily.             polyethylene glycol (GLYCOLAX) 17 gram PwPk  17 g by Per G Tube route daily as needed.             QUEtiapine (SEROQUEL) 25 MG Tab  1 tablet (25 mg total) by Per G Tube route nightly as needed (for aggitation).             senna-docusate 8.6-50 mg (PERICOLACE) 8.6-50 mg per tablet  1 tablet by Per G Tube route 2 (two) times daily as needed for Constipation.                         Norma Pedraza PA-C  01/30/2020

## 2020-01-30 NOTE — SUBJECTIVE & OBJECTIVE
Neurologic Chief Complaint: Right sided weakness    Subjective:     Interval History: Patient is seen for follow-up neurological assessment and treatment recommendations: OMERON     HPI, Past Medical, Family, and Social History remains the same as documented in the initial encounter.     Review of Systems   Constitutional: Negative for chills and fever.   HENT: Positive for trouble swallowing.    Respiratory: Negative for cough.    Gastrointestinal: Negative for vomiting.   Neurological: Positive for facial asymmetry, speech difficulty and weakness.     Scheduled Meds:   albuterol-ipratropium  3 mL Nebulization Q6H WAKE    apixaban  5 mg Per G Tube BID    atorvastatin  40 mg Per NG tube Daily    barium  450 mL Per NG tube Once    levothyroxine  75 mcg Per NG tube Before breakfast    metoprolol tartrate  25 mg Per NG tube BID    polyethylene glycol  17 g Per G Tube Daily    senna-docusate 8.6-50 mg  1 tablet Per NG tube BID     Continuous Infusions:    PRN Meds:acetaminophen, Dextrose 10% Bolus, glucagon (human recombinant), glucose, glucose, insulin aspart U-100, ondansetron, QUEtiapine, sodium chloride 0.9%    Objective:     Vital Signs (Most Recent):  Temp: 98.6 °F (37 °C) (01/30/20 1538)  Pulse: 109 (01/30/20 1538)  Resp: 18 (01/30/20 1538)  BP: 111/64 (01/30/20 1538)  SpO2: (!) 93 % (01/30/20 1538)  BP Location: Left arm    Vital Signs Range (Last 24H):  Temp:  [96.7 °F (35.9 °C)-98.9 °F (37.2 °C)]   Pulse:  []   Resp:  [16-22]   BP: (102-114)/(59-66)   SpO2:  [93 %-97 %]   BP Location: Left arm    Physical Exam   Constitutional: He appears well-developed and well-nourished. No distress.   HENT:   Head: Normocephalic and atraumatic.   Eyes: Pupils are equal, round, and reactive to light.   Left gaze preference    Cardiovascular: Tachycardia present.   Pulmonary/Chest: Effort normal. No tachypnea. No respiratory distress.   Skin: Skin is warm and dry.   Vitals reviewed.      Neurological Exam:   LOC:  alert  Attention Span: poor  Language: Global aphasia  Articulation: Dysarthria  Orientation: Untestable due to severe aphasia   Visual Fields: Full  EOM (CN III, IV, VI): Gaze preference  left  Pupils (CN II, III): PERRL  Facial Movement (CN VII): Lower facial weakness on the Right  Motor: Arm left  Paresis: 5/5  Leg left  Paresis: 2/5  Arm right  Plegia 0/5  Leg right Paresis: 0/5  Cebellar: No evidence of appendicular or axial ataxia  Tone: Flaccid  RUE    Laboratory:  CMP:   Recent Labs   Lab 01/30/20  0803   CALCIUM 9.2   ALBUMIN 2.5*   PROT 5.6*   *   K 4.3   CO2 28   *   BUN 41*   CREATININE 0.9   ALKPHOS 108   ALT 40   AST 49*   BILITOT 1.4*     CBC:   Recent Labs   Lab 01/30/20  0517   WBC 13.83*   RBC 4.07*   HGB 13.6*   HCT 44.7      *   MCH 33.4*   MCHC 30.4*     Lipid Panel:   No results for input(s): CHOL, LDLCALC, HDL, TRIG in the last 168 hours.  Coagulation:   Recent Labs   Lab 01/30/20  0517   INR 1.2     Hgb A1C:   No results for input(s): HGBA1C in the last 168 hours.  TSH:   No results for input(s): TSH in the last 168 hours.    Diagnostic Results     Brain Imaging   CT Head without 1/15 (07:43)  Evolving moderate-sized region of acute infarction in the left MCA territory as above.  Distribution grossly stable from recent MRI without significant infarct extension.  No hemorrhagic conversion or new territorial infarct.     MRI Brain 1/14 (04:28)  There is interval change, there is interval development of acute ischemia/infarct along the left MCA distribution, this is superimposed on subacute ischemic change seen on the recent MRI examination  Findings concerning for occlusion of the M2 segment of the left MCA as above.    Additional chronic changes are noted.    Vessel Imaging   CTA Head and Neck 1/14 (02:43)  As on the recent brain MRA examination there is appearance of attenuation of the left MCA branch vessels without evidence for high-grade stenosis or occlusion and  otherwise there is no evidence for high-grade stenosis or occlusion of the major arterial vascular structures of the head or neck.    There is no evidence for intracranial aneurysm or AVM.    Findings consistent with acute to subacute left parietal ischemic change again noted.    Small thyroid nodules or cysts.    Cardiac Imaging  TTE 1/11  · Normal left ventricular systolic function. The estimated ejection fraction is 55%.  · Mid anteroseptal and apical moderate hypokinesia.  · Diastolic pattern consistent with atrial fibrillation observed.  · Severe left atrial enlargement.  · Normal right ventricular systolic function.  · Severe right atrial enlargement.  · Mild-to-moderate mitral regurgitation.  · Mild tricuspid regurgitation.  · The estimated PA systolic pressure is 40 mmHg.  Intermediate central venous pressure (8 mmHg).

## 2020-01-30 NOTE — PROGRESS NOTES
[facility]  Vascular Neurology  Comprehensive Stroke Center  Progress Note    Assessment/Plan:     * Embolic stroke involving left middle cerebral artery  80 yo man with history of HTN, a fib (on Coumadin) and hypothyroidism. Patient has been admitted to [facility] for left temporal partieal infarct on 1/10. On 1/14 patient with worsening of deficits: aphasia, right side plegia and not following commands. Telestroke consult was completed by Dr. Olivares, no tpa given as INR was 2.0. Patient transferred to [facility] ED for stat CTA stroke MP.  Positive for LVO, L M1 occlusion. Patient not candidate for intervention d/t large core infarct. Patient was admitted to [facility] for close monitoring.  Coumadin held d/t size of stroke. PEG placed 1/23,. Eliquis and TF started on 1/24.          Antithrombotics: Eliquis 5mg BID     Statins: Lipitor 40 mg daily    Aggressive risk factor modification: A-Fib, HTN     Rehab efforts: The patient has been evaluated by a stroke team provider and the therapy needs have been fully considered based off the presenting complaints and exam findings. The following therapy evaluations are needed: PT evaluate and treat, OT evaluate and treat, SLP evaluate and treat, PM&R evaluate for appropriate placement - SNF    Diagnostics ordered/pending: None     VTE prophylaxis: Eliquis, multiple skin tears unable to place SCDs     BP parameters: Infarct: No intervention, SBP <160    PEG (percutaneous endoscopic gastrostomy) adjustment/replacement/removal  PEG placed by general surgery  Patient now at goal on TF- tolerating well.     Oral phase dysphagia  Due to stroke   SLP therapy  PEG placed     Chronic anticoagulation  Previously on coumadin, transitioned to Eliquis on 1/24    Cytotoxic cerebral edema  Area of cytotoxic cerebral edema identified when reviewing brain imaging in the territory of the left middle cerebral artery. There is mass effect associated with it. We will continue to  monitor the patients clinical exam for any worsening of symptoms which may indicate expansion of the stroke or the area of the edema resulting in the clinical change. The pattern is suggestive of cardio embolic etiology.        Hypercoagulable state  Pt previously on Coumadin but subtherapeutic   Eliquis started on 1/24    Aphasia  Due to stroke  Aggressive therapy SLP    Right spastic hemiparesis  Due to stroke  Aggressive therapy with PT/OT    Atrial fibrillation  Stroke risk factor  Rate control  Repeat CT on 1/18 stable. PEG placed 1/23,  Eliquis started on 1/24         Type 2 diabetes mellitus with circulatory disorder, without long-term current use of insulin  Stroke risk factor  HgB A1C 5.1  No SSI or POCT glucose checks needed at this time      Essential hypertension  Stroke risk factor  SBP <160  Patient at goal on current regimen            Patient admitted to neurocritical care for Left MCA syndrome, severe aphasia following extension of prior stroke.  01/15/2020: Overnight concern for extensor posturing of right arm, emergent CT this AM showing stable area of infarct, no new hemorrhage or extension.  01/16/20: No acute events overnight, neurostatus unchanged  01/17/2020: No events overnight, patient received seroquel for agitation this AM. To remain in neuro ICU for closer monitoring  01/18/2020: Patient calm and alert during today's exam. Patient to step down to stroke primary service. Breath sounds course, CXR without significant change- will continue pulmonary tolieting.   1/19/20: Patient now with leukocytosis. Repeat CBC pending. CXR with pneumonitis. Rocephin ordered.  1/20/20: CT abd/pelvis for PEG planning, scheduled for tomorrow AM with IR. Plan to restart AC (Heparin vs DOAC) post procedure  1/21/20: Issues with placing NG overnight, PEG deferred to tomorrow as pt unable to get barium. SLP re-eval, MBSS today  1/22/20: NG pulled overnight, gen surgery consulted with plan for PEG tomorrow    1/23/20: PEG placed today by general surgery, plan to start DOAC 24 hr after procedure.   1/24/20: Patient 24 hour s/p PEG. Eliquis started. TF started.   1/25/20: Neuro exam stable. Patient now at goal on TF- tolerating well.   1/26/20: Neuro exam stable. Patient with decreased bowel sounds and taut abdomen. KUB without obstruction. Patient LBM on 1/21, brown bomb ordered.   1/27 multiple bowel movements overnight after receiving bowel regimen.  Neutraphos added to PEG tube.  Mild leukocytosis on labs today.  CXR did not show evidence of pneumonia.  Multiple skin tears and could be the etiology of leukocytosis.  Will continue to monitor   1/28/20 working well with therapy.  Waiting for placement to SNF.    1/29/20 increasing water boluses for hypernatremia    STROKE DOCUMENTATION   Acute Stroke Times   Last Known Normal Date: 01/13/20  Last Known Normal Time: 2315  Symptom Onset Date: 01/14/20  Symptom Onset Time: 0050  Stroke Team Called Date: 01/14/20  Stroke Team Called Time: 0105  Stroke Team Arrival Date: 01/14/20  Stroke Team Arrival Time: 0355  CT Interpretation Time: 0100    NIH Scale:  1a. Level of Consciousness: 0-->Alert, keenly responsive  1b. LOC Questions: 2-->Answers neither question correctly  1c. LOC Commands: 2-->Performs neither task correctly  2. Best Gaze: 1-->Partial gaze palsy, gaze is abnormal in one or both eyes, but forced deviation or total gaze paresis is not present  3. Visual: 0-->No visual loss  4. Facial Palsy: 1-->Minor paralysis (flattened nasolabial fold, asymmetry on smiling)  5a. Motor Arm, Left: 0-->No drift, limb holds 90 (or 45) degrees for full 10 secs  5b. Motor Arm, Right: 4-->No movement  6a. Motor Leg, Left: 3-->No effort against gravity, leg falls to bed immediately  6b. Motor Leg, Right: 4-->No movement  7. Limb Ataxia: 0-->Absent  8. Sensory: 0-->Normal, no sensory loss  9. Best Language: 2-->Severe aphasia, all communication is through fragmentary expression, great  need for inference, questioning, and guessing by the listener. Range of information that can be exchanged is limited, listener carries burden of. . . (see row details)  10. Dysarthria: 2-->Severe dysarthria, patients speech is so slurred as to be unintelligible in the absence of or out of proportion to any dysphasia, or is mute/anarthric  11. Extinction and Inattention (formerly Neglect): 0-->No abnormality  Total (NIH Stroke Scale): 21       Modified Blanche Score: 0  [place] Coma Scale:    ABCD2 Score:    [id number] Score:   HAS -BLED Score:   ICH Score:   Hunt & Jones Classification:      Hemorrhagic change of an Ischemic Stroke: Does this patient have an ischemic stroke with hemorrhagic changes? No     Neurologic Chief Complaint: Right sided weakness    Subjective:     Interval History: Patient is seen for follow-up neurological assessment and treatment recommendations: UNA     HPI, Past Medical, Family, and Social History remains the same as documented in the initial encounter.     Review of Systems   Constitutional: Negative for chills and fever.   HENT: Positive for trouble swallowing.    Respiratory: Negative for cough.    Gastrointestinal: Negative for vomiting.   Neurological: Positive for facial asymmetry, speech difficulty and weakness.     Scheduled Meds:   albuterol-ipratropium  3 mL Nebulization Q6H WAKE    apixaban  5 mg Per G Tube BID    atorvastatin  40 mg Per NG tube Daily    barium  450 mL Per NG tube Once    levothyroxine  75 mcg Per NG tube Before breakfast    metoprolol tartrate  25 mg Per NG tube BID    polyethylene glycol  17 g Per G Tube Daily    senna-docusate 8.6-50 mg  1 tablet Per NG tube BID     Continuous Infusions:    PRN Meds:acetaminophen, Dextrose 10% Bolus, glucagon (human recombinant), glucose, glucose, insulin aspart U-100, ondansetron, QUEtiapine, sodium chloride 0.9%    Objective:     Vital Signs (Most Recent):  Temp: 98.6 °F (37 °C) (01/30/20 1538)  Pulse: 109  (01/30/20 1538)  Resp: 18 (01/30/20 1538)  BP: 111/64 (01/30/20 1538)  SpO2: (!) 93 % (01/30/20 1538)  BP Location: Left arm    Vital Signs Range (Last 24H):  Temp:  [96.7 °F (35.9 °C)-98.9 °F (37.2 °C)]   Pulse:  []   Resp:  [16-22]   BP: (102-114)/(59-66)   SpO2:  [93 %-97 %]   BP Location: Left arm    Physical Exam   Constitutional: He appears well-developed and well-nourished. No distress.   HENT:   Head: Normocephalic and atraumatic.   Eyes: Pupils are equal, round, and reactive to light.   Left gaze preference    Cardiovascular: Tachycardia present.   Pulmonary/Chest: Effort normal. No tachypnea. No respiratory distress.   Skin: Skin is warm and dry.   Vitals reviewed.      Neurological Exam:   LOC: alert  Attention Span: poor  Language: Global aphasia  Articulation: Dysarthria  Orientation: Untestable due to severe aphasia   Visual Fields: Full  EOM (CN III, IV, VI): Gaze preference  left  Pupils (CN II, III): PERRL  Facial Movement (CN VII): Lower facial weakness on the Right  Motor: Arm left  Paresis: 5/5  Leg left  Paresis: 2/5  Arm right  Plegia 0/5  Leg right Paresis: 0/5  Cebellar: No evidence of appendicular or axial ataxia  Tone: Flaccid  RUE    Laboratory:  CMP:   Recent Labs   Lab 01/30/20  0803   CALCIUM 9.2   ALBUMIN 2.5*   PROT 5.6*   *   K 4.3   CO2 28   *   BUN 41*   CREATININE 0.9   ALKPHOS 108   ALT 40   AST 49*   BILITOT 1.4*     CBC:   Recent Labs   Lab 01/30/20  0517   WBC 13.83*   RBC 4.07*   HGB 13.6*   HCT 44.7      *   MCH 33.4*   MCHC 30.4*     Lipid Panel:   No results for input(s): CHOL, LDLCALC, HDL, TRIG in the last 168 hours.  Coagulation:   Recent Labs   Lab 01/30/20  0517   INR 1.2     Hgb A1C:   No results for input(s): HGBA1C in the last 168 hours.  TSH:   No results for input(s): TSH in the last 168 hours.    Diagnostic Results     Brain Imaging   CT Head without 1/15 (07:43)  Evolving moderate-sized region of acute infarction in the left MCA  territory as above.  Distribution grossly stable from recent MRI without significant infarct extension.  No hemorrhagic conversion or new territorial infarct.     MRI Brain 1/14 (04:28)  There is interval change, there is interval development of acute ischemia/infarct along the left MCA distribution, this is superimposed on subacute ischemic change seen on the recent MRI examination  Findings concerning for occlusion of the M2 segment of the left MCA as above.    Additional chronic changes are noted.    Vessel Imaging   CTA Head and Neck 1/14 (02:43)  As on the recent brain MRA examination there is appearance of attenuation of the left MCA branch vessels without evidence for high-grade stenosis or occlusion and otherwise there is no evidence for high-grade stenosis or occlusion of the major arterial vascular structures of the head or neck.    There is no evidence for intracranial aneurysm or AVM.    Findings consistent with acute to subacute left parietal ischemic change again noted.    Small thyroid nodules or cysts.    Cardiac Imaging  TTE 1/11  · Normal left ventricular systolic function. The estimated ejection fraction is 55%.  · Mid anteroseptal and apical moderate hypokinesia.  · Diastolic pattern consistent with atrial fibrillation observed.  · Severe left atrial enlargement.  · Normal right ventricular systolic function.  · Severe right atrial enlargement.  · Mild-to-moderate mitral regurgitation.  · Mild tricuspid regurgitation.  · The estimated PA systolic pressure is 40 mmHg.  Intermediate central venous pressure (8 mmHg).         Norma Pedraza PA-C  Comprehensive Stroke Center  Department of Vascular Neurology   [facility]

## 2020-01-30 NOTE — PLAN OF CARE
Patient medically ready for discharge to [facility]. Informed Nephew David of patients transport time.      01/30/20 1656   Final Note   Assessment Type Final Discharge Note   Anticipated Discharge Disposition SNF   Hospital Follow Up  Appt(s) scheduled? Yes   Discharge plans and expectations educations in teach back method with documentation complete? No   Right Care Referral Info   Post Acute Recommendation SNF / Sub-Acute Rehab   Facility Name [facility]     Loni Thorne RN  Case Management  Ext: [phone]  01/30/2020  4:57 PM

## 2020-01-31 NOTE — DISCHARGE SUMMARY
[facility]  Vascular Neurology  Comprehensive Stroke Center  Discharge Summary     Summary:     Admit Date: 1/10/2020  2:42 PM    Discharge Date and Time: 1/30/2020  7:20 PM    Attending Physician: Andry Eden MD    Discharge Provider: Norma Pedraza PA-C    History of Present Illness: 78 y/o male who on 1-9-20 started with difficulty speaking. On 1-10-20 still no improvement was taken to [facility] that revealed a left temp pariatel infarct. Patient just with aphasia. He has hx of afib and is on coumadin. He was admitted to [facility] and d/c recommendation was rehab so was waiting on placement. On 1-14-20 he was notice around 0050 to not move his right sided and no longer trying to talk and not following commands. He was moved to ICU and telemedicne consult was done with Dr Olivares. No TPA as INR 2.0. Recommednation was to transfer to [facility] for further evaluation. CTA head and neck done revealing M1 occlusion.   Upon arrival patient still not talking or following commands, not moving the right side. Patient to MRI for acute intervention protocol.  MRI acute intervention protocol reveals large core infarct so no IR.    Risk factors afib, HTN,     Hospital Course (synopsis of major diagnoses, care, treatment, and services provided during the course of the hospital stay): Chirag Thompson is a 79 y.o. male with PMHx of HTN, a fib (on Coumadin) and hypothyroidism. Patient has been admitted to [facility] for left temporal partieal infarct on 1/10. On 1/14 patient with worsening of deficits: aphasia, right side plegia and not following commands. Telestroke consult was completed by Dr. Olivares, no tpa given as INR was 2.0. Patient transferred to [facility] ED for stat CTA stroke MP which revealed no evidence of LVO or significant stenosis. Therefore, patient not candidate for intervention. Patient was admitted to [facility] for close monitoring. Etiology of stroke cardioembolic.    Patient remained  stable in neuro ICU and stepped down to stroke unit on 1/18/20. He was evaluated by Physical therapy, Occupational therapy, speech therapy who recommended discharge to skilled nursing facility.  Patient was evaluated by speech therapy who recommended patient remain NPO.  Patient failed modified barium swallow study on 01/21/2020 and had PEG placed on 01/23/2020, patient tolerating tube feeds at discharge. Patient with evidence of pneumonitis on CXR and patient treated with pneumonitis. Hypernatremia treated with increasing enteral water boluses. Wound care consulted for RLE abrasions, sacral pressure injury discovered during wound care exam. SNF to treat abrasions with triad hydrophilic and foam dressing, change dressing every Monday and Thursday. SNF to treat sacral deep tissue injury with venelex BID, turning schedule, low air loss bed, and elevated heels     Anticoagulation initially held due to size of stroke and need for PEG tube placement. After peg tube placement, he was started on Eliquis 5 BID for secondary stroke prevention. Patient will also continue atorvastatin 40 mg daily. Patient's nephew contacted on day of discharge to discuss discharge plan, but did not answer phone call. Voicemail was left and phone call wasn't returned. Patient will be discharged to SNF. He will follow up with his PCP and stroke clinic.                  Patient admitted to neurocritical care for Left MCA syndrome, severe aphasia following extension of prior stroke.  01/15/2020: Overnight concern for extensor posturing of right arm, emergent CT this AM showing stable area of infarct, no new hemorrhage or extension.  01/16/20: No acute events overnight, neurostatus unchanged  01/17/2020: No events overnight, patient received seroquel for agitation this AM. To remain in neuro ICU for closer monitoring  01/18/2020: Patient calm and alert during today's exam. Patient to step down to stroke primary service. Breath sounds course, CXR  without significant change- will continue pulmonary tolieting.   1/19/20: Patient now with leukocytosis. Repeat CBC pending. CXR with pneumonitis. Rocephin ordered.  1/20/20: CT abd/pelvis for PEG planning, scheduled for tomorrow AM with IR. Plan to restart AC (Heparin vs DOAC) post procedure  1/21/20: Issues with placing NG overnight, PEG deferred to tomorrow as pt unable to get barium. SLP re-eval, MBSS today  1/22/20: NG pulled overnight, gen surgery consulted with plan for PEG tomorrow   1/23/20: PEG placed today by general surgery, plan to start DOAC 24 hr after procedure.   1/24/20: Patient 24 hour s/p PEG. Eliquis started. TF started.   1/25/20: Neuro exam stable. Patient now at goal on TF- tolerating well.   1/26/20: Neuro exam stable. Patient with decreased bowel sounds and taut abdomen. KUB without obstruction. Patient LBM on 1/21, brown bomb ordered.   1/27 multiple bowel movements overnight after receiving bowel regimen.  Neutraphos added to PEG tube.  Mild leukocytosis on labs today.  CXR did not show evidence of pneumonia.  Multiple skin tears and could be the etiology of leukocytosis.  Will continue to monitor   1/28/20 working well with therapy.  Waiting for placement to SNF.    1/29/20 increasing water boluses for hypernatremia    Stroke Etiology: Evident Atrial fibrillation Cardio-Aortic Embolism (CE)    STROKE DOCUMENTATION   Acute Stroke Times   Last Known Normal Date: 01/13/20  Last Known Normal Time: 2315  Symptom Onset Date: 01/14/20  Symptom Onset Time: 0050  Stroke Team Called Date: 01/14/20  Stroke Team Called Time: 0105  Stroke Team Arrival Date: 01/14/20  Stroke Team Arrival Time: 0355  CT Interpretation Time: 0100     NIH Scale:  Interval: 7 days or at discharge (whichever comes first)  1a. Level of Consciousness: 0-->Alert, keenly responsive  1b. LOC Questions: 2-->Answers neither question correctly  1c. LOC Commands: 2-->Performs neither task correctly  2. Best Gaze: 1-->Partial gaze  palsy, gaze is abnormal in one or both eyes, but forced deviation or total gaze paresis is not present  3. Visual: 0-->No visual loss  4. Facial Palsy: 1-->Minor paralysis (flattened nasolabial fold, asymmetry on smiling)  5a. Motor Arm, Left: 0-->No drift, limb holds 90 (or 45) degrees for full 10 secs  5b. Motor Arm, Right: 4-->No movement  6a. Motor Leg, Left: 3-->No effort against gravity, leg falls to bed immediately  6b. Motor Leg, Right: 4-->No movement  7. Limb Ataxia: 0-->Absent  8. Sensory: 0-->Normal, no sensory loss  9. Best Language: 2-->Severe aphasia, all communication is through fragmentary expression, great need for inference, questioning, and guessing by the listener. Range of information that can be exchanged is limited, listener carries burden of. . . (see row details)  10. Dysarthria: 2-->Severe dysarthria, patients speech is so slurred as to be unintelligible in the absence of or out of proportion to any dysphasia, or is mute/anarthric  11. Extinction and Inattention (formerly Neglect): 0-->No abnormality  Total (NIH Stroke Scale): 21        Modified [place] Score: 5  [place] Coma Scale:    ABCD2 Score:    [id number] Score:   HAS -BLED Score:   ICH Score:   Hunt & Jones Classification:       Assessment/Plan:     Diagnostic Results:      Brain Imaging   CT Head without 1/15 (07:43)  Evolving moderate-sized region of acute infarction in the left MCA territory as above.  Distribution grossly stable from recent MRI without significant infarct extension.  No hemorrhagic conversion or new territorial infarct.     MRI Brain 1/14 (04:28)  There is interval change, there is interval development of acute ischemia/infarct along the left MCA distribution, this is superimposed on subacute ischemic change seen on the recent MRI examination  Findings concerning for occlusion of the M2 segment of the left MCA as above.    Additional chronic changes are noted.     Vessel Imaging   CTA Head and Neck 1/14  (02:43)  As on the recent brain MRA examination there is appearance of attenuation of the left MCA branch vessels without evidence for high-grade stenosis or occlusion and otherwise there is no evidence for high-grade stenosis or occlusion of the major arterial vascular structures of the head or neck.    There is no evidence for intracranial aneurysm or AVM.    Findings consistent with acute to subacute left parietal ischemic change again noted.    Small thyroid nodules or cysts.     Cardiac Imaging  TTE 1/11  · Normal left ventricular systolic function. The estimated ejection fraction is 55%.  · Mid anteroseptal and apical moderate hypokinesia.  · Diastolic pattern consistent with atrial fibrillation observed.  · Severe left atrial enlargement.  · Normal right ventricular systolic function.  · Severe right atrial enlargement.  · Mild-to-moderate mitral regurgitation.  · Mild tricuspid regurgitation.  · The estimated PA systolic pressure is 40 mmHg.  Intermediate central venous pressure (8 mmHg).        Interventions: None    Complications: None    Disposition: Home or Self Care    Final Active Diagnoses:    Diagnosis Date Noted POA    PRINCIPAL PROBLEM:  Embolic stroke involving left middle cerebral artery [I63.412] 01/10/2020 Yes    Alteration in skin integrity [R23.9] 01/30/2020 Yes    PEG (percutaneous endoscopic gastrostomy) adjustment/replacement/removal [Z43.1]  Not Applicable    Oral phase dysphagia [R13.11]  Yes    Right spastic hemiparesis [G81.11] 01/14/2020 Yes    Aphasia [R47.01] 01/14/2020 Yes    Acquired hypothyroidism [E03.9] 01/14/2020 Yes    Hypercoagulable state [D68.59] 01/14/2020 Yes    Acute ischemic left middle cerebral artery (MCA) stroke [I63.512] 01/14/2020 Yes    Cytotoxic cerebral edema [G93.6] 01/14/2020 Yes    Chronic anticoagulation [Z79.01] 01/14/2020 Not Applicable    Essential hypertension [I10] 01/10/2020 Yes     Chronic    Type 2 diabetes mellitus with circulatory  disorder, 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] 01/10/2020 Yes     Chronic    Atrial fibrillation [I48.91] 01/10/2020 Yes      Problems Resolved During this Admission:    Diagnosis Date Noted Date Resolved POA    Constipation [K59.00] 01/26/2020 01/27/2020 Yes    Oxygen desaturation [R09.02] 01/26/2020 01/29/2020 No    Hyponatremia [E87.1] 01/14/2020 01/20/2020 Yes     No new Assessment & Plan notes have been filed under this hospital service since the last note was generated.  Service: Vascular Neurology      Recommendations:     Post-discharge complication risks: Falls, Pneumonia, Seizure, Skin breakdown, Urinary tract infections    Stroke Education given to: patient aphasic, attempted to contact emergency contact (nephew) but phone call was not answered or returned, left stroke number for nephew on voicemail    Follow-up in Stroke Clinic in 4-6 weeks.     Discharge Plan:  Statin: Atorvastatin 40mg  Anticoagulant: Eliquis 5 mg BID  Aggresive risk factor modification:  Hypertension  Atrial Fibrillation    Follow Up:  Follow-up Information     Schedule an appointment as soon as possible for a visit with Dany Marinelli Iii, MD.    Specialty:  Internal Medicine  Why:  Please call to schedule hosptial follow up 7-10 days after discharge from facility  Contact information:  [address]  [address] [address] [phone]             [facility] VASCULAR NEUROLOGY.    Specialty:  Vascular Neurology  Why:  Someone will contact you to schedule your stroke follow up appointment. Please contact phone number listed if you do not receive a phone call within 1 week  Contact information:  [address]  [facility] [id number] [phone]                 Patient Instructions:      Diet NPO   Order Comments: Use peg tube for medications and nutrition     Activity as tolerated       Medications:  Reconciled Home Medications:      Medication List      START taking these medications     albuterol-ipratropium 2.5 mg-0.5 mg/3 mL nebulizer solution  Commonly known as:  DUO-NEB  Take 3 mLs by nebulization every 6 (six) hours as needed for Wheezing. Rescue     apixaban 5 mg Tab  Commonly known as:  ELIQUIS  1 tablet (5 mg total) by Per G Tube route 2 (two) times daily.     atorvastatin 40 MG tablet  Commonly known as:  LIPITOR  1 tablet (40 mg total) by Per G Tube route once daily.     metoprolol tartrate 25 MG tablet  Commonly known as:  LOPRESSOR  1 tablet (25 mg total) by Per G Tube route 2 (two) times daily.     polyethylene glycol 17 gram Pwpk  Commonly known as:  GLYCOLAX  17 g by Per G Tube route daily as needed.     QUEtiapine 25 MG Tab  Commonly known as:  SEROQUEL  1 tablet (25 mg total) by Per G Tube route nightly as needed (for aggitation).     senna-docusate 8.6-50 mg 8.6-50 mg per tablet  Commonly known as:  PERICOLACE  1 tablet by Per G Tube route 2 (two) times daily as needed for Constipation.        CHANGE how you take these medications    allopurinoL 300 MG tablet  Commonly known as:  ZYLOPRIM  1 tablet (300 mg total) by Per G Tube route once daily.  What changed:    · how to take this  · when to take this     levothyroxine 75 MCG tablet  Commonly known as:  SYNTHROID  1 tablet (75 mcg total) by Per G Tube route once daily.  What changed:  how to take this        STOP taking these medications    amLODIPine 5 MG tablet  Commonly known as:  NORVASC     enalapril 20 MG tablet  Commonly known as:  VASOTEC     glipiZIDE 2.5 MG tablet  Commonly known as:  GLUCOTROL     magnesium 30 mg Tab     METOPROLOL SUCCINATE ORAL     spironolactone 25 MG tablet  Commonly known as:  ALDACTONE     traMADol 50 mg tablet  Commonly known as:  ULTRAM     warfarin 5 MG tablet  Commonly known as:  COUMADIN            Norma Pedraza PA-C  [facility]  Department of Vascular Neurology   [facility]

## 2020-01-31 NOTE — PHYSICIAN QUERY
PT Name: Chirag Thompson  MR #: [id number]     PHYSICIAN QUERY - INTEGUMENTARY CLARIFICATION     CDS/: Juliet Terrazas RN, CDS               Contact information: [email or web address]  This form is a permanent document in the medical record.     Query Date: January 31, 2020    By submitting this query, we are merely seeking further clarification of documentation.  Please utilize your independent clinical judgment when addressing the question(s) below.    The Medical Record contains the following:   Indicators   Supporting Clinical Findings Location in Medical Record    Redness     x Decubitus, Pressure, Ulcer, etc. --Patient has multiple bruises and a pressure injury to his left gluteal area.    Nursing POC Note 1/30   x Deep Tissue Injury --Sacral area              -with suspected deep tissue injury that appears to be acquired during this hospital stay.                              -Sacral 8x10 evolving deep tissue injury Wound Care note 1/30        Nursing Home Order Note 1/30     x Wound care consult    Date First Assessed/Time First Assessed: 01/29/20 2100     Pressure Injury Present on Admission: suspected hospital acquired    Side: Left         Location: Sacral spine    Is this injury device related?: No    Staging: Suspected DTPI   Wound Image                            Staging Suspected DTPI   Appearance Purple;Maroon;Red;Moist;Blistered   Periwound Area Intact   Wound Edges Open;Irregular   Wound Length (cm) 10 cm   Wound Width (cm) 8 cm   Wound Depth (cm) 0.1 cm        Wound Care Note 1/30   x Medication: --Apply venelex BID Nursing Home Order Note 1/30     x Treatment: --Barrier cream applied on penis, scrotum and sacral area. Protective drsg applied to sacral. Patient on waffle mattress    --Patient needs low air loss bed with turning schedule Nursing Note 1/25        Nursing Home Order Note 1/30    Other:          National Pressure Ulcer Advisory Panel (2007) Pressure Ulcer Definitions &  Stages:  Stage I:                     Intact skin with non-blanchable redness of a localized area usually over a bony prominence.   Stage II:                    Partial thickness loss of dermis presenting as a shallow open ulcer with a red pink wound bed, without slough.   Stage III:                   Full thickness tissue loss. Subcutaneous fat may be visible but bone, tendon or muscle is not exposed. Slough may be                                      present but does not obscure the depth of tissue loss. May include undermining and tunneling.  Stage IV:                  Full thickness tissue loss with exposed bone, tendon or muscle. Slough or eschar may be present on some parts of the                                      wound bed. Often include undermining and tunneling.  Unstageable:           Full thickness tissue loss but base of ulcer is covered by slough and/or eschar in the wound bed. Until enough                                        slough and/or eschar is removed to expose wound base, its true depth, and therefore stage, cannot be determined  Deep Tissue Injury: Purple or maroon localized area of discolored intact skin or blood-filled blister due to damage of underlying soft tissue   from pressure and/or shear.  Suspected deep tissue injuries may develop into a stageable ulcer or turn out to be another diagnosis (e.g. an ecchymosis)     Provider, please clarify/ provide the diagnosis related to the documentation:    [  x ] Decubitus (Pressure) Ulcer   [   ] Deep Tissue Pressure Injury  [   ] Deep Tissue Pressure Injury that transformed into a Decubitus (Pressure) Ulcer  [   ] Other Integumentary Diagnosis (please specify):______________  [   ] Diagnosis Clinically Undetermined      Please document in your progress notes daily for the duration of treatment until resolved and include in your discharge summary.

## 2020-01-31 NOTE — PROGRESS NOTES
Saline lock and telemetry monitor were dc'd. Peg tube feeding was disconnected and flushed with sterile water.Pt awake,alert,non verbal.No distress noted,breathing unlabored.Attempted to call report to [facility],but unsuccessful.Nurse stated that she was not able to take report at this time and would call back to get report.Informed nurse that the pt is set to discharge at 6:30.Will continue to monitor.

## 2020-01-31 NOTE — PT/OT/SLP DISCHARGE
Occupational Therapy Discharge Summary    Chirag Thompson  MRN: [id number]   Principal Problem: Embolic stroke involving left middle cerebral artery      Patient Discharged from acute Occupational Therapy on 1/30/2020.  Please refer to prior OT note dated 1/29/2020 for functional status.    Assessment:      Goals partially met.    Objective:     GOALS:   Multidisciplinary Problems     Occupational Therapy Goals     Not on file          Multidisciplinary Problems (Resolved)        Problem: Occupational Therapy Goal    Goal Priority Disciplines Outcome Interventions   Occupational Therapy Goal   (Resolved)     OT, PT/OT Met    Description:  Goals to be met by: 2/4 (revision completed 1/28)    Patient will increase functional independence with ADLs by performing:    UE Dressing while seated EOB with Moderate Assistance.  Grooming while EOB with Moderate Assistance. Met  *revised: with set up and SBA for oral care.  Sitting at edge of bed x10 minutes with Moderate Assistance. Met  *revised: CGA for 15 min for dynamic, functional task.  Rolling to Bilateral with Moderate Assistance and use of bedrail as needed.   Supine to sit with Moderate Assistance.  Squat pivot transfers with Maximum Assistance.   Pt will consistently be found positioned according to turning schedule to prevent skin breakdown.   Pt will have skin breakdown measures in place upon entry for 2 therapy sessions. (waffle mattress, heel protector boot, turning schedule).                        Reasons for Discontinuation of Therapy Services  Transfer to alternate level of care.      Plan:     Patient Discharged to: Skilled Nursing Facility [facility]    KATELYN Ivey  1/31/2020

## 2020-02-03 ENCOUNTER — HOSPITAL ENCOUNTER (INPATIENT)
Facility: HOSPITAL | Age: 80
LOS: 8 days | Discharge: HOME HEALTH | DRG: 189 | End: 2020-02-11
Attending: EMERGENCY MEDICINE | Admitting: INTERNAL MEDICINE
Payer: MEDICARE

## 2020-02-03 DIAGNOSIS — I48.19 OTHER PERSISTENT ATRIAL FIBRILLATION: ICD-10-CM

## 2020-02-03 DIAGNOSIS — E43 SEVERE PROTEIN-CALORIE MALNUTRITION: ICD-10-CM

## 2020-02-03 DIAGNOSIS — G81.11 RIGHT SPASTIC HEMIPARESIS: ICD-10-CM

## 2020-02-03 DIAGNOSIS — Z86.73 CHRONIC ISCHEMIC LEFT MCA STROKE: ICD-10-CM

## 2020-02-03 DIAGNOSIS — R06.02 SHORTNESS OF BREATH: ICD-10-CM

## 2020-02-03 DIAGNOSIS — J90 PLEURAL EFFUSION: ICD-10-CM

## 2020-02-03 DIAGNOSIS — R41.82 ALTERED MENTAL STATUS: ICD-10-CM

## 2020-02-03 DIAGNOSIS — Z43.1 PEG (PERCUTANEOUS ENDOSCOPIC GASTROSTOMY) ADJUSTMENT/REPLACEMENT/REMOVAL: ICD-10-CM

## 2020-02-03 DIAGNOSIS — I50.9 CONGESTIVE HEART FAILURE, UNSPECIFIED HF CHRONICITY, UNSPECIFIED HEART FAILURE TYPE: ICD-10-CM

## 2020-02-03 DIAGNOSIS — J90 PLEURAL EFFUSION, BILATERAL: Primary | ICD-10-CM

## 2020-02-03 DIAGNOSIS — R82.81 PYURIA: ICD-10-CM

## 2020-02-03 DIAGNOSIS — R31.9 HEMATURIA, UNSPECIFIED TYPE: ICD-10-CM

## 2020-02-03 LAB
ALBUMIN SERPL BCP-MCNC: 2.4 G/DL (ref 3.5–5.2)
ALLENS TEST: ABNORMAL
ALP SERPL-CCNC: 118 U/L (ref 55–135)
ALT SERPL W/O P-5'-P-CCNC: 43 U/L (ref 10–44)
ANION GAP SERPL CALC-SCNC: 3 MMOL/L (ref 8–16)
APTT BLDCRRT: 30.8 SEC (ref 21–32)
AST SERPL-CCNC: 48 U/L (ref 10–40)
BACTERIA #/AREA URNS HPF: ABNORMAL /HPF
BASOPHILS # BLD AUTO: 0.04 K/UL (ref 0–0.2)
BASOPHILS NFR BLD: 0.3 % (ref 0–1.9)
BILIRUB SERPL-MCNC: 1.9 MG/DL (ref 0.1–1)
BILIRUB UR QL STRIP: NEGATIVE
BNP SERPL-MCNC: 259 PG/ML (ref 0–99)
BUN SERPL-MCNC: 30 MG/DL (ref 8–23)
CALCIUM SERPL-MCNC: 8.6 MG/DL (ref 8.7–10.5)
CHLORIDE SERPL-SCNC: 100 MMOL/L (ref 95–110)
CLARITY UR: ABNORMAL
CO2 SERPL-SCNC: 30 MMOL/L (ref 23–29)
COLOR UR: ABNORMAL
CREAT SERPL-MCNC: 0.8 MG/DL (ref 0.5–1.4)
CTP QC/QA: YES
DELSYS: ABNORMAL
DIFFERENTIAL METHOD: ABNORMAL
EOSINOPHIL # BLD AUTO: 0.5 K/UL (ref 0–0.5)
EOSINOPHIL NFR BLD: 4.2 % (ref 0–8)
ERYTHROCYTE [DISTWIDTH] IN BLOOD BY AUTOMATED COUNT: 15.6 % (ref 11.5–14.5)
ERYTHROCYTE [SEDIMENTATION RATE] IN BLOOD BY WESTERGREN METHOD: 28 MM/H
EST. GFR  (AFRICAN AMERICAN): >60 ML/MIN/1.73 M^2
EST. GFR  (NON AFRICAN AMERICAN): >60 ML/MIN/1.73 M^2
FLOW: 2
GLUCOSE SERPL-MCNC: 231 MG/DL (ref 70–110)
GLUCOSE UR QL STRIP: ABNORMAL
HCO3 UR-SCNC: 27.3 MMOL/L (ref 24–28)
HCT VFR BLD AUTO: 39.5 % (ref 40–54)
HGB BLD-MCNC: 13 G/DL (ref 14–18)
HGB UR QL STRIP: ABNORMAL
HYALINE CASTS #/AREA URNS LPF: 0 /LPF
IMM GRANULOCYTES # BLD AUTO: 0.16 K/UL (ref 0–0.04)
IMM GRANULOCYTES NFR BLD AUTO: 1.3 % (ref 0–0.5)
INR PPP: 1.2 (ref 0.8–1.2)
KETONES UR QL STRIP: NEGATIVE
LACTATE SERPL-SCNC: 1.4 MMOL/L (ref 0.5–2.2)
LEUKOCYTE ESTERASE UR QL STRIP: ABNORMAL
LYMPHOCYTES # BLD AUTO: 1 K/UL (ref 1–4.8)
LYMPHOCYTES NFR BLD: 7.7 % (ref 18–48)
MAGNESIUM SERPL-MCNC: 1.8 MG/DL (ref 1.6–2.6)
MCH RBC QN AUTO: 33.7 PG (ref 27–31)
MCHC RBC AUTO-ENTMCNC: 32.9 G/DL (ref 32–36)
MCV RBC AUTO: 102 FL (ref 82–98)
MICROSCOPIC COMMENT: ABNORMAL
MODE: ABNORMAL
MONOCYTES # BLD AUTO: 1.4 K/UL (ref 0.3–1)
MONOCYTES NFR BLD: 11.1 % (ref 4–15)
NEUTROPHILS # BLD AUTO: 9.4 K/UL (ref 1.8–7.7)
NEUTROPHILS NFR BLD: 75.4 % (ref 38–73)
NITRITE UR QL STRIP: NEGATIVE
NRBC BLD-RTO: 0 /100 WBC
PCO2 BLDA: 31.7 MMHG (ref 35–45)
PH SMN: 7.54 [PH] (ref 7.35–7.45)
PH UR STRIP: 6 [PH] (ref 5–8)
PLATELET # BLD AUTO: 149 K/UL (ref 150–350)
PMV BLD AUTO: 11.7 FL (ref 9.2–12.9)
PO2 BLDA: 63 MMHG (ref 80–100)
POC BE: 5 MMOL/L
POC MOLECULAR INFLUENZA A AGN: NEGATIVE
POC MOLECULAR INFLUENZA B AGN: NEGATIVE
POC SATURATED O2: 95 % (ref 95–100)
POC TCO2: 28 MMOL/L (ref 23–27)
POTASSIUM SERPL-SCNC: 4.2 MMOL/L (ref 3.5–5.1)
PROCALCITONIN SERPL IA-MCNC: 0.17 NG/ML
PROT SERPL-MCNC: 5.6 G/DL (ref 6–8.4)
PROT UR QL STRIP: ABNORMAL
PROTHROMBIN TIME: 12.6 SEC (ref 9–12.5)
RBC # BLD AUTO: 3.86 M/UL (ref 4.6–6.2)
RBC #/AREA URNS HPF: >100 /HPF (ref 0–4)
SAMPLE: ABNORMAL
SITE: ABNORMAL
SODIUM SERPL-SCNC: 133 MMOL/L (ref 136–145)
SP GR UR STRIP: 1.02 (ref 1–1.03)
SP02: 96
SQUAMOUS #/AREA URNS HPF: 4 /HPF
TROPONIN I SERPL DL<=0.01 NG/ML-MCNC: 0.04 NG/ML (ref 0–0.03)
TROPONIN I SERPL DL<=0.01 NG/ML-MCNC: 0.05 NG/ML (ref 0–0.03)
URN SPEC COLLECT METH UR: ABNORMAL
UROBILINOGEN UR STRIP-ACNC: ABNORMAL EU/DL
WBC # BLD AUTO: 12.47 K/UL (ref 3.9–12.7)
WBC #/AREA URNS HPF: >100 /HPF (ref 0–5)

## 2020-02-03 PROCEDURE — 87040 BLOOD CULTURE FOR BACTERIA: CPT | Mod: 59

## 2020-02-03 PROCEDURE — 99900035 HC TECH TIME PER 15 MIN (STAT)

## 2020-02-03 PROCEDURE — 85025 COMPLETE CBC W/AUTO DIFF WBC: CPT

## 2020-02-03 PROCEDURE — 94761 N-INVAS EAR/PLS OXIMETRY MLT: CPT

## 2020-02-03 PROCEDURE — 85730 THROMBOPLASTIN TIME PARTIAL: CPT

## 2020-02-03 PROCEDURE — 94640 AIRWAY INHALATION TREATMENT: CPT

## 2020-02-03 PROCEDURE — 93010 EKG 12-LEAD: ICD-10-PCS | Mod: ,,, | Performed by: INTERNAL MEDICINE

## 2020-02-03 PROCEDURE — 21400001 HC TELEMETRY ROOM

## 2020-02-03 PROCEDURE — 96374 THER/PROPH/DIAG INJ IV PUSH: CPT

## 2020-02-03 PROCEDURE — 81000 URINALYSIS NONAUTO W/SCOPE: CPT

## 2020-02-03 PROCEDURE — 36600 WITHDRAWAL OF ARTERIAL BLOOD: CPT

## 2020-02-03 PROCEDURE — 82803 BLOOD GASES ANY COMBINATION: CPT

## 2020-02-03 PROCEDURE — 25000003 PHARM REV CODE 250: Performed by: EMERGENCY MEDICINE

## 2020-02-03 PROCEDURE — 25000242 PHARM REV CODE 250 ALT 637 W/ HCPCS: Performed by: EMERGENCY MEDICINE

## 2020-02-03 PROCEDURE — 36415 COLL VENOUS BLD VENIPUNCTURE: CPT

## 2020-02-03 PROCEDURE — 63600175 PHARM REV CODE 636 W HCPCS: Performed by: EMERGENCY MEDICINE

## 2020-02-03 PROCEDURE — 84484 ASSAY OF TROPONIN QUANT: CPT | Mod: 91

## 2020-02-03 PROCEDURE — 87086 URINE CULTURE/COLONY COUNT: CPT

## 2020-02-03 PROCEDURE — 84145 PROCALCITONIN (PCT): CPT

## 2020-02-03 PROCEDURE — 80053 COMPREHEN METABOLIC PANEL: CPT

## 2020-02-03 PROCEDURE — 27000221 HC OXYGEN, UP TO 24 HOURS

## 2020-02-03 PROCEDURE — 99285 EMERGENCY DEPT VISIT HI MDM: CPT | Mod: 25

## 2020-02-03 PROCEDURE — 93010 ELECTROCARDIOGRAM REPORT: CPT | Mod: ,,, | Performed by: INTERNAL MEDICINE

## 2020-02-03 PROCEDURE — 93005 ELECTROCARDIOGRAM TRACING: CPT

## 2020-02-03 PROCEDURE — 83735 ASSAY OF MAGNESIUM: CPT

## 2020-02-03 PROCEDURE — 83605 ASSAY OF LACTIC ACID: CPT

## 2020-02-03 PROCEDURE — 83880 ASSAY OF NATRIURETIC PEPTIDE: CPT

## 2020-02-03 PROCEDURE — 85610 PROTHROMBIN TIME: CPT

## 2020-02-03 PROCEDURE — 25000242 PHARM REV CODE 250 ALT 637 W/ HCPCS: Performed by: INTERNAL MEDICINE

## 2020-02-03 RX ORDER — POLYETHYLENE GLYCOL 3350 17 G/17G
17 POWDER, FOR SOLUTION ORAL DAILY
Status: DISCONTINUED | OUTPATIENT
Start: 2020-02-04 | End: 2020-02-07

## 2020-02-03 RX ORDER — ALLOPURINOL 100 MG/1
300 TABLET ORAL DAILY
Status: DISCONTINUED | OUTPATIENT
Start: 2020-02-04 | End: 2020-02-11 | Stop reason: HOSPADM

## 2020-02-03 RX ORDER — LEVOTHYROXINE SODIUM 75 UG/1
75 TABLET ORAL DAILY
Status: DISCONTINUED | OUTPATIENT
Start: 2020-02-04 | End: 2020-02-11 | Stop reason: HOSPADM

## 2020-02-03 RX ORDER — POLYETHYLENE GLYCOL 3350 17 G/17G
17 POWDER, FOR SOLUTION ORAL DAILY
Status: DISCONTINUED | OUTPATIENT
Start: 2020-02-04 | End: 2020-02-03

## 2020-02-03 RX ORDER — AMOXICILLIN 250 MG
1 CAPSULE ORAL 2 TIMES DAILY PRN
Status: DISCONTINUED | OUTPATIENT
Start: 2020-02-03 | End: 2020-02-07

## 2020-02-03 RX ORDER — IPRATROPIUM BROMIDE AND ALBUTEROL SULFATE 2.5; .5 MG/3ML; MG/3ML
3 SOLUTION RESPIRATORY (INHALATION)
Status: COMPLETED | OUTPATIENT
Start: 2020-02-03 | End: 2020-02-03

## 2020-02-03 RX ORDER — IPRATROPIUM BROMIDE AND ALBUTEROL SULFATE 2.5; .5 MG/3ML; MG/3ML
3 SOLUTION RESPIRATORY (INHALATION) EVERY 4 HOURS
Status: DISCONTINUED | OUTPATIENT
Start: 2020-02-03 | End: 2020-02-11 | Stop reason: HOSPADM

## 2020-02-03 RX ORDER — HYDROCODONE BITARTRATE AND ACETAMINOPHEN 5; 325 MG/1; MG/1
1 TABLET ORAL EVERY 4 HOURS PRN
Status: DISCONTINUED | OUTPATIENT
Start: 2020-02-03 | End: 2020-02-04

## 2020-02-03 RX ORDER — METOPROLOL TARTRATE 25 MG/1
25 TABLET, FILM COATED ORAL 2 TIMES DAILY
Status: DISCONTINUED | OUTPATIENT
Start: 2020-02-03 | End: 2020-02-04

## 2020-02-03 RX ORDER — ALBUTEROL SULFATE 2.5 MG/.5ML
5 SOLUTION RESPIRATORY (INHALATION)
Status: COMPLETED | OUTPATIENT
Start: 2020-02-03 | End: 2020-02-03

## 2020-02-03 RX ORDER — FUROSEMIDE 10 MG/ML
80 INJECTION INTRAMUSCULAR; INTRAVENOUS
Status: COMPLETED | OUTPATIENT
Start: 2020-02-03 | End: 2020-02-03

## 2020-02-03 RX ORDER — QUETIAPINE FUMARATE 25 MG/1
25 TABLET, FILM COATED ORAL NIGHTLY PRN
Status: DISCONTINUED | OUTPATIENT
Start: 2020-02-03 | End: 2020-02-04

## 2020-02-03 RX ORDER — SODIUM CHLORIDE 0.9 % (FLUSH) 0.9 %
10 SYRINGE (ML) INJECTION
Status: DISCONTINUED | OUTPATIENT
Start: 2020-02-03 | End: 2020-02-11 | Stop reason: HOSPADM

## 2020-02-03 RX ORDER — IPRATROPIUM BROMIDE AND ALBUTEROL SULFATE 2.5; .5 MG/3ML; MG/3ML
3 SOLUTION RESPIRATORY (INHALATION) EVERY 4 HOURS
Status: DISCONTINUED | OUTPATIENT
Start: 2020-02-03 | End: 2020-02-03

## 2020-02-03 RX ORDER — FUROSEMIDE 10 MG/ML
40 INJECTION INTRAMUSCULAR; INTRAVENOUS 2 TIMES DAILY
Status: DISCONTINUED | OUTPATIENT
Start: 2020-02-04 | End: 2020-02-04

## 2020-02-03 RX ORDER — FUROSEMIDE 10 MG/ML
40 INJECTION INTRAMUSCULAR; INTRAVENOUS 2 TIMES DAILY
Status: DISCONTINUED | OUTPATIENT
Start: 2020-02-03 | End: 2020-02-04

## 2020-02-03 RX ORDER — ATORVASTATIN CALCIUM 40 MG/1
40 TABLET, FILM COATED ORAL DAILY
Status: DISCONTINUED | OUTPATIENT
Start: 2020-02-04 | End: 2020-02-11 | Stop reason: HOSPADM

## 2020-02-03 RX ADMIN — FUROSEMIDE 40 MG: 10 INJECTION, SOLUTION INTRAVENOUS at 05:02

## 2020-02-03 RX ADMIN — METOPROLOL TARTRATE 25 MG: 25 TABLET ORAL at 10:02

## 2020-02-03 RX ADMIN — HYDROCODONE BITARTRATE AND ACETAMINOPHEN 1 TABLET: 5; 325 TABLET ORAL at 10:02

## 2020-02-03 RX ADMIN — FUROSEMIDE 80 MG: 10 INJECTION, SOLUTION INTRAVENOUS at 02:02

## 2020-02-03 RX ADMIN — ALBUTEROL SULFATE 5 MG: 2.5 SOLUTION RESPIRATORY (INHALATION) at 02:02

## 2020-02-03 RX ADMIN — IPRATROPIUM BROMIDE AND ALBUTEROL SULFATE 3 ML: .5; 3 SOLUTION RESPIRATORY (INHALATION) at 08:02

## 2020-02-03 RX ADMIN — IPRATROPIUM BROMIDE AND ALBUTEROL SULFATE 3 ML: .5; 3 SOLUTION RESPIRATORY (INHALATION) at 02:02

## 2020-02-03 RX ADMIN — APIXABAN 5 MG: 5 TABLET, FILM COATED ORAL at 10:02

## 2020-02-03 NOTE — ED PROVIDER NOTES
Encounter Date: 2/3/2020       History     Chief Complaint   Patient presents with    Shortness of Breath     EMS reports pt from [facility] today with shortness of breath, urinary retention and swollen testicles. pt recently had indwelling dean removed. aphasic from previous stroke. appears lethargic in triage.      HPI   This 79-year-old male presents to the emergency room with a history of a left middle cerebral artery stroke.  The patient has chronic right-sided hemiparesis.  He has chronic aphasia.  He is here because he is short of breath.  The patient can give no history.  History is taken from EMS.  He has multiple medical problems.  Please see below.  He has atrial fibrillation and is maintained on Eliquis.  Review of patient's allergies indicates:  No Known Allergies  Past Medical History:   Diagnosis Date    Acquired hypothyroidism 1/14/2020    Acute ischemic left middle cerebral artery (MCA) stroke 1/14/2020    Anticoagulant long-term use     eliquis    Arthritis     Chronic a-fib     Current use of long term anticoagulation     Embolic stroke involving left middle cerebral artery 1/10/2020    Hypertension     PEG (percutaneous endoscopic gastrostomy) adjustment/replacement/removal     Pressure ulcer of right leg, unspecified pressure ulcer stage     was going to [facility] healed now    Right spastic hemiparesis 1/14/2020    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 No pertinent family history.  Social History      Tobacco Use    Smoking status: Never Smoker    Smokeless tobacco: Never Used   Substance Use Topics    Alcohol use: Never     Frequency: Never    Drug use: Never     Review of Systems  The patient was questioned specifically with regard to the following.  General: Fever, chills, sweats. Neuro: Headache. Eyes: eye problems. ENT: Ear pain, sore throat. Cardiovascular: Chest pain. Respiratory: Cough, shortness of breath. Gastrointestinal: Abdominal pain, vomiting, diarrhea. Genitourinary: Painful urination.  Musculoskeletal: Arm and leg problems. Skin: Rash.  The review of systems was negative except for the following:  Shortness of breath, episode of blood from the penis, scrotal edema.   Physical Exam     Initial Vitals   BP Pulse Resp Temp SpO2   02/03/20 1351 02/03/20 1351 02/03/20 1351 02/03/20 1715 02/03/20 1351   124/81 100 (!) 30 99.7 °F (37.6 °C) 98 %      MAP       --                Physical Exam  The patient was examined specifically for the following:   General:No significant distress, Good color, Warm and dry. Head and neck:Scalp atraumatic, Neck supple. Neurological:Appropriate conversation, Gross motor deficits. Eyes:Conjugate gaze, Clear corneas. ENT: No epistaxis. Cardiac: Regular rate and rhythm, Grossly normal heart tones. Pulmonary: Wheezing, Rales. Gastrointestinal: Abdominal tenderness, Abdominal distention. Musculoskeletal: Extremity deformity, Apparent pain with range of motion of the joints. Skin: Rash.   The findings on examination were normal except for the following:  The patient mumbles.  He has bilateral wheezing.  There is a gastrostomy tube in place.  He has scrotal edema. There is some pitting edema around his buttocks.  The patient is morbidly obese.  The scalp is atraumatic.   ED Course   Procedures  Labs Reviewed   COMPREHENSIVE METABOLIC PANEL - Abnormal; Notable for the following components:       Result Value    Sodium 133 (*)     CO2 30 (*)     Glucose 231 (*)     BUN,  Bld 30 (*)     Calcium 8.6 (*)     Total Protein 5.6 (*)     Albumin 2.4 (*)     Total Bilirubin 1.9 (*)     AST 48 (*)     Anion Gap 3 (*)     All other components within normal limits   PROTIME-INR - Abnormal; Notable for the following components:    Prothrombin Time 12.6 (*)     All other components within normal limits   CBC W/ AUTO DIFFERENTIAL - Abnormal; Notable for the following components:    RBC 3.86 (*)     Hemoglobin 13.0 (*)     Hematocrit 39.5 (*)     Mean Corpuscular Volume 102 (*)     Mean Corpuscular Hemoglobin 33.7 (*)     RDW 15.6 (*)     Platelets 149 (*)     Immature Granulocytes 1.3 (*)     Gran # (ANC) 9.4 (*)     Immature Grans (Abs) 0.16 (*)     Mono # 1.4 (*)     Gran% 75.4 (*)     Lymph% 7.7 (*)     All other components within normal limits   B-TYPE NATRIURETIC PEPTIDE - Abnormal; Notable for the following components:     (*)     All other components within normal limits   TROPONIN I - Abnormal; Notable for the following components:    Troponin I 0.039 (*)     All other components within normal limits   URINALYSIS, REFLEX TO URINE CULTURE - Abnormal; Notable for the following components:    Color, UA Red (*)     Appearance, UA Cloudy (*)     Protein, UA 2+ (*)     Glucose, UA 2+ (*)     Occult Blood UA 3+ (*)     Urobilinogen, UA 4.0-6.0 (*)     Leukocytes, UA 2+ (*)     All other components within normal limits    Narrative:     Preferred Collection Type->Urine, Clean Catch   URINALYSIS MICROSCOPIC - Abnormal; Notable for the following components:    RBC, UA >100 (*)     WBC, UA >100 (*)     Bacteria Few (*)     All other components within normal limits    Narrative:     Preferred Collection Type->Urine, Clean Catch   ISTAT PROCEDURE - Abnormal; Notable for the following components:    POC PH 7.543 (*)     POC PCO2 31.7 (*)     POC PO2 63 (*)     POC TCO2 28 (*)     All other components within normal limits   APTT   MAGNESIUM   LACTIC ACID, PLASMA   POCT INFLUENZA A/B MOLECULAR      EKG Readings: (Independently Interpreted)   This patient is in atrial fibrillation with a heart rate of 81.  There are no significant ST segment or T-wave changes.  There is poor R-wave progression across the precordium.  The patient has an intraventricular conduction delay.  There is left axis deviation.     ECG Results          EKG 12-lead (In process)  Result time 02/04/20 14:58:53    In process by Interface, Lab In [facility] (02/04/20 14:58:53)                 Narrative:    Test Reason : R06.02,    Vent. Rate : 081 BPM     Atrial Rate : 089 BPM     P-R Int : 000 ms          QRS Dur : 116 ms      QT Int : 386 ms       P-R-T Axes : 000 -49 -18 degrees     QTc Int : 448 ms    Atrial fibrillation  Left axis deviation  Cannot rule out Anterior infarct ,age undetermined  Abnormal ECG  When compared with ECG of 30-JAN-2020 08:05,  Significant changes have occurred    Referred By: [email or web address]   SELF           Confirmed By:                   In process by Interface, Lab In [facility] (02/04/20 14:54:05)                 Narrative:    Test Reason : R06.02,    Vent. Rate : 081 BPM     Atrial Rate : 089 BPM     P-R Int : 000 ms          QRS Dur : 116 ms      QT Int : 386 ms       P-R-T Axes : 000 -49 -18 degrees     QTc Int : 448 ms    Atrial fibrillation  Left axis deviation  Cannot rule out Anterior infarct ,age undetermined  Abnormal ECG  When compared with ECG of 30-JAN-2020 08:05,  Significant changes have occurred    Referred By: [email or web address]   SELF           Confirmed By:                             Imaging Results          X-Ray Chest AP Portable (Final result)  Result time 02/03/20 16:56:38    Final result by Keith Tai MD (02/03/20 16:56:38)                 Impression:      1. Suboptimal inspiration with mild bibasilar effusions and probable atelectasis.  2. Stable cardiomegaly.      Electronically signed by: Keith Tai  Date:    02/03/2020  Time:    16:56             Narrative:    EXAMINATION:  XR CHEST AP  PORTABLE    CLINICAL HISTORY:  chest pain;    TECHNIQUE:  Single frontal view of the chest was performed.    COMPARISON:  01/27/2020    FINDINGS:  Suboptimal inspiration with bibasilar pleural effusions.    Heart is mildly enlarged.  No edema.    No mass or lobar consolidation.    Bibasilar mild airspace disease likely represents atelectasis.    No acute osseous abnormality.                              Medical decision making:  Given the above, this patient presents to the emergency room with multiple complaints according to the nursing home.  The patient cannot really give complaints. He looks sleepy a little distracted any mumbles.  Chest x-ray reveals bilateral pleural effusions.  The patient's oxygen saturation is 98%.  He has respiratory rate of 30 he looks slightly tachypneic pulmonary examination reveal bilateral wheezing.  He responded nicely to albuterol.  His heart rate remains around 100.  The BNP is almost normal 269 the patient is very slightly elevated troponin.  These values are of uncertain significance.  He has a normal lactate and no fever.  Rectal temperature was 99.7°.  I am concerned the patient may have a urinary tract infection.  He has 100 red cells 100 white cells and few bacteria in the urine.  I spoke with in the family, Vanessa Thompson, who reports that the patient seems confused and has been mumbling for about 3 weeks.  The nursing home felt that he had a urinary tract infection and they treated him with IV fluids.  There were concerned today that they may have over treated him.  The patient does have some pitting edema around the buttocks in the low thoracic back.  I will admit the patient for diuresis.  I will treat with IV antibiotics, diuretics and bronchodilators.  I will present the case to Dr. Ashton.                                     Clinical Impression:       ICD-10-CM ICD-9-CM   1. Pleural effusion, bilateral J90 511.9   2. Shortness of breath R06.02 786.05   3. Altered  mental status R41.82 780.97   4. Pyuria R82.81 791.9   5. Hematuria, unspecified type R31.9 599.70   6. Pleural effusion J90 511.9                             Rogelio Miller MD  02/04/20 1638       Rogelio Miller MD  02/04/20 1642

## 2020-02-03 NOTE — PHYSICIAN QUERY
PT Name: Chirag Thompson  MR #: [id number]     PHYSICIAN QUERY -  INTEGUMENTARY CLARIFICATION     CDS/: Juliet Terrazas RN, CDS               Contact information: [email or web address]  This form is a permanent document in the medical record.     Query Date: February 3, 2020    By submitting this query, we are merely seeking further clarification of documentation.  Please utilize your independent clinical judgment when addressing the question(s) below.  The Medical Record contains the following:   Indicators   Supporting Clinical Findings Location in Medical Record    Redness     x Decubitus, Pressure, Ulcer, etc. --Patient has multiple bruises and a pressure injury to his left gluteal area.    --Wound care consulted for RLE abrasions, sacral pressure injury discovered during wound care exam.    --[  x ] Decubitus (Pressure) Ulcer   Nursing POC Note 1/30      Discharge Summary 1/30      Query 1/31   x Deep Tissue Injury --Sacral area              -with suspected deep tissue injury that appears to be acquired during this hospital stay.   Wound Care note 1/30   x Wound care consult Date First Assessed/Time First Assessed: 01/29/20 2100     Pressure Injury Present on Admission: suspected hospital acquired    Side: Left         Location: Sacral spine    Is this injury device related?: No    Staging: Suspected DTPI   Wound Image                            Staging Suspected DTPI   Appearance Purple;Maroon;Red;Moist;Blistered   Periwound Area Intact   Wound Edges Open;Irregular   Wound Length (cm) 10 cm   Wound Width (cm) 8 cm   Wound Depth (cm) 0.1 cm      Wound Care note 1/30   x Medication: --Apply venelex BID Nursing Home Order Note 1/30   x Treatment: --Barrier cream applied on penis, scrotum and sacral area. Protective drsg applied to sacral. Patient on waffle mattress   Nursing Note 1/25    Other:          National Pressure Ulcer Advisory Panel (2007) Pressure Ulcer Definitions & Stages:  Stage I:                      Intact skin with non-blanchable redness of a localized area usually over a bony prominence.   Stage II:                    Partial thickness loss of dermis presenting as a shallow open ulcer with a red pink wound bed, without slough.   Stage III:                   Full thickness tissue loss. Subcutaneous fat may be visible but bone, tendon or muscle is not exposed. Slough may be                                      present but does not obscure the depth of tissue loss. May include undermining and tunneling.  Stage IV:                  Full thickness tissue loss with exposed bone, tendon or muscle. Slough or eschar may be present on some parts of the                                      wound bed. Often include undermining and tunneling.  Unstageable:           Full thickness tissue loss but base of ulcer is covered by slough and/or eschar in the wound bed. Until enough                                        slough and/or eschar is removed to expose wound base, its true depth, and therefore stage, cannot be determined  Deep Tissue Injury: Purple or maroon localized area of discolored intact skin or blood-filled blister due to damage of underlying soft tissue   from pressure and/or shear.  Suspected deep tissue injuries may develop into a stageable ulcer or turn out to be another diagnosis (e.g. an ecchymosis)       Provider, please specify the location, stage and POA status of the diagnosis:___[  x ] Decubitus (Pressure) Ulcer______       SITE LATERALITY STAGE PRESENT ON ADMISSION?   [ x ] buttock [ x ]  right       [ x ]  left [  ] 1         [ x ] 2  [  ] 3         [  ] 4  [  ]Unstageable [  ] yes    [  ] no      [x  ] Clinically Undetermined (W)                [  ] Documentation Insufficient (U)   [ x ] sacrum ------------N/A--------- [  ] 1         [ x ] 2  [  ] 3         [  ] 4  [  ]Unstageable   [  ] yes    [  ] no      [  x] Clinically Undetermined (W)                [  ] Documentation  Insufficient (U)   [  ] other site (Specify): ______ [  ]  right       [  ]  left [  ] 1         [  ] 2  [  ] 3         [  ] 4  [  ]Unstageable   [  ] yes    [  ] no      [  ] Clinically Undetermined (W)                [  ] Documentation Insufficient (U)         [  ] Clinically undetermined      Please document in your progress notes daily for the duration of treatment until resolved and include in your discharge summary.

## 2020-02-04 PROBLEM — D68.59 HYPERCOAGULABLE STATE: Chronic | Status: ACTIVE | Noted: 2020-01-14

## 2020-02-04 PROBLEM — I48.91 ATRIAL FIBRILLATION: Chronic | Status: ACTIVE | Noted: 2020-01-10

## 2020-02-04 PROBLEM — I63.9 COMPLETED STROKE: Status: ACTIVE | Noted: 2020-02-04

## 2020-02-04 PROBLEM — E03.9 ACQUIRED HYPOTHYROIDISM: Chronic | Status: ACTIVE | Noted: 2020-01-14

## 2020-02-04 LAB
ALBUMIN SERPL BCP-MCNC: 2.4 G/DL (ref 3.5–5.2)
ALP SERPL-CCNC: 117 U/L (ref 55–135)
ALT SERPL W/O P-5'-P-CCNC: 41 U/L (ref 10–44)
ANION GAP SERPL CALC-SCNC: 7 MMOL/L (ref 8–16)
AORTIC ROOT ANNULUS: 3.36 CM
AORTIC VALVE CUSP SEPERATION: 1.66 CM
ASCENDING AORTA: 3.38 CM
AST SERPL-CCNC: 40 U/L (ref 10–40)
AV INDEX (PROSTH): 0.66
AV MEAN GRADIENT: 2 MMHG
AV PEAK GRADIENT: 4 MMHG
AV VALVE AREA: 2.98 CM2
AV VELOCITY RATIO: 0.69
BASOPHILS # BLD AUTO: 0.04 K/UL (ref 0–0.2)
BASOPHILS NFR BLD: 0.3 % (ref 0–1.9)
BILIRUB DIRECT SERPL-MCNC: 1 MG/DL (ref 0.1–0.3)
BILIRUB SERPL-MCNC: 2.7 MG/DL (ref 0.1–1)
BSA FOR ECHO PROCEDURE: 2.19 M2
BUN SERPL-MCNC: 34 MG/DL (ref 8–23)
CALCIUM SERPL-MCNC: 8.6 MG/DL (ref 8.7–10.5)
CHLORIDE SERPL-SCNC: 100 MMOL/L (ref 95–110)
CO2 SERPL-SCNC: 30 MMOL/L (ref 23–29)
CREAT SERPL-MCNC: 1.1 MG/DL (ref 0.5–1.4)
CV ECHO LV RWT: 0.45 CM
DIFFERENTIAL METHOD: ABNORMAL
DOP CALC AO PEAK VEL: 1 M/S
DOP CALC AO VTI: 16.92 CM
DOP CALC LVOT AREA: 4.5 CM2
DOP CALC LVOT DIAMETER: 2.39 CM
DOP CALC LVOT PEAK VEL: 0.69 M/S
DOP CALC LVOT STROKE VOLUME: 50.4 CM3
DOP CALCLVOT PEAK VEL VTI: 11.24 CM
ECHO LV POSTERIOR WALL: 1.17 CM (ref 0.6–1.1)
EOSINOPHIL # BLD AUTO: 0.1 K/UL (ref 0–0.5)
EOSINOPHIL NFR BLD: 0.6 % (ref 0–8)
ERYTHROCYTE [DISTWIDTH] IN BLOOD BY AUTOMATED COUNT: 15.9 % (ref 11.5–14.5)
EST. GFR  (AFRICAN AMERICAN): >60 ML/MIN/1.73 M^2
EST. GFR  (NON AFRICAN AMERICAN): >60 ML/MIN/1.73 M^2
ESTIMATED AVG GLUCOSE: 128 MG/DL (ref 68–131)
FRACTIONAL SHORTENING: 23 % (ref 28–44)
GLUCOSE SERPL-MCNC: 175 MG/DL (ref 70–110)
HBA1C MFR BLD HPLC: 6.1 % (ref 4–5.6)
HCT VFR BLD AUTO: 37.9 % (ref 40–54)
HGB BLD-MCNC: 12.2 G/DL (ref 14–18)
IMM GRANULOCYTES # BLD AUTO: 0.15 K/UL (ref 0–0.04)
IMM GRANULOCYTES NFR BLD AUTO: 1.2 % (ref 0–0.5)
INTERVENTRICULAR SEPTUM: 1.09 CM (ref 0.6–1.1)
IVRT: 0.08 MSEC
LA MAJOR: 6.42 CM
LA MINOR: 4.75 CM
LA WIDTH: 4.37 CM
LDH SERPL L TO P-CCNC: 352 U/L (ref 110–260)
LEFT ATRIUM SIZE: 3.8 CM
LEFT ATRIUM VOLUME INDEX: 35.8 ML/M2
LEFT ATRIUM VOLUME: 77.07 CM3
LEFT INTERNAL DIMENSION IN SYSTOLE: 3.96 CM (ref 2.1–4)
LEFT VENTRICLE DIASTOLIC VOLUME INDEX: 59.48 ML/M2
LEFT VENTRICLE DIASTOLIC VOLUME: 128.02 ML
LEFT VENTRICLE MASS INDEX: 105 G/M2
LEFT VENTRICLE SYSTOLIC VOLUME INDEX: 31.7 ML/M2
LEFT VENTRICLE SYSTOLIC VOLUME: 68.28 ML
LEFT VENTRICULAR INTERNAL DIMENSION IN DIASTOLE: 5.17 CM (ref 3.5–6)
LEFT VENTRICULAR MASS: 226.89 G
LYMPHOCYTES # BLD AUTO: 0.6 K/UL (ref 1–4.8)
LYMPHOCYTES NFR BLD: 4.9 % (ref 18–48)
MCH RBC QN AUTO: 33.7 PG (ref 27–31)
MCHC RBC AUTO-ENTMCNC: 32.2 G/DL (ref 32–36)
MCV RBC AUTO: 105 FL (ref 82–98)
MONOCYTES # BLD AUTO: 1.4 K/UL (ref 0.3–1)
MONOCYTES NFR BLD: 11.6 % (ref 4–15)
NEUTROPHILS # BLD AUTO: 10.1 K/UL (ref 1.8–7.7)
NEUTROPHILS NFR BLD: 81.4 % (ref 38–73)
NRBC BLD-RTO: 0 /100 WBC
PISA TR MAX VEL: 2.05 M/S
PLATELET # BLD AUTO: 149 K/UL (ref 150–350)
PMV BLD AUTO: 11.8 FL (ref 9.2–12.9)
POCT GLUCOSE: 129 MG/DL (ref 70–110)
POCT GLUCOSE: 136 MG/DL (ref 70–110)
POCT GLUCOSE: 159 MG/DL (ref 70–110)
POCT GLUCOSE: 177 MG/DL (ref 70–110)
POTASSIUM SERPL-SCNC: 4.5 MMOL/L (ref 3.5–5.1)
PROCALCITONIN SERPL IA-MCNC: 0.33 NG/ML
PROT SERPL-MCNC: 5.8 G/DL (ref 6–8.4)
PV PEAK VELOCITY: 0.88 CM/S
RA MAJOR: 5.96 CM
RA WIDTH: 2.85 CM
RBC # BLD AUTO: 3.62 M/UL (ref 4.6–6.2)
RIGHT VENTRICULAR END-DIASTOLIC DIMENSION: 2.37 CM
RV TISSUE DOPPLER FREE WALL SYSTOLIC VELOCITY 1 (APICAL 4 CHAMBER VIEW): 7.91 CM/S
SINUS: 3.59 CM
SODIUM SERPL-SCNC: 137 MMOL/L (ref 136–145)
STJ: 2.73 CM
TR MAX PG: 17 MMHG
TRICUSPID ANNULAR PLANE SYSTOLIC EXCURSION: 1.13 CM
TROPONIN I SERPL DL<=0.01 NG/ML-MCNC: 0.05 NG/ML (ref 0–0.03)
WBC # BLD AUTO: 12.42 K/UL (ref 3.9–12.7)

## 2020-02-04 PROCEDURE — 80076 HEPATIC FUNCTION PANEL: CPT

## 2020-02-04 PROCEDURE — 85025 COMPLETE CBC W/AUTO DIFF WBC: CPT

## 2020-02-04 PROCEDURE — 84484 ASSAY OF TROPONIN QUANT: CPT

## 2020-02-04 PROCEDURE — 25000003 PHARM REV CODE 250: Performed by: HOSPITALIST

## 2020-02-04 PROCEDURE — 83615 LACTATE (LD) (LDH) ENZYME: CPT

## 2020-02-04 PROCEDURE — 80048 BASIC METABOLIC PNL TOTAL CA: CPT

## 2020-02-04 PROCEDURE — 21400001 HC TELEMETRY ROOM

## 2020-02-04 PROCEDURE — 25000242 PHARM REV CODE 250 ALT 637 W/ HCPCS: Performed by: INTERNAL MEDICINE

## 2020-02-04 PROCEDURE — 36415 COLL VENOUS BLD VENIPUNCTURE: CPT

## 2020-02-04 PROCEDURE — 94640 AIRWAY INHALATION TREATMENT: CPT

## 2020-02-04 PROCEDURE — 27000221 HC OXYGEN, UP TO 24 HOURS

## 2020-02-04 PROCEDURE — 84145 PROCALCITONIN (PCT): CPT

## 2020-02-04 PROCEDURE — 25000003 PHARM REV CODE 250: Performed by: INTERNAL MEDICINE

## 2020-02-04 PROCEDURE — 94761 N-INVAS EAR/PLS OXIMETRY MLT: CPT

## 2020-02-04 PROCEDURE — 83036 HEMOGLOBIN GLYCOSYLATED A1C: CPT

## 2020-02-04 PROCEDURE — 25000242 PHARM REV CODE 250 ALT 637 W/ HCPCS: Performed by: HOSPITALIST

## 2020-02-04 PROCEDURE — 63600175 PHARM REV CODE 636 W HCPCS: Performed by: HOSPITALIST

## 2020-02-04 RX ORDER — INSULIN ASPART 100 [IU]/ML
1-10 INJECTION, SOLUTION INTRAVENOUS; SUBCUTANEOUS EVERY 6 HOURS PRN
Status: DISCONTINUED | OUTPATIENT
Start: 2020-02-04 | End: 2020-02-11 | Stop reason: HOSPADM

## 2020-02-04 RX ORDER — FUROSEMIDE 10 MG/ML
40 INJECTION INTRAMUSCULAR; INTRAVENOUS DAILY
Status: DISCONTINUED | OUTPATIENT
Start: 2020-02-05 | End: 2020-02-05

## 2020-02-04 RX ORDER — GLUCAGON 1 MG
1 KIT INJECTION
Status: DISCONTINUED | OUTPATIENT
Start: 2020-02-04 | End: 2020-02-11 | Stop reason: HOSPADM

## 2020-02-04 RX ORDER — ACETAMINOPHEN 325 MG/1
650 TABLET ORAL EVERY 6 HOURS PRN
Status: DISCONTINUED | OUTPATIENT
Start: 2020-02-04 | End: 2020-02-07

## 2020-02-04 RX ADMIN — IPRATROPIUM BROMIDE AND ALBUTEROL SULFATE 3 ML: .5; 3 SOLUTION RESPIRATORY (INHALATION) at 12:02

## 2020-02-04 RX ADMIN — LEVOTHYROXINE SODIUM 75 MCG: 75 TABLET ORAL at 05:02

## 2020-02-04 RX ADMIN — HYDROCODONE BITARTRATE AND ACETAMINOPHEN 1 TABLET: 5; 325 TABLET ORAL at 12:02

## 2020-02-04 RX ADMIN — IPRATROPIUM BROMIDE AND ALBUTEROL SULFATE 3 ML: .5; 3 SOLUTION RESPIRATORY (INHALATION) at 11:02

## 2020-02-04 RX ADMIN — IPRATROPIUM BROMIDE AND ALBUTEROL SULFATE 3 ML: .5; 3 SOLUTION RESPIRATORY (INHALATION) at 04:02

## 2020-02-04 RX ADMIN — HYDROCODONE BITARTRATE AND ACETAMINOPHEN 1 TABLET: 5; 325 TABLET ORAL at 05:02

## 2020-02-04 RX ADMIN — IPRATROPIUM BROMIDE AND ALBUTEROL SULFATE 3 ML: .5; 3 SOLUTION RESPIRATORY (INHALATION) at 03:02

## 2020-02-04 RX ADMIN — ALLOPURINOL 300 MG: 100 TABLET ORAL at 12:02

## 2020-02-04 RX ADMIN — FUROSEMIDE 40 MG: 10 INJECTION, SOLUTION INTRAVENOUS at 12:02

## 2020-02-04 RX ADMIN — ATORVASTATIN CALCIUM 40 MG: 40 TABLET, FILM COATED ORAL at 12:02

## 2020-02-04 RX ADMIN — POLYETHYLENE GLYCOL 3350 17 G: 17 POWDER, FOR SOLUTION ORAL at 12:02

## 2020-02-04 RX ADMIN — IPRATROPIUM BROMIDE AND ALBUTEROL SULFATE 3 ML: .5; 3 SOLUTION RESPIRATORY (INHALATION) at 07:02

## 2020-02-04 RX ADMIN — APIXABAN 5 MG: 5 TABLET, FILM COATED ORAL at 08:02

## 2020-02-04 RX ADMIN — IPRATROPIUM BROMIDE AND ALBUTEROL SULFATE 3 ML: .5; 3 SOLUTION RESPIRATORY (INHALATION) at 08:02

## 2020-02-04 RX ADMIN — METOPROLOL TARTRATE 25 MG: 25 TABLET ORAL at 12:02

## 2020-02-04 RX ADMIN — CEFTRIAXONE 1 G: 1 INJECTION, SOLUTION INTRAVENOUS at 12:02

## 2020-02-04 NOTE — PLAN OF CARE
Problem: Wound  Goal: Optimal Wound Healing  Outcome: Ongoing, Progressing     Problem: Fall Injury Risk  Goal: Absence of Fall and Fall-Related Injury  Outcome: Ongoing, Progressing     Problem: Skin Injury Risk Increased  Goal: Skin Health and Integrity  Outcome: Ongoing, Progressing     Problem: Adult Inpatient Plan of Care  Goal: Plan of Care Review  Outcome: Ongoing, Progressing  Goal: Patient-Specific Goal (Individualization)  Outcome: Ongoing, Progressing  Goal: Absence of Hospital-Acquired Illness or Injury  Outcome: Ongoing, Progressing  Goal: Optimal Comfort and Wellbeing  Outcome: Ongoing, Progressing  Goal: Readiness for Transition of Care  Outcome: Ongoing, Progressing  Goal: Rounds/Family Conference  Outcome: Ongoing, Progressing     Problem: Diabetes Comorbidity  Goal: Blood Glucose Level Within Desired Range  Outcome: Ongoing, Progressing     Problem: Infection  Goal: Infection Symptom Resolution  Outcome: Ongoing, Progressing

## 2020-02-04 NOTE — NURSING
Patient bedside report has been completed [place]. Chart check has been completed. NAD noted. O2 at 3 liters via NC. Rodriguez in place and draining yellow urine. Pt's eyes are open and he follows to voice.

## 2020-02-04 NOTE — HPI
Chirag Thompson is a 79 y.o. male that (in part)  has a past medical history of Acquired hypothyroidism, Acute ischemic left middle cerebral artery (MCA) stroke, Anticoagulant long-term use, Arthritis, Chronic a-fib, Current use of long term anticoagulation, Embolic stroke involving left middle cerebral artery, Hypertension, PEG (percutaneous endoscopic gastrostomy) adjustment/replacement/removal, Pressure ulcer of right leg, unspecified pressure ulcer stage, Right spastic hemiparesis, Stroke due to embolism of left middle cerebral artery, Thyroid disease, and Type 2 diabetes mellitus with circulatory disorder, without long-term current use of insulin.  has a past surgical history that includes Hernia repair and Esophagogastroduodenoscopy w/ PEG (N/A, 1/23/2020). Presents to [facility]  by EMS from  [facility] today with report of both shortness of breath.  Associated symptoms include urinary retention and swollen testicles.  He was recently admitted for acute CVA a discharged approximately 2 weeks ago.  He recently had an indwelling dean removed.  No report of fever chills.  No hematuria.  Positive for suprapubic tenderness .  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  He is reportedly lethargic compared to his baseline.  Concern for both his breathing and urinary retention.    In the emergency department routine laboratory studies, urinalysis, chest x-ray obtained.  Chest x-ray concerning for bilateral pleural effusions.  Supple oxygen was given.  He is on and apixaban therefore no thoracentesis could be performed at this time safely.  History phone was slightly elevated but consistent with previous measurements.  EKG was nonischemic.  Urinalysis concerning for WBCs and RBCs.  Possible UTI.    Hospital medicine has been asked to admit to inpatient for further evaluation and treatment.

## 2020-02-04 NOTE — H&P
[facility] Hospital Medicine  History & Physical    Patient Name: Chirag Thompson  MRN: [id number]  Admission Date: 02/04/2020  Attending Physician: Quan Nobles MD, MPH      PCP:     Dany Marinelli Iii, MD    CC:     Chief Complaint   Patient presents with    Shortness of Breath     EMS reports pt from [facility] today with shortness of breath, urinary retention and swollen testicles. pt recently had indwelling dean removed. aphasic from previous stroke. appears lethargic in triage.        HISTORY OF PRESENT ILLNESS:     Chirag Thompson is a 79 y.o. male that (in part)  has a past medical history of Acquired hypothyroidism, Acute ischemic left middle cerebral artery (MCA) stroke, Anticoagulant long-term use, Arthritis, Chronic a-fib, Current use of long term anticoagulation, Embolic stroke involving left middle cerebral artery, Hypertension, PEG (percutaneous endoscopic gastrostomy) adjustment/replacement/removal, Pressure ulcer of right leg, unspecified pressure ulcer stage, Right spastic hemiparesis, Stroke due to embolism of left middle cerebral artery, Thyroid disease, and Type 2 diabetes mellitus with circulatory disorder, without long-term current use of insulin.  has a past surgical history that includes Hernia repair and Esophagogastroduodenoscopy w/ PEG (N/A, 1/23/2020). Presents to [facility] Emergency Department  by EMS from  [facility] today with report of both shortness of breath.  Associated symptoms include urinary retention and swollen testicles.  He was recently admitted for acute CVA a discharged approximately 2 weeks ago.  He recently had an indwelling dean removed.  No report of fever chills.  No hematuria.  Positive for suprapubic tenderness .  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  He is reportedly lethargic compared to his baseline.  Concern for both his breathing  and urinary retention.    In the emergency department routine laboratory studies, urinalysis, chest x-ray obtained.  Chest x-ray concerning for bilateral pleural effusions.  Supple oxygen was given.  He is on and apixaban therefore no thoracentesis could be performed at this time safely.  History phone was slightly elevated but consistent with previous measurements.  EKG was nonischemic.  Urinalysis concerning for WBCs and RBCs.  Possible UTI.    Hospital medicine has been asked to admit to inpatient for further evaluation and treatment.       REVIEW OF SYSTEMS:     Unable to obtain review of systems due to current condition of the patient; aphasia and recent stroke      PAST MEDICAL / SURGICAL HISTORY:     Past Medical History:   Diagnosis Date    Acquired hypothyroidism 1/14/2020    Acute ischemic left middle cerebral artery (MCA) stroke 1/14/2020    Anticoagulant long-term use     eliquis    Arthritis     Chronic a-fib     Current use of long term anticoagulation     Embolic stroke involving left middle cerebral artery 1/10/2020    Hypertension     PEG (percutaneous endoscopic gastrostomy) adjustment/replacement/removal     Pressure ulcer of right leg, unspecified pressure ulcer stage     was going to [facility] healed now    Right spastic hemiparesis 1/14/2020    Stroke due to embolism of left middle cerebral artery 01/10/2020    left temp parietal    Thyroid disease     Type 2 diabetes mellitus with circulatory disorder, without long-term current use of insulin 1/10/2020     Past Surgical History:   Procedure Laterality Date    ESOPHAGOGASTRODUODENOSCOPY W/ PEG N/A 1/23/2020    Procedure: EGD, WITH PEG TUBE INSERTION;  Surgeon: Quan Feng MD;  Location: [facility] OR [address] [place] [address];  Service: General;  Laterality: N/A;    HERNIA REPAIR           FAMILY HISTORY:     Unable to answer family history due to the current condition of patient with aphasia and lethargy      SOCIAL HISTORY:     Social  History     Socioeconomic History    Marital status: Single     Spouse name: Not on file    Number of children: Not on file    Years of education: Not on file    Highest education level: Not on file   Occupational History    Not on file   Social Needs    Financial resource strain: Not on file    Food insecurity:     Worry: Not on file     Inability: Not on file    Transportation needs:     Medical: Not on file     Non-medical: Not on file   Tobacco Use    Smoking status: Never Smoker    Smokeless tobacco: Never Used   Substance and Sexual Activity    Alcohol use: Never     Frequency: Never    Drug use: Never    Sexual activity: Not on file   Lifestyle    Physical activity:     Days per week: Not on file     Minutes per session: Not on file    Stress: Not on file   Relationships    Social connections:     Talks on phone: Not on file     Gets together: Not on file     Attends Mandaen service: Not on file     Active member of club or organization: Not on file     Attends meetings of clubs or organizations: Not on file     Relationship status: Not on file   Other Topics Concern    Not on file   Social History Narrative    Not on file         ALLERGIES:       Review of patient's allergies indicates:  No Known Allergies      HEALTH SCREENING:     Influenza vaccine  up-to-date for this season.  Prevnar 13 pneumonia vaccine = no evidence of previous vaccination found in the medical record      HOME MEDICATIONS:     Prior to Admission medications    Medication Sig Start Date End Date Taking? Authorizing Provider   albuterol-ipratropium (DUO-NEB) 2.5 mg-0.5 mg/3 mL nebulizer solution Take 3 mLs by nebulization every 6 (six) hours as needed for Wheezing. Rescue 1/30/20 1/29/21 Yes Norma Pedraza PA-C   allopurinol (ZYLOPRIM) 300 MG tablet 1 tablet (300 mg total) by Per G Tube route once daily. 1/30/20  Yes Norma Pedraza PA-C   apixaban (ELIQUIS) 5 mg Tab 1 tablet (5 mg total) by Per G Tube route 2  (two) times daily. 1/30/20  Yes Norma Pedraza PA-C   atorvastatin (LIPITOR) 40 MG tablet 1 tablet (40 mg total) by Per G Tube route once daily. 1/31/20 1/30/21 Yes Norma Pedraza PA-C   levothyroxine (SYNTHROID) 75 MCG tablet 1 tablet (75 mcg total) by Per G Tube route once daily. 1/30/20  Yes Norma Pedraza PA-C   metoprolol tartrate (LOPRESSOR) 25 MG tablet 1 tablet (25 mg total) by Per G Tube route 2 (two) times daily.  Patient taking differently: 50 mg by Per G Tube route 2 (two) times daily.  1/30/20 1/29/21 Yes Norma Pedraza PA-C   polyethylene glycol (GLYCOLAX) 17 gram PwPk 17 g by Per G Tube route daily as needed. 1/30/20  Yes Norma Pedraza PA-C   QUEtiapine (SEROQUEL) 25 MG Tab 1 tablet (25 mg total) by Per G Tube route nightly as needed (for aggitation). 1/30/20 1/29/21 Yes Norma Pedraza PA-C   senna-docusate 8.6-50 mg (PERICOLACE) 8.6-50 mg per tablet 1 tablet by Per G Tube route 2 (two) times daily as needed for Constipation. 1/30/20   Norma Pedraza PA-C          HOSPITAL MEDICATIONS:     Scheduled Meds:    albuterol-ipratropium  3 mL Nebulization Q4H    allopurinoL  300 mg Per G Tube Daily    atorvastatin  40 mg Per G Tube Daily    cefTRIAXone (ROCEPHIN) IVPB  1 g Intravenous Q24H    furosemide  40 mg Intravenous BID    levothyroxine  75 mcg Per G Tube Daily    metoprolol tartrate  25 mg Per G Tube BID    polyethylene glycol  17 g Per G Tube Daily     Continuous Infusions:   PRN Meds: dextrose 50%, glucagon (human recombinant), HYDROcodone-acetaminophen, influenza, insulin aspart U-100, pneumoc 13-mariya conj-dip cr(PF), senna-docusate 8.6-50 mg, sodium chloride 0.9%      PHYSICAL EXAM:     Wt Readings from Last 1 Encounters:   02/03/20 2030 97.5 kg (214 lb 15.2 oz)   02/03/20 1351 90.7 kg (200 lb)     Body mass index is 30.84 kg/m².  Vitals:    02/04/20 0002 02/04/20 0004 02/04/20 0356 02/04/20 0420   BP: (!) 105/57   114/60   BP Location: Left arm   Left arm    Patient Position: Lying   Lying   Pulse: 87 88 98 98   Resp: 18 20 20 18   Temp: 98.1 °F (36.7 °C)   97.8 °F (36.6 °C)   TempSrc: Oral   Oral   SpO2: 96% 97% 96% 98%   Weight:       Height:              -- General appearance: chronically ill appearing.  OBese. well developed. appears stated age   -- Head: normocephalic, atraumatic   -- Eyes: conjunctivae clear. Extraocular muscles intact  -- Nose: Nares normal. Septum midline.   -- Mouth/Throat: lips, mucosa, and tongue normal. no throat erythema.   -- Neck: +  JVD.   supple, symmetrical, trachea midline, and thyroid not grossly enlarged, appears symmetric  -- Lungs:  Decreased breath sounds to auscultation bilaterally with dullness to percussion at bases.  Normal respiratory rate with slightly Increased respiratory effort. No use of accessory muscles.   -- Chest wall: no tenderness. equal bilateral chest rise   -- Heart:  Rapid rate and regular rhythm. S1, S2 normal.  no click, rub or gallop   -- Abdomen:  Gastrostomy tube present.  Positive for scrotal edema.  Suprapubic tenderness.  Abdomen is otherwise  soft, non-distended.  OBEse  -- Extremities: no cyanosis, clubbing.  Bilateral lower extremity pitting edema  -- Pulses: 2+ and symmetric   -- Skin:  Turgor normal. Color normal. Texture normal. No rashes or lesions.   -- Neurologic:  Aphasia.  Chronic right-sided hemiparesis.       LABORATORY STUDIES:     Recent Results (from the past 36 hour(s))   Comprehensive metabolic panel    Collection Time: 02/03/20  2:24 PM   Result Value Ref Range    Sodium 133 (L) 136 - 145 mmol/L    Potassium 4.2 3.5 - 5.1 mmol/L    Chloride 100 95 - 110 mmol/L    CO2 30 (H) 23 - 29 mmol/L    Glucose 231 (H) 70 - 110 mg/dL    BUN, Bld 30 (H) 8 - 23 mg/dL    Creatinine 0.8 0.5 - 1.4 mg/dL    Calcium 8.6 (L) 8.7 - 10.5 mg/dL    Total Protein 5.6 (L) 6.0 - 8.4 g/dL    Albumin 2.4 (L) 3.5 - 5.2 g/dL    Total Bilirubin 1.9 (H) 0.1 - 1.0 mg/dL    Alkaline Phosphatase 118 55 - 135 U/L     AST 48 (H) 10 - 40 U/L    ALT 43 10 - 44 U/L    Anion Gap 3 (L) 8 - 16 mmol/L    eGFR if African American >60 >60 mL/min/1.73 m^2    eGFR if non African American >60 >60 mL/min/1.73 m^2   APTT    Collection Time: 02/03/20  2:24 PM   Result Value Ref Range    aPTT 30.8 21.0 - 32.0 sec   Protime-INR    Collection Time: 02/03/20  2:24 PM   Result Value Ref Range    Prothrombin Time 12.6 (H) 9.0 - 12.5 sec    INR 1.2 0.8 - 1.2   CBC auto differential    Collection Time: 02/03/20  2:24 PM   Result Value Ref Range    WBC 12.47 3.90 - 12.70 K/uL    RBC 3.86 (L) 4.60 - 6.20 M/uL    Hemoglobin 13.0 (L) 14.0 - 18.0 g/dL    Hematocrit 39.5 (L) 40.0 - 54.0 %    Mean Corpuscular Volume 102 (H) 82 - 98 fL    Mean Corpuscular Hemoglobin 33.7 (H) 27.0 - 31.0 pg    Mean Corpuscular Hemoglobin Conc 32.9 32.0 - 36.0 g/dL    RDW 15.6 (H) 11.5 - 14.5 %    Platelets 149 (L) 150 - 350 K/uL    MPV 11.7 9.2 - 12.9 fL    Immature Granulocytes 1.3 (H) 0.0 - 0.5 %    Gran # (ANC) 9.4 (H) 1.8 - 7.7 K/uL    Immature Grans (Abs) 0.16 (H) 0.00 - 0.04 K/uL    Lymph # 1.0 1.0 - 4.8 K/uL    Mono # 1.4 (H) 0.3 - 1.0 K/uL    Eos # 0.5 0.0 - 0.5 K/uL    Baso # 0.04 0.00 - 0.20 K/uL    nRBC 0 0 /100 WBC    Gran% 75.4 (H) 38.0 - 73.0 %    Lymph% 7.7 (L) 18.0 - 48.0 %    Mono% 11.1 4.0 - 15.0 %    Eosinophil% 4.2 0.0 - 8.0 %    Basophil% 0.3 0.0 - 1.9 %    Differential Method Automated    Magnesium    Collection Time: 02/03/20  2:24 PM   Result Value Ref Range    Magnesium 1.8 1.6 - 2.6 mg/dL   Brain natriuretic peptide    Collection Time: 02/03/20  2:24 PM   Result Value Ref Range     (H) 0 - 99 pg/mL   Troponin I    Collection Time: 02/03/20  2:24 PM   Result Value Ref Range    Troponin I 0.039 (H) 0.000 - 0.026 ng/mL   Lactic acid, plasma    Collection Time: 02/03/20  2:24 PM   Result Value Ref Range    Lactate (Lactic Acid) 1.4 0.5 - 2.2 mmol/L   Urinalysis, Reflex to Urine Culture Urine, Clean Catch    Collection Time: 02/03/20  2:34 PM    Result Value Ref Range    Specimen UA Urine, Catheterized     Color, UA Red (A) Yellow, Straw, Leida    Appearance, UA Cloudy (A) Clear    pH, UA 6.0 5.0 - 8.0    Specific Gravity, UA 1.020 1.005 - 1.030    Protein, UA 2+ (A) Negative    Glucose, UA 2+ (A) Negative    Ketones, UA Negative Negative    Bilirubin (UA) Negative Negative    Occult Blood UA 3+ (A) Negative    Nitrite, UA Negative Negative    Urobilinogen, UA 4.0-6.0 (A) <2.0 EU/dL    Leukocytes, UA 2+ (A) Negative   Urinalysis Microscopic    Collection Time: 02/03/20  2:34 PM   Result Value Ref Range    RBC, UA >100 (H) 0 - 4 /hpf    WBC, UA >100 (H) 0 - 5 /hpf    Bacteria Few (A) None-Occ /hpf    Squam Epithel, UA 4 /hpf    Hyaline Casts, UA 0 0-1/lpf /lpf    Microscopic Comment SEE COMMENT    ISTAT PROCEDURE    Collection Time: 02/03/20  5:09 PM   Result Value Ref Range    POC PH 7.543 (H) 7.35 - 7.45    POC PCO2 31.7 (L) 35 - 45 mmHg    POC PO2 63 (L) 80 - 100 mmHg    POC HCO3 27.3 24 - 28 mmol/L    POC BE 5 -2 to 2 mmol/L    POC SATURATED O2 95 95 - 100 %    POC TCO2 28 (H) 23 - 27 mmol/L    Rate 28     Sample ARTERIAL     Site RR     Allens Test Pass     DelSys Nasal Can     Mode SPONT     Flow 2     Sp02 96    Blood culture    Collection Time: 02/03/20  5:45 PM   Result Value Ref Range    Blood Culture, Routine No Growth to date    Blood culture    Collection Time: 02/03/20  6:00 PM   Result Value Ref Range    Blood Culture, Routine No Growth to date    Procalcitonin    Collection Time: 02/03/20  6:00 PM   Result Value Ref Range    Procalcitonin 0.17 <0.25 ng/mL   POCT Influenza A/B Molecular    Collection Time: 02/03/20  6:05 PM   Result Value Ref Range    POC Molecular Influenza A Ag Negative Negative, Not Reported    POC Molecular Influenza B Ag Negative Negative, Not Reported     Acceptable Yes    Troponin I    Collection Time: 02/03/20  8:18 PM   Result Value Ref Range    Troponin I 0.053 (H) 0.000 - 0.026 ng/mL   Basic  metabolic panel    Collection Time: 02/04/20  1:35 AM   Result Value Ref Range    Sodium 137 136 - 145 mmol/L    Potassium 4.5 3.5 - 5.1 mmol/L    Chloride 100 95 - 110 mmol/L    CO2 30 (H) 23 - 29 mmol/L    Glucose 175 (H) 70 - 110 mg/dL    BUN, Bld 34 (H) 8 - 23 mg/dL    Creatinine 1.1 0.5 - 1.4 mg/dL    Calcium 8.6 (L) 8.7 - 10.5 mg/dL    Anion Gap 7 (L) 8 - 16 mmol/L    eGFR if African American >60 >60 mL/min/1.73 m^2    eGFR if non African American >60 >60 mL/min/1.73 m^2   Troponin I    Collection Time: 02/04/20  1:36 AM   Result Value Ref Range    Troponin I 0.048 (H) 0.000 - 0.026 ng/mL   CBC auto differential    Collection Time: 02/04/20  1:36 AM   Result Value Ref Range    WBC 12.42 3.90 - 12.70 K/uL    RBC 3.62 (L) 4.60 - 6.20 M/uL    Hemoglobin 12.2 (L) 14.0 - 18.0 g/dL    Hematocrit 37.9 (L) 40.0 - 54.0 %    Mean Corpuscular Volume 105 (H) 82 - 98 fL    Mean Corpuscular Hemoglobin 33.7 (H) 27.0 - 31.0 pg    Mean Corpuscular Hemoglobin Conc 32.2 32.0 - 36.0 g/dL    RDW 15.9 (H) 11.5 - 14.5 %    Platelets 149 (L) 150 - 350 K/uL    MPV 11.8 9.2 - 12.9 fL    Immature Granulocytes 1.2 (H) 0.0 - 0.5 %    Gran # (ANC) 10.1 (H) 1.8 - 7.7 K/uL    Immature Grans (Abs) 0.15 (H) 0.00 - 0.04 K/uL    Lymph # 0.6 (L) 1.0 - 4.8 K/uL    Mono # 1.4 (H) 0.3 - 1.0 K/uL    Eos # 0.1 0.0 - 0.5 K/uL    Baso # 0.04 0.00 - 0.20 K/uL    nRBC 0 0 /100 WBC    Gran% 81.4 (H) 38.0 - 73.0 %    Lymph% 4.9 (L) 18.0 - 48.0 %    Mono% 11.6 4.0 - 15.0 %    Eosinophil% 0.6 0.0 - 8.0 %    Basophil% 0.3 0.0 - 1.9 %    Differential Method Automated        Lab Results   Component Value Date    INR 1.2 02/03/2020    INR 1.2 01/30/2020    INR 1.3 (H) 01/29/2020     Lab Results   Component Value Date    HGBA1C 5.4 01/11/2020     No results for input(s): POCTGLUCOSE in the last 72 hours.        MICROBIOLOGY DATA:     No results found for: LABGENI, LABREFE, LABUPPE, LABURIN, LABAFB    Microbiology x 7d:   Microbiology Results (last 7 days)      Procedure Component Value Units Date/Time    Blood culture [[id number]] Collected:  02/03/20 1800    Order Status:  Completed Specimen:  Blood from Peripheral, Hand, Right Updated:  02/04/20 0512     Blood Culture, Routine No Growth to date    Blood culture [[id number]] Collected:  02/03/20 1745    Order Status:  Completed Specimen:  Blood from Peripheral, Upper Arm, Right Updated:  02/04/20 0512     Blood Culture, Routine No Growth to date    Urine culture [[id number]] Collected:  02/03/20 1434    Order Status:  No result Specimen:  Urine Updated:  02/03/20 1601            IMAGING:     Imaging Results          X-Ray Chest AP Portable (Final result)  Result time 02/03/20 16:56:38    Final result by Keith Tai MD (02/03/20 16:56:38)                 Impression:      1. Suboptimal inspiration with mild bibasilar effusions and probable atelectasis.  2. Stable cardiomegaly.      Electronically signed by: Keith Tai  Date:    02/03/2020  Time:    16:56             Narrative:    EXAMINATION:  XR CHEST AP PORTABLE    CLINICAL HISTORY:  chest pain;    TECHNIQUE:  Single frontal view of the chest was performed.    COMPARISON:  01/27/2020    FINDINGS:  Suboptimal inspiration with bibasilar pleural effusions.    Heart is mildly enlarged.  No edema.    No mass or lobar consolidation.    Bibasilar mild airspace disease likely represents atelectasis.    No acute osseous abnormality.                                  CONSULTS:     WOUND CARE CONSULT  IP CONSULT TO REGISTERED DIETITIAN/NUTRITIONIST  IP CONSULT TO UROLOGY  IP CONSULT TO INTERVENTIONAL RADIOLOGY       ASSESSMENT & PLAN:     Primary Diagnosis:  Pleural effusion, bilateral    Active Hospital Problems    Diagnosis  POA    *Pleural effusion, bilateral [J90]  Yes     Priority: 1 - High    Completed stroke [I63.9]  Yes    PEG (percutaneous endoscopic gastrostomy) adjustment/replacement/removal [Z43.1]  Not Applicable    Oral phase dysphagia [R13.11]  Yes     Acquired hypothyroidism [E03.9]  Yes     Chronic    Hypercoagulable state [D68.59]  Yes     Chronic    Chronic anticoagulation [Z79.01]  Not Applicable    Essential hypertension [I10]  Yes     Chronic    Type 2 diabetes mellitus with circulatory disorder, 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]  Yes     Chronic    Atrial fibrillation [I48.91]  Yes     Chronic      Resolved Hospital Problems   No resolved problems to display.     Shortness of breath with bilateral pleural effusions  · Likely due to worsening bilateral pleural effusions   · Will need diagnostic and/or therapeutic thoracentesis; patient is on rivaroxaban due for recent stroke.  Unable to perform procedure tonight.  · Will consult IR and hold anticoagulation    urinary retention and swollen testicles.   · pt recently had indwelling dean removed recently  · Urinalysis concerning for WBCs and RBCs  · Will treat empirically for UTI with ceftriaxone after urine culture obtained  As evidenced by History, physical exam, and urinalysis  Obtain Urine culture and Gram stain prior to antibiotics  Given empiric antibiotics w/ Ceftriaxone  Will tailor antibiotic regimen according to culture & sensitivity results  Maintain euvolemic status with IV fluids  Obtain procalcitonin level  Consult urology for urinary retention    Recently Completed stroke  · Right-sided hemiparesis and aphasic from previous stroke. appears lethargic in triage.  May be related to UTI?  Hypoxemia?  Evaluation and management as noted above  No evidence of acute stroke at this time  Maintain adequate blood pressure control  Heart healthy diet  Aspirin and Eliquis on hold pending thoracentesis as noted above  Statin    Essential Hypertension  · Goal while inpatient is a systolic blood pressure less than 160mmHg  · BP in acceptable range at this time  · Continue current home regimen with hold parameters  · PRN antihypertensives available      Hyperglycemia secondary to Diabetes  mellitus type 2  · BG is not in acceptable range at this time; insulin given  · Maintain w/ subcutaneous insulin management order set  · Hold oral diabetic meds  · ADA 1800 kcal diet  · BG goal while in patient is <180mg/dL  · HgA1c = Pending    Thyroid dysfunction   · Clinically, patient is euthyroid   · Chemically, undetermined  · Obtain TSH, free T3, and free T4  · Continue current regimen    Chronic atrial fibrillation  · Currently rate controlled  · Maintain with beta-blocker with hold parameters  · Monitor on telemetry  · Maintain magnesium around 2.0  · Maintain potassium around 4.0  · Holding apixaban as noted above      Chronic anticoagulation  · For treatment of atrial fibrillation and recent stroke  · No evidence of acute blood loss   · Holding apixaban for pending thoracentesis and noted above        VTE Risk Mitigation (From admission, onward)         Ordered     IP VTE HIGH RISK PATIENT  Once      02/03/20 1918     Reason for No Pharmacological VTE Prophylaxis  Once     Question:  Reasons:  Answer:  Already adequately anticoagulated on oral Anticoagulants    02/03/20 1918                  Adult PRN medications available   DVT prophylaxis given       DISPOSITION:     Will admit to the Hospital Medicine service for further evaluation and treatment.    Chart reviewed and updated where applicable.    High Risk Conditions:  Patient has a condition that poses threat to life and bodily function:  Shortness of breath, bilateral pleural effusion      ===============================================================    Quan Nobles MD, MPH  Department of Hospital Medicine   [facility]  [phone] pg  (7pm - 6am)          This note is dictated using [employer] voice recognition software.  There are word recognition mistakes that are occasionally missed on review.

## 2020-02-04 NOTE — PLAN OF CARE
02/04/20 1502   Discharge Assessment   Assessment Type Discharge Planning Assessment   TN attempted assessment, hospitalist in with pt/family.

## 2020-02-04 NOTE — PROGRESS NOTES
"[facility] [place]  Adult Nutrition  Progress Note    SUMMARY       Recommendations    1. Recommend TF inititation of Isosurce 1.5 @ 55ml/hr to provide: 1980 kcals, 90 g pro, 1003ml free fl.      2. Free water flushes per MD discretion at this time.    Goals: Intake >85% EEN/ EPN daily    Nutrition Goal Status: new  Communication of RD Recs: other (comment)(POC)    Reason for Assessment    Reason For Assessment: consult  Diagnosis: other (see comments)(pleural effusion bilateral)  Relevant Medical History: HTN, Afib, stroke, PU, DMII  Interdisciplinary Rounds: did not attend  General Information Comments: Met with pt for TF consult. Pt aphasiac 2/2 recent stroke. Per records, pt on Jevity 1.5 pta. NFPE performed today 2/4 and pt has some muscle wasting and fat loss, which is appropriate for his advanced age. Noted stable wt hx in Epic.   Nutrition Discharge Planning: Discharge on TF    Nutrition Risk Screen    Nutrition Risk Screen: tube feeding or parenteral nutrition    Nutrition/Diet History    Spiritual, Cultural Beliefs, Temple Practices, Values that Affect Care: no  Food Allergies: NKFA  Nutrition Support Formula Prior to Admit: Jevity 1.5  Nutrition Support Rate Prior to Admit: 60 (ml)  Nutrtion Support Frequency Prior to Admit: ml/hr    Anthropometrics    Temp: 97.3 °F (36.3 °C)  Height Method: Stated  Height: 5' 10" (177.8 cm)  Height (inches): 70 in  Weight Method: Bed Scale  Weight: 97.5 kg (214 lb 15.2 oz)  Weight (lb): 214.95 lb  Ideal Body Weight (IBW), Male: 166 lb  % Ideal Body Weight, Male (lb): 129.49 %  BMI (Calculated): 30.8  BMI Grade: 30 - 34.9- obesity - grade I       Lab/Procedures/Meds    Pertinent Labs Reviewed: reviewed  Pertinent Labs Comments: , BUN 34, Glu 175, Ca 8.6  Pertinent Medications Reviewed: reviewed  Pertinent Medications Comments: atorvastatin, furosemide, levothyroxine, metoprolol, polyethylene glycol, allpurinol      Estimated/Assessed Needs    Weight " Used For Calorie Calculations: 97.5 kg (214 lb 15.2 oz)  Energy Calorie Requirements (kcal): 2030  Energy Need Method: [place]-St Jeor(xPAL 1.2)  Protein Requirements: 97g/d(1.0g/kg)  Weight Used For Protein Calculations: 97.5 kg (214 lb 15.2 oz)        RDA Method (mL): 2030  CHO Requirement: 253      Nutrition Prescription Ordered    Current Diet Order: NPO    Evaluation of Received Nutrient/Fluid Intake    I/O: -1531  Energy Calories Required: not meeting needs  Protein Required: not meeting needs  Fluid Required: not meeting needs  Comments: LBM 2/3  % Intake of Estimated Energy Needs: 0 - 25 % as TF has yet to be started  % Meal Intake: NPO    Nutrition Risk    Level of Risk/Frequency of Follow-up: (f/u 2x/ weekly)     Assessment and Plan    Nutrition Problem  Swallowing Difficulty    Related to (etiology):   Stroke    Signs and Symptoms (as evidenced by):   Dysphagia/ NPO with dependence on PEG tube feed.    Interventions/Recommendations (treatment strategy):  Collaboration with other providers  Enteral Nutrition     Nutrition Diagnosis Status:   New        Monitor and Evaluation    Food and Nutrient Intake: energy intake, enteral nutrition intake  Food and Nutrient Adminstration: diet order, enteral and parenteral nutrition administration  Anthropometric Measurements: weight, weight change, body mass index  Biochemical Data, Medical Tests and Procedures: electrolyte and renal panel, glucose/endocrine profile, lipid profile  Nutrition-Focused Physical Findings: overall appearance     Malnutrition Assessment                     [place] Region (Muscle Loss): moderate depletion  Clavicle Bone Region (Muscle Loss): mild depletion  Clavicle and Acromion Bone Region (Muscle Loss): moderate depletion  Dorsal Hand (Muscle Loss): well nourished  Patellar Region (Muscle Loss): well nourished  Anterior Thigh Region (Muscle Loss): well nourished  Posterior Calf Region (Muscle Loss): well nourished       Subcutaneous Fat  Loss (Final Summary): mild protein-calorie malnutrition  Muscle Loss Evaluation (Final Summary): moderate protein-calorie malnutrition         Nutrition Follow-Up    RD Follow-up?: Yes

## 2020-02-04 NOTE — NURSING
Called pt's nurseRosa at [facility]  [phone]. While able to receive some information such as pt receiving tube feedings of Jevity 1.5 calories at 60ml/hr continous and water 240ml every 4 hours. Requested pt's information be faxed to the unit at [phone] to complete pt's admission. Awaiting pt's facility information and will update pt's nurse.

## 2020-02-04 NOTE — PLAN OF CARE
Patient is currently on a 2 lpm nasal cannula with oxygen saturation of 97%.  No apparent distress noted at present time.  Will administer Respiratory treatments as ordered.  Will continue to monitor.

## 2020-02-04 NOTE — CARE UPDATE
I agree with my colleague's assessment and plan. I personally evaluated Mr Thompson today. He has dysarthria and somnolent. Is stable on low flow NC but with rales and absent breath sounds in both bases. Per nephew, patient is actually looking better today. Per IR, effusions are too small for thoracentesis today. Will continue with furosemide and stop narcotics. Obtain Echo and brain CT. Start tube feeds and restart NOAC for stroke prophylaxis. Continue ceftriaxone pending UCx results. Consult Speech, PT and OT. Patient's nephew and niece stated they would like patient to go home with them when ready for discharge.

## 2020-02-04 NOTE — NURSING TRANSFER
Nursing Transfer Note      2/3/2020     Transfer From: ED    Transfer via stretcher    Transfer with 3 liters to O2, cardiac monitoring    Transported by Elena    Medicines sent: none    Chart send with patient: Yes    Notified: family is at the bedside    Patient reassessed at: 2/3/2020 1845    Upon arrival to floor: cardiac monitor applied and bed in lowest position

## 2020-02-04 NOTE — CONSULTS
Inpatient Radiology Pre-procedure Note    History of Present Illness:  Chirag Thompson is a 79 y.o. male with pertinent PMHx of recent Lt MCA ischemic infarct on chronic Eliquis who presents to [facility]- with worsening SOB.     CXR in ED was read as 'mild bilateral pleural effusions' however, upon personal review, I respectfully disagree. Pt appears to have bibasilar atelectasis/PNA, possibly have at best a trace right pleural effusion and no evidence for significant left pleural effusion. Of note, optimal evaluation for potential presence and amount of pleural fluid is partially limited given lack of lateral views on CXR. Given this, there is still no evidence on PA CXR for presence of a sizable effusion that would easily explain pts presenting symptoms or provide significant relief of symptoms with drainage.     Furthermore, pt on chronic Eliquis with most recent dose at 10PM last night which significantly increases risk for uncontrolled hemorrhage/hemothorax. Risk is exacerbated by lack of sizable pocket of fluid for safe fluid sampling.     New inpatient IR consult placed to assess for potential image-guided bilateral thoracenteses.       Admission H&P reviewed.  Past Medical History:   Diagnosis Date    Acquired hypothyroidism 1/14/2020    Acute ischemic left middle cerebral artery (MCA) stroke 1/14/2020    Anticoagulant long-term use     eliquis    Arthritis     Chronic a-fib     Current use of long term anticoagulation     Embolic stroke involving left middle cerebral artery 1/10/2020    Hypertension     PEG (percutaneous endoscopic gastrostomy) adjustment/replacement/removal     Pressure ulcer of right leg, unspecified pressure ulcer stage     was going to [facility] healed now    Right spastic hemiparesis 1/14/2020    Stroke due to embolism of left middle cerebral artery 01/10/2020    left temp parietal    Thyroid disease     Type 2 diabetes mellitus with circulatory disorder, without  long-term current use of insulin 1/10/2020     Past Surgical History:   Procedure Laterality Date    ESOPHAGOGASTRODUODENOSCOPY W/ PEG N/A 1/23/2020    Procedure: EGD, WITH PEG TUBE INSERTION;  Surgeon: Quan Feng MD;  Location: [facility] OR [address] [place] [address];  Service: General;  Laterality: N/A;    HERNIA REPAIR         Review of Systems:   As documented in primary team H&P    Home Meds:   Prior to Admission medications    Medication Sig Start Date End Date Taking? Authorizing Provider   albuterol-ipratropium (DUO-NEB) 2.5 mg-0.5 mg/3 mL nebulizer solution Take 3 mLs by nebulization every 6 (six) hours as needed for Wheezing. Rescue 1/30/20 1/29/21 Yes Norma Pedraza PA-C   allopurinol (ZYLOPRIM) 300 MG tablet 1 tablet (300 mg total) by Per G Tube route once daily. 1/30/20  Yes Norma Pedraza PA-C   apixaban (ELIQUIS) 5 mg Tab 1 tablet (5 mg total) by Per G Tube route 2 (two) times daily. 1/30/20  Yes Norma Pedraza PA-C   atorvastatin (LIPITOR) 40 MG tablet 1 tablet (40 mg total) by Per G Tube route once daily. 1/31/20 1/30/21 Yes Norma Pedraza PA-C   levothyroxine (SYNTHROID) 75 MCG tablet 1 tablet (75 mcg total) by Per G Tube route once daily. 1/30/20  Yes Norma Pedraza PA-C   metoprolol tartrate (LOPRESSOR) 25 MG tablet 1 tablet (25 mg total) by Per G Tube route 2 (two) times daily.  Patient taking differently: 50 mg by Per G Tube route 2 (two) times daily.  1/30/20 1/29/21 Yes oNrma Pedraza PA-C   polyethylene glycol (GLYCOLAX) 17 gram PwPk 17 g by Per G Tube route daily as needed. 1/30/20  Yes Norma Pedraza PA-C   QUEtiapine (SEROQUEL) 25 MG Tab 1 tablet (25 mg total) by Per G Tube route nightly as needed (for aggitation). 1/30/20 1/29/21 Yes Norma Pedraza PA-C   senna-docusate 8.6-50 mg (PERICOLACE) 8.6-50 mg per tablet 1 tablet by Per G Tube route 2 (two) times daily as needed for Constipation. 1/30/20   Normaelia Pedraza PA-C     Scheduled Meds:    albuterol-ipratropium   3 mL Nebulization Q4H    allopurinoL  300 mg Per G Tube Daily    atorvastatin  40 mg Per G Tube Daily    cefTRIAXone (ROCEPHIN) IVPB  1 g Intravenous Q24H    furosemide  40 mg Intravenous BID    levothyroxine  75 mcg Per G Tube Daily    metoprolol tartrate  25 mg Per G Tube BID    polyethylene glycol  17 g Per G Tube Daily     Continuous Infusions:   PRN Meds:dextrose 50%, glucagon (human recombinant), HYDROcodone-acetaminophen, influenza, insulin aspart U-100, pneumoc 13-mariya conj-dip cr(PF), senna-docusate 8.6-50 mg, sodium chloride 0.9%  Anticoagulants/Antiplatelets: Eliquis 5-mg BID    Allergies: Review of patient's allergies indicates:  No Known Allergies     Sedation Hx: have not been any systemic reactions    Labs:  Recent Labs   Lab 02/03/20  1424   INR 1.2       Recent Labs   Lab 02/04/20  0136   WBC 12.42   HGB 12.2*   HCT 37.9*   *   *      Recent Labs   Lab 02/03/20  1424 02/04/20  0135 02/04/20  0540   * 175*  --    * 137  --    K 4.2 4.5  --     100  --    CO2 30* 30*  --    BUN 30* 34*  --    CREATININE 0.8 1.1  --    CALCIUM 8.6* 8.6*  --    MG 1.8  --   --    ALT 43  --  41   AST 48*  --  40   ALBUMIN 2.4*  --  2.4*   BILITOT 1.9*  --  2.7*   BILIDIR  --   --  1.0*       Vitals:  Temp: 98.2 °F (36.8 °C) (02/04/20 0716)  Pulse: 95 (02/04/20 0747)  Resp: 20 (02/04/20 0747)  BP: 111/60 (02/04/20 0716)  SpO2: 95 % (02/04/20 0747)       A/P:  80 y/o M with h/o recenet CVA on chronic AC and c/o of worsening SOB with concerns for bilateral pleural effusions contributing to pts symptoms.     1. SOB - Personal review of CXR shows bibasilar atelectasis and/or PNA and at best a trace right pleural effusion with no evidence for significant left pleural effusion on the partially limited PA only CXR in pt on chronic Eliquis with most recent dose at 10-PM last night.     Given lack of evidence for any significant pleural fluid to explain pts acute worsening of SOB/provide  symptomatic relief with drainage and significant risk of uncontrolled hemorrhage/hemothorax related to chronic AC and lack of sizable pocket, no indication and not safe to attempt thoracentesis. Presence or lack of sufficient pleural fluid for safe thora can be confirmed with lateral CXR or limited thoracic US as clinically indicated/feasible.     Please feel free to contact IR with any questions/concerns. Thank you for considering IR for the care of your patient.     Stevie White MD  Interventional Radiology

## 2020-02-05 PROBLEM — I48.19 OTHER PERSISTENT ATRIAL FIBRILLATION: Status: ACTIVE | Noted: 2020-01-10

## 2020-02-05 PROBLEM — Z86.73 CHRONIC ISCHEMIC LEFT MCA STROKE: Status: ACTIVE | Noted: 2020-02-04

## 2020-02-05 PROBLEM — E43 SEVERE PROTEIN-CALORIE MALNUTRITION: Status: ACTIVE | Noted: 2020-02-05

## 2020-02-05 PROBLEM — I48.91 ATRIAL FIBRILLATION WITH RVR: Status: ACTIVE | Noted: 2020-02-05

## 2020-02-05 PROBLEM — R17 SERUM TOTAL BILIRUBIN ELEVATED: Status: ACTIVE | Noted: 2020-02-05

## 2020-02-05 PROBLEM — J96.01 ACUTE RESPIRATORY FAILURE WITH HYPOXIA: Status: ACTIVE | Noted: 2020-02-05

## 2020-02-05 LAB
ALBUMIN SERPL BCP-MCNC: 2.2 G/DL (ref 3.5–5.2)
ALBUMIN SERPL BCP-MCNC: 2.3 G/DL (ref 3.5–5.2)
ALP SERPL-CCNC: 105 U/L (ref 55–135)
ALP SERPL-CCNC: 111 U/L (ref 55–135)
ALT SERPL W/O P-5'-P-CCNC: 37 U/L (ref 10–44)
ALT SERPL W/O P-5'-P-CCNC: 38 U/L (ref 10–44)
ANION GAP SERPL CALC-SCNC: 7 MMOL/L (ref 8–16)
ANION GAP SERPL CALC-SCNC: 7 MMOL/L (ref 8–16)
AST SERPL-CCNC: 43 U/L (ref 10–40)
AST SERPL-CCNC: 43 U/L (ref 10–40)
BACTERIA UR CULT: NORMAL
BASOPHILS # BLD AUTO: 0.05 K/UL (ref 0–0.2)
BASOPHILS # BLD AUTO: 0.05 K/UL (ref 0–0.2)
BASOPHILS NFR BLD: 0.5 % (ref 0–1.9)
BASOPHILS NFR BLD: 0.5 % (ref 0–1.9)
BILIRUB DIRECT SERPL-MCNC: 1.2 MG/DL (ref 0.1–0.3)
BILIRUB SERPL-MCNC: 1.6 MG/DL (ref 0.1–1)
BILIRUB SERPL-MCNC: 1.8 MG/DL (ref 0.1–1)
BUN SERPL-MCNC: 40 MG/DL (ref 8–23)
BUN SERPL-MCNC: 42 MG/DL (ref 8–23)
CALCIUM SERPL-MCNC: 8.9 MG/DL (ref 8.7–10.5)
CALCIUM SERPL-MCNC: 8.9 MG/DL (ref 8.7–10.5)
CHLORIDE SERPL-SCNC: 100 MMOL/L (ref 95–110)
CHLORIDE SERPL-SCNC: 101 MMOL/L (ref 95–110)
CO2 SERPL-SCNC: 32 MMOL/L (ref 23–29)
CO2 SERPL-SCNC: 32 MMOL/L (ref 23–29)
CREAT SERPL-MCNC: 0.9 MG/DL (ref 0.5–1.4)
CREAT SERPL-MCNC: 0.9 MG/DL (ref 0.5–1.4)
DIFFERENTIAL METHOD: ABNORMAL
DIFFERENTIAL METHOD: ABNORMAL
EOSINOPHIL # BLD AUTO: 0.3 K/UL (ref 0–0.5)
EOSINOPHIL # BLD AUTO: 0.4 K/UL (ref 0–0.5)
EOSINOPHIL NFR BLD: 3.1 % (ref 0–8)
EOSINOPHIL NFR BLD: 3.2 % (ref 0–8)
ERYTHROCYTE [DISTWIDTH] IN BLOOD BY AUTOMATED COUNT: 15.9 % (ref 11.5–14.5)
ERYTHROCYTE [DISTWIDTH] IN BLOOD BY AUTOMATED COUNT: 16.2 % (ref 11.5–14.5)
EST. GFR  (AFRICAN AMERICAN): >60 ML/MIN/1.73 M^2
EST. GFR  (AFRICAN AMERICAN): >60 ML/MIN/1.73 M^2
EST. GFR  (NON AFRICAN AMERICAN): >60 ML/MIN/1.73 M^2
EST. GFR  (NON AFRICAN AMERICAN): >60 ML/MIN/1.73 M^2
GLUCOSE SERPL-MCNC: 145 MG/DL (ref 70–110)
GLUCOSE SERPL-MCNC: 164 MG/DL (ref 70–110)
HCT VFR BLD AUTO: 37.9 % (ref 40–54)
HCT VFR BLD AUTO: 38.5 % (ref 40–54)
HGB BLD-MCNC: 12 G/DL (ref 14–18)
HGB BLD-MCNC: 12.2 G/DL (ref 14–18)
IMM GRANULOCYTES # BLD AUTO: 0.08 K/UL (ref 0–0.04)
IMM GRANULOCYTES # BLD AUTO: 0.11 K/UL (ref 0–0.04)
IMM GRANULOCYTES NFR BLD AUTO: 0.7 % (ref 0–0.5)
IMM GRANULOCYTES NFR BLD AUTO: 1 % (ref 0–0.5)
LYMPHOCYTES # BLD AUTO: 0.6 K/UL (ref 1–4.8)
LYMPHOCYTES # BLD AUTO: 0.7 K/UL (ref 1–4.8)
LYMPHOCYTES NFR BLD: 5.6 % (ref 18–48)
LYMPHOCYTES NFR BLD: 6.7 % (ref 18–48)
MAGNESIUM SERPL-MCNC: 1.9 MG/DL (ref 1.6–2.6)
MAGNESIUM SERPL-MCNC: 2 MG/DL (ref 1.6–2.6)
MCH RBC QN AUTO: 33.2 PG (ref 27–31)
MCH RBC QN AUTO: 33.2 PG (ref 27–31)
MCHC RBC AUTO-ENTMCNC: 31.7 G/DL (ref 32–36)
MCHC RBC AUTO-ENTMCNC: 31.7 G/DL (ref 32–36)
MCV RBC AUTO: 105 FL (ref 82–98)
MCV RBC AUTO: 105 FL (ref 82–98)
MONOCYTES # BLD AUTO: 1.1 K/UL (ref 0.3–1)
MONOCYTES # BLD AUTO: 1.1 K/UL (ref 0.3–1)
MONOCYTES NFR BLD: 9.5 % (ref 4–15)
MONOCYTES NFR BLD: 9.7 % (ref 4–15)
NEUTROPHILS # BLD AUTO: 8.7 K/UL (ref 1.8–7.7)
NEUTROPHILS # BLD AUTO: 8.8 K/UL (ref 1.8–7.7)
NEUTROPHILS NFR BLD: 79.1 % (ref 38–73)
NEUTROPHILS NFR BLD: 80.4 % (ref 38–73)
NRBC BLD-RTO: 0 /100 WBC
NRBC BLD-RTO: 0 /100 WBC
PHOSPHATE SERPL-MCNC: 3.3 MG/DL (ref 2.7–4.5)
PLATELET # BLD AUTO: 150 K/UL (ref 150–350)
PLATELET # BLD AUTO: 154 K/UL (ref 150–350)
PMV BLD AUTO: 11.1 FL (ref 9.2–12.9)
PMV BLD AUTO: 11.7 FL (ref 9.2–12.9)
POCT GLUCOSE: 174 MG/DL (ref 70–110)
POCT GLUCOSE: 181 MG/DL (ref 70–110)
POCT GLUCOSE: 194 MG/DL (ref 70–110)
POTASSIUM SERPL-SCNC: 4.1 MMOL/L (ref 3.5–5.1)
POTASSIUM SERPL-SCNC: 4.2 MMOL/L (ref 3.5–5.1)
PROT SERPL-MCNC: 5.5 G/DL (ref 6–8.4)
PROT SERPL-MCNC: 5.5 G/DL (ref 6–8.4)
RBC # BLD AUTO: 3.61 M/UL (ref 4.6–6.2)
RBC # BLD AUTO: 3.68 M/UL (ref 4.6–6.2)
SODIUM SERPL-SCNC: 139 MMOL/L (ref 136–145)
SODIUM SERPL-SCNC: 140 MMOL/L (ref 136–145)
WBC # BLD AUTO: 10.81 K/UL (ref 3.9–12.7)
WBC # BLD AUTO: 11.11 K/UL (ref 3.9–12.7)

## 2020-02-05 PROCEDURE — 36415 COLL VENOUS BLD VENIPUNCTURE: CPT

## 2020-02-05 PROCEDURE — 25000003 PHARM REV CODE 250: Performed by: INTERNAL MEDICINE

## 2020-02-05 PROCEDURE — 97162 PT EVAL MOD COMPLEX 30 MIN: CPT

## 2020-02-05 PROCEDURE — 63600175 PHARM REV CODE 636 W HCPCS: Performed by: HOSPITALIST

## 2020-02-05 PROCEDURE — 83735 ASSAY OF MAGNESIUM: CPT | Mod: 91

## 2020-02-05 PROCEDURE — 80076 HEPATIC FUNCTION PANEL: CPT

## 2020-02-05 PROCEDURE — 80053 COMPREHEN METABOLIC PANEL: CPT

## 2020-02-05 PROCEDURE — 25000242 PHARM REV CODE 250 ALT 637 W/ HCPCS: Performed by: HOSPITALIST

## 2020-02-05 PROCEDURE — 97167 OT EVAL HIGH COMPLEX 60 MIN: CPT

## 2020-02-05 PROCEDURE — 27000221 HC OXYGEN, UP TO 24 HOURS

## 2020-02-05 PROCEDURE — 25000003 PHARM REV CODE 250: Performed by: HOSPITALIST

## 2020-02-05 PROCEDURE — 94640 AIRWAY INHALATION TREATMENT: CPT

## 2020-02-05 PROCEDURE — 99291 PR CRITICAL CARE, E/M 30-74 MINUTES: ICD-10-PCS | Mod: ,,, | Performed by: INTERNAL MEDICINE

## 2020-02-05 PROCEDURE — 25000242 PHARM REV CODE 250 ALT 637 W/ HCPCS: Performed by: INTERNAL MEDICINE

## 2020-02-05 PROCEDURE — 85025 COMPLETE CBC W/AUTO DIFF WBC: CPT

## 2020-02-05 PROCEDURE — 63600175 PHARM REV CODE 636 W HCPCS: Performed by: INTERNAL MEDICINE

## 2020-02-05 PROCEDURE — 84100 ASSAY OF PHOSPHORUS: CPT

## 2020-02-05 PROCEDURE — 99291 CRITICAL CARE FIRST HOUR: CPT | Mod: ,,, | Performed by: INTERNAL MEDICINE

## 2020-02-05 PROCEDURE — 80048 BASIC METABOLIC PNL TOTAL CA: CPT

## 2020-02-05 PROCEDURE — 83735 ASSAY OF MAGNESIUM: CPT

## 2020-02-05 PROCEDURE — 20000000 HC ICU ROOM

## 2020-02-05 PROCEDURE — 94761 N-INVAS EAR/PLS OXIMETRY MLT: CPT

## 2020-02-05 RX ORDER — DILTIAZEM HCL 1 MG/ML
5 INJECTION, SOLUTION INTRAVENOUS CONTINUOUS
Status: DISCONTINUED | OUTPATIENT
Start: 2020-02-05 | End: 2020-02-06

## 2020-02-05 RX ORDER — METOPROLOL TARTRATE 25 MG/1
25 TABLET, FILM COATED ORAL 2 TIMES DAILY
Status: DISCONTINUED | OUTPATIENT
Start: 2020-02-05 | End: 2020-02-06

## 2020-02-05 RX ADMIN — APIXABAN 5 MG: 5 TABLET, FILM COATED ORAL at 08:02

## 2020-02-05 RX ADMIN — ACETAMINOPHEN 650 MG: 325 TABLET ORAL at 04:02

## 2020-02-05 RX ADMIN — ALLOPURINOL 300 MG: 100 TABLET ORAL at 08:02

## 2020-02-05 RX ADMIN — IPRATROPIUM BROMIDE AND ALBUTEROL SULFATE 3 ML: .5; 3 SOLUTION RESPIRATORY (INHALATION) at 03:02

## 2020-02-05 RX ADMIN — CEFTRIAXONE 1 G: 1 INJECTION, SOLUTION INTRAVENOUS at 08:02

## 2020-02-05 RX ADMIN — METOPROLOL TARTRATE 25 MG: 25 TABLET ORAL at 08:02

## 2020-02-05 RX ADMIN — METOPROLOL TARTRATE 25 MG: 25 TABLET ORAL at 01:02

## 2020-02-05 RX ADMIN — DILTIAZEM HYDROCHLORIDE 5 MG/HR: 5 INJECTION INTRAVENOUS at 07:02

## 2020-02-05 RX ADMIN — LEVOTHYROXINE SODIUM 75 MCG: 75 TABLET ORAL at 05:02

## 2020-02-05 RX ADMIN — FUROSEMIDE 40 MG: 10 INJECTION, SOLUTION INTRAVENOUS at 08:02

## 2020-02-05 RX ADMIN — IPRATROPIUM BROMIDE AND ALBUTEROL SULFATE 3 ML: .5; 3 SOLUTION RESPIRATORY (INHALATION) at 08:02

## 2020-02-05 RX ADMIN — IPRATROPIUM BROMIDE AND ALBUTEROL SULFATE 3 ML: .5; 3 SOLUTION RESPIRATORY (INHALATION) at 07:02

## 2020-02-05 RX ADMIN — IPRATROPIUM BROMIDE AND ALBUTEROL SULFATE 3 ML: .5; 3 SOLUTION RESPIRATORY (INHALATION) at 11:02

## 2020-02-05 RX ADMIN — ACETAMINOPHEN 650 MG: 325 TABLET ORAL at 10:02

## 2020-02-05 RX ADMIN — POLYETHYLENE GLYCOL 3350 17 G: 17 POWDER, FOR SOLUTION ORAL at 08:02

## 2020-02-05 RX ADMIN — ATORVASTATIN CALCIUM 40 MG: 40 TABLET, FILM COATED ORAL at 08:02

## 2020-02-05 NOTE — PLAN OF CARE
Problem: Occupational Therapy Goal  Goal: Occupational Therapy Goal  Description  Goals to be met by: 02/19/20    Patient will increase functional independence with ADLs by performing:    Sitting at edge of bed x15 minutes with Moderate Assistance.  Rolling to the Right with Minimal Assistance.   Pt will follow 75% of 1-step commands with L arm AAROM exercises in order to increase active participation with care.   Pt will follow 25% of 2-step commands with L arm AROM exercises in order to increase active participation with care.   Pt will reach for ADL object with LUE with minimal verbal cueing in supported sit in order to increase pt's participation with ADLs.   Pt will visually track familiar object L<>R with verbal cueing 50% of the time in order to increase active engagement with ADLs and social participation with family.      Outcome: Ongoing, Progressing    TOTAL A x2 for bed mobility, TOTAL A to sit EOB with rounded shoulders and forward head. Intermittently followed one-step commands with L side movements, but not consistent. Family wants pt to d/c home- OT rec. katy lift, hospital bed, and wheelchair with HHOT at d/c in order to decrease caregiver burden by educating the family on use of DME and safety with body mechanics when completing ADLs.

## 2020-02-05 NOTE — PLAN OF CARE
Problem: Physical Therapy Goal  Goal: Physical Therapy Goal  Description  Goals to be met by: 20     Patient will increase functional independence with mobility by performin. Supine to sit with Moderate Assistance  2. Sit to supine with Moderate Assistance  3. Rolling to Left and Right with Moderate Assistance  4. Sitting at edge of bed x30 minutes with Stand-by Assistance  5. Lower extremity exercise program x10-15 reps per handout, with assistance as needed     Outcome: Ongoing, Progressing    Pt was dep of 2 persons for bed mobility.  Family would like to take pt home, will need 24 hour care.  Pt will benefit from HHPT services, hospital bed, katy lift, and w/c.

## 2020-02-05 NOTE — NURSING
Eyes closed responses to name being called. Denies pain at present. Rodriguez in place draining clear ramin urine.Peg patent infusing Isosource @ 40 cc/hr. No residual. Bed low call light tin reach assessment completed..

## 2020-02-05 NOTE — PT/OT/SLP EVAL
Occupational Therapy   Evaluation    Name: Chirag Thompson  MRN: [id number]  Admitting Diagnosis:  Pleural effusion, bilateral      Recommendations:     Discharge Recommendations: home health OT(with 24 hour assist (per family request))  Discharge Equipment Recommendations:  wheelchair, hospital bed(katy lift)  Barriers to discharge:  (pt will require total A for all aspects of care)    Assessment:     Chirag Thompson is a 79 y.o. male with a medical diagnosis of Pleural effusion, bilateral. Performance deficits affecting function: weakness, decreased upper extremity function, decreased ROM, impaired cardiopulmonary response to activity, impaired endurance, impaired balance, decreased lower extremity function, impaired coordination, impaired sensation, decreased safety awareness, impaired fine motor, impaired self care skills, impaired cognition, impaired skin, impaired functional mobilty, edema, abnormal tone.      TOTAL A x2 for bed mobility, TOTAL A to sit EOB with rounded shoulders and forward head. Intermittently followed one-step commands with L side movements, but not consistent. Family wants pt to d/c home- OT rec. katy lift, hospital bed, and wheelchair with HHOT at d/c in order to decrease caregiver burden by educating the family on use of DME and safety with body mechanics when completing ADLs.     Rehab Prognosis: Fair -; patient would benefit from acute skilled OT services to address these deficits and reach maximum level of function.       Plan:     Patient to be seen (2-3x/week) to address the above listed problems via self-care/home management, therapeutic activities, therapeutic exercises, neuromuscular re-education  · Plan of Care Expires: 02/19/20  · Plan of Care Reviewed with: patient    Subjective     Chief Complaint: pt did not appear in distress during therapy   Patient/Family Comments/goals: cooperative with EOB activities; required frequent to open his eyes and engage with therapists  "    Occupational Profile: D/t pt aphasic and unable to communicate, PLOF obtained through chart review:   Living Environment: Pt came to [facility] from [facility] Prior to that, he lived in a SS house with 4 steps at entry with HR on R ascending. Bathroom set-up: tub/shower combo with bath bench.   Previous level of function: Pt was MOD I prior to 01/14/20 admission to the hospital, ambulating with str c and MOD I with ADLs; pt did not drive. Pt arrived from SNF prior to this admission.   Equipment Used at Home:  bath bench, cane, straight  Assistance upon Discharge: niece/nephew??     Pain/Comfort:  · Pain Rating 1: (pt did not make facial nor verbal remarks to demonstrate pain in therapy session; unable to shake head "yes/'no" when prompted if he was in any pain)    Patients cultural, spiritual, Adventist conflicts given the current situation: no    Objective:     Communicated with: nurseZaynab, prior to session.  Patient found HOB elevated with bed alarm, telemetry, dean catheter, pressure relief boots, PEG Tube, oxygen, peripheral IV upon OT entry to room. Tube feeding was on hold upon therapy arrival.     General Precautions: Standard, fall, NPO, diabetic, aphasia, respiratory   Orthopedic Precautions:N/A   Braces: N/A     Occupational Performance:    Bed Mobility:    · Patient completed Rolling/Turning to Right/Left with total assistance and 2 persons  · Patient completed Scooting with total assistance and 2 persons  · Patient completed Bridging with total assistance and 2 persons with bed in Trendelenburg position   · Patient completed Supine to Sit with total assistance, 2 persons and HOB elevated  · Patient completed Sit to Supine with total assistance and 2 persons    Functional Mobility/Transfers:  · Functional Mobility: Not appropriate to assess 2* poor sitting balance     Activities of Daily Living:  · Feeding:  NPO; PEG tube    · Lower Body Dressing: total assistance with donning/doffing " "socks  · Toileting: total assistance foly cath    Cognitive/Visual Perceptual:  Cognitive/Psychosocial Skills:     -       Oriented to: unable to answer any questions; pt did respond intermittently when called by his first name   -       Follows Commands/attention:follows some one-step commands with L sided tasks only; not consistent and not always appropriate to cueing   -       Communication: aphasic; pt said a few short words fluently, such as "ok" and "up" -only 2-3x during eval  -       Memory: impaired   -       Safety awareness/insight to disability: impaired   -       Mood/Affect/Coping skills/emotional control: Lethargic  Visual/Perceptual:      -Impaired  tracking, R/L discrimination and visual field ; pt unable to scan L<>R or follow command for visual convergence    Physical Exam:  Balance:    -       seated: poor; pt able to intermittently hold himself up with LUE positioned on the side of him and RUE placed on a pillow in static sit; pt demo'd lateral lean to the R, requiring TOTAL A at times for correction to midline. Pt varied with MOD>TOTAL A with dynamic sitting balance  Postural examination/scapula alignment:    -       Rounded shoulders  -       Forward head  -       Palpable subluxation to R shoulder noted  Skin integrity: bruising to L upper arm, bruising to lower abdomen- primarily L sided; taping for PEG tube intact  Edema:  Moderate RUE  Sensation:    -       Impaired  sharp/dull R hand  Dominant hand:    -       unknown  Upper Extremity Range of Motion:     -       Right Upper Extremity: hemiparetic; no AROM. PROM WFL for digits, wrist, elbow, scaption to R shoulder; shoulder flexion passed 90* NT  -       Left Upper Extremity: no AROM; AAROM WFL  Upper Extremity Strength:    -       Right Upper Extremity: 0/5 wrist, elbow, and shoulder  -       Left Upper Extremity: elbow flexion: 3/5; shoulder flexion: 2/5   Strength:    -       Right Upper Extremity: Deficits: impaired; no  " strength noted   -       Left Upper Extremity: pt able to grasp and maintain hold on ADL object   Fine Motor Coordination:    -       Impaired  Left hand, manipulation of objects   and Right hand, manipulation of objects    Gross motor coordination:   Hemiplegia/paresis (R)    AMPAC 6 Click ADL:  AMPAC Total Score: 6    Treatment & Education:  · Pt educated on OT role/oriented to person, time, and place   · Time taken EOB to assist pt to use LUE to help with sitting balance  · Safety during bed mobility   · White board updated   · Multiple self-care tasks/functional mobility completed- assistance level noted above   · No family present for any education      Education:    Patient left left sidelying with HOB elevated to 30* with RUE elevated on pillow with all lines intact, call button in reach, bed alarm on, pressure relief boots on, and nurse, Zaynab, notified, that the tube feedings was on hold.     GOALS:   Multidisciplinary Problems     Occupational Therapy Goals        Problem: Occupational Therapy Goal    Goal Priority Disciplines Outcome Interventions   Occupational Therapy Goal     OT, PT/OT Ongoing, Progressing    Description:  Goals to be met by: 02/19/20    Patient will increase functional independence with ADLs by performing:    Sitting at edge of bed x15 minutes with Moderate Assistance.  Rolling to the Right with Minimal Assistance.   Pt will follow 75% of 1-step commands with L arm AAROM exercises in order to increase active participation with care.   Pt will follow 25% of 2-step commands with L arm AROM exercises in order to increase active participation with care.   Pt will reach for ADL object with LUE with minimal verbal cueing in supported sit in order to increase pt's participation with ADLs.   Pt will visually track familiar object L<>R with verbal cueing 50% of the time in order to increase active engagement with ADLs and social participation with family.                       History:      Past Medical History:   Diagnosis Date    Acquired hypothyroidism 1/14/2020    Acute ischemic left middle cerebral artery (MCA) stroke 1/14/2020    Anticoagulant long-term use     eliquis    Arthritis     Chronic a-fib     Current use of long term anticoagulation     Embolic stroke involving left middle cerebral artery 1/10/2020    Hypertension     PEG (percutaneous endoscopic gastrostomy) adjustment/replacement/removal     Pressure ulcer of right leg, unspecified pressure ulcer stage     was going to [facility] healed now    Right spastic hemiparesis 1/14/2020    Stroke due to embolism of left middle cerebral artery 01/10/2020    left temp parietal    Thyroid disease     Type 2 diabetes mellitus with circulatory disorder, without long-term current use of insulin 1/10/2020       Past Surgical History:   Procedure Laterality Date    ESOPHAGOGASTRODUODENOSCOPY W/ PEG N/A 1/23/2020    Procedure: EGD, WITH PEG TUBE INSERTION;  Surgeon: Quan Feng MD;  Location: [facility] OR [address] [place] [address];  Service: General;  Laterality: N/A;    HERNIA REPAIR         Time Tracking:     OT Date of Treatment: 02/05/20  OT Start Time: 1037  OT Stop Time: 1101  OT Total Time (min): 24 min    Billable Minutes:Evaluation 24 min (co-eval with PT)    Tara Noble OT  2/5/2020

## 2020-02-05 NOTE — ASSESSMENT & PLAN NOTE
Resumed metoprolol for rate control   On anticoagulation for stroke prophylaxis (CHADS VASc 5, which puts him at high risk for recurrent stroke)

## 2020-02-05 NOTE — ASSESSMENT & PLAN NOTE
Per last Speech therapy notes from recent admission, patient is to remain strictly NPO for significant dysphagia and to continue with tube feeds. Speech therapy re-consulted.

## 2020-02-05 NOTE — CONSULTS
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 Dry 3 mm abrasion on dorsal toe.  Left foot- Dry 3 mm abrasion on 4th dorsal toe.   No surrounding erythema. Caregivers at bedside reports patient only wearing slippers since stroke.   Nursing preparing to transfer patient to ICU. States patient has Stage 2 sacral pressure injury. Will continue Written Order Guidelines for Stage 2 pressure injury  Recommendations:  Keep wounds on toes clean and dry. Continue EHOB boots and avoid pressure on feet.   Continue WOGs for Stage 2 pressure injury to sacrum.

## 2020-02-05 NOTE — SUBJECTIVE & OBJECTIVE
Interval History: more alert, making eye contact and interactive. With slurred speech. TBil better but elevated.     Review of Systems   Unable to perform ROS: Other (slurred speech)     Objective:     Vital Signs (Most Recent):  Temp: 97.8 °F (36.6 °C) (02/05/20 1129)  Pulse: 110 (02/05/20 1129)  Resp: 18 (02/05/20 1129)  BP: 139/61 (02/05/20 1129)  SpO2: 97 % (02/05/20 1129) Vital Signs (24h Range):  Temp:  [97.2 °F (36.2 °C)-97.9 °F (36.6 °C)] 97.8 °F (36.6 °C)  Pulse:  [] 110  Resp:  [16-20] 18  SpO2:  [92 %-100 %] 97 %  BP: (111-139)/(61-82) 139/61     Weight: 92.6 kg (204 lb 2.3 oz)  Body mass index is 29.29 kg/m².    Intake/Output Summary (Last 24 hours) at 2/5/2020 1150  Last data filed at 2/5/2020 0001  Gross per 24 hour   Intake --   Output 1100 ml   Net -1100 ml      Physical Exam   Constitutional: He appears well-developed. No distress.   HENT:   Head: Normocephalic and atraumatic.   Eyes: Pupils are equal, round, and reactive to light. Conjunctivae and EOM are normal.   Neck: Normal range of motion.   Cardiovascular: An irregularly irregular rhythm present. Tachycardia present.   Pulmonary/Chest: Effort normal. He has no wheezes. He has no rales.   Decreased breath sounds bases   Abdominal: Soft. Bowel sounds are normal.   Neurological: He is alert.   Makes eye contact and interactive but with significant dysarthria.   No facial droop   Skin: Capillary refill takes less than 2 seconds. He is not diaphoretic.   Nursing note and vitals reviewed.      Significant Labs: All pertinent labs within the past 24 hours have been reviewed.    Significant Imaging: I have reviewed all pertinent imaging results/findings within the past 24 hours.  I have reviewed and interpreted all pertinent imaging results/findings within the past 24 hours.

## 2020-02-05 NOTE — ASSESSMENT & PLAN NOTE
Not candidate for therapeutic thoracentesis due to size  Is on low supplemental O2 requirements and breathing more comfortably with IV diuresis  No evidence of systolic heart failure on Echo, although limited study due to atrial fibrillation  Grade 1 diastolic dysfunction and no significant valve disease  Third spacing from malnutrition?

## 2020-02-05 NOTE — PLAN OF CARE
Problem: Fall Injury Risk  Goal: Absence of Fall and Fall-Related Injury  Outcome: Ongoing, Progressing  Intervention: Promote Injury-Free Environment  Flowsheets (Taken 2/5/2020 1341)  Safety Promotion/Fall Prevention: side rails raised x 2; bed alarm set

## 2020-02-05 NOTE — ASSESSMENT & PLAN NOTE
2/2 pulmonary edema and pleural effusions  Atrial fibrillation can result in ineffective cardiac output and pulmonary edema when poorly controlled  Echocardiogram with mild diastolic dysfunction, preserved EF and no significant valvular issues  I am concerned this is due to malnutrition  Tube feeds restarted and on diuresis  Supplemental O2 as needed  Activity as tolerated with PT/OT  Speech eval

## 2020-02-05 NOTE — PROGRESS NOTES
[facility] Hospital Medicine  Progress Note    Patient Name: Chirag Thompson  MRN: [id number]  Patient Class: IP- Inpatient   Admission Date: 2/3/2020  Length of Stay: 2 days  Attending Physician: Norma Watkins MD  Primary Care Provider: Dany Marinelli Iii, MD        Subjective:     Principal Problem:Acute respiratory failure with hypoxia        HPI:    Chirag Thompson is a 79 y.o. male that (in part)  has a past medical history of Acquired hypothyroidism, Acute ischemic left middle cerebral artery (MCA) stroke, Anticoagulant long-term use, Arthritis, Chronic a-fib, Current use of long term anticoagulation, Embolic stroke involving left middle cerebral artery, Hypertension, PEG (percutaneous endoscopic gastrostomy) adjustment/replacement/removal, Pressure ulcer of right leg, unspecified pressure ulcer stage, Right spastic hemiparesis, Stroke due to embolism of left middle cerebral artery, Thyroid disease, and Type 2 diabetes mellitus with circulatory disorder, without long-term current use of insulin.  has a past surgical history that includes Hernia repair and Esophagogastroduodenoscopy w/ PEG (N/A, 1/23/2020). Presents to [facility]  by EMS from  [facility] today with report of both shortness of breath.  Associated symptoms include urinary retention and swollen testicles.  He was recently admitted for acute CVA a discharged approximately 2 weeks ago.  He recently had an indwelling dean removed.  No report of fever chills.  No hematuria.  Positive for suprapubic tenderness .  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  He is reportedly lethargic compared to his baseline.  Concern for both his breathing and urinary retention.    In the emergency department routine laboratory studies, urinalysis, chest x-ray obtained.  Chest x-ray concerning for bilateral pleural effusions.  Supple oxygen  was given.  He is on and apixaban therefore no thoracentesis could be performed at this time safely.  History phone was slightly elevated but consistent with previous measurements.  EKG was nonischemic.  Urinalysis concerning for WBCs and RBCs.  Possible UTI.    Hospital medicine has been asked to admit to inpatient for further evaluation and treatment.     Overview/Hospital Course:  No notes on file    Interval History: more alert, making eye contact and interactive. With slurred speech. TBil better but elevated.     Review of Systems   Unable to perform ROS: Other (slurred speech)     Objective:     Vital Signs (Most Recent):  Temp: 97.8 °F (36.6 °C) (02/05/20 1129)  Pulse: 110 (02/05/20 1129)  Resp: 18 (02/05/20 1129)  BP: 139/61 (02/05/20 1129)  SpO2: 97 % (02/05/20 1129) Vital Signs (24h Range):  Temp:  [97.2 °F (36.2 °C)-97.9 °F (36.6 °C)] 97.8 °F (36.6 °C)  Pulse:  [] 110  Resp:  [16-20] 18  SpO2:  [92 %-100 %] 97 %  BP: (111-139)/(61-82) 139/61     Weight: 92.6 kg (204 lb 2.3 oz)  Body mass index is 29.29 kg/m².    Intake/Output Summary (Last 24 hours) at 2/5/2020 1150  Last data filed at 2/5/2020 0001  Gross per 24 hour   Intake --   Output 1100 ml   Net -1100 ml      Physical Exam   Constitutional: He appears well-developed. No distress.   HENT:   Head: Normocephalic and atraumatic.   Eyes: Pupils are equal, round, and reactive to light. Conjunctivae and EOM are normal.   Neck: Normal range of motion.   Cardiovascular: An irregularly irregular rhythm present. Tachycardia present.   Pulmonary/Chest: Effort normal. He has no wheezes. He has no rales.   Decreased breath sounds bases   Abdominal: Soft. Bowel sounds are normal.   Neurological: He is alert.   Makes eye contact and interactive but with significant dysarthria.   facial droop   Skin: Capillary refill takes less than 2 seconds. He is not diaphoretic.   Nursing note and vitals reviewed.      Significant Labs: All pertinent labs within the past  24 hours have been reviewed.    Significant Imaging: I have reviewed all pertinent imaging results/findings within the past 24 hours.  I have reviewed and interpreted all pertinent imaging results/findings within the past 24 hours.      Assessment/Plan:      * Acute respiratory failure with hypoxia  2/2 pulmonary edema and pleural effusions  Atrial fibrillation can result in ineffective cardiac output and pulmonary edema when poorly controlled  Echocardiogram with mild diastolic dysfunction, preserved EF and no significant valvular issues  I am concerned this is due to malnutrition  Tube feeds restarted and on diuresis  Supplemental O2 as needed  Activity as tolerated with PT/OT  Speech eval      Pleural effusion, bilateral  Not candidate for therapeutic thoracentesis due to size  Is on low supplemental O2 requirements and breathing more comfortably with IV diuresis  No evidence of systolic heart failure on Echo, although limited study due to atrial fibrillation  Grade 1 diastolic dysfunction and no significant valve disease  Third spacing from malnutrition?        Serum total bilirubin elevated  Etiology unknown  Will obtain RUQ ultrasound      Severe protein-calorie malnutrition  As evidenced by low albumin, low total protein and evidence of third spacing  This is all likely to consequences of large stroke 3 weeks prior to presentation  On tube feeds. Will increase rate to goal today  Pre-albumin with AM labs    Chronic ischemic left MCA stroke  No evidence of new stroke  Pretty debilitated since his stroke (poorly functional, fully dependent, dysarthria, dysphagia)      PEG (percutaneous endoscopic gastrostomy) adjustment/replacement/removal  No acute issues  On tube feeds. So fat tolerating well      Dysphagia due to recent stroke  Per last Speech therapy notes from recent admission, patient is to remain strictly NPO for significant dysphagia and to continue with tube feeds. Speech therapy re-consulted.        Chronic anticoagulation  No acute issues      Hypercoagulable state  On anticoagulation      Acquired hypothyroidism  No acute issues  Continue home regimen      Other persistent atrial fibrillation  Resumed metoprolol for rate control   On anticoagulation for stroke prophylaxis (CHADS VASc 5, which puts him at high risk for recurrent stroke)      Type 2 diabetes mellitus with circulatory disorder, without long-term current use of insulin  Well controlled on sliding scale insulin  HgbA1c 6%      Essential hypertension  Stable on current regimen      VTE Risk Mitigation (From admission, onward)         Ordered     apixaban tablet 5 mg  2 times daily      02/04/20 1331     IP VTE HIGH RISK PATIENT  Once      02/03/20 1918     Reason for No Pharmacological VTE Prophylaxis  Once     Question:  Reasons:  Answer:  Already adequately anticoagulated on oral Anticoagulants    02/03/20 1918                Addendum: called by nurse about HR persistently 120s-130s despite metoprolol. Is using accessory muscles to breath but not hypoxic. BP low at 100s/70s mmHg. Obtain CMP, mag, CBC and CR STAT. Would benefit from amiodarone infusion and Cardiology consultation. Transfer to ICU. Discussed with family.       Norma Silva MD  Department of Hospital Medicine   [facility]

## 2020-02-05 NOTE — NURSING
Patient's  sustained.Patient appears uncomfortable. /71. Dr. Silva at bedside.  Patient to be transferred to ICU.

## 2020-02-05 NOTE — PT/OT/SLP EVAL
Physical Therapy Evaluation    Patient Name:  Chirag Thompson   MRN:  [id number]    Recommendations:     Discharge Recommendations:  home health PT(with 24 hour care)   Discharge Equipment Recommendations: hospital bed, lift device, wheelchair   Barriers to discharge home: Inaccessible home and Pt with decreased mobility.    Assessment:     Chirag Thompson is a 79 y.o. male admitted with a medical diagnosis of Pleural effusion, bilateral.  He presents with the following impairments/functional limitations:  weakness, impaired endurance, impaired self care skills, impaired functional mobilty, impaired balance, impaired cognition, decreased coordination, decreased upper extremity function, decreased lower extremity function, decreased safety awareness, abnormal tone, decreased ROM, impaired coordination, impaired fine motor, impaired skin, edema, impaired cardiopulmonary response to activity, impaired joint extensibility, orthopedic precautions.    Rehab Prognosis: Fair-; patient would benefit from acute skilled PT services to address these deficits and reach maximum level of function.    Recent Surgery: * No surgery found *      Plan:     During this hospitalization, patient to be seen (2-3x/wk) to address the identified rehab impairments via therapeutic activities, therapeutic exercises, neuromuscular re-education and progress toward the following goals:    · Plan of Care Expires:  02/19/20    Subjective     Chief Complaint: N/A; Pt with aphasia.    Patient/Family Comments/goals: Family would like pt to return home and not back to [facility].    Pain/Comfort:  · Pain Rating 1: (Pt appeared comfortable with no s/s of distress.)    Living Environment:  Pt transferred to [facility] from [facility] 2* recent CVA's x 2 in Jan 2020.  Prior, pt was living at home with nephew in a SS house with ~4 steps and R handrail at entry.    Prior to admission, patients level of function was ambulatory with str cane prior to  CVA's.  Since 2nd CVA in Jan 2020, pt has been nonambulatory and max A for bed mobility/transfers.  Equipment used at home: cane, straight.  Upon discharge, patient will have assistance from nephew and niece.    Objective:     Communicated with nurse Zaynab prior to session.  Patient found HOB elevated with oxygen 3L, PEG Tube, dean catheter, pressure relief boots, peripheral IV, telemetry, bed alarm  upon PT entry to room.    General Precautions: Standard, fall, diabetic, aphasia   Orthopedic Precautions:N/A   Braces: N/A     Exams:  · Cognitive Exam:  Patient responded to his name.  Pt was able to follow some simple commands.   · Gross Motor Coordination:  impaired  · Postural Exam:  Patient presented with the following abnormalities:    · -       Rounded shoulders  · -       Forward head  · Skin Integrity/Edema:      · -       Skin integrity: Aquacel foam dressings to sacrum and R lateral knee noted; discoloration to BLE   · -       Edema: None noted BLE; mod to RUE/R shoulder subluxation   · BLE ROM: PROM RLE WFL; AROM LLE WFL  · BLE Strength: no active movement noted to RLE; LLE WFL    Functional Mobility:  Pt arousable when his name was called, required min VC's to stay alert.  Pt with aphasia 2* recent CVA, able to make eye contact and follow some simple commands.  Pt required mod VC's/TC's and demonstration to follow through with tasks.    · Bed Mobility:     · Rolling Left:  dependence and of 2 persons x 2 trials  · Rolling Right: dependence and of 2 persons  · Scooting: dependence and of 2 persons for anterior scooting  · Bridging: dependence and of 2 persons with bed in trendelenburg to reposition HOB  · Supine to Sit: dependence and of 2 persons with HOB elevated  · Sit to Supine: dependence and of 2 persons  · Balance: Pt wtih fair- static sit balance.       Therapeutic Activities and Exercises:  PROM RLE in all available planes    Balance Training  Static Sitting:  Patient performed static sitting on  EOB using LUE for support with Moderate Assistance-min A and maximal verbal cues for upright posture.  Pt with R lateral trunk lean.       AM-PAC 6 CLICK MOBILITY  Total Score:8     Patient left left sidelying and HOB elevated 30* 2* PEG and RUE elevated with all lines intact, call button in reach, bed alarm on and nurse Zaynab notified.  B pressure relief boots placed.     GOALS:   Multidisciplinary Problems     Physical Therapy Goals        Problem: Physical Therapy Goal    Goal Priority Disciplines Outcome Goal Variances Interventions   Physical Therapy Goal     PT, PT/OT Ongoing, Progressing     Description:  Goals to be met by: 20     Patient will increase functional independence with mobility by performin. Supine to sit with Moderate Assistance  2. Sit to supine with Moderate Assistance  3. Rolling to Left and Right with Moderate Assistance  4. Sitting at edge of bed x30 minutes with Stand-by Assistance  5. Lower extremity exercise program x10-15 reps per handout, with assistance as needed                      History:     Past Medical History:   Diagnosis Date    Acquired hypothyroidism 2020    Acute ischemic left middle cerebral artery (MCA) stroke 2020    Anticoagulant long-term use     eliquis    Arthritis     Chronic a-fib     Current use of long term anticoagulation     Embolic stroke involving left middle cerebral artery 1/10/2020    Hypertension     PEG (percutaneous endoscopic gastrostomy) adjustment/replacement/removal     Pressure ulcer of right leg, unspecified pressure ulcer stage     was going to [facility] healed now    Right spastic hemiparesis 2020    Stroke due to embolism of left middle cerebral artery 01/10/2020    left temp parietal    Thyroid disease     Type 2 diabetes mellitus with circulatory disorder, without long-term current use of insulin 1/10/2020       Past Surgical History:   Procedure Laterality Date    ESOPHAGOGASTRODUODENOSCOPY  W/ PEG N/A 1/23/2020    Procedure: EGD, WITH PEG TUBE INSERTION;  Surgeon: Quan Feng MD;  Location: [facility] OR [address] [place] [address];  Service: General;  Laterality: N/A;    HERNIA REPAIR         Time Tracking:     PT Received On: 02/05/20  PT Start Time: 1038     PT Stop Time: 1102  PT Total Time (min): 24 min     Billable Minutes: Evaluation 24 min co-tx with OT      Suzie Gomez, PT  02/05/2020

## 2020-02-05 NOTE — ASSESSMENT & PLAN NOTE
As evidenced by low albumin, low total protein and evidence of third spacing  This is all likely to consequences of large stroke 3 weeks prior to presentation  On tube feeds. Will increase rate to goal today  Pre-albumin with AM labs

## 2020-02-05 NOTE — ASSESSMENT & PLAN NOTE
No evidence of new stroke  Pretty debilitated since his stroke (poorly functional, fully dependent, dysarthria, dysphagia)

## 2020-02-05 NOTE — NURSING
Report called to Radha PORTER RN.   Patient transferred to ICU in bed,on monitor . Chart with patient.  Family aware of transfer

## 2020-02-06 PROBLEM — G93.41 ACUTE METABOLIC ENCEPHALOPATHY: Status: ACTIVE | Noted: 2020-02-06

## 2020-02-06 PROBLEM — G93.40 ACUTE ENCEPHALOPATHY: Status: ACTIVE | Noted: 2020-02-06

## 2020-02-06 LAB
ALBUMIN SERPL BCP-MCNC: 2.2 G/DL (ref 3.5–5.2)
ALP SERPL-CCNC: 111 U/L (ref 55–135)
ALT SERPL W/O P-5'-P-CCNC: 37 U/L (ref 10–44)
ANION GAP SERPL CALC-SCNC: 8 MMOL/L (ref 8–16)
AST SERPL-CCNC: 42 U/L (ref 10–40)
BILIRUB SERPL-MCNC: 1.8 MG/DL (ref 0.1–1)
BUN SERPL-MCNC: 43 MG/DL (ref 8–23)
CALCIUM SERPL-MCNC: 8.7 MG/DL (ref 8.7–10.5)
CHLORIDE SERPL-SCNC: 101 MMOL/L (ref 95–110)
CO2 SERPL-SCNC: 30 MMOL/L (ref 23–29)
CREAT SERPL-MCNC: 0.9 MG/DL (ref 0.5–1.4)
EST. GFR  (AFRICAN AMERICAN): >60 ML/MIN/1.73 M^2
EST. GFR  (NON AFRICAN AMERICAN): >60 ML/MIN/1.73 M^2
GLUCOSE SERPL-MCNC: 142 MG/DL (ref 70–110)
POCT GLUCOSE: 143 MG/DL (ref 70–110)
POCT GLUCOSE: 146 MG/DL (ref 70–110)
POCT GLUCOSE: 160 MG/DL (ref 70–110)
POCT GLUCOSE: 162 MG/DL (ref 70–110)
POTASSIUM SERPL-SCNC: 3.9 MMOL/L (ref 3.5–5.1)
PREALB SERPL-MCNC: 5 MG/DL (ref 20–43)
PROT SERPL-MCNC: 5.5 G/DL (ref 6–8.4)
SODIUM SERPL-SCNC: 139 MMOL/L (ref 136–145)

## 2020-02-06 PROCEDURE — 94640 AIRWAY INHALATION TREATMENT: CPT

## 2020-02-06 PROCEDURE — 99233 SBSQ HOSP IP/OBS HIGH 50: CPT | Mod: ,,, | Performed by: INTERNAL MEDICINE

## 2020-02-06 PROCEDURE — 25000003 PHARM REV CODE 250: Performed by: INTERNAL MEDICINE

## 2020-02-06 PROCEDURE — 25000242 PHARM REV CODE 250 ALT 637 W/ HCPCS: Performed by: INTERNAL MEDICINE

## 2020-02-06 PROCEDURE — 27000221 HC OXYGEN, UP TO 24 HOURS

## 2020-02-06 PROCEDURE — 84134 ASSAY OF PREALBUMIN: CPT

## 2020-02-06 PROCEDURE — 21400001 HC TELEMETRY ROOM

## 2020-02-06 PROCEDURE — 80053 COMPREHEN METABOLIC PANEL: CPT

## 2020-02-06 PROCEDURE — 63600175 PHARM REV CODE 636 W HCPCS: Performed by: INTERNAL MEDICINE

## 2020-02-06 PROCEDURE — 25000003 PHARM REV CODE 250: Performed by: HOSPITALIST

## 2020-02-06 PROCEDURE — 36415 COLL VENOUS BLD VENIPUNCTURE: CPT

## 2020-02-06 PROCEDURE — 99233 PR SUBSEQUENT HOSPITAL CARE,LEVL III: ICD-10-PCS | Mod: ,,, | Performed by: INTERNAL MEDICINE

## 2020-02-06 PROCEDURE — 94761 N-INVAS EAR/PLS OXIMETRY MLT: CPT

## 2020-02-06 RX ORDER — SIMETHICONE 80 MG
1 TABLET,CHEWABLE ORAL
Status: DISPENSED | OUTPATIENT
Start: 2020-02-06 | End: 2020-02-07

## 2020-02-06 RX ORDER — DILTIAZEM HCL 1 MG/ML
5 INJECTION, SOLUTION INTRAVENOUS CONTINUOUS
Status: DISCONTINUED | OUTPATIENT
Start: 2020-02-06 | End: 2020-02-06

## 2020-02-06 RX ORDER — FUROSEMIDE 10 MG/ML
20 INJECTION INTRAMUSCULAR; INTRAVENOUS 2 TIMES DAILY
Status: DISCONTINUED | OUTPATIENT
Start: 2020-02-06 | End: 2020-02-07

## 2020-02-06 RX ORDER — METOPROLOL TARTRATE 50 MG/1
50 TABLET ORAL 2 TIMES DAILY
Status: DISCONTINUED | OUTPATIENT
Start: 2020-02-06 | End: 2020-02-11 | Stop reason: HOSPADM

## 2020-02-06 RX ADMIN — METOPROLOL TARTRATE 50 MG: 50 TABLET, FILM COATED ORAL at 08:02

## 2020-02-06 RX ADMIN — INSULIN ASPART 1 UNITS: 100 INJECTION, SOLUTION INTRAVENOUS; SUBCUTANEOUS at 08:02

## 2020-02-06 RX ADMIN — IPRATROPIUM BROMIDE AND ALBUTEROL SULFATE 3 ML: .5; 3 SOLUTION RESPIRATORY (INHALATION) at 12:02

## 2020-02-06 RX ADMIN — IPRATROPIUM BROMIDE AND ALBUTEROL SULFATE 3 ML: .5; 3 SOLUTION RESPIRATORY (INHALATION) at 07:02

## 2020-02-06 RX ADMIN — ALLOPURINOL 300 MG: 100 TABLET ORAL at 09:02

## 2020-02-06 RX ADMIN — LEVOTHYROXINE SODIUM 75 MCG: 75 TABLET ORAL at 06:02

## 2020-02-06 RX ADMIN — Medication 5 MG/HR: at 12:02

## 2020-02-06 RX ADMIN — APIXABAN 5 MG: 5 TABLET, FILM COATED ORAL at 09:02

## 2020-02-06 RX ADMIN — APIXABAN 5 MG: 5 TABLET, FILM COATED ORAL at 08:02

## 2020-02-06 RX ADMIN — IPRATROPIUM BROMIDE AND ALBUTEROL SULFATE 3 ML: .5; 3 SOLUTION RESPIRATORY (INHALATION) at 01:02

## 2020-02-06 RX ADMIN — ATORVASTATIN CALCIUM 40 MG: 40 TABLET, FILM COATED ORAL at 09:02

## 2020-02-06 RX ADMIN — SIMETHICONE CHEW TAB 80 MG 80 MG: 80 TABLET ORAL at 03:02

## 2020-02-06 RX ADMIN — POLYETHYLENE GLYCOL 3350 17 G: 17 POWDER, FOR SOLUTION ORAL at 09:02

## 2020-02-06 RX ADMIN — FUROSEMIDE 20 MG: 10 INJECTION, SOLUTION INTRAMUSCULAR; INTRAVENOUS at 06:02

## 2020-02-06 RX ADMIN — FUROSEMIDE 20 MG: 10 INJECTION, SOLUTION INTRAMUSCULAR; INTRAVENOUS at 09:02

## 2020-02-06 RX ADMIN — SIMETHICONE CHEW TAB 80 MG 80 MG: 80 TABLET ORAL at 09:02

## 2020-02-06 RX ADMIN — IPRATROPIUM BROMIDE AND ALBUTEROL SULFATE 3 ML: .5; 3 SOLUTION RESPIRATORY (INHALATION) at 03:02

## 2020-02-06 RX ADMIN — SIMETHICONE CHEW TAB 80 MG 80 MG: 80 TABLET ORAL at 08:02

## 2020-02-06 RX ADMIN — METOPROLOL TARTRATE 50 MG: 50 TABLET, FILM COATED ORAL at 06:02

## 2020-02-06 NOTE — SUBJECTIVE & OBJECTIVE
Past Medical History:   Diagnosis Date    Acquired hypothyroidism 1/14/2020    Acute ischemic left middle cerebral artery (MCA) stroke 1/14/2020    Anticoagulant long-term use     eliquis    Arthritis     Chronic a-fib     Current use of long term anticoagulation     Embolic stroke involving left middle cerebral artery 1/10/2020    Hypertension     PEG (percutaneous endoscopic gastrostomy) adjustment/replacement/removal     Pressure ulcer of right leg, unspecified pressure ulcer stage     was going to [facility] healed now    Right spastic hemiparesis 1/14/2020    Stroke due to embolism of left middle cerebral artery 01/10/2020    left temp parietal    Thyroid disease     Type 2 diabetes mellitus with circulatory disorder, without long-term current use of insulin 1/10/2020       Past Surgical History:   Procedure Laterality Date    ESOPHAGOGASTRODUODENOSCOPY W/ PEG N/A 1/23/2020    Procedure: EGD, WITH PEG TUBE INSERTION;  Surgeon: Quan Feng MD;  Location: [facility] OR [address] [place] [address];  Service: General;  Laterality: N/A;    HERNIA REPAIR         Review of patient's allergies indicates:  No Known Allergies    No current facility-administered medications on file prior to encounter.      Current Outpatient Medications on File Prior to Encounter   Medication Sig    albuterol-ipratropium (DUO-NEB) 2.5 mg-0.5 mg/3 mL nebulizer solution Take 3 mLs by nebulization every 6 (six) hours as needed for Wheezing. Rescue    allopurinol (ZYLOPRIM) 300 MG tablet 1 tablet (300 mg total) by Per G Tube route once daily.    apixaban (ELIQUIS) 5 mg Tab 1 tablet (5 mg total) by Per G Tube route 2 (two) times daily.    atorvastatin (LIPITOR) 40 MG tablet 1 tablet (40 mg total) by Per G Tube route once daily.    levothyroxine (SYNTHROID) 75 MCG tablet 1 tablet (75 mcg total) by Per G Tube route once daily.    metoprolol tartrate (LOPRESSOR) 25 MG tablet 1 tablet (25 mg total) by Per G Tube route 2 (two) times  daily. (Patient taking differently: 50 mg by Per G Tube route 2 (two) times daily. )    polyethylene glycol (GLYCOLAX) 17 gram PwPk 17 g by Per G Tube route daily as needed.    QUEtiapine (SEROQUEL) 25 MG Tab 1 tablet (25 mg total) by Per G Tube route nightly as needed (for aggitation).    senna-docusate 8.6-50 mg (PERICOLACE) 8.6-50 mg per tablet 1 tablet by Per G Tube route 2 (two) times daily as needed for Constipation.     Family History     None        Tobacco Use    Smoking status: Never Smoker    Smokeless tobacco: Never Used   Substance and Sexual Activity    Alcohol use: Never     Frequency: Never    Drug use: Never    Sexual activity: Not on file     Review of Systems   Unable to perform ROS: mental status change     Objective:     Vital Signs (Most Recent):  Temp: 98.5 °F (36.9 °C) (02/05/20 1815)  Pulse: (!) 118 (02/05/20 1815)  Resp: 20 (02/05/20 1815)  BP: (!) 149/89 (02/05/20 1815)  SpO2: 95 % (02/05/20 1815) Vital Signs (24h Range):  Temp:  [97.2 °F (36.2 °C)-98.5 °F (36.9 °C)] 98.5 °F (36.9 °C)  Pulse:  [] 118  Resp:  [16-24] 20  SpO2:  [92 %-100 %] 95 %  BP: (108-180)/(61-89) 149/89     Weight: 92.6 kg (204 lb 2.3 oz)  Body mass index is 29.29 kg/m².    SpO2: 95 %  O2 Device (Oxygen Therapy): nasal cannula      Intake/Output Summary (Last 24 hours) at 2/5/2020 1821  Last data filed at 2/5/2020 1600  Gross per 24 hour   Intake --   Output 1250 ml   Net -1250 ml       Lines/Drains/Airways     Drain                 Gastrostomy/Enterostomy 01/23/20 0827 Percutaneous endoscopic gastrostomy (PEG) LUQ feeding 13 days         Urethral Catheter 02/03/20 1436 Latex 16 Fr. 2 days          Peripheral Intravenous Line                 Peripheral IV - Single Lumen 02/03/20 20 G Right Antecubital 2 days                Physical Exam   Constitutional: He appears well-developed and well-nourished. No distress.   Chr ill elderly man, NAD   HENT:   Head: Normocephalic and atraumatic.   Eyes: Pupils are  equal, round, and reactive to light. Conjunctivae are normal. No scleral icterus.   Neck: Normal range of motion. Neck supple. No JVD present. No tracheal deviation present. No thyromegaly present.   Cardiovascular: S1 normal and S2 normal. An irregularly irregular rhythm present. Tachycardia present. Exam reveals distant heart sounds. Exam reveals no gallop and no friction rub.   No murmur heard.  Pulmonary/Chest: Effort normal. No respiratory distress. He has no wheezes. He has no rales. He exhibits no tenderness.   Abdominal: Soft. He exhibits no distension. There is tenderness (suprapubic).   Musculoskeletal: He exhibits edema.   Neurological: He is alert. No cranial nerve deficit.   Skin: Skin is warm and dry. He is not diaphoretic.   Psychiatric:   UTO       Current Medications:   albuterol-ipratropium  3 mL Nebulization Q4H    allopurinoL  300 mg Per G Tube Daily    amiodarone in dextrose  150 mg Intravenous Once    apixaban  5 mg Per G Tube BID    atorvastatin  40 mg Per G Tube Daily    levothyroxine  75 mcg Per G Tube Daily    metoprolol tartrate  25 mg Per G Tube BID    polyethylene glycol  17 g Per G Tube Daily      amiodarone in dextrose 5%      [START ON 2/6/2020] amiodarone in dextrose 5%       acetaminophen, dextrose 50%, glucagon (human recombinant), influenza, insulin aspart U-100, pneumoc 13-mariya conj-dip cr(PF), senna-docusate 8.6-50 mg, sodium chloride 0.9%    Laboratory (all labs reviewed):  CBC:  Recent Labs   Lab 01/29/20  0626 01/30/20  0517 02/03/20  1424 02/04/20  0136 02/05/20  0447   WBC 12.82 H 13.83 H 12.47 12.42 11.11   Hemoglobin 13.7 L 13.6 L 13.0 L 12.2 L 12.2 L   Hematocrit 41.6 44.7 39.5 L 37.9 L 38.5 L   Platelets 126 L 150 149 L 149 L 150       CHEMISTRIES:  Recent Labs   Lab 01/25/20  0457 01/26/20  0447 01/27/20  0832 01/30/20  0803 02/03/20  1424 02/04/20  0135 02/05/20  0447   Glucose  --   --  185 H 178 H 231 H 175 H 164 H   Sodium  --   --  147 H 151 H 133 L 137  139   Potassium  --   --  4.7 4.3 4.2 4.5 4.1   BUN, Bld  --   --  41 H 41 H 30 H 34 H 40 H   Creatinine  --   --  0.9 0.9 0.8 1.1 0.9   eGFR if African American  --   --  >60.0 >60.0 >60 >60 >60   eGFR if non   --   --  >60.0 >60.0 >60 >60 >60   Calcium  --   --  9.6 9.2 8.6 L 8.6 L 8.9   Magnesium 2.1 1.9  --  1.8 1.8  --  2.0       CARDIAC BIOMARKERS:  Recent Labs   Lab 02/03/20  1424 02/03/20 2018 02/04/20  0136   Troponin I 0.039 H 0.053 H 0.048 H       COAGS:  Recent Labs   Lab 01/27/20  0519 01/28/20  0421 01/29/20  0626 01/30/20  0517 02/03/20  1424   INR 1.3 H 1.2 1.3 H 1.2 1.2       LIPIDS/LFTS:  Recent Labs   Lab 01/11/20  0357  01/27/20  0832 01/30/20  0803 02/03/20  1424 02/04/20  0540 02/05/20  0447   Cholesterol 150  --   --   --   --   --   --    Triglycerides 46  --   --   --   --   --   --    HDL 61  --   --   --   --   --   --    LDL Cholesterol 79.8  --   --   --   --   --   --    Non-HDL Cholesterol 89  --   --   --   --   --   --    AST  --    < > 44 H 49 H 48 H 40 43 H   ALT  --    < > 40 40 43 41 38    < > = values in this interval not displayed.       BNP:  Recent Labs   Lab 02/03/20  1424    H       TSH:  Recent Labs   Lab 01/10/20  2012   TSH 4.277 H       Free T4:  Recent Labs   Lab 01/10/20  2012   Free T4 0.97       Diagnostic Results:  ECG (personally reviewed and interpreted tracing(s)):  2/3/20 1416 AF 81, PRWP/LAD/LAHB, NSSTTW changes, similar to 1/30/20    Chest X-Ray (personally reviewed and interpreted image(s)): 2/3/20 bilat effusions    Echo: 2/4/20 (images personally reviewed and interpreted)  · TDS  · Irreg irreg rhythm throughout exam.  · Low normal left ventricular systolic function. The estimated ejection fraction is 50%.  · No diagnostic regional wall motion abnormalities.  · Mild concentric left ventricular hypertrophy.  · Normal right ventricular systolic function.  · Mild mitral regurgitation.  · Mild tricuspid regurgitation.

## 2020-02-06 NOTE — PHYSICIAN QUERY
PT Name: Chirag Thompson  MR #: [id number]    Physician Query Form - Respiratory Condition Clarification      CDS: Elio Cobb RN CCDS               Contact information: [email or web address] or [phone]    This form is a permanent document in the medical record.    Query Date: February 6, 2020    By submitting this query, we are merely seeking further clarification of documentation. Please utilize your independent clinical judgment when addressing the question(s) below.    The Medical record contains the following:   Indicators   Supporting Clinical Findings Location in Medical Record     x Pulmonary Edema documented Pulmonary edema  2/5/2020 Hospital Medicine progress notes Norma Silva MD     x Wheezing, Productive cough, SOB, FORTUNE, Use of accessory muscles documented bilateral wheezing, short of breath  slightly Increased respiratory effort 2/3/2020 ED provider notes Rogelio Miller MD  2/4/2020 H&P Quan Nobles MD     x Radiology Findings Suboptimal inspiration with mild bibasilar effusions and probable atelectasis. Stable cardiomegaly.    Lungs are significantly hypoaerated.  Some curvilinear increased opacity at the right base slightly increased.  Believe this more likely related to atelectasis/consolidation in the right lower lobe than free air in the peritoneum.  Correlation with clinical findings suggested and further evaluation as indicated. 2/3/2020 Chest x-ray       2/5/2020 Chest x-ray      x Hypoxia , Hypoxemia Hypoxic documented Acute respiratory failure with hypoxia  2/5/2020 Hospital Medicine progress notes Norma Silva MD    Respiratory Distress documented       x RR                  PaO2                  PaCO2                     O2 sat  2/3/2020 17:09   POC PH 7.543 (H)   POC PCO2 31.7 (L)   POC PO2 63 (L)   POC BE 5   POC HCO3 27.3   POC SATURATED O2 95   POC TCO2 28 (H)   Flow 2   Sample ARTERIAL   DelSys Nasal Can     RR 30, O2 sat on RA 96-98%  ABG                          2/3/2020 Vitals      x Treatment: furosemide injection 80 mg IV  furosemide injection 40 mg IV  furosemide injection 40 mg IV daily  furosemide injection 20 mg IV BID 2/3/2020 Medications    2/4-2/5/2020 Medication  Current Medication     x Supplemental O2: 2-5L/min  via NC 2/3/2020 Vitals     Oxygen dependence       x Other: Atrial fibrillation can result in ineffective cardiac output and pulmonary edema when poorly controlled  2/5/2020 Hospital Medicine progress notes Norma Silva MD     Provider, please specify diagnosis or diagnoses associated with above clinical findings.    Provider, please further specify the diagnosis of Pulmonary Edema  associated with above clinical findings.  [    ] Acute pulmonary edema, cardiac cause (specify cardiac condition): ___________________   [    ] Acute pulmonary edema, non-cardiac cause (specify causative condition): ___________________   [    ] Chronic pulmonary edema, cardiac cause (specify cardiac condition): ___________________   [    ] Chronic pulmonary edema, non-cardiac cause (specify causative condition): ___________________   [    ] Other respiratory diagnosis (please specify): _________________________________    [ [ x  ] ] Clinically Undetermined         Please document in your progress notes daily for the duration of treatment, until resolved, and include in your discharge summary.

## 2020-02-06 NOTE — HOSPITAL COURSE
Interval Hx: pt seen in ICU, case d/w RN.  Difficult to obtain info from pt (aphasia), but still with lower abd pain.  No cp/sob/palps/  AF better controlled on dilt gtt.    Tele: AF 80s (personally reviewed and interpreted)

## 2020-02-06 NOTE — NURSING
Patient has arrived on the floor, accompanied in bed, 02 therapy in place 3L via nc, Tube feeds @30cc/hr, Bed in lowest position, wheels locked, call light in reach, NAD. Will continue to monitor

## 2020-02-06 NOTE — PLAN OF CARE
02/06/20 1129   Discharge Assessment   Assessment Type Discharge Planning Assessment   Assessment information obtained from? Medical Record   Prior to hospitilization cognitive status: Unable to Assess   Prior to hospitalization functional status: Needs Assistance   Current cognitive status: Unable to Assess   Current Functional Status: Needs Assistance   Facility Arrived From: [facility] With facility resident   Patient's perception of discharge disposition skilled nursing facility   Patient currently being followed by outpatient case management? No   Patient currently receives any other outside agency services? No   Equipment Currently Used at Home cane, straight   Do you have any problems affording any of your prescribed medications? No   Is the patient taking medications as prescribed? yes   Does the patient have transportation home? Yes   Transportation Anticipated health plan transportation   Does the patient receive services at the [facility]? No   Discharge Plan A Skilled Nursing Facility   Discharge Plan B Return to Nursing Home   Patient/Family in Agreement with Plan yes         [id number] - [place], [place] - [address] & [facility]  [address]  [facility] [address]  Phone: [phone] Fax: [phone]

## 2020-02-06 NOTE — ASSESSMENT & PLAN NOTE
Chr AF on OAC  HR being driven by underlying medical comorbidities/acute decompensation  Dilt gtt with good effect, will stop and inc BBL.  Inc metoprolol 50mg bid, itrate as HR/BP will allow  No plan for DCCV.  OK for transfer to tele.  No further CV testing planned at this juncture.

## 2020-02-06 NOTE — ASSESSMENT & PLAN NOTE
Etiology unknown  RUQ ultrasound with cholelithiasis and sludge but no obstruction, inflammation and CBD normal

## 2020-02-06 NOTE — ASSESSMENT & PLAN NOTE
2/2 pulmonary edema and pleural effusions  Atrial fibrillation can result in ineffective cardiac output and pulmonary edema when poorly controlled  Echocardiogram with mild diastolic dysfunction, preserved EF and no significant valvular issues  I am concerned this is due to malnutrition  Tube feeds restarted and on diuresis  Supplemental O2 as needed  Activity as tolerated with PT/OT  Speech eval. So far is to stay NPO. Modified swallow ordered as per Speech's recs  Patient might need O2 for home use

## 2020-02-06 NOTE — ASSESSMENT & PLAN NOTE
As evidenced by low albumin, low total protein, sacral pressure ulcer and evidence of third spacing  This is all likely to consequences of large stroke 3 weeks prior to presentation  Pre-albumin very low at 5  Tube feeds via PEG tube per dietary

## 2020-02-06 NOTE — NURSING
Ultrasound tech at bedside with pt for US of abdomen. Pt tolerated well. Will continue to monitor.

## 2020-02-06 NOTE — HOSPITAL COURSE
Mr Thompson presented with acute respiratory failure with hypoxia and acute encephalopathy. Workup consistent with bilateral pleural effusions, pulmonary edema and severe malnutrition. Initiated on IV furosemide, supplemental O2 via low fow NC and consulted IR for thoracentesis. IR recommended against thoracentesis as they thought size of effusions were too small for safe intervention. IV furosemide continued. Echocardiogram with grade 1 diastolic dysfunction with preserved EF and no significant valvular issues. He is in chronic AFib however, rate controlled with metoprolol. Is on NOAC for stroke prophylaxis. As for encephalopathy, patient became more alert and interactive after narcotics were discontinued and shortness of breath addressed. No evidence of infection except for abnormal urinalysis however with few bacteria in setting of indwelling dean catheter. Urine culture negative, no growth in blood cultures, afebrile and no leukocytosis. Antibiotics therefore discontinued. Dean exchanged for a new one in the ED. On 2/5 patient developed rapid ventricular response despite use of home metoprolol and overall clinical improvement. Blood pressure at the time too low at the time, therefore transferred to ICU on amiodarone infusion and cardiology consultation. Blood pressure had improved therefore Cardiology recommended diltiazem infusion instead. Repeat CXR and labs showed no acute findings. Patient was properly rate controlled and was transitioned to a higher dose of metoprolol (50 mg BID). Remained stable. Stepped down to telemetry floor on 2/6. Remained stable on supplemental O2 via low flow NC, dean in place and bowel regimen. Tolerated tube feeds. Patient's nephew and 2 nieces willing to care for patient at home. This is first time patient is going home since the stroke and is no longer ambulatory/independent. Given that he is now bedbound and fully dependent, a hospital bed, katy lift and wheelchair were  ordered. They were approved by insurance. Patient is to remain with dean catheter and PEG tube. Unsafe for oral intake unless pleasure feeds are initiated however risk for aspiration very high. Bolus tube feeds glucerna 1.5 kcal QID with water flushes. Maintenance diuresis with furosemide 40 mg daily via PEG. Nurse to teach family how to use PEG and insulin. Wound care and pressure shift of DTI to sacrum. As for PCP, SW got a PCP that will visit patient at home on Friday 2/14/2020. Patient is high risk for readmission. Code status should be addressed. Home once HH set up, equipment sent to patient's home and teaching provided to family concerning glucose checks, PEG use and insulin injection. HH to proceed with wound care.

## 2020-02-06 NOTE — PROGRESS NOTES
[facility]  Cardiology  Progress Note    Patient Name: Chirag Thompson  MRN: [id number]  Admission Date: 2/3/2020  Hospital Length of Stay: 3 days  Code Status: Full Code   Attending Physician: Norma Watkins MD   Primary Care Physician: Dany Marinelli Iii, MD  Expected Discharge Date: 2/5/2020  Principal Problem:Acute respiratory failure with hypoxia    Subjective:     Interval Hx: pt seen in ICU, case d/w RN.  Difficult to obtain info from pt (aphasia), but still with lower abd pain.  No cp/sob/palps/  AF better controlled on dilt gtt.    Tele: AF 80s (personally reviewed and interpreted)      Past Medical History:   Diagnosis Date    Acquired hypothyroidism 1/14/2020    Acute ischemic left middle cerebral artery (MCA) stroke 1/14/2020    Anticoagulant long-term use     eliquis    Arthritis     Chronic a-fib     Current use of long term anticoagulation     Embolic stroke involving left middle cerebral artery 1/10/2020    Hypertension     PEG (percutaneous endoscopic gastrostomy) adjustment/replacement/removal     Pressure ulcer of right leg, unspecified pressure ulcer stage     was going to [facility] healed now    Right spastic hemiparesis 1/14/2020    Stroke due to embolism of left middle cerebral artery 01/10/2020    left temp parietal    Thyroid disease     Type 2 diabetes mellitus with circulatory disorder, without long-term current use of insulin 1/10/2020       Past Surgical History:   Procedure Laterality Date    ESOPHAGOGASTRODUODENOSCOPY W/ PEG N/A 1/23/2020    Procedure: EGD, WITH PEG TUBE INSERTION;  Surgeon: Quan Feng MD;  Location: [facility] OR [address] [place] [address];  Service: General;  Laterality: N/A;    HERNIA REPAIR         Review of patient's allergies indicates:  No Known Allergies    No current facility-administered medications on file prior to encounter.      Current Outpatient Medications on File Prior to Encounter   Medication Sig    albuterol-ipratropium  (DUO-NEB) 2.5 mg-0.5 mg/3 mL nebulizer solution Take 3 mLs by nebulization every 6 (six) hours as needed for Wheezing. Rescue    allopurinol (ZYLOPRIM) 300 MG tablet 1 tablet (300 mg total) by Per G Tube route once daily.    apixaban (ELIQUIS) 5 mg Tab 1 tablet (5 mg total) by Per G Tube route 2 (two) times daily.    atorvastatin (LIPITOR) 40 MG tablet 1 tablet (40 mg total) by Per G Tube route once daily.    levothyroxine (SYNTHROID) 75 MCG tablet 1 tablet (75 mcg total) by Per G Tube route once daily.    metoprolol tartrate (LOPRESSOR) 25 MG tablet 1 tablet (25 mg total) by Per G Tube route 2 (two) times daily. (Patient taking differently: 50 mg by Per G Tube route 2 (two) times daily. )    polyethylene glycol (GLYCOLAX) 17 gram PwPk 17 g by Per G Tube route daily as needed.    QUEtiapine (SEROQUEL) 25 MG Tab 1 tablet (25 mg total) by Per G Tube route nightly as needed (for aggitation).    senna-docusate 8.6-50 mg (PERICOLACE) 8.6-50 mg per tablet 1 tablet by Per G Tube route 2 (two) times daily as needed for Constipation.     Family History     None        Tobacco Use    Smoking status: Never Smoker    Smokeless tobacco: Never Used   Substance and Sexual Activity    Alcohol use: Never     Frequency: Never    Drug use: Never    Sexual activity: Not on file     Review of Systems   Unable to perform ROS: mental status change     Objective:     Vital Signs (Most Recent):  Temp: 98.2 °F (36.8 °C) (02/06/20 0315)  Pulse: 86 (02/06/20 0400)  Resp: (!) 26 (02/06/20 0400)  BP: 119/64 (02/06/20 0400)  SpO2: 97 % (02/06/20 0400) Vital Signs (24h Range):  Temp:  [97.2 °F (36.2 °C)-98.5 °F (36.9 °C)] 98.2 °F (36.8 °C)  Pulse:  [] 86  Resp:  [13-38] 26  SpO2:  [92 %-100 %] 97 %  BP: ()/(52-89) 119/64     Weight: 94.1 kg (207 lb 7.3 oz)  Body mass index is 29.77 kg/m².    SpO2: 97 %  O2 Device (Oxygen Therapy): room air(Pt found on RA.)      Intake/Output Summary (Last 24 hours) at 2/6/2020 0534  Last  data filed at 2/6/2020 0400  Gross per 24 hour   Intake 117.13 ml   Output 1335 ml   Net -1217.87 ml       Lines/Drains/Airways     Drain                 Gastrostomy/Enterostomy 01/23/20 0827 Percutaneous endoscopic gastrostomy (PEG) LUQ feeding 13 days         Urethral Catheter 02/03/20 1436 Latex 16 Fr. 2 days          Peripheral Intravenous Line                 Peripheral IV - Single Lumen 02/03/20 20 G Right Antecubital 3 days         Peripheral IV - Single Lumen 02/05/20 1824 20 G Posterior;Right;Anterior Forearm less than 1 day         Peripheral IV - Single Lumen 02/05/20 1830 20 G Right;Posterior Wrist less than 1 day                Physical Exam   Constitutional: He appears well-developed and well-nourished. No distress.   Chr ill elderly man, NAD   HENT:   Head: Normocephalic and atraumatic.   Eyes: Pupils are equal, round, and reactive to light. Conjunctivae are normal. No scleral icterus.   Neck: Normal range of motion. Neck supple. No JVD present. No tracheal deviation present. No thyromegaly present.   Cardiovascular: Normal rate, S1 normal and S2 normal. An irregularly irregular rhythm present. Exam reveals distant heart sounds. Exam reveals no gallop and no friction rub.   No murmur heard.  Pulmonary/Chest: Effort normal. No respiratory distress. He has no wheezes. He has no rales. He exhibits no tenderness.   Abdominal: Soft. He exhibits no distension. There is tenderness (suprapubic).   Musculoskeletal: He exhibits edema.   Neurological: He is alert. No cranial nerve deficit.   Skin: Skin is warm and dry. He is not diaphoretic.   Psychiatric:   UTO       Current Medications:   albuterol-ipratropium  3 mL Nebulization Q4H    allopurinoL  300 mg Per G Tube Daily    apixaban  5 mg Per G Tube BID    atorvastatin  40 mg Per G Tube Daily    levothyroxine  75 mcg Per G Tube Daily    metoprolol tartrate  25 mg Per G Tube BID    polyethylene glycol  17 g Per G Tube Daily      dilTIAZem 7.5 mg/hr  (02/06/20 0400)     acetaminophen, dextrose 50%, glucagon (human recombinant), influenza, insulin aspart U-100, pneumoc 13-mariya conj-dip cr(PF), senna-docusate 8.6-50 mg, sodium chloride 0.9%    Laboratory (all labs reviewed):  CBC:  Recent Labs   Lab 01/30/20 0517 02/03/20 1424 02/04/20 0136 02/05/20 0447 02/05/20 2000   WBC 13.83 H 12.47 12.42 11.11 10.81   Hemoglobin 13.6 L 13.0 L 12.2 L 12.2 L 12.0 L   Hematocrit 44.7 39.5 L 37.9 L 38.5 L 37.9 L   Platelets 150 149 L 149 L 150 154       CHEMISTRIES:  Recent Labs   Lab 01/26/20 0447 01/30/20  0803 02/03/20 1424 02/04/20 0135 02/05/20 0447 02/05/20 2000 02/06/20  0340   Glucose  --    < > 178 H 231 H 175 H 164 H 145 H 142 H   Sodium  --    < > 151 H 133 L 137 139 140 139   Potassium  --    < > 4.3 4.2 4.5 4.1 4.2 3.9   BUN, Bld  --    < > 41 H 30 H 34 H 40 H 42 H 43 H   Creatinine  --    < > 0.9 0.8 1.1 0.9 0.9 0.9   eGFR if African American  --    < > >60.0 >60 >60 >60 >60 >60   eGFR if non African American  --    < > >60.0 >60 >60 >60 >60 >60   Calcium  --    < > 9.2 8.6 L 8.6 L 8.9 8.9 8.7   Magnesium 1.9  --  1.8 1.8  --  2.0 1.9  --     < > = values in this interval not displayed.       CARDIAC BIOMARKERS:  Recent Labs   Lab 02/03/20 1424 02/03/20 2018 02/04/20 0136   Troponin I 0.039 H 0.053 H 0.048 H       COAGS:  Recent Labs   Lab 01/27/20  0519 01/28/20  0421 01/29/20  0626 01/30/20 0517 02/03/20  1424   INR 1.3 H 1.2 1.3 H 1.2 1.2       LIPIDS/LFTS:  Recent Labs   Lab 01/11/20  0357  02/03/20  1424 02/04/20  0540 02/05/20  0447 02/05/20 2000 02/06/20  0340   Cholesterol 150  --   --   --   --   --   --    Triglycerides 46  --   --   --   --   --   --    HDL 61  --   --   --   --   --   --    LDL Cholesterol 79.8  --   --   --   --   --   --    Non-HDL Cholesterol 89  --   --   --   --   --   --    AST  --    < > 48 H 40 43 H 43 H 42 H   ALT  --    < > 43 41 38 37 37    < > = values in this interval not displayed.       BNP:  Recent Labs    Lab 02/03/20  1424    H       TSH:  Recent Labs   Lab 01/10/20  2012   TSH 4.277 H       Free T4:  Recent Labs   Lab 01/10/20  2012   Free T4 0.97       Diagnostic Results:  ECG (personally reviewed and interpreted tracing(s)):  2/3/20 1416 AF 81, PRWP/LAD/LAHB, NSSTTW changes, similar to 1/30/20    Chest X-Ray (personally reviewed and interpreted image(s)): 2/3/20 bilat effusions    Echo: 2/4/20 (images personally reviewed and interpreted)  · TDS  · Irreg irreg rhythm throughout exam.  · Low normal left ventricular systolic function. The estimated ejection fraction is 50%.  · No diagnostic regional wall motion abnormalities.  · Mild concentric left ventricular hypertrophy.  · Normal right ventricular systolic function.  · Mild mitral regurgitation.  · Mild tricuspid regurgitation.          Assessment and Plan:     * Acute respiratory failure with hypoxia  Mgmt per IM    Atrial fibrillation with RVR  Chr AF on OAC  HR being driven by underlying medical comorbidities/acute decompensation  Dilt gtt with good effect, will stop and inc BBL.  Inc metoprolol 50mg bid, itrate as HR/BP will allow  No plan for DCCV.  OK [facility] transfer to [facility].  No further CV testing planned at this juncture.    Essential hypertension  Cont med rx    Type 2 diabetes mellitus with circulatory disorder, without long-term current use of insulin  Per IM        VTE Risk Mitigation (From admission, onward)         Ordered     apixaban tablet 5 mg  2 times daily      02/04/20 1331     IP VTE HIGH RISK PATIENT  Once      02/03/20 1918     Reason for No Pharmacological VTE Prophylaxis  Once     Question:  Reasons:  Answer:  Already adequately anticoagulated on oral Anticoagulants    02/03/20 1918              Cardiology will sign off, pls call with questions.    Quan Calvillo MD  Cardiology  [facility]

## 2020-02-06 NOTE — PLAN OF CARE
Pt awake and alert; nods appropriately and tries to respond but mumbles incomprehensible words. On 3 L NC. Cardizem gtt discontinued; will restart oral meds per Peg tube. Pt remains in A-Fib; rate controlled. Tube feedings restarted; pt tolerating well. Rodriguez in place with adequate UO. Pt w/o signs of pain. Pt updated on plan of care. Pt free of falls, injuries, and skin breakdown.

## 2020-02-06 NOTE — PHYSICIAN QUERY
PT Name: Chirag Thompson  MR #: [id number]    Physician Query Form - Respiratory Condition Clarification      CDS: Elio Cobb RN CCDS               Contact information: [email or web address] or [phone]    This form is a permanent document in the medical record.    Query Date: February 6, 2020    By submitting this query, we are merely seeking further clarification of documentation. Please utilize your independent clinical judgment when addressing the question(s) below.    The Medical record contains the following   Indicators   Supporting Clinical Findings Location in Medical Record     x SOB, FORTUNE, Wheezing, Productive Cough, Use of Accessory Muscles, etc. short of breath, bilateral wheezing.  slightly Increased respiratory effort 2/3/2020 ED provider notes Rogelio Miller MD  2/4/2020 H&P Quan Nobles MD     x Acute/Chronic Illness Acute metabolic encephalopathy  Severe protein-calorie malnutrition  Chronic ischemic left MCA stroke  Dysphagia due to recent stroke  Other persistent atrial fibrillation  2/6/2020 Hospital Medicine progress notes Norma Silva MD     x Radiology Findings Suboptimal inspiration with mild bibasilar effusions and probable atelectasis. Stable cardiomegaly.    Lungs are significantly hypoaerated.  Some curvilinear increased opacity at the right base slightly increased.  Believe this more likely related to atelectasis/consolidation in the right lower lobe than free air in the peritoneum.  Correlation with clinical findings suggested and further evaluation as indicated. 2/3/2020 Chest x-ray       2/5/2020 Chest x-ray      x Respiratory Distress or Failure Acute respiratory failure with hypoxia  2/2 pulmonary edema and pleural effusions  2/5/2020 Hospital Medicine progress notes Norma Silva MD    Hypoxia or Hypercapnia       x RR         ABGs         O2 sat  2/3/2020 17:09   POC PH 7.543 (H)   POC PCO2 31.7 (L)   POC PO2 63 (L)   POC BE 5   POC HCO3 27.3   POC SATURATED O2 95    POC TCO2 28 (H)   Flow 2   Sample ARTERIAL   DelSys Nasal Can     RR 30, O2 sat on RA 96-98% ABG                          2/3/2020 Vitals     BiPAP/Intubation      x Supplemental O2 2-5L/min  via NC 2/3/2020 Vitals     Home O2, Oxygen Dependence      Treatment      Other       Respiratory failure can be acute, chronic or both. It is generally further specified as hypoxic, hypercapnic or both. Lastly, it is important to identify an etiology, if known or suspected.   References::  https://www.acphospitalist.org/archives/2013/10/coding.htm http://[employer]/acute-respiratory-failure-know     The clinical guidelines noted below are only system guidelines, and do not replace the providers clinical judgment.    Provider, please specify diagnosis or diagnoses associated with above clinical findings.   [ x  ] Acute Respiratory Failure with Hypoxia treated and monitored during this admission. Please provide clinical indicators to support this diagnosis: ________________________  (ABG pO2 < 60 mmHg or O2 sat of 88% on RA and respiratory symptoms documented)   [   ] Acute Respiratory Distress - Generally describes less severe respiratory symptoms (tachypnea, in respiratory distress, increased work of breathing, unable to speak in complete sentences, labored breathing, use of accessory muscles, RR> 24, cyanosis, dyspnea, wheezing, stridor, lethargy) without sufficient measurements (pO2, SpO2, pH, and pCO2) to meet criteria for respiratory failure    [   ] Other Respiratory Diagnosis (please specify): _________________________________   [   ]  Clinically Undetermined       Please document in your progress notes daily for the duration of treatment until resolved and include in your discharge summary.

## 2020-02-06 NOTE — HPI
79 y.o. male that (in part)  has a past medical history of Acquired hypothyroidism, Acute ischemic left middle cerebral artery (MCA) stroke, Anticoagulant long-term use, Arthritis, Chronic a-fib, Current use of long term anticoagulation, Embolic stroke involving left middle cerebral artery, Hypertension, PEG (percutaneous endoscopic gastrostomy) adjustment/replacement/removal, Pressure ulcer of right leg, unspecified pressure ulcer stage, Right spastic hemiparesis, Stroke due to embolism of left middle cerebral artery, Thyroid disease, and Type 2 diabetes mellitus with circulatory disorder, without long-term current use of insulin.  has a past surgical history that includes Hernia repair and Esophagogastroduodenoscopy w/ PEG (N/A, 1/23/2020). Presents to [facility]  by EMS from  [facility] today with report of both shortness of breath.  Associated symptoms include urinary retention and swollen testicles.  He was recently admitted for acute CVA a discharged approximately 2 weeks ago.  He recently had an indwelling dean removed.  No report of fever chills.  No hematuria.  Positive for suprapubic tenderness .  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  He is reportedly lethargic compared to his baseline.  Concern for both his breathing and urinary retention.  In the emergency department routine laboratory studies, urinalysis, chest x-ray obtained.  Chest x-ray concerning for bilateral pleural effusions.  Supple oxygen was given.  He is on and apixaban therefore no thoracentesis could be performed at this time safely.  History phone was slightly elevated but consistent with previous measurements.  EKG was nonischemic.  Urinalysis concerning for WBCs and RBCs.  Possible UTI.    Cardiology consulted for assistance with AF/RVR.    Patient is seen in the intensive care unit.  He is not able right much the way of  history.  He denies any overt angina or dyspnea, does describe some lower abdominal discomfort.  Amiodarone drip has been ordered, but I think a diltiazem drip may be more in order seeing as how the patient has chronic atrial fibrillation.  This is likely being driven by his underlying medical illness as we do not plan to perform cardioversion.  He is on anticoagulation.  He does appear to be hemodynamically stable.  Recent echocardiogram reveals preserved LV function.  I do not plan cardioversion.

## 2020-02-06 NOTE — NURSING
Patient received from floor via bed, transferred to ICU bed without difficulty.  Patient awake, follows commands.  Continues afib.

## 2020-02-06 NOTE — ASSESSMENT & PLAN NOTE
Dose of metoprolol increased for better rate control.   On anticoagulation for stroke prophylaxis (CHADS VASc 5, which puts him at high risk for recurrent stroke)

## 2020-02-06 NOTE — SUBJECTIVE & OBJECTIVE
Interval History: rate controlled. Now on Metoprolol per G tube and stable. Even more interactive today and shook my hand when asked. Still with slurred speech but nodding appropriately, it seems.     Review of Systems   Unable to perform ROS: Other (slurred speech)     Objective:     Vital Signs (Most Recent):  Temp: 98.2 °F (36.8 °C) (02/06/20 0315)  Pulse: 83 (02/06/20 0738)  Resp: 18 (02/06/20 0738)  BP: 121/70 (02/06/20 0645)  SpO2: 98 % (02/06/20 0738) Vital Signs (24h Range):  Temp:  [97.2 °F (36.2 °C)-98.5 °F (36.9 °C)] 98.2 °F (36.8 °C)  Pulse:  [] 83  Resp:  [13-38] 18  SpO2:  [92 %-100 %] 98 %  BP: ()/(52-89) 121/70     Weight: 94.1 kg (207 lb 7.3 oz)  Body mass index is 29.77 kg/m².    Intake/Output Summary (Last 24 hours) at 2/6/2020 0859  Last data filed at 2/6/2020 0600  Gross per 24 hour   Intake 147.13 ml   Output 1410 ml   Net -1262.87 ml      Physical Exam   Constitutional: He appears well-developed. No distress.   HENT:   Head: Normocephalic and atraumatic.   Eyes: Pupils are equal, round, and reactive to light. Conjunctivae and EOM are normal.   Neck: Normal range of motion.   Cardiovascular: Normal rate. An irregularly irregular rhythm present.   Pulmonary/Chest: Effort normal. He has no wheezes. He has no rales.   Decreased breath sounds bases   Abdominal: Soft. Bowel sounds are normal.   Neurological: He is alert.   Makes eye contact and interactive but with significant dysarthria.   Facial droop    Skin: Capillary refill takes less than 2 seconds. He is not diaphoretic.   Nursing note and vitals reviewed.      Significant Labs: All pertinent labs within the past 24 hours have been reviewed.    Significant Imaging: I have reviewed all pertinent imaging results/findings within the past 24 hours.  I have reviewed and interpreted all pertinent imaging results/findings within the past 24 hours.

## 2020-02-06 NOTE — NURSING
[facility]  ICU Multidisciplinary Bedside Rounds   SUMMARY     Date: 2/6/2020    Prehospitalization: Nursing Home  Admit Date / LOS : 2/3/2020/ 3 days    Diagnosis: Acute respiratory failure with hypoxia    Consults:        Active: Cardio, IR, OT, PT, ST and Wound Care       Needed: N/A     Code Status: Full Code   Advanced Directive: <no information>    LDA: Rodriguez, PEG and PIV       Central Lines/Site/Justification:Patient Does Not Have Central Line       Urinary Cath/Order/Justification:Critically Ill in the ICU and requiring intensive monitoring    Vasopressors/Infusions:        GOALS: Volume/ Hemodynamic: N/A                     RASS: 0  alert and calm    CAM ICU: N/A  Pain Management: PO       Pain Controlled: yes     Rhythm: A-Fib    Respiratory Device: Nasal Cannula                  Most Recent SBT/ SAT: N/A       MOVE Screen: PT Consult    VTE Prophylaxis: Pharm  Mobility: Bedrest  Stress Ulcer Prophylaxis: No    Dietary: TF  Tolerance: yes  /  Advancement: yes    Isolation: No active isolations    Restraints: No    Significant Dates:  Post Op Date: N/A  Rescue Date: N/A  Imaging/ Diagnostics: N/A    Noteworthy Labs:  none    CBC/Anemia Labs: Coags:    Recent Labs   Lab 02/05/20 0447 02/05/20 2000   WBC 11.11 10.81   HGB 12.2* 12.0*   HCT 38.5* 37.9*    154   * 105*   RDW 15.9* 16.2*    Recent Labs   Lab 02/03/20  1424   INR 1.2   APTT 30.8        Chemistries:   Recent Labs   Lab 02/05/20 0447 02/05/20 2000 02/06/20  0340    140 139   K 4.1 4.2 3.9    101 101   CO2 32* 32* 30*   BUN 40* 42* 43*   CREATININE 0.9 0.9 0.9   CALCIUM 8.9 8.9 8.7   PROT 5.5* 5.5* 5.5*   BILITOT 1.8* 1.6* 1.8*   ALKPHOS 105 111 111   ALT 38 37 37   AST 43* 43* 42*   MG 2.0 1.9  --    PHOS 3.3  --   --         Cardiac Enzymes: Ejection Fractions:    Recent Labs     02/03/20 2018 02/04/20  0136   TROPONINI 0.053* 0.048*    No results found for: EF     POCT Glucose: HbA1c:    Recent  Labs   Lab 02/05/20  1126 02/05/20  1544 02/05/20  2220   POCTGLUCOSE 194* 174* 146*    Hemoglobin A1C   Date Value Ref Range Status   02/04/2020 6.1 (H) 4.0 - 5.6 % Final     Comment:     ADA Screening Guidelines:  5.7-6.4%  Consistent with prediabetes  >or=6.5%  Consistent with diabetes  High levels of fetal hemoglobin interfere with the HbA1C  assay. Heterozygous hemoglobin variants (HbS, HgC, etc)do  not significantly interfere with this assay.   However, presence of multiple variants may affect accuracy.          Needs from Care Team: Increase tube feedings as tolerated, comfort     ICU LOS 12h  Level of Care: OK to Transfer

## 2020-02-06 NOTE — PROGRESS NOTES
[facility] Hospital Medicine  Progress Note    Patient Name: Chirag Thompson  MRN: [id number]  Patient Class: IP- Inpatient   Admission Date: 2/3/2020  Length of Stay: 3 days  Attending Physician: Norma Watkins MD  Primary Care Provider: Dany Marinelli Iii, MD        Subjective:     Principal Problem:Acute respiratory failure with hypoxia        HPI:    Chirag Thompson is a 79 y.o. male that (in part)  has a past medical history of Acquired hypothyroidism, Acute ischemic left middle cerebral artery (MCA) stroke, Anticoagulant long-term use, Arthritis, Chronic a-fib, Current use of long term anticoagulation, Embolic stroke involving left middle cerebral artery, Hypertension, PEG (percutaneous endoscopic gastrostomy) adjustment/replacement/removal, Pressure ulcer of right leg, unspecified pressure ulcer stage, Right spastic hemiparesis, Stroke due to embolism of left middle cerebral artery, Thyroid disease, and Type 2 diabetes mellitus with circulatory disorder, without long-term current use of insulin.  has a past surgical history that includes Hernia repair and Esophagogastroduodenoscopy w/ PEG (N/A, 1/23/2020). Presents to [facility] Emergency Department  by EMS from  [facility] today with report of both shortness of breath.  Associated symptoms include urinary retention and swollen testicles.  He was recently admitted for acute CVA a discharged approximately 2 weeks ago.  He recently had an indwelling dean removed.  No report of fever chills.  No hematuria.  Positive for suprapubic tenderness .  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  He is reportedly lethargic compared to his baseline.  Concern for both his breathing and urinary retention.    In the emergency department routine laboratory studies, urinalysis, chest x-ray obtained.  Chest x-ray concerning for bilateral pleural effusions.  Supple oxygen  was given.  He is on and apixaban therefore no thoracentesis could be performed at this time safely.  History phone was slightly elevated but consistent with previous measurements.  EKG was nonischemic.  Urinalysis concerning for WBCs and RBCs.  Possible UTI.    Hospital medicine has been asked to admit to inpatient for further evaluation and treatment.     Overview/Hospital Course:  Mr Thompson presented with acute respiratory failure with hypoxia and acute encephalopathy. Workup consistent with bilateral pleural effusions, pulmonary edema and severe malnutrition. Initiated on IV furosemide, supplemental O2 via low fow NC and consulted IR for thoracentesis. IR recommended against thoracentesis as they thought size of effusions were too small for safe intervention. IV furosemide continued. Echocardiogram with grade 1 diastolic dysfunction with preserved EF and no significant valvular issues. He is in chronic AFib however, rate controlled with metoprolol. As for encephalopathy, patient became more alert and interactive after narcotics were discontinued and shortness of breath addressed. No evidence of infection except for abnormal urinalysis however with few bacteria in setting of indwelling dean catheter. Urine culture negative, no growth in blood cultures, afebrile and no leukocytosis. Antibiotics therefore discontinued. Dean exchanged for a new one in the ED. On 2/5 patient developed rapid ventricular response despite use of home metoprolol and overall clinical improvement. Blood pressure at the time too low at the time, therefore transferred to ICU on amiodarone infusion and cardiology consultation. Blood pressure had improved therefore Cardiology recommended diltiazem infusion instead. Repeat CXR and labs showed no acute findings. Patient was properly rate controlled and was transitioned to a higher dose of metoprolol (50 mg BID). Remained stable. Stepped down to telemetry floor on 2/6.     Interval History:  rate controlled. Now on Metoprolol per G tube and stable. Even more interactive today and shook my hand when asked. Still with slurred speech but nodding appropriately, it seems.     Review of Systems   Unable to perform ROS: Other (slurred speech)     Objective:     Vital Signs (Most Recent):  Temp: 98.2 °F (36.8 °C) (02/06/20 0315)  Pulse: 83 (02/06/20 0738)  Resp: 18 (02/06/20 0738)  BP: 121/70 (02/06/20 0645)  SpO2: 98 % (02/06/20 0738) Vital Signs (24h Range):  Temp:  [97.2 °F (36.2 °C)-98.5 °F (36.9 °C)] 98.2 °F (36.8 °C)  Pulse:  [] 83  Resp:  [13-38] 18  SpO2:  [92 %-100 %] 98 %  BP: ()/(52-89) 121/70     Weight: 94.1 kg (207 lb 7.3 oz)  Body mass index is 29.77 kg/m².    Intake/Output Summary (Last 24 hours) at 2/6/2020 0859  Last data filed at 2/6/2020 0600  Gross per 24 hour   Intake 147.13 ml   Output 1410 ml   Net -1262.87 ml      Physical Exam   Constitutional: He appears well-developed. No distress.   HENT:   Head: Normocephalic and atraumatic.   Eyes: Pupils are equal, round, and reactive to light. Conjunctivae and EOM are normal.   Neck: Normal range of motion.   Cardiovascular: Normal rate. An irregularly irregular rhythm present.   Pulmonary/Chest: Effort normal. He has no wheezes. He has no rales.   Decreased breath sounds bases   Abdominal: Soft. Bowel sounds are normal.   Neurological: He is alert.   Makes eye contact and interactive but with significant dysarthria.   Facial droop    Skin: Capillary refill takes less than 2 seconds. He is not diaphoretic.   Nursing note and vitals reviewed.      Significant Labs: All pertinent labs within the past 24 hours have been reviewed.    Significant Imaging: I have reviewed all pertinent imaging results/findings within the past 24 hours.  I have reviewed and interpreted all pertinent imaging results/findings within the past 24 hours.      Assessment/Plan:      * Acute respiratory failure with hypoxia  2/2 pulmonary edema and pleural  effusions  Atrial fibrillation can result in ineffective cardiac output and pulmonary edema when poorly controlled  Echocardiogram with mild diastolic dysfunction, preserved EF and no significant valvular issues  I am concerned this is due to malnutrition  Tube feeds restarted and on diuresis  Supplemental O2 as needed  Activity as tolerated with PT/OT  Speech eval. So far is to stay NPO. Modified swallow ordered as per Speech's recs  Patient might need O2 for home use      Pleural effusion, bilateral  Not candidate for therapeutic thoracentesis due to size  Is on low supplemental O2 requirements and breathing more comfortably with IV diuresis  No evidence of systolic heart failure on Echo, although limited study due to atrial fibrillation  Grade 1 diastolic dysfunction and no significant valve disease  Third spacing from malnutrition?        Acute metabolic encephalopathy  Likely due to hypoxia  Resolved     Serum total bilirubin elevated  Etiology unknown  RUQ ultrasound with cholelithiasis and sludge but no obstruction, inflammation and CBD normal        Severe protein-calorie malnutrition  As evidenced by low albumin, low total protein, sacral pressure ulcer and evidence of third spacing  This is all likely to consequences of large stroke 3 weeks prior to presentation  Pre-albumin very low at 5  Tube feeds via PEG tube per dietary    Chronic ischemic left MCA stroke  No evidence of new stroke  Pretty debilitated since his stroke (poorly functional, fully dependent, dysarthria, dysphagia)      PEG (percutaneous endoscopic gastrostomy) adjustment/replacement/removal  No acute issues  On tube feeds. So fat tolerating well      Dysphagia due to recent stroke  Per last Speech therapy notes from recent admission, patient is to remain strictly NPO for significant dysphagia and to continue with tube feeds. Speech therapy re-consulted.       Chronic anticoagulation  No acute issues      Hypercoagulable state  On  anticoagulation      Acquired hypothyroidism  No acute issues  Continue home regimen      Other persistent atrial fibrillation  Dose of metoprolol increased for better rate control.   On anticoagulation for stroke prophylaxis (CHADS VASc 5, which puts him at high risk for recurrent stroke)      Type 2 diabetes mellitus with circulatory disorder, without long-term current use of insulin  Well controlled on sliding scale insulin  HgbA1c 6%      Essential hypertension  Stable on current regimen    VTE Risk Mitigation (From admission, onward)         Ordered     apixaban tablet 5 mg  2 times daily      02/04/20 1331     IP VTE HIGH RISK PATIENT  Once      02/03/20 1918     Reason for No Pharmacological VTE Prophylaxis  Once     Question:  Reasons:  Answer:  Already adequately anticoagulated on oral Anticoagulants    02/03/20 1918              Stable for step down to floor today. Will call family for an update.     Critical care time spent on the evaluation and treatment of severe organ dysfunction, review of pertinent labs and imaging studies, discussions with consulting providers and discussions with patient/family: > 35 minutes.      Norma Silva MD  Department of Hospital Medicine   [facility]

## 2020-02-06 NOTE — ASSESSMENT & PLAN NOTE
Chr AF on OAC  HR being driven by underlying medical comorbidities/acute decompensation  Start dilt gtt (good BP noted)  Titrate PO meds as HR/BP will allow  No plan for DCCV

## 2020-02-06 NOTE — CONSULTS
[facility]  Cardiology  Consult Note    Patient Name: Chirag Thompson  MRN: [id number]  Admission Date: 2/3/2020  Hospital Length of Stay: 2 days  Code Status: Full Code   Attending Provider: Norma Watkins MD   Consulting Provider: Quan Calvillo MD  Primary Care Physician: Dany Marinelli Iii, MD  Principal Problem:Acute respiratory failure with hypoxia    Patient information was obtained from patient and ER records.     Inpatient consult to Cardiology  Consult performed by: Quan Calvillo MD  Consult ordered by: Norma Watkins MD  Reason for consult: AF/RVR        Subjective:     Chief Complaint:  FTT/SOB    HPI:   79 y.o. male that (in part)  has a past medical history of Acquired hypothyroidism, Acute ischemic left middle cerebral artery (MCA) stroke, Anticoagulant long-term use, Arthritis, Chronic a-fib, Current use of long term anticoagulation, Embolic stroke involving left middle cerebral artery, Hypertension, PEG (percutaneous endoscopic gastrostomy) adjustment/replacement/removal, Pressure ulcer of right leg, unspecified pressure ulcer stage, Right spastic hemiparesis, Stroke due to embolism of left middle cerebral artery, Thyroid disease, and Type 2 diabetes mellitus with circulatory disorder, without long-term current use of insulin.  has a past surgical history that includes Hernia repair and Esophagogastroduodenoscopy w/ PEG (N/A, 1/23/2020). Presents to [facility] Emergency Department  by EMS from  [facility] today with report of both shortness of breath.  Associated symptoms include urinary retention and swollen testicles.  He was recently admitted for acute CVA a discharged approximately 2 weeks ago.  He recently had an indwelling dean removed.  No report of fever chills.  No hematuria.  Positive for suprapubic tenderness .  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  He is  reportedly lethargic compared to his baseline.  Concern for both his breathing and urinary retention.  In the emergency department routine laboratory studies, urinalysis, chest x-ray obtained.  Chest x-ray concerning for bilateral pleural effusions.  Supple oxygen was given.  He is on and apixaban therefore no thoracentesis could be performed at this time safely.  History phone was slightly elevated but consistent with previous measurements.  EKG was nonischemic.  Urinalysis concerning for WBCs and RBCs.  Possible UTI.    Cardiology consulted for assistance with AF/RVR.    Patient is seen in the intensive care unit.  He is not able right much the way of history.  He denies any overt angina or dyspnea, does describe some lower abdominal discomfort.  Amiodarone drip has been ordered, but I think a diltiazem drip may be more in order seeing as how the patient has chronic atrial fibrillation.  This is likely being driven by his underlying medical illness as we do not plan to perform cardioversion.  He is on anticoagulation.  He does appear to be hemodynamically stable.  Recent echocardiogram reveals preserved LV function.  I do not plan cardioversion.    Past Medical History:   Diagnosis Date    Acquired hypothyroidism 1/14/2020    Acute ischemic left middle cerebral artery (MCA) stroke 1/14/2020    Anticoagulant long-term use     eliquis    Arthritis     Chronic a-fib     Current use of long term anticoagulation     Embolic stroke involving left middle cerebral artery 1/10/2020    Hypertension     PEG (percutaneous endoscopic gastrostomy) adjustment/replacement/removal     Pressure ulcer of right leg, unspecified pressure ulcer stage     was going to [facility] healed now    Right spastic hemiparesis 1/14/2020    Stroke due to embolism of left middle cerebral artery 01/10/2020    left temp parietal    Thyroid disease     Type 2 diabetes mellitus with circulatory disorder, without long-term current  use of insulin 1/10/2020       Past Surgical History:   Procedure Laterality Date    ESOPHAGOGASTRODUODENOSCOPY W/ PEG N/A 1/23/2020    Procedure: EGD, WITH PEG TUBE INSERTION;  Surgeon: Quan Feng MD;  Location: [facility] OR [address] [place] [address];  Service: General;  Laterality: N/A;    HERNIA REPAIR         Review of patient's allergies indicates:  No Known Allergies    No current facility-administered medications on file prior to encounter.      Current Outpatient Medications on File Prior to Encounter   Medication Sig    albuterol-ipratropium (DUO-NEB) 2.5 mg-0.5 mg/3 mL nebulizer solution Take 3 mLs by nebulization every 6 (six) hours as needed for Wheezing. Rescue    allopurinol (ZYLOPRIM) 300 MG tablet 1 tablet (300 mg total) by Per G Tube route once daily.    apixaban (ELIQUIS) 5 mg Tab 1 tablet (5 mg total) by Per G Tube route 2 (two) times daily.    atorvastatin (LIPITOR) 40 MG tablet 1 tablet (40 mg total) by Per G Tube route once daily.    levothyroxine (SYNTHROID) 75 MCG tablet 1 tablet (75 mcg total) by Per G Tube route once daily.    metoprolol tartrate (LOPRESSOR) 25 MG tablet 1 tablet (25 mg total) by Per G Tube route 2 (two) times daily. (Patient taking differently: 50 mg by Per G Tube route 2 (two) times daily. )    polyethylene glycol (GLYCOLAX) 17 gram PwPk 17 g by Per G Tube route daily as needed.    QUEtiapine (SEROQUEL) 25 MG Tab 1 tablet (25 mg total) by Per G Tube route nightly as needed (for aggitation).    senna-docusate 8.6-50 mg (PERICOLACE) 8.6-50 mg per tablet 1 tablet by Per G Tube route 2 (two) times daily as needed for Constipation.     Family History     None        Tobacco Use    Smoking status: Never Smoker    Smokeless tobacco: Never Used   Substance and Sexual Activity    Alcohol use: Never     Frequency: Never    Drug use: Never    Sexual activity: Not on file     Review of Systems   Unable to perform ROS: mental status change     Objective:     Vital Signs (Most  Recent):  Temp: 98.5 °F (36.9 °C) (02/05/20 1815)  Pulse: (!) 118 (02/05/20 1815)  Resp: 20 (02/05/20 1815)  BP: (!) 149/89 (02/05/20 1815)  SpO2: 95 % (02/05/20 1815) Vital Signs (24h Range):  Temp:  [97.2 °F (36.2 °C)-98.5 °F (36.9 °C)] 98.5 °F (36.9 °C)  Pulse:  [] 118  Resp:  [16-24] 20  SpO2:  [92 %-100 %] 95 %  BP: (108-180)/(61-89) 149/89     Weight: 92.6 kg (204 lb 2.3 oz)  Body mass index is 29.29 kg/m².    SpO2: 95 %  O2 Device (Oxygen Therapy): nasal cannula      Intake/Output Summary (Last 24 hours) at 2/5/2020 1821  Last data filed at 2/5/2020 1600  Gross per 24 hour   Intake --   Output 1250 ml   Net -1250 ml       Lines/Drains/Airways     Drain                 Gastrostomy/Enterostomy 01/23/20 0827 Percutaneous endoscopic gastrostomy (PEG) LUQ feeding 13 days         Urethral Catheter 02/03/20 1436 Latex 16 Fr. 2 days          Peripheral Intravenous Line                 Peripheral IV - Single Lumen 02/03/20 20 G Right Antecubital 2 days                Physical Exam   Constitutional: He appears well-developed and well-nourished. No distress.   Chr ill elderly man, NAD   HENT:   Head: Normocephalic and atraumatic.   Eyes: Pupils are equal, round, and reactive to light. Conjunctivae are normal. No scleral icterus.   Neck: Normal range of motion. Neck supple. No JVD present. No tracheal deviation present. No thyromegaly present.   Cardiovascular: S1 normal and S2 normal. An irregularly irregular rhythm present. Tachycardia present. Exam reveals distant heart sounds. Exam reveals no gallop and no friction rub.   No murmur heard.  Pulmonary/Chest: Effort normal. No respiratory distress. He has no wheezes. He has no rales. He exhibits no tenderness.   Abdominal: Soft. He exhibits no distension. There is tenderness (suprapubic).   Musculoskeletal: He exhibits edema.   Neurological: He is alert. No cranial nerve deficit.   Skin: Skin is warm and dry. He is not diaphoretic.   Psychiatric:   UTO        Current Medications:   albuterol-ipratropium  3 mL Nebulization Q4H    allopurinoL  300 mg Per G Tube Daily    amiodarone in dextrose  150 mg Intravenous Once    apixaban  5 mg Per G Tube BID    atorvastatin  40 mg Per G Tube Daily    levothyroxine  75 mcg Per G Tube Daily    metoprolol tartrate  25 mg Per G Tube BID    polyethylene glycol  17 g Per G Tube Daily      amiodarone in dextrose 5%      [START ON 2/6/2020] amiodarone in dextrose 5%       acetaminophen, dextrose 50%, glucagon (human recombinant), influenza, insulin aspart U-100, pneumoc 13-mariya conj-dip cr(PF), senna-docusate 8.6-50 mg, sodium chloride 0.9%    Laboratory (all labs reviewed):  CBC:  Recent Labs   Lab 01/29/20  0626 01/30/20  0517 02/03/20  1424 02/04/20  0136 02/05/20  0447   WBC 12.82 H 13.83 H 12.47 12.42 11.11   Hemoglobin 13.7 L 13.6 L 13.0 L 12.2 L 12.2 L   Hematocrit 41.6 44.7 39.5 L 37.9 L 38.5 L   Platelets 126 L 150 149 L 149 L 150       CHEMISTRIES:  Recent Labs   Lab 01/25/20  0457 01/26/20  0447 01/27/20  0832 01/30/20  0803 02/03/20  1424 02/04/20  0135 02/05/20  0447   Glucose  --   --  185 H 178 H 231 H 175 H 164 H   Sodium  --   --  147 H 151 H 133 L 137 139   Potassium  --   --  4.7 4.3 4.2 4.5 4.1   BUN, Bld  --   --  41 H 41 H 30 H 34 H 40 H   Creatinine  --   --  0.9 0.9 0.8 1.1 0.9   eGFR if African American  --   --  >60.0 >60.0 >60 >60 >60   eGFR if non   --   --  >60.0 >60.0 >60 >60 >60   Calcium  --   --  9.6 9.2 8.6 L 8.6 L 8.9   Magnesium 2.1 1.9  --  1.8 1.8  --  2.0       CARDIAC BIOMARKERS:  Recent Labs   Lab 02/03/20  1424 02/03/20  2018 02/04/20  0136   Troponin I 0.039 H 0.053 H 0.048 H       COAGS:  Recent Labs   Lab 01/27/20  0519 01/28/20  0421 01/29/20  0626 01/30/20  0517 02/03/20  1424   INR 1.3 H 1.2 1.3 H 1.2 1.2       LIPIDS/LFTS:  Recent Labs   Lab 01/11/20  0357  01/27/20  0832 01/30/20  0803 02/03/20  1424 02/04/20  0540 02/05/20  0447   Cholesterol 150  --   --    --   --   --   --    Triglycerides 46  --   --   --   --   --   --    HDL 61  --   --   --   --   --   --    LDL Cholesterol 79.8  --   --   --   --   --   --    Non-HDL Cholesterol 89  --   --   --   --   --   --    AST  --    < > 44 H 49 H 48 H 40 43 H   ALT  --    < > 40 40 43 41 38    < > = values in this interval not displayed.       BNP:  Recent Labs   Lab 02/03/20  1424    H       TSH:  Recent Labs   Lab 01/10/20  2012   TSH 4.277 H       Free T4:  Recent Labs   Lab 01/10/20  2012   Free T4 0.97       Diagnostic Results:  ECG (personally reviewed and interpreted tracing(s)):  2/3/20 1416 AF 81, PRWP/LAD/LAHB, NSSTTW changes, similar to 1/30/20    Chest X-Ray (personally reviewed and interpreted image(s)): 2/3/20 bilat effusions    Echo: 2/4/20 (images personally reviewed and interpreted)  · TDS  · Irreg irreg rhythm throughout exam.  · Low normal left ventricular systolic function. The estimated ejection fraction is 50%.  · No diagnostic regional wall motion abnormalities.  · Mild concentric left ventricular hypertrophy.  · Normal right ventricular systolic function.  · Mild mitral regurgitation.  · Mild tricuspid regurgitation.          Assessment and Plan:     * Acute respiratory failure with hypoxia  Mgmt per IM    Atrial fibrillation with RVR  Chr AF on OAC  HR being driven by underlying medical comorbidities/acute decompensation  Start dilt gtt (good BP noted)  Titrate PO meds as HR/BP will allow  No plan for DCCV    Essential hypertension  Cont med rx    Type 2 diabetes mellitus with circulatory disorder, without long-term current use of insulin  Per IM        VTE Risk Mitigation (From admission, onward)         Ordered     apixaban tablet 5 mg  2 times daily      02/04/20 1331     IP VTE HIGH RISK PATIENT  Once      02/03/20 1918     Reason for No Pharmacological VTE Prophylaxis  Once     Question:  Reasons:  Answer:  Already adequately anticoagulated on oral Anticoagulants    02/03/20 1918               Pt seen in ICU, case d/w RN staff  Critical care time 30min    Thank you for your consult. I will follow-up with patient. Please contact us if you have any additional questions.    Quan Calvillo MD  Cardiology   [facility]

## 2020-02-06 NOTE — PT/OT/SLP PROGRESS
Speech Language Pathology      Chirag Thompson  MRN: [id number]    Patient not seen today secondary to pt unsafe for PO nutrition per MBS completed on 1/21/2020. Rec: con't NPO with PEG tube feedings. Re-consult if family decides on hospice care and desires pleasure feedings.    Haydee Barksdale, CCC-SLP

## 2020-02-06 NOTE — SUBJECTIVE & OBJECTIVE
Past Medical History:   Diagnosis Date    Acquired hypothyroidism 1/14/2020    Acute ischemic left middle cerebral artery (MCA) stroke 1/14/2020    Anticoagulant long-term use     eliquis    Arthritis     Chronic a-fib     Current use of long term anticoagulation     Embolic stroke involving left middle cerebral artery 1/10/2020    Hypertension     PEG (percutaneous endoscopic gastrostomy) adjustment/replacement/removal     Pressure ulcer of right leg, unspecified pressure ulcer stage     was going to [facility] healed now    Right spastic hemiparesis 1/14/2020    Stroke due to embolism of left middle cerebral artery 01/10/2020    left temp parietal    Thyroid disease     Type 2 diabetes mellitus with circulatory disorder, without long-term current use of insulin 1/10/2020       Past Surgical History:   Procedure Laterality Date    ESOPHAGOGASTRODUODENOSCOPY W/ PEG N/A 1/23/2020    Procedure: EGD, WITH PEG TUBE INSERTION;  Surgeon: Quan Feng MD;  Location: [facility] OR [address] [place] [address];  Service: General;  Laterality: N/A;    HERNIA REPAIR         Review of patient's allergies indicates:  No Known Allergies    No current facility-administered medications on file prior to encounter.      Current Outpatient Medications on File Prior to Encounter   Medication Sig    albuterol-ipratropium (DUO-NEB) 2.5 mg-0.5 mg/3 mL nebulizer solution Take 3 mLs by nebulization every 6 (six) hours as needed for Wheezing. Rescue    allopurinol (ZYLOPRIM) 300 MG tablet 1 tablet (300 mg total) by Per G Tube route once daily.    apixaban (ELIQUIS) 5 mg Tab 1 tablet (5 mg total) by Per G Tube route 2 (two) times daily.    atorvastatin (LIPITOR) 40 MG tablet 1 tablet (40 mg total) by Per G Tube route once daily.    levothyroxine (SYNTHROID) 75 MCG tablet 1 tablet (75 mcg total) by Per G Tube route once daily.    metoprolol tartrate (LOPRESSOR) 25 MG tablet 1 tablet (25 mg total) by Per G Tube route 2 (two) times  daily. (Patient taking differently: 50 mg by Per G Tube route 2 (two) times daily. )    polyethylene glycol (GLYCOLAX) 17 gram PwPk 17 g by Per G Tube route daily as needed.    QUEtiapine (SEROQUEL) 25 MG Tab 1 tablet (25 mg total) by Per G Tube route nightly as needed (for aggitation).    senna-docusate 8.6-50 mg (PERICOLACE) 8.6-50 mg per tablet 1 tablet by Per G Tube route 2 (two) times daily as needed for Constipation.     Family History     None        Tobacco Use    Smoking status: Never Smoker    Smokeless tobacco: Never Used   Substance and Sexual Activity    Alcohol use: Never     Frequency: Never    Drug use: Never    Sexual activity: Not on file     Review of Systems   Unable to perform ROS: mental status change     Objective:     Vital Signs (Most Recent):  Temp: 98.2 °F (36.8 °C) (02/06/20 0315)  Pulse: 86 (02/06/20 0400)  Resp: (!) 26 (02/06/20 0400)  BP: 119/64 (02/06/20 0400)  SpO2: 97 % (02/06/20 0400) Vital Signs (24h Range):  Temp:  [97.2 °F (36.2 °C)-98.5 °F (36.9 °C)] 98.2 °F (36.8 °C)  Pulse:  [] 86  Resp:  [13-38] 26  SpO2:  [92 %-100 %] 97 %  BP: ()/(52-89) 119/64     Weight: 94.1 kg (207 lb 7.3 oz)  Body mass index is 29.77 kg/m².    SpO2: 97 %  O2 Device (Oxygen Therapy): room air(Pt found on RA.)      Intake/Output Summary (Last 24 hours) at 2/6/2020 0557  Last data filed at 2/6/2020 0400  Gross per 24 hour   Intake 117.13 ml   Output 1335 ml   Net -1217.87 ml       Lines/Drains/Airways     Drain                 Gastrostomy/Enterostomy 01/23/20 0827 Percutaneous endoscopic gastrostomy (PEG) LUQ feeding 13 days         Urethral Catheter 02/03/20 1436 Latex 16 Fr. 2 days          Peripheral Intravenous Line                 Peripheral IV - Single Lumen 02/03/20 20 G Right Antecubital 3 days         Peripheral IV - Single Lumen 02/05/20 1824 20 G Posterior;Right;Anterior Forearm less than 1 day         Peripheral IV - Single Lumen 02/05/20 1830 20 G Right;Posterior Wrist  less than 1 day                Physical Exam   Constitutional: He appears well-developed and well-nourished. No distress.   Chr ill elderly man, NAD   HENT:   Head: Normocephalic and atraumatic.   Eyes: Pupils are equal, round, and reactive to light. Conjunctivae are normal. No scleral icterus.   Neck: Normal range of motion. Neck supple. No JVD present. No tracheal deviation present. No thyromegaly present.   Cardiovascular: Normal rate, S1 normal and S2 normal. An irregularly irregular rhythm present. Exam reveals distant heart sounds. Exam reveals no gallop and no friction rub.   No murmur heard.  Pulmonary/Chest: Effort normal. No respiratory distress. He has no wheezes. He has no rales. He exhibits no tenderness.   Abdominal: Soft. He exhibits no distension. There is tenderness (suprapubic).   Musculoskeletal: He exhibits edema.   Neurological: He is alert. No cranial nerve deficit.   Skin: Skin is warm and dry. He is not diaphoretic.   Psychiatric:   UTO       Current Medications:   albuterol-ipratropium  3 mL Nebulization Q4H    allopurinoL  300 mg Per G Tube Daily    apixaban  5 mg Per G Tube BID    atorvastatin  40 mg Per G Tube Daily    levothyroxine  75 mcg Per G Tube Daily    metoprolol tartrate  25 mg Per G Tube BID    polyethylene glycol  17 g Per G Tube Daily      dilTIAZem 7.5 mg/hr (02/06/20 0400)     acetaminophen, dextrose 50%, glucagon (human recombinant), influenza, insulin aspart U-100, pneumoc 13-mariya conj-dip cr(PF), senna-docusate 8.6-50 mg, sodium chloride 0.9%    Laboratory (all labs reviewed):  CBC:  Recent Labs   Lab 01/30/20  0517 02/03/20  1424 02/04/20  0136 02/05/20  0447 02/05/20 2000   WBC 13.83 H 12.47 12.42 11.11 10.81   Hemoglobin 13.6 L 13.0 L 12.2 L 12.2 L 12.0 L   Hematocrit 44.7 39.5 L 37.9 L 38.5 L 37.9 L   Platelets 150 149 L 149 L 150 154       CHEMISTRIES:  Recent Labs   Lab 01/26/20  0447  01/30/20  0803 02/03/20  1424 02/04/20  0135 02/05/20  0447  02/05/20 2000 02/06/20  0340   Glucose  --    < > 178 H 231 H 175 H 164 H 145 H 142 H   Sodium  --    < > 151 H 133 L 137 139 140 139   Potassium  --    < > 4.3 4.2 4.5 4.1 4.2 3.9   BUN, Bld  --    < > 41 H 30 H 34 H 40 H 42 H 43 H   Creatinine  --    < > 0.9 0.8 1.1 0.9 0.9 0.9   eGFR if African American  --    < > >60.0 >60 >60 >60 >60 >60   eGFR if non African American  --    < > >60.0 >60 >60 >60 >60 >60   Calcium  --    < > 9.2 8.6 L 8.6 L 8.9 8.9 8.7   Magnesium 1.9  --  1.8 1.8  --  2.0 1.9  --     < > = values in this interval not displayed.       CARDIAC BIOMARKERS:  Recent Labs   Lab 02/03/20  1424 02/03/20 2018 02/04/20  0136   Troponin I 0.039 H 0.053 H 0.048 H       COAGS:  Recent Labs   Lab 01/27/20  0519 01/28/20  0421 01/29/20  0626 01/30/20  0517 02/03/20  1424   INR 1.3 H 1.2 1.3 H 1.2 1.2       LIPIDS/LFTS:  Recent Labs   Lab 01/11/20  0357  02/03/20  1424 02/04/20  0540 02/05/20 0447 02/05/20 2000 02/06/20  0340   Cholesterol 150  --   --   --   --   --   --    Triglycerides 46  --   --   --   --   --   --    HDL 61  --   --   --   --   --   --    LDL Cholesterol 79.8  --   --   --   --   --   --    Non-HDL Cholesterol 89  --   --   --   --   --   --    AST  --    < > 48 H 40 43 H 43 H 42 H   ALT  --    < > 43 41 38 37 37    < > = values in this interval not displayed.       BNP:  Recent Labs   Lab 02/03/20  1424    H       TSH:  Recent Labs   Lab 01/10/20  2012   TSH 4.277 H       Free T4:  Recent Labs   Lab 01/10/20  2012   Free T4 0.97       Diagnostic Results:  ECG (personally reviewed and interpreted tracing(s)):  2/3/20 1416 AF 81, PRWP/LAD/LAHB, NSSTTW changes, similar to 1/30/20    Chest X-Ray (personally reviewed and interpreted image(s)): 2/3/20 bilat effusions    Echo: 2/4/20 (images personally reviewed and interpreted)  · TDS  · Irreg irreg rhythm throughout exam.  · Low normal left ventricular systolic function. The estimated ejection fraction is 50%.  · No diagnostic  regional wall motion abnormalities.  · Mild concentric left ventricular hypertrophy.  · Normal right ventricular systolic function.  · Mild mitral regurgitation.  · Mild tricuspid regurgitation.

## 2020-02-07 LAB
ALBUMIN SERPL BCP-MCNC: 2.3 G/DL (ref 3.5–5.2)
ALLENS TEST: ABNORMAL
ALP SERPL-CCNC: 116 U/L (ref 55–135)
ALT SERPL W/O P-5'-P-CCNC: 40 U/L (ref 10–44)
ANION GAP SERPL CALC-SCNC: 5 MMOL/L (ref 8–16)
AST SERPL-CCNC: 44 U/L (ref 10–40)
BILIRUB SERPL-MCNC: 1.6 MG/DL (ref 0.1–1)
BUN SERPL-MCNC: 40 MG/DL (ref 8–23)
CALCIUM SERPL-MCNC: 9.2 MG/DL (ref 8.7–10.5)
CHLORIDE SERPL-SCNC: 102 MMOL/L (ref 95–110)
CO2 SERPL-SCNC: 35 MMOL/L (ref 23–29)
CREAT SERPL-MCNC: 0.9 MG/DL (ref 0.5–1.4)
DELSYS: ABNORMAL
ERYTHROCYTE [SEDIMENTATION RATE] IN BLOOD BY WESTERGREN METHOD: 17 MM/H
EST. GFR  (AFRICAN AMERICAN): >60 ML/MIN/1.73 M^2
EST. GFR  (NON AFRICAN AMERICAN): >60 ML/MIN/1.73 M^2
FLOW: 2
GLUCOSE SERPL-MCNC: 136 MG/DL (ref 70–110)
HCO3 UR-SCNC: 32.9 MMOL/L (ref 24–28)
MODE: ABNORMAL
PCO2 BLDA: 41 MMHG (ref 35–45)
PH SMN: 7.51 [PH] (ref 7.35–7.45)
PO2 BLDA: 76 MMHG (ref 80–100)
POC BE: 9 MMOL/L
POC SATURATED O2: 96 % (ref 95–100)
POC TCO2: 34 MMOL/L (ref 23–27)
POCT GLUCOSE: 149 MG/DL (ref 70–110)
POCT GLUCOSE: 150 MG/DL (ref 70–110)
POCT GLUCOSE: 162 MG/DL (ref 70–110)
POCT GLUCOSE: 181 MG/DL (ref 70–110)
POTASSIUM SERPL-SCNC: 4.4 MMOL/L (ref 3.5–5.1)
PROT SERPL-MCNC: 5.5 G/DL (ref 6–8.4)
SAMPLE: ABNORMAL
SITE: ABNORMAL
SODIUM SERPL-SCNC: 142 MMOL/L (ref 136–145)

## 2020-02-07 PROCEDURE — 25000003 PHARM REV CODE 250: Performed by: INTERNAL MEDICINE

## 2020-02-07 PROCEDURE — 97110 THERAPEUTIC EXERCISES: CPT

## 2020-02-07 PROCEDURE — 94761 N-INVAS EAR/PLS OXIMETRY MLT: CPT

## 2020-02-07 PROCEDURE — 36600 WITHDRAWAL OF ARTERIAL BLOOD: CPT

## 2020-02-07 PROCEDURE — 63600175 PHARM REV CODE 636 W HCPCS: Performed by: INTERNAL MEDICINE

## 2020-02-07 PROCEDURE — 99900035 HC TECH TIME PER 15 MIN (STAT)

## 2020-02-07 PROCEDURE — 25000003 PHARM REV CODE 250: Performed by: HOSPITALIST

## 2020-02-07 PROCEDURE — 27000221 HC OXYGEN, UP TO 24 HOURS

## 2020-02-07 PROCEDURE — 94640 AIRWAY INHALATION TREATMENT: CPT

## 2020-02-07 PROCEDURE — 36415 COLL VENOUS BLD VENIPUNCTURE: CPT

## 2020-02-07 PROCEDURE — 25000242 PHARM REV CODE 250 ALT 637 W/ HCPCS: Performed by: INTERNAL MEDICINE

## 2020-02-07 PROCEDURE — 82803 BLOOD GASES ANY COMBINATION: CPT

## 2020-02-07 PROCEDURE — 21400001 HC TELEMETRY ROOM

## 2020-02-07 PROCEDURE — 80053 COMPREHEN METABOLIC PANEL: CPT

## 2020-02-07 RX ORDER — ACETAMINOPHEN 325 MG/1
650 TABLET ORAL EVERY 6 HOURS PRN
Status: DISCONTINUED | OUTPATIENT
Start: 2020-02-07 | End: 2020-02-11 | Stop reason: HOSPADM

## 2020-02-07 RX ORDER — DOCUSATE SODIUM 50 MG/5ML
100 LIQUID ORAL DAILY
Status: DISCONTINUED | OUTPATIENT
Start: 2020-02-07 | End: 2020-02-11 | Stop reason: HOSPADM

## 2020-02-07 RX ORDER — FUROSEMIDE 40 MG/1
40 TABLET ORAL 2 TIMES DAILY
Status: DISCONTINUED | OUTPATIENT
Start: 2020-02-07 | End: 2020-02-07

## 2020-02-07 RX ORDER — POLYETHYLENE GLYCOL 3350 17 G/17G
17 POWDER, FOR SOLUTION ORAL 2 TIMES DAILY
Status: DISCONTINUED | OUTPATIENT
Start: 2020-02-07 | End: 2020-02-11 | Stop reason: HOSPADM

## 2020-02-07 RX ORDER — OLANZAPINE 2.5 MG/1
5 TABLET ORAL NIGHTLY PRN
Status: DISCONTINUED | OUTPATIENT
Start: 2020-02-07 | End: 2020-02-08

## 2020-02-07 RX ORDER — FUROSEMIDE 40 MG/1
40 TABLET ORAL 2 TIMES DAILY
Status: DISCONTINUED | OUTPATIENT
Start: 2020-02-07 | End: 2020-02-08

## 2020-02-07 RX ADMIN — IPRATROPIUM BROMIDE AND ALBUTEROL SULFATE 3 ML: .5; 3 SOLUTION RESPIRATORY (INHALATION) at 03:02

## 2020-02-07 RX ADMIN — FUROSEMIDE 20 MG: 10 INJECTION, SOLUTION INTRAMUSCULAR; INTRAVENOUS at 10:02

## 2020-02-07 RX ADMIN — FUROSEMIDE 40 MG: 40 TABLET ORAL at 05:02

## 2020-02-07 RX ADMIN — IPRATROPIUM BROMIDE AND ALBUTEROL SULFATE 3 ML: .5; 3 SOLUTION RESPIRATORY (INHALATION) at 08:02

## 2020-02-07 RX ADMIN — APIXABAN 5 MG: 5 TABLET, FILM COATED ORAL at 09:02

## 2020-02-07 RX ADMIN — METOPROLOL TARTRATE 50 MG: 50 TABLET, FILM COATED ORAL at 10:02

## 2020-02-07 RX ADMIN — METOPROLOL TARTRATE 50 MG: 50 TABLET, FILM COATED ORAL at 09:02

## 2020-02-07 RX ADMIN — OLANZAPINE 5 MG: 2.5 TABLET, FILM COATED ORAL at 12:02

## 2020-02-07 RX ADMIN — ALLOPURINOL 300 MG: 100 TABLET ORAL at 10:02

## 2020-02-07 RX ADMIN — LEVOTHYROXINE SODIUM 75 MCG: 75 TABLET ORAL at 06:02

## 2020-02-07 RX ADMIN — IPRATROPIUM BROMIDE AND ALBUTEROL SULFATE 3 ML: .5; 3 SOLUTION RESPIRATORY (INHALATION) at 01:02

## 2020-02-07 RX ADMIN — APIXABAN 5 MG: 5 TABLET, FILM COATED ORAL at 10:02

## 2020-02-07 RX ADMIN — INSULIN ASPART 1 UNITS: 100 INJECTION, SOLUTION INTRAVENOUS; SUBCUTANEOUS at 09:02

## 2020-02-07 RX ADMIN — IPRATROPIUM BROMIDE AND ALBUTEROL SULFATE 3 ML: .5; 3 SOLUTION RESPIRATORY (INHALATION) at 12:02

## 2020-02-07 RX ADMIN — POLYETHYLENE GLYCOL 3350 17 G: 17 POWDER, FOR SOLUTION ORAL at 09:02

## 2020-02-07 RX ADMIN — DOCUSATE SODIUM 100 MG: 50 LIQUID ORAL at 02:02

## 2020-02-07 RX ADMIN — IPRATROPIUM BROMIDE AND ALBUTEROL SULFATE 3 ML: .5; 3 SOLUTION RESPIRATORY (INHALATION) at 07:02

## 2020-02-07 RX ADMIN — ATORVASTATIN CALCIUM 40 MG: 40 TABLET, FILM COATED ORAL at 10:02

## 2020-02-07 NOTE — PROGRESS NOTES
"Patient began shift with sitter and [employer]as[employer], agitated and pulling at nasal cannula, feeding tube and dean catheter.   was notified; Zyprexa 5mg ordered and administered through feeding tube, with ineffective results.  Patient has been repositioned numerous times, turned every two hours with pillows used to adjust weight off pressure points.  Patient remains aphasic and unable to clearly verbalize needs, but no non-verbal pain indicators have been noted and patient stated "No" when asked if he was in pain.  Patient was changed from room 313A to 301, to be closer to nurses' station.  Feeding rate was increased at beginning of shift to 50 mL/hr, then raised to maximum goal of 55 mL/hr after 4 hours.  Patient is currently resting with sitter and Avasys at bedside.  "

## 2020-02-07 NOTE — PLAN OF CARE
Problem: Physical Therapy Goal  Goal: Physical Therapy Goal  Description  Goals to be met by: 20     Patient will increase functional independence with mobility by performin. Supine to sit with Moderate Assistance  2. Sit to supine with Moderate Assistance  3. Rolling to Left and Right with Moderate Assistance  4. Sitting at edge of bed x30 minutes with Stand-by Assistance  5. Lower extremity exercise program x10-15 reps per handout, with assistance as needed     Outcome: Ongoing, Progressing    PROM performed to RUE/RLE.  Pt was able to minimally participate in LUE/LLE therex in bed.  No family present for education.

## 2020-02-07 NOTE — PT/OT/SLP PROGRESS
Physical Therapy Treatment    Patient Name:  Chirag Thompson   MRN:  [id number]    Recommendations:     Discharge Recommendations:  home health PT(with 24 hour care)   Discharge Equipment Recommendations: hospital bed, lift device, wheelchair   Barriers to discharge home: Pt with decreased mobility.    Assessment:     Chirag Thompson is a 79 y.o. male admitted with a medical diagnosis of Acute respiratory failure with hypoxia.  He presents with the following impairments/functional limitations:  weakness, impaired endurance, impaired self care skills, impaired functional mobilty, impaired balance, impaired cognition, decreased coordination, decreased upper extremity function, decreased lower extremity function, decreased safety awareness, abnormal tone, decreased ROM, impaired coordination, impaired fine motor, impaired skin, edema, impaired cardiopulmonary response to activity, impaired joint extensibility, orthopedic precautions.    Rehab Prognosis: Fair-; patient would benefit from acute skilled PT services to address these deficits and reach maximum level of function.    Recent Surgery: * No surgery found *      Plan:     During this hospitalization, patient to be seen (2-3x/wk) to address the identified rehab impairments via therapeutic activities, therapeutic exercises, neuromuscular re-education and progress toward the following goals:    · Plan of Care Expires:  02/19/20    Subjective     Chief Complaint: N/A; Pt with h/o aphasia.    Patient/Family Comments/goals: N/A  Pain/Comfort: Pt appeared comfortable.     Objective:     Patient found HOB elevated with oxygen 2L, PEG Tube, dean catheter, pressure relief boots, peripheral IV, telemetry, bed alarm upon PT entry to room.     General Precautions: Standard, fall, diabetic, aphasia   Orthopedic Precautions:N/A   Braces: N/A    Functional Mobility:    Pt was dep to reposition head and trunk in bed. Pt was also dep to reposition RLE away from bedrail with pillow  placed between RLE and bedrail.  RUE/LUE were also repositioned on pillows.  No family present for education.       AM-PAC 6 CLICK MOBILITY  Turning over in bed (including adjusting bedclothes, sheets and blankets)?: 2  Sitting down on and standing up from a chair with arms (e.g., wheelchair, bedside commode, etc.): 1  Moving from lying on back to sitting on the side of the bed?: 2  Moving to and from a bed to a chair (including a wheelchair)?: 1  Need to walk in hospital room?: 1  Climbing 3-5 steps with a railing?: 1  Basic Mobility Total Score: 8       Therapeutic Activities and Exercises:  PROM RUE/RLE in all available planes: shoulder flex, horizontal abd/add, elbow flex/ext, forearm sup/pron, and wrist flex/ext; hip flex, hip IR/ER, hip abd/add, knee flex/ext, and ankle DF/PF    AAROM LUE/LLE x 10 reps: shoulder flex, horizontal abd/add, elbow flex/ext, and finger flex/ext; hip abd/add, hip IR/ER, SLR, and AP.  Pt was alert and was attempting to count during LUE therex.     Patient left HOB elevated with all lines intact, call button in reach, bed alarm on and Avasys monitor present.    GOALS:   Multidisciplinary Problems     Physical Therapy Goals        Problem: Physical Therapy Goal    Goal Priority Disciplines Outcome Goal Variances Interventions   Physical Therapy Goal     PT, PT/OT Ongoing, Progressing     Description:  Goals to be met by: 20     Patient will increase functional independence with mobility by performin. Supine to sit with Moderate Assistance  2. Sit to supine with Moderate Assistance  3. Rolling to Left and Right with Moderate Assistance  4. Sitting at edge of bed x30 minutes with Stand-by Assistance  5. Lower extremity exercise program x10-15 reps per handout, with assistance as needed                      Time Tracking:     PT Received On: 20  PT Start Time: 1152     PT Stop Time: 1204  PT Total Time (min): 12 min     Billable Minutes: Therapeutic Exercise 12 min                    Suzie Gomez, PT  02/07/2020

## 2020-02-07 NOTE — NURSING
Report given to oncoming nurse Jasmin RN, Patient is awake and alert. Bed is in lowest position, wheels locked, call light is in reach. Pillow support provided, Tube feeds continued at 50ml/hr 12 hour chart check completed

## 2020-02-07 NOTE — ASSESSMENT & PLAN NOTE
2/2 pulmonary edema and pleural effusions  Atrial fibrillation can result in ineffective cardiac output and pulmonary edema when poorly controlled  Echocardiogram with mild diastolic dysfunction, preserved EF and no significant valvular issues  I am concerned this is due to malnutrition  Tube feeds restarted and on diuresis  Supplemental O2 as needed. Would likely need O2 at discharge. Will test for this  Patient is max assist and has stage 2 sacral pressure ulcer. Patient is considered bed bound  PT/OT as tolerated. Hospital bed, katy lift, wheelchair. Will ask insurance about transportation for appointments, etc since he is bed bound.   NPO as patient is very high risk for aspiration. Feeding per PEG

## 2020-02-07 NOTE — PLAN OF CARE
Recommendations      1. Current TF adequate to meet estimated needs  -Rec Fluid flushes of 165 ml q 4 hrs for maintenance fluids    2. If BG levels problematic, can change formula to Diabetisource to goal of 70 ml/hr + FLuid flushes of 155 ml QID.    2. Diet advancement per SLP   3. Monitor weight weekly     Goals: Intake >85% EEN/ EPN daily  Nutrition Goal Status: new  Communication of RD Recs: other (comment)(POC)

## 2020-02-07 NOTE — SUBJECTIVE & OBJECTIVE
Interval History: clinically unchanged    Review of Systems   Unable to perform ROS: Other (slurred speech)     Objective:     Vital Signs (Most Recent):  Temp: 97.9 °F (36.6 °C) (02/07/20 0746)  Pulse: 97 (02/07/20 0759)  Resp: 18 (02/07/20 0759)  BP: 119/76 (02/07/20 0746)  SpO2: 99 % (02/07/20 0759) Vital Signs (24h Range):  Temp:  [97.4 °F (36.3 °C)-98 °F (36.7 °C)] 97.9 °F (36.6 °C)  Pulse:  [] 97  Resp:  [17-24] 18  SpO2:  [91 %-100 %] 99 %  BP: (113-139)/(63-76) 119/76     Weight: 94.1 kg (207 lb 7.3 oz)  Body mass index is 29.77 kg/m².    Intake/Output Summary (Last 24 hours) at 2/7/2020 1004  Last data filed at 2/7/2020 0600  Gross per 24 hour   Intake 482 ml   Output 1850 ml   Net -1368 ml      Physical Exam   Constitutional: He appears well-developed. No distress.   HENT:   Head: Normocephalic and atraumatic.   Eyes: Pupils are equal, round, and reactive to light. Conjunctivae and EOM are normal.   Neck: Normal range of motion.   Cardiovascular: Normal rate. An irregularly irregular rhythm present.   Pulmonary/Chest: Effort normal. He has no wheezes. He has no rales.   Decreased breath sounds bases   Abdominal: Soft. Bowel sounds are normal.   Neurological: He is alert.   Makes eye contact and interactive but with significant dysarthria.   Facial droop    Skin: Capillary refill takes less than 2 seconds. He is not diaphoretic.   Nursing note and vitals reviewed.      Significant Labs: All pertinent labs within the past 24 hours have been reviewed.    Significant Imaging: I have reviewed all pertinent imaging results/findings within the past 24 hours.  I have reviewed and interpreted all pertinent imaging results/findings within the past 24 hours.

## 2020-02-07 NOTE — PLAN OF CARE
Problem: Fall Injury Risk  Goal: Absence of Fall and Fall-Related Injury  Intervention: Identify and Manage Contributors to Fall Injury Risk  Flowsheets (Taken 2/6/2020 1832)  Self-Care Promotion: independence encouraged; BADL personal routines maintained; BADL personal objects within reach  Medication Review/Management: medications reviewed  Intervention: Promote Injury-Free Environment  Flowsheets (Taken 2/6/2020 1832)  Safety Promotion/Fall Prevention: assistive device/personal item within reach; bed alarm set; room near unit station; /camera at bedside; side rails raised x 3; supervised activity

## 2020-02-07 NOTE — PLAN OF CARE
Pt confused, needs reinforcement, does not follow commands with deep breathing excersises; RN informed

## 2020-02-07 NOTE — PROGRESS NOTES
1020 [address] sent a message to Jennifer of [facility] informing pt may possibly d/c over the weekend; previous authorization for DME, katy lift, wheelchair, and hospital bed. Attending is contacting family.

## 2020-02-07 NOTE — PLAN OF CARE
Problem: Wound  Goal: Optimal Wound Healing  Outcome: Ongoing, Progressing     Problem: Fall Injury Risk  Goal: Absence of Fall and Fall-Related Injury  Outcome: Ongoing, Progressing     Problem: Skin Injury Risk Increased  Goal: Skin Health and Integrity  Outcome: Ongoing, Progressing     Problem: Adult Inpatient Plan of Care  Goal: Plan of Care Review  Outcome: Ongoing, Progressing  Goal: Patient-Specific Goal (Individualization)  Outcome: Ongoing, Progressing  Goal: Absence of Hospital-Acquired Illness or Injury  Outcome: Ongoing, Progressing  Goal: Optimal Comfort and Wellbeing  Outcome: Ongoing, Progressing  Goal: Readiness for Transition of Care  Outcome: Ongoing, Progressing  Goal: Rounds/Family Conference  Outcome: Ongoing, Progressing     Problem: Diabetes Comorbidity  Goal: Blood Glucose Level Within Desired Range  Outcome: Ongoing, Progressing     Problem: Infection  Goal: Infection Symptom Resolution  Outcome: Ongoing, Progressing     Problem: Aspiration (Enteral Nutrition)  Goal: Absence of Aspiration Signs/Symptoms  Outcome: Ongoing, Progressing     Problem: Device-Related Complication Risk (Enteral Nutrition)  Goal: Safe, Effective Therapy Delivery  Outcome: Ongoing, Progressing     Problem: Feeding Intolerance (Enteral Nutrition)  Goal: Feeding Tolerance  Outcome: Ongoing, Progressing     Problem: Communication Impairment (Stroke, Ischemic/Transient Ischemic Attack)  Goal: Improved Communication Skills  Outcome: Ongoing, Progressing     Problem: Sensorimotor Impairment (Stroke, Ischemic/Transient Ischemic Attack)  Goal: Improved Sensorimotor Function  Outcome: Ongoing, Progressing

## 2020-02-07 NOTE — PROGRESS NOTES
"[facility]  Adult Nutrition  Progress Note    SUMMARY       Recommendations     1. Current TF adequate to meet estimated needs  -Rec Fluid flushes of 165 ml q 4 hrs for maintenance fluids    2. If BG levels problematic, can change formula to Diabetisource to goal of 70 ml/hr + FLuid flushes of 155 ml QID.    2. Diet advancement per SLP   3. Monitor weight weekly    Goals: Intake >85% EEN/ EPN daily  Nutrition Goal Status: new  Communication of RD Recs: other (comment)(POC)    Reason for Assessment    Reason For Assessment: RD follow-up  Diagnosis: other (see comments)(pleural effusion bilateral)  Relevant Medical History: HTN, Afib, stroke, PU, DMII  Interdisciplinary Rounds: did not attend    General Information Comments: TF running at goal rate, no issues per NSG. + PEG. SLP continues to rec NPO. NFPE 2/7/2020: LBM WDL for age, adequate Fat mass.    Nutrition Discharge Planning: Discharge on TF    Nutrition Risk Screen    Nutrition Risk Screen: tube feeding or parenteral nutrition    Nutrition/Diet History    Spiritual, Cultural Beliefs, Orthodox Practices, Values that Affect Care: no  Food Allergies: NKFA  Nutrition Support Formula Prior to Admit: Jevity 1.5  Nutrition Support Rate Prior to Admit: 60 (ml)  Nutrtion Support Frequency Prior to Admit: ml/hr    Anthropometrics    Temp: 98.1 °F (36.7 °C)  Height Method: Stated  Height: 5' 10" (177.8 cm)  Height (inches): 70 in  Weight Method: Bed Scale  Weight: 94.1 kg (207 lb 7.3 oz)  Weight (lb): 207.45 lb  Ideal Body Weight (IBW), Male: 166 lb  % Ideal Body Weight, Male (lb): 129.49 %  BMI (Calculated): 29.8  BMI Grade: 30 - 34.9- obesity - grade I       Lab/Procedures/Meds    Pertinent Labs Reviewed: reviewed  Pertinent Labs Comments: BUN 40; alb 2.3  Pertinent Medications Reviewed: reviewed  Pertinent Medications Comments: atorvastatin, furosemide, levothyroxine, metoprolol, polyethylene glycol, allpurinol    Estimated/Assessed Needs    Weight " Used For Calorie Calculations: 97.5 kg (214 lb 15.2 oz)  Energy Calorie Requirements (kcal): 2030  Energy Need Method: Chama-St Jeor(xPAL 1.2)  Protein Requirements: 97g/d(1.0g/kg)  Weight Used For Protein Calculations: 97.5 kg (214 lb 15.2 oz)        RDA Method (mL): 2030  CHO Requirement: 253      Nutrition Prescription Ordered    Current Diet Order: NPO  Current Nutrition Support Formula Ordered: Isosource 1.5  Current Nutrition Support Rate Ordered: 55 (ml)  Current Nutrition Support Frequency Ordered: ml/hr    Evaluation of Received Nutrient/Fluid Intake    Enteral Calories (kcal): 1980  Enteral Protein (gm): 90  Enteral (Free Water) Fluid (mL): 1008  % Kcal Needs: 98%  % Protein Needs: 93%  I/O: 512/1900  Energy Calories Required: meeting needs  Protein Required: meeting needs  Fluid Required: (per MD)  Comments: LBM: 2/3    % Meal Intake: NPO    Nutrition Risk    Level of Risk/Frequency of Follow-up: (f/u 2x/ weekly)     Assessment and Plan    Nutrition Problem  Difficulty swallowing    Related to (etiology):   Dysphagia    Signs and Symptoms (as evidenced by):   NPO per SLP/PEG    Interventions/R  Collaboration with providers    Nutrition Diagnosis Status:   New         Monitor and Evaluation    Food and Nutrient Intake: energy intake, enteral nutrition intake  Food and Nutrient Adminstration: diet order, enteral and parenteral nutrition administration  Anthropometric Measurements: weight, weight change, body mass index  Biochemical Data, Medical Tests and Procedures: electrolyte and renal panel, glucose/endocrine profile, lipid profile  Nutrition-Focused Physical Findings: overall appearance     Malnutrition Assessment       [place] Region (Muscle Loss): mild depletion  Clavicle Bone Region (Muscle Loss): mild depletion  Clavicle and Acromion Bone Region (Muscle Loss): mild depletion  Dorsal Hand (Muscle Loss): well nourished  Patellar Region (Muscle Loss): well nourished  Anterior Thigh Region (Muscle  Loss): well nourished  Posterior Calf Region (Muscle Loss): well nourished       Subcutaneous Fat Loss (Final Summary): well nourished  Muscle Loss Evaluation (Final Summary): well nourished(age appropriate)         Nutrition Follow-Up    RD Follow-up?: Yes

## 2020-02-07 NOTE — NURSING
Report received from night nurse. Visualized patient and assessed patient's overall condition and appearance. Tube feeding infusing. Rodriguez in place. Will continue to monitor.

## 2020-02-07 NOTE — PROGRESS NOTES
[facility] Hospital Medicine  Progress Note    Patient Name: Chirag Thompson  MRN: [id number]  Patient Class: IP- Inpatient   Admission Date: 2/3/2020  Length of Stay: 4 days  Attending Physician: Norma Watkins MD  Primary Care Provider: Dany Marinelli Iii, MD        Subjective:     Principal Problem:Acute respiratory failure with hypoxia        HPI:    Chirag Thompson is a 79 y.o. male that (in part)  has a past medical history of Acquired hypothyroidism, Acute ischemic left middle cerebral artery (MCA) stroke, Anticoagulant long-term use, Arthritis, Chronic a-fib, Current use of long term anticoagulation, Embolic stroke involving left middle cerebral artery, Hypertension, PEG (percutaneous endoscopic gastrostomy) adjustment/replacement/removal, Pressure ulcer of right leg, unspecified pressure ulcer stage, Right spastic hemiparesis, Stroke due to embolism of left middle cerebral artery, Thyroid disease, and Type 2 diabetes mellitus with circulatory disorder, without long-term current use of insulin.  has a past surgical history that includes Hernia repair and Esophagogastroduodenoscopy w/ PEG (N/A, 1/23/2020). Presents to [facility] Emergency Department  by EMS from  [facility] today with report of both shortness of breath.  Associated symptoms include urinary retention and swollen testicles.  He was recently admitted for acute CVA a discharged approximately 2 weeks ago.  He recently had an indwelling dean removed.  No report of fever chills.  No hematuria.  Positive for suprapubic tenderness .  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  He is reportedly lethargic compared to his baseline.  Concern for both his breathing and urinary retention.    In the emergency department routine laboratory studies, urinalysis, chest x-ray obtained.  Chest x-ray concerning for bilateral pleural effusions.  Supple oxygen  was given.  He is on and apixaban therefore no thoracentesis could be performed at this time safely.  History phone was slightly elevated but consistent with previous measurements.  EKG was nonischemic.  Urinalysis concerning for WBCs and RBCs.  Possible UTI.    Hospital medicine has been asked to admit to inpatient for further evaluation and treatment.     Overview/Hospital Course:  Mr Thompson presented with acute respiratory failure with hypoxia and acute encephalopathy. Workup consistent with bilateral pleural effusions, pulmonary edema and severe malnutrition. Initiated on IV furosemide, supplemental O2 via low fow NC and consulted IR for thoracentesis. IR recommended against thoracentesis as they thought size of effusions were too small for safe intervention. IV furosemide continued. Echocardiogram with grade 1 diastolic dysfunction with preserved EF and no significant valvular issues. He is in chronic AFib however, rate controlled with metoprolol. As for encephalopathy, patient became more alert and interactive after narcotics were discontinued and shortness of breath addressed. No evidence of infection except for abnormal urinalysis however with few bacteria in setting of indwelling dean catheter. Urine culture negative, no growth in blood cultures, afebrile and no leukocytosis. Antibiotics therefore discontinued. Dean exchanged for a new one in the ED. On 2/5 patient developed rapid ventricular response despite use of home metoprolol and overall clinical improvement. Blood pressure at the time too low at the time, therefore transferred to ICU on amiodarone infusion and cardiology consultation. Blood pressure had improved therefore Cardiology recommended diltiazem infusion instead. Repeat CXR and labs showed no acute findings. Patient was properly rate controlled and was transitioned to a higher dose of metoprolol (50 mg BID). Remained stable. Stepped down to telemetry floor on 2/6.     Interval History:  clinically unchanged    Review of Systems   Unable to perform ROS: Other (slurred speech)     Objective:     Vital Signs (Most Recent):  Temp: 97.9 °F (36.6 °C) (02/07/20 0746)  Pulse: 97 (02/07/20 0759)  Resp: 18 (02/07/20 0759)  BP: 119/76 (02/07/20 0746)  SpO2: 99 % (02/07/20 0759) Vital Signs (24h Range):  Temp:  [97.4 °F (36.3 °C)-98 °F (36.7 °C)] 97.9 °F (36.6 °C)  Pulse:  [] 97  Resp:  [17-24] 18  SpO2:  [91 %-100 %] 99 %  BP: (113-139)/(63-76) 119/76     Weight: 94.1 kg (207 lb 7.3 oz)  Body mass index is 29.77 kg/m².    Intake/Output Summary (Last 24 hours) at 2/7/2020 1004  Last data filed at 2/7/2020 0600  Gross per 24 hour   Intake 482 ml   Output 1850 ml   Net -1368 ml      Physical Exam   Constitutional: He appears well-developed. No distress.   HENT:   Head: Normocephalic and atraumatic.   Eyes: Pupils are equal, round, and reactive to light. Conjunctivae and EOM are normal.   Neck: Normal range of motion.   Cardiovascular: Normal rate. An irregularly irregular rhythm present.   Pulmonary/Chest: Effort normal. He has no wheezes. He has no rales.   Decreased breath sounds bases   Abdominal: Soft. Bowel sounds are normal.   Neurological: He is alert.   Makes eye contact and interactive but with significant dysarthria.   Facial droop    Skin: Capillary refill takes less than 2 seconds. He is not diaphoretic.   Nursing note and vitals reviewed.      Significant Labs: All pertinent labs within the past 24 hours have been reviewed.    Significant Imaging: I have reviewed all pertinent imaging results/findings within the past 24 hours.  I have reviewed and interpreted all pertinent imaging results/findings within the past 24 hours.      Assessment/Plan:      * Acute respiratory failure with hypoxia  2/2 pulmonary edema and pleural effusions  Atrial fibrillation can result in ineffective cardiac output and pulmonary edema when poorly controlled  Echocardiogram with mild diastolic dysfunction, preserved  EF and no significant valvular issues  I am concerned this is due to malnutrition  Tube feeds restarted and on diuresis  Supplemental O2 as needed. Would likely need O2 at discharge. Will test for this  Patient is max assist and has stage 2 sacral pressure ulcer. Patient is considered bed bound  PT/OT as tolerated. Hospital bed, katy lift, wheelchair. Will ask insurance about transportation for appointments, etc since he is bed bound.   NPO as patient is very high risk for aspiration. Feeding per PEG        Pleural effusion, bilateral  Not candidate for therapeutic thoracentesis due to size  Is on low supplemental O2 requirements and breathing more comfortably with IV diuresis  No evidence of systolic heart failure on Echo, although limited study due to atrial fibrillation  Grade 1 diastolic dysfunction and no significant valve disease  Third spacing from malnutrition?        Acute metabolic encephalopathy  Likely due to hypoxia  Resolved     Serum total bilirubin elevated  Etiology unknown  RUQ ultrasound with cholelithiasis and sludge but no obstruction, inflammation and CBD normal        Severe protein-calorie malnutrition  As evidenced by low albumin, low total protein, sacral pressure ulcer and evidence of third spacing  This is all likely to consequences of large stroke 3 weeks prior to presentation  Pre-albumin very low at 5  Tube feeds via PEG tube per dietary    Chronic ischemic left MCA stroke  No evidence of new stroke  Pretty debilitated since his stroke (poorly functional, fully dependent, dysarthria, dysphagia)      PEG (percutaneous endoscopic gastrostomy) adjustment/replacement/removal  No acute issues  On tube feeds. So fat tolerating well      Dysphagia due to recent stroke  Per last Speech therapy notes from recent admission, patient is to remain strictly NPO for significant dysphagia and to continue with tube feeds. Speech therapy re-consulted.       Chronic anticoagulation  No acute  issues      Hypercoagulable state  On anticoagulation      Acquired hypothyroidism  No acute issues  Continue home regimen      Other persistent atrial fibrillation  Dose of metoprolol increased for better rate control.   On anticoagulation for stroke prophylaxis (CHADS VASc 5, which puts him at high risk for recurrent stroke)      Type 2 diabetes mellitus with circulatory disorder, without long-term current use of insulin  Well controlled on sliding scale insulin  HgbA1c 6%      Essential hypertension  Stable on current regimen    VTE Risk Mitigation (From admission, onward)         Ordered     apixaban tablet 5 mg  2 times daily      02/04/20 1331     IP VTE HIGH RISK PATIENT  Once      02/03/20 1918     Reason for No Pharmacological VTE Prophylaxis  Once     Question:  Reasons:  Answer:  Already adequately anticoagulated on oral Anticoagulants    02/03/20 1918              Called family at number listed in chart but no answer. Will need to speak with nephew or niece, who will be primary caregivers, to discuss plan since it is complex. Hopefully they are to come visit patient today so we can talk. Hopefully home soon.     Norma Silva MD  Department of Hospital Medicine   [facility]

## 2020-02-07 NOTE — NURSING
Patient fall safety education and environmental rounds performed.  Avasys sitter in use. Bed alarm in use. Call bell within patient's reach. Patient in bed with eyes closed. Moving hands frequently and grabbing on tele monitoring leads in use. No leads disconnected at this time.

## 2020-02-08 LAB
ALBUMIN SERPL BCP-MCNC: 2.2 G/DL (ref 3.5–5.2)
ALP SERPL-CCNC: 119 U/L (ref 55–135)
ALT SERPL W/O P-5'-P-CCNC: 35 U/L (ref 10–44)
ANION GAP SERPL CALC-SCNC: 6 MMOL/L (ref 8–16)
AST SERPL-CCNC: 41 U/L (ref 10–40)
BACTERIA BLD CULT: NORMAL
BACTERIA BLD CULT: NORMAL
BASOPHILS # BLD AUTO: 0.05 K/UL (ref 0–0.2)
BASOPHILS NFR BLD: 0.6 % (ref 0–1.9)
BILIRUB SERPL-MCNC: 1.3 MG/DL (ref 0.1–1)
BUN SERPL-MCNC: 39 MG/DL (ref 8–23)
CALCIUM SERPL-MCNC: 8.8 MG/DL (ref 8.7–10.5)
CHLORIDE SERPL-SCNC: 105 MMOL/L (ref 95–110)
CO2 SERPL-SCNC: 31 MMOL/L (ref 23–29)
CREAT SERPL-MCNC: 0.9 MG/DL (ref 0.5–1.4)
DIFFERENTIAL METHOD: ABNORMAL
EOSINOPHIL # BLD AUTO: 0.6 K/UL (ref 0–0.5)
EOSINOPHIL NFR BLD: 6.6 % (ref 0–8)
ERYTHROCYTE [DISTWIDTH] IN BLOOD BY AUTOMATED COUNT: 16.3 % (ref 11.5–14.5)
EST. GFR  (AFRICAN AMERICAN): >60 ML/MIN/1.73 M^2
EST. GFR  (NON AFRICAN AMERICAN): >60 ML/MIN/1.73 M^2
GLUCOSE SERPL-MCNC: 212 MG/DL (ref 70–110)
HCT VFR BLD AUTO: 38.3 % (ref 40–54)
HGB BLD-MCNC: 11.9 G/DL (ref 14–18)
IMM GRANULOCYTES # BLD AUTO: 0.06 K/UL (ref 0–0.04)
IMM GRANULOCYTES NFR BLD AUTO: 0.7 % (ref 0–0.5)
LYMPHOCYTES # BLD AUTO: 0.9 K/UL (ref 1–4.8)
LYMPHOCYTES NFR BLD: 9.7 % (ref 18–48)
MCH RBC QN AUTO: 33.6 PG (ref 27–31)
MCHC RBC AUTO-ENTMCNC: 31.1 G/DL (ref 32–36)
MCV RBC AUTO: 108 FL (ref 82–98)
MONOCYTES # BLD AUTO: 0.8 K/UL (ref 0.3–1)
MONOCYTES NFR BLD: 8.8 % (ref 4–15)
NEUTROPHILS # BLD AUTO: 6.5 K/UL (ref 1.8–7.7)
NEUTROPHILS NFR BLD: 73.6 % (ref 38–73)
NRBC BLD-RTO: 0 /100 WBC
PLATELET # BLD AUTO: 187 K/UL (ref 150–350)
PMV BLD AUTO: 11 FL (ref 9.2–12.9)
POCT GLUCOSE: 191 MG/DL (ref 70–110)
POCT GLUCOSE: 199 MG/DL (ref 70–110)
POCT GLUCOSE: 204 MG/DL (ref 70–110)
POCT GLUCOSE: 226 MG/DL (ref 70–110)
POTASSIUM SERPL-SCNC: 3.8 MMOL/L (ref 3.5–5.1)
PROT SERPL-MCNC: 5.3 G/DL (ref 6–8.4)
RBC # BLD AUTO: 3.54 M/UL (ref 4.6–6.2)
SODIUM SERPL-SCNC: 142 MMOL/L (ref 136–145)
WBC # BLD AUTO: 8.76 K/UL (ref 3.9–12.7)

## 2020-02-08 PROCEDURE — 27000221 HC OXYGEN, UP TO 24 HOURS

## 2020-02-08 PROCEDURE — 85025 COMPLETE CBC W/AUTO DIFF WBC: CPT

## 2020-02-08 PROCEDURE — 25000003 PHARM REV CODE 250: Performed by: INTERNAL MEDICINE

## 2020-02-08 PROCEDURE — 25000242 PHARM REV CODE 250 ALT 637 W/ HCPCS: Performed by: INTERNAL MEDICINE

## 2020-02-08 PROCEDURE — 94640 AIRWAY INHALATION TREATMENT: CPT

## 2020-02-08 PROCEDURE — 80053 COMPREHEN METABOLIC PANEL: CPT

## 2020-02-08 PROCEDURE — 21400001 HC TELEMETRY ROOM

## 2020-02-08 PROCEDURE — 36415 COLL VENOUS BLD VENIPUNCTURE: CPT

## 2020-02-08 PROCEDURE — 94761 N-INVAS EAR/PLS OXIMETRY MLT: CPT

## 2020-02-08 PROCEDURE — 63600175 PHARM REV CODE 636 W HCPCS: Performed by: INTERNAL MEDICINE

## 2020-02-08 RX ORDER — FUROSEMIDE 40 MG/1
40 TABLET ORAL DAILY
Status: DISCONTINUED | OUTPATIENT
Start: 2020-02-09 | End: 2020-02-11 | Stop reason: HOSPADM

## 2020-02-08 RX ADMIN — LEVOTHYROXINE SODIUM 75 MCG: 75 TABLET ORAL at 06:02

## 2020-02-08 RX ADMIN — INSULIN ASPART 2 UNITS: 100 INJECTION, SOLUTION INTRAVENOUS; SUBCUTANEOUS at 11:02

## 2020-02-08 RX ADMIN — APIXABAN 5 MG: 5 TABLET, FILM COATED ORAL at 09:02

## 2020-02-08 RX ADMIN — METOPROLOL TARTRATE 50 MG: 50 TABLET, FILM COATED ORAL at 09:02

## 2020-02-08 RX ADMIN — IPRATROPIUM BROMIDE AND ALBUTEROL SULFATE 3 ML: .5; 3 SOLUTION RESPIRATORY (INHALATION) at 04:02

## 2020-02-08 RX ADMIN — IPRATROPIUM BROMIDE AND ALBUTEROL SULFATE 3 ML: .5; 3 SOLUTION RESPIRATORY (INHALATION) at 12:02

## 2020-02-08 RX ADMIN — INSULIN ASPART 1 UNITS: 100 INJECTION, SOLUTION INTRAVENOUS; SUBCUTANEOUS at 09:02

## 2020-02-08 RX ADMIN — IPRATROPIUM BROMIDE AND ALBUTEROL SULFATE 3 ML: .5; 3 SOLUTION RESPIRATORY (INHALATION) at 08:02

## 2020-02-08 RX ADMIN — FUROSEMIDE 40 MG: 40 TABLET ORAL at 08:02

## 2020-02-08 RX ADMIN — INSULIN ASPART 4 UNITS: 100 INJECTION, SOLUTION INTRAVENOUS; SUBCUTANEOUS at 05:02

## 2020-02-08 RX ADMIN — SODIUM CHLORIDE 500 ML: 0.9 INJECTION, SOLUTION INTRAVENOUS at 01:02

## 2020-02-08 RX ADMIN — ALLOPURINOL 300 MG: 100 TABLET ORAL at 08:02

## 2020-02-08 RX ADMIN — ACETAMINOPHEN 650 MG: 325 TABLET ORAL at 04:02

## 2020-02-08 RX ADMIN — APIXABAN 5 MG: 5 TABLET, FILM COATED ORAL at 08:02

## 2020-02-08 RX ADMIN — POLYETHYLENE GLYCOL 3350 17 G: 17 POWDER, FOR SOLUTION ORAL at 08:02

## 2020-02-08 RX ADMIN — ATORVASTATIN CALCIUM 40 MG: 40 TABLET, FILM COATED ORAL at 08:02

## 2020-02-08 RX ADMIN — DOCUSATE SODIUM 100 MG: 50 LIQUID ORAL at 08:02

## 2020-02-08 RX ADMIN — IPRATROPIUM BROMIDE AND ALBUTEROL SULFATE 3 ML: .5; 3 SOLUTION RESPIRATORY (INHALATION) at 11:02

## 2020-02-08 RX ADMIN — IPRATROPIUM BROMIDE AND ALBUTEROL SULFATE 3 ML: .5; 3 SOLUTION RESPIRATORY (INHALATION) at 07:02

## 2020-02-08 RX ADMIN — POLYETHYLENE GLYCOL 3350 17 G: 17 POWDER, FOR SOLUTION ORAL at 09:02

## 2020-02-08 RX ADMIN — METOPROLOL TARTRATE 50 MG: 50 TABLET, FILM COATED ORAL at 08:02

## 2020-02-08 NOTE — SUBJECTIVE & OBJECTIVE
Interval History: drowsy and blood pressure low. Stable otherwise.     Review of Systems   Unable to perform ROS: Other (slurred speech)     Objective:     Vital Signs (Most Recent):  Temp: 97.6 °F (36.4 °C) (02/08/20 1109)  Pulse: 74 (02/08/20 1154)  Resp: 20 (02/08/20 1154)  BP: (!) 91/58 (02/08/20 1109)  SpO2: 95 % (02/08/20 1154) Vital Signs (24h Range):  Temp:  [97.6 °F (36.4 °C)-98.9 °F (37.2 °C)] 97.6 °F (36.4 °C)  Pulse:  [74-97] 74  Resp:  [16-22] 20  SpO2:  [93 %-97 %] 95 %  BP: ()/(53-73) 91/58     Weight: 94.1 kg (207 lb 7.3 oz)  Body mass index is 29.77 kg/m².    Intake/Output Summary (Last 24 hours) at 2/8/2020 1315  Last data filed at 2/8/2020 0845  Gross per 24 hour   Intake 660 ml   Output 900 ml   Net -240 ml      Physical Exam   Constitutional: He appears well-developed. No distress.   HENT:   Head: Normocephalic and atraumatic.   Eyes: Pupils are equal, round, and reactive to light. Conjunctivae and EOM are normal.   Neck: Normal range of motion.   Cardiovascular: Normal rate. An irregularly irregular rhythm present.   Pulmonary/Chest: Effort normal. He has no wheezes. He has no rales.   Decreased breath sounds bases   Abdominal: Soft. Bowel sounds are normal.   Neurological:   Drowsy   Facial droop    Skin: Capillary refill takes less than 2 seconds. He is not diaphoretic.   Nursing note and vitals reviewed.      Significant Labs: All pertinent labs within the past 24 hours have been reviewed.    Significant Imaging: I have reviewed all pertinent imaging results/findings within the past 24 hours.  I have reviewed and interpreted all pertinent imaging results/findings within the past 24 hours.

## 2020-02-08 NOTE — NURSING
Received report from IVANNA Castellanos. Patient lying in bed resting, NAD noted. Safety precautions maintained, will monitor. Avasys at bedside. Sitter at bedside.

## 2020-02-08 NOTE — ASSESSMENT & PLAN NOTE
2/2 pulmonary edema and pleural effusions  Atrial fibrillation can result in ineffective cardiac output and pulmonary edema when poorly controlled  Echocardiogram with mild diastolic dysfunction, preserved EF and no significant valvular issues  I am concerned this is due to malnutrition  Supplemental O2 as needed. Would likely need O2 at discharge. Will test for this prior to dc  Patient is max assist and has stage 2 sacral pressure ulcer. Patient is considered bed bound  PT/OT as tolerated. Hospital bed, katy lift, wheelchair to be sent to house when family ready to receive equipment. They are aware and will plan with SW. Will ask insurance about transportation for appointments, etc since he is bed bound.   NPO as patient is very high risk for aspiration. Feeding per PEG. Add water flushes  Hold diuresis as BP is low. Breathing comfortably on low flow NC

## 2020-02-08 NOTE — PROGRESS NOTES
[facility] Hospital Medicine  Progress Note    Patient Name: Chirag Thompson  MRN: [id number]  Patient Class: IP- Inpatient   Admission Date: 2/3/2020  Length of Stay: 5 days  Attending Physician: Norma Watkins MD  Primary Care Provider: Dany Marinelli Iii, MD        Subjective:     Principal Problem:Acute respiratory failure with hypoxia        HPI:    Chriag Thompson is a 79 y.o. male that (in part)  has a past medical history of Acquired hypothyroidism, Acute ischemic left middle cerebral artery (MCA) stroke, Anticoagulant long-term use, Arthritis, Chronic a-fib, Current use of long term anticoagulation, Embolic stroke involving left middle cerebral artery, Hypertension, PEG (percutaneous endoscopic gastrostomy) adjustment/replacement/removal, Pressure ulcer of right leg, unspecified pressure ulcer stage, Right spastic hemiparesis, Stroke due to embolism of left middle cerebral artery, Thyroid disease, and Type 2 diabetes mellitus with circulatory disorder, without long-term current use of insulin.  has a past surgical history that includes Hernia repair and Esophagogastroduodenoscopy w/ PEG (N/A, 1/23/2020). Presents to [facility] Emergency Department  by EMS from  [facility] today with report of both shortness of breath.  Associated symptoms include urinary retention and swollen testicles.  He was recently admitted for acute CVA a discharged approximately 2 weeks ago.  He recently had an indwelling dean removed.  No report of fever chills.  No hematuria.  Positive for suprapubic tenderness .  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  He is reportedly lethargic compared to his baseline.  Concern for both his breathing and urinary retention.    In the emergency department routine laboratory studies, urinalysis, chest x-ray obtained.  Chest x-ray concerning for bilateral pleural effusions.  Supple oxygen  was given.  He is on and apixaban therefore no thoracentesis could be performed at this time safely.  History phone was slightly elevated but consistent with previous measurements.  EKG was nonischemic.  Urinalysis concerning for WBCs and RBCs.  Possible UTI.    Hospital medicine has been asked to admit to inpatient for further evaluation and treatment.     Overview/Hospital Course:  Mr Thompson presented with acute respiratory failure with hypoxia and acute encephalopathy. Workup consistent with bilateral pleural effusions, pulmonary edema and severe malnutrition. Initiated on IV furosemide, supplemental O2 via low fow NC and consulted IR for thoracentesis. IR recommended against thoracentesis as they thought size of effusions were too small for safe intervention. IV furosemide continued. Echocardiogram with grade 1 diastolic dysfunction with preserved EF and no significant valvular issues. He is in chronic AFib however, rate controlled with metoprolol. As for encephalopathy, patient became more alert and interactive after narcotics were discontinued and shortness of breath addressed. No evidence of infection except for abnormal urinalysis however with few bacteria in setting of indwelling dean catheter. Urine culture negative, no growth in blood cultures, afebrile and no leukocytosis. Antibiotics therefore discontinued. Dean exchanged for a new one in the ED. On 2/5 patient developed rapid ventricular response despite use of home metoprolol and overall clinical improvement. Blood pressure at the time too low at the time, therefore transferred to ICU on amiodarone infusion and cardiology consultation. Blood pressure had improved therefore Cardiology recommended diltiazem infusion instead. Repeat CXR and labs showed no acute findings. Patient was properly rate controlled and was transitioned to a higher dose of metoprolol (50 mg BID). Remained stable. Stepped down to telemetry floor on 2/6.     Interval History:  drowsy and blood pressure low. Stable otherwise.     Review of Systems   Unable to perform ROS: Other (slurred speech)     Objective:     Vital Signs (Most Recent):  Temp: 97.6 °F (36.4 °C) (02/08/20 1109)  Pulse: 74 (02/08/20 1154)  Resp: 20 (02/08/20 1154)  BP: (!) 91/58 (02/08/20 1109)  SpO2: 95 % (02/08/20 1154) Vital Signs (24h Range):  Temp:  [97.6 °F (36.4 °C)-98.9 °F (37.2 °C)] 97.6 °F (36.4 °C)  Pulse:  [74-97] 74  Resp:  [16-22] 20  SpO2:  [93 %-97 %] 95 %  BP: ()/(53-73) 91/58     Weight: 94.1 kg (207 lb 7.3 oz)  Body mass index is 29.77 kg/m².    Intake/Output Summary (Last 24 hours) at 2/8/2020 1315  Last data filed at 2/8/2020 0845  Gross per 24 hour   Intake 660 ml   Output 900 ml   Net -240 ml      Physical Exam   Constitutional: He appears well-developed. No distress.   HENT:   Head: Normocephalic and atraumatic.   Eyes: Pupils are equal, round, and reactive to light. Conjunctivae and EOM are normal.   Neck: Normal range of motion.   Cardiovascular: Normal rate. An irregularly irregular rhythm present.   Pulmonary/Chest: Effort normal. He has no wheezes. He has no rales.   Decreased breath sounds bases   Abdominal: Soft. Bowel sounds are normal.   Neurological:   Drowsy   Facial droop    Skin: Capillary refill takes less than 2 seconds. He is not diaphoretic.   Nursing note and vitals reviewed.      Significant Labs: All pertinent labs within the past 24 hours have been reviewed.    Significant Imaging: I have reviewed all pertinent imaging results/findings within the past 24 hours.  I have reviewed and interpreted all pertinent imaging results/findings within the past 24 hours.      Assessment/Plan:      * Acute respiratory failure with hypoxia  2/2 pulmonary edema and pleural effusions  Atrial fibrillation can result in ineffective cardiac output and pulmonary edema when poorly controlled  Echocardiogram with mild diastolic dysfunction, preserved EF and no significant valvular issues  I am  concerned this is due to malnutrition  Supplemental O2 as needed. Would likely need O2 at discharge. Will test for this prior to dc  Patient is max assist and has stage 2 sacral pressure ulcer. Patient is considered bed bound  PT/OT as tolerated. Hospital bed, katy lift, wheelchair to be sent to house when family ready to receive equipment. They are aware and will plan with SW. Will ask insurance about transportation for appointments, etc since he is bed bound.   NPO as patient is very high risk for aspiration. Feeding per PEG. Add water flushes  Hold diuresis as BP is low. Breathing comfortably on low flow NC        Pleural effusion, bilateral  Not candidate for therapeutic thoracentesis due to size  Is on low supplemental O2 requirements and breathing more comfortably with IV diuresis  No evidence of systolic heart failure on Echo, although limited study due to atrial fibrillation  Grade 1 diastolic dysfunction and no significant valve disease  Third spacing from malnutrition?        Acute metabolic encephalopathy  Likely due to hypoxia  Resolved     Serum total bilirubin elevated  Etiology unknown  RUQ ultrasound with cholelithiasis and sludge but no obstruction, inflammation and CBD normal        Severe protein-calorie malnutrition  As evidenced by low albumin, low total protein, sacral pressure ulcer and evidence of third spacing  This is all likely to consequences of large stroke 3 weeks prior to presentation  Pre-albumin very low at 5  Tube feeds via PEG tube per dietary    Chronic ischemic left MCA stroke  No evidence of new stroke  Pretty debilitated since his stroke (poorly functional, fully dependent, dysarthria, dysphagia)      PEG (percutaneous endoscopic gastrostomy) adjustment/replacement/removal  No acute issues  On tube feeds. So fat tolerating well      Dysphagia due to recent stroke  Per last Speech therapy notes from recent admission, patient is to remain strictly NPO for significant dysphagia  and to continue with tube feeds. Speech therapy re-consulted.       Chronic anticoagulation  No acute issues      Hypercoagulable state  On anticoagulation      Acquired hypothyroidism  No acute issues  Continue home regimen      Other persistent atrial fibrillation  Dose of metoprolol increased for better rate control.   On anticoagulation for stroke prophylaxis (CHADS VASc 5, which puts him at high risk for recurrent stroke)      Type 2 diabetes mellitus with circulatory disorder, without long-term current use of insulin  Well controlled on sliding scale insulin  HgbA1c 6%      Essential hypertension  BP low. Hold diuresis today. Give gentle bolus as he appears to be symptomatic    VTE Risk Mitigation (From admission, onward)         Ordered     apixaban tablet 5 mg  2 times daily      02/04/20 1331     IP VTE HIGH RISK PATIENT  Once      02/03/20 1918     Reason for No Pharmacological VTE Prophylaxis  Once     Question:  Reasons:  Answer:  Already adequately anticoagulated on oral Anticoagulants    02/03/20 1918                Dispo: home once equipment sent to house and  set up. Likely Monday. Discussed with becky Virk and one of patient main caregivers       Norma Silva MD  Department of Hospital Medicine   [facility]

## 2020-02-08 NOTE — PLAN OF CARE
Problem: Wound  Goal: Optimal Wound Healing  Intervention: Promote Effective Wound Healing  Flowsheets (Taken 2/8/2020 0953)  Oral Nutrition Promotion: rest periods promoted; social interaction promoted  Pain Management Interventions: position adjusted     Problem: Fall Injury Risk  Goal: Absence of Fall and Fall-Related Injury  Intervention: Promote Injury-Free Environment  Flowsheets (Taken 2/8/2020 0953)  Safety Promotion/Fall Prevention: assistive device/personal item within reach; bed alarm set; /camera at bedside; side rails raised x 2  Environmental Safety Modification: assistive device/personal items within reach; clutter free environment maintained; room near unit station     Problem: Skin Injury Risk Increased  Goal: Skin Health and Integrity  Intervention: Optimize Skin Protection  Flowsheets (Taken 2/8/2020 0953)  Pressure Reduction Techniques: weight shift assistance provided; positioned off wounds; pressure points protected  Pressure Reduction Devices: heel offloading device utilized; positioning supports utilized  Skin Protection: adhesive use limited; incontinence pads utilized  Head of Bed (HOB): HOB elevated  Intervention: Promote and Optimize Oral Intake  Flowsheets (Taken 2/8/2020 0953)  Oral Nutrition Promotion: rest periods promoted; social interaction promoted     Problem: Adult Inpatient Plan of Care  Goal: Plan of Care Review  Flowsheets (Taken 2/8/2020 0953)  Plan of Care Reviewed With: patient     Problem: Diabetes Comorbidity  Goal: Blood Glucose Level Within Desired Range  Intervention: Maintain Glycemic Control  Flowsheets (Taken 2/8/2020 0953)  Glycemic Management: blood glucose monitoring; supplemental insulin given     Problem: Aspiration (Enteral Nutrition)  Goal: Absence of Aspiration Signs/Symptoms  Intervention: Minimize Aspiration Risk  Flowsheets (Taken 2/8/2020 0953)  Head of Bed (HOB): HOB elevated  Oral Care: other (see comments)     Problem: Communication  Impairment (Stroke, Ischemic/Transient Ischemic Attack)  Goal: Improved Communication Skills  Intervention: Optimize Cognitive and Communication Skills  Flowsheets (Taken 2/8/2020 7567)  Communication Enhancement Strategies: family involved in communication plan

## 2020-02-09 LAB
ALBUMIN SERPL BCP-MCNC: 2.1 G/DL (ref 3.5–5.2)
ALP SERPL-CCNC: 124 U/L (ref 55–135)
ALT SERPL W/O P-5'-P-CCNC: 37 U/L (ref 10–44)
ANION GAP SERPL CALC-SCNC: 9 MMOL/L (ref 8–16)
AST SERPL-CCNC: 41 U/L (ref 10–40)
BILIRUB SERPL-MCNC: 1.3 MG/DL (ref 0.1–1)
BUN SERPL-MCNC: 39 MG/DL (ref 8–23)
CALCIUM SERPL-MCNC: 8.6 MG/DL (ref 8.7–10.5)
CHLORIDE SERPL-SCNC: 104 MMOL/L (ref 95–110)
CO2 SERPL-SCNC: 30 MMOL/L (ref 23–29)
CREAT SERPL-MCNC: 0.8 MG/DL (ref 0.5–1.4)
EST. GFR  (AFRICAN AMERICAN): >60 ML/MIN/1.73 M^2
EST. GFR  (NON AFRICAN AMERICAN): >60 ML/MIN/1.73 M^2
GLUCOSE SERPL-MCNC: 209 MG/DL (ref 70–110)
POCT GLUCOSE: 189 MG/DL (ref 70–110)
POCT GLUCOSE: 194 MG/DL (ref 70–110)
POCT GLUCOSE: 195 MG/DL (ref 70–110)
POCT GLUCOSE: 197 MG/DL (ref 70–110)
POTASSIUM SERPL-SCNC: 4 MMOL/L (ref 3.5–5.1)
PROT SERPL-MCNC: 5.4 G/DL (ref 6–8.4)
SODIUM SERPL-SCNC: 143 MMOL/L (ref 136–145)

## 2020-02-09 PROCEDURE — 25000242 PHARM REV CODE 250 ALT 637 W/ HCPCS: Performed by: INTERNAL MEDICINE

## 2020-02-09 PROCEDURE — 36415 COLL VENOUS BLD VENIPUNCTURE: CPT

## 2020-02-09 PROCEDURE — 21400001 HC TELEMETRY ROOM

## 2020-02-09 PROCEDURE — 94640 AIRWAY INHALATION TREATMENT: CPT

## 2020-02-09 PROCEDURE — 94761 N-INVAS EAR/PLS OXIMETRY MLT: CPT

## 2020-02-09 PROCEDURE — 25000003 PHARM REV CODE 250: Performed by: INTERNAL MEDICINE

## 2020-02-09 PROCEDURE — C9399 UNCLASSIFIED DRUGS OR BIOLOG: HCPCS | Performed by: INTERNAL MEDICINE

## 2020-02-09 PROCEDURE — 80053 COMPREHEN METABOLIC PANEL: CPT

## 2020-02-09 PROCEDURE — 63600175 PHARM REV CODE 636 W HCPCS: Performed by: INTERNAL MEDICINE

## 2020-02-09 RX ORDER — SYRING-NEEDL,DISP,INSUL,0.3 ML 29 G X1/2"
148 SYRINGE, EMPTY DISPOSABLE MISCELLANEOUS ONCE
Status: COMPLETED | OUTPATIENT
Start: 2020-02-09 | End: 2020-02-09

## 2020-02-09 RX ADMIN — INSULIN ASPART 2 UNITS: 100 INJECTION, SOLUTION INTRAVENOUS; SUBCUTANEOUS at 12:02

## 2020-02-09 RX ADMIN — IPRATROPIUM BROMIDE AND ALBUTEROL SULFATE 3 ML: .5; 3 SOLUTION RESPIRATORY (INHALATION) at 03:02

## 2020-02-09 RX ADMIN — ATORVASTATIN CALCIUM 40 MG: 40 TABLET, FILM COATED ORAL at 09:02

## 2020-02-09 RX ADMIN — IPRATROPIUM BROMIDE AND ALBUTEROL SULFATE 3 ML: .5; 3 SOLUTION RESPIRATORY (INHALATION) at 09:02

## 2020-02-09 RX ADMIN — POLYETHYLENE GLYCOL 3350 17 G: 17 POWDER, FOR SOLUTION ORAL at 09:02

## 2020-02-09 RX ADMIN — MAGNESIUM CITRATE 148 ML: 1.75 LIQUID ORAL at 04:02

## 2020-02-09 RX ADMIN — APIXABAN 5 MG: 5 TABLET, FILM COATED ORAL at 09:02

## 2020-02-09 RX ADMIN — ALLOPURINOL 300 MG: 100 TABLET ORAL at 09:02

## 2020-02-09 RX ADMIN — ACETAMINOPHEN 650 MG: 325 TABLET ORAL at 09:02

## 2020-02-09 RX ADMIN — FUROSEMIDE 40 MG: 40 TABLET ORAL at 09:02

## 2020-02-09 RX ADMIN — IPRATROPIUM BROMIDE AND ALBUTEROL SULFATE 3 ML: .5; 3 SOLUTION RESPIRATORY (INHALATION) at 11:02

## 2020-02-09 RX ADMIN — LEVOTHYROXINE SODIUM 75 MCG: 75 TABLET ORAL at 05:02

## 2020-02-09 RX ADMIN — IPRATROPIUM BROMIDE AND ALBUTEROL SULFATE 3 ML: .5; 3 SOLUTION RESPIRATORY (INHALATION) at 07:02

## 2020-02-09 RX ADMIN — INSULIN ASPART 2 UNITS: 100 INJECTION, SOLUTION INTRAVENOUS; SUBCUTANEOUS at 09:02

## 2020-02-09 RX ADMIN — IPRATROPIUM BROMIDE AND ALBUTEROL SULFATE 3 ML: .5; 3 SOLUTION RESPIRATORY (INHALATION) at 04:02

## 2020-02-09 RX ADMIN — IPRATROPIUM BROMIDE AND ALBUTEROL SULFATE 3 ML: .5; 3 SOLUTION RESPIRATORY (INHALATION) at 12:02

## 2020-02-09 RX ADMIN — METOPROLOL TARTRATE 50 MG: 50 TABLET, FILM COATED ORAL at 09:02

## 2020-02-09 RX ADMIN — INSULIN DETEMIR 8 UNITS: 100 INJECTION, SOLUTION SUBCUTANEOUS at 12:02

## 2020-02-09 RX ADMIN — DOCUSATE SODIUM 100 MG: 50 LIQUID ORAL at 09:02

## 2020-02-09 NOTE — PROGRESS NOTES
[facility] Hospital Medicine  Progress Note    Patient Name: Chirag Thompson  MRN: [id number]  Patient Class: IP- Inpatient   Admission Date: 2/3/2020  Length of Stay: 6 days  Attending Physician: Norma Watkins MD  Primary Care Provider: Dany Marinelli Iii, MD        Subjective:     Principal Problem:Acute respiratory failure with hypoxia        HPI:    Chirag Thompson is a 79 y.o. male that (in part)  has a past medical history of Acquired hypothyroidism, Acute ischemic left middle cerebral artery (MCA) stroke, Anticoagulant long-term use, Arthritis, Chronic a-fib, Current use of long term anticoagulation, Embolic stroke involving left middle cerebral artery, Hypertension, PEG (percutaneous endoscopic gastrostomy) adjustment/replacement/removal, Pressure ulcer of right leg, unspecified pressure ulcer stage, Right spastic hemiparesis, Stroke due to embolism of left middle cerebral artery, Thyroid disease, and Type 2 diabetes mellitus with circulatory disorder, without long-term current use of insulin.  has a past surgical history that includes Hernia repair and Esophagogastroduodenoscopy w/ PEG (N/A, 1/23/2020). Presents to [facility] Emergency Department  by EMS from  [facility] today with report of both shortness of breath.  Associated symptoms include urinary retention and swollen testicles.  He was recently admitted for acute CVA a discharged approximately 2 weeks ago.  He recently had an indwelling dean removed.  No report of fever chills.  No hematuria.  Positive for suprapubic tenderness .  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  He is reportedly lethargic compared to his baseline.  Concern for both his breathing and urinary retention.    In the emergency department routine laboratory studies, urinalysis, chest x-ray obtained.  Chest x-ray concerning for bilateral pleural effusions.  Supple oxygen  was given.  He is on and apixaban therefore no thoracentesis could be performed at this time safely.  History phone was slightly elevated but consistent with previous measurements.  EKG was nonischemic.  Urinalysis concerning for WBCs and RBCs.  Possible UTI.    Hospital medicine has been asked to admit to inpatient for further evaluation and treatment.     Overview/Hospital Course:  Mr Thompson presented with acute respiratory failure with hypoxia and acute encephalopathy. Workup consistent with bilateral pleural effusions, pulmonary edema and severe malnutrition. Initiated on IV furosemide, supplemental O2 via low fow NC and consulted IR for thoracentesis. IR recommended against thoracentesis as they thought size of effusions were too small for safe intervention. IV furosemide continued. Echocardiogram with grade 1 diastolic dysfunction with preserved EF and no significant valvular issues. He is in chronic AFib however, rate controlled with metoprolol. As for encephalopathy, patient became more alert and interactive after narcotics were discontinued and shortness of breath addressed. No evidence of infection except for abnormal urinalysis however with few bacteria in setting of indwelling dean catheter. Urine culture negative, no growth in blood cultures, afebrile and no leukocytosis. Antibiotics therefore discontinued. Dean exchanged for a new one in the ED. On 2/5 patient developed rapid ventricular response despite use of home metoprolol and overall clinical improvement. Blood pressure at the time too low at the time, therefore transferred to ICU on amiodarone infusion and cardiology consultation. Blood pressure had improved therefore Cardiology recommended diltiazem infusion instead. Repeat CXR and labs showed no acute findings. Patient was properly rate controlled and was transitioned to a higher dose of metoprolol (50 mg BID). Remained stable. Stepped down to telemetry floor on 2/6.     Interval History:  very alert and more interactive. Breathing comfortably at room air. Constipation     Review of Systems   Unable to perform ROS: Other (slurred speech)     Objective:     Vital Signs (Most Recent):  Temp: 97.9 °F (36.6 °C) (02/09/20 1124)  Pulse: 91 (02/09/20 1124)  Resp: 16 (02/09/20 1124)  BP: (!) 102/55 (02/09/20 1124)  SpO2: (!) 93 % (02/09/20 1124) Vital Signs (24h Range):  Temp:  [97.5 °F (36.4 °C)-98.1 °F (36.7 °C)] 97.9 °F (36.6 °C)  Pulse:  [] 91  Resp:  [16-22] 16  SpO2:  [93 %-96 %] 93 %  BP: ()/(54-67) 102/55     Weight: 94.1 kg (207 lb 7.3 oz)  Body mass index is 29.77 kg/m².    Intake/Output Summary (Last 24 hours) at 2/9/2020 1327  Last data filed at 2/9/2020 1124  Gross per 24 hour   Intake 500 ml   Output 800 ml   Net -300 ml      Physical Exam   Constitutional: He appears well-developed. No distress.   HENT:   Head: Normocephalic and atraumatic.   Eyes: Pupils are equal, round, and reactive to light. Conjunctivae and EOM are normal.   Neck: Normal range of motion.   Cardiovascular: Normal rate. An irregularly irregular rhythm present.   Pulmonary/Chest: Effort normal. He has no wheezes. He has no rales.   Decreased breath sounds bases   Abdominal: Soft. Bowel sounds are normal.   Neurological:   Alert and interactive. Squeezing left hand when asked   Facial droop    Skin: Capillary refill takes less than 2 seconds. He is not diaphoretic.   Nursing note and vitals reviewed.      Significant Labs: All pertinent labs within the past 24 hours have been reviewed.    Significant Imaging: I have reviewed all pertinent imaging results/findings within the past 24 hours.  I have reviewed and interpreted all pertinent imaging results/findings within the past 24 hours.      Assessment/Plan:      * Acute respiratory failure with hypoxia  2/2 pulmonary edema and pleural effusions  Atrial fibrillation can result in ineffective cardiac output and pulmonary edema when poorly controlled  Echocardiogram  with mild diastolic dysfunction, preserved EF and no significant valvular issues  I am concerned this is due to malnutrition rather than cardiogenic  Supplemental O2 as needed. So far doing great at room air  Patient is max assist and has stage 2 sacral pressure ulcer. Patient is considered bed bound  PT/OT as tolerated. Hospital bed, katy lift, wheelchair to be sent to house when family ready to receive equipment. They are aware and will plan with SW. Will ask insurance about transportation for appointments, etc since he is bed bound.   NPO as patient is very high risk for aspiration. Feeding per PEG. Added water flushes  Diuretics per PEG. Vitals q 4 hours        Pleural effusion, bilateral  Not candidate for therapeutic thoracentesis due to size  Is on low supplemental O2 requirements and breathing more comfortably with IV diuresis  No evidence of systolic heart failure on Echo, although limited study due to atrial fibrillation  Grade 1 diastolic dysfunction and no significant valve disease  Third spacing from malnutrition?        Acute metabolic encephalopathy  Likely due to hypoxia  Resolved     Serum total bilirubin elevated  Etiology unknown  RUQ ultrasound with cholelithiasis and sludge but no obstruction, inflammation and CBD normal        Severe protein-calorie malnutrition  As evidenced by low albumin, low total protein, sacral pressure ulcer and evidence of third spacing  This is all likely to consequences of large stroke 3 weeks prior to presentation  Pre-albumin very low at 5  Tube feeds via PEG tube per dietary    Chronic ischemic left MCA stroke  No evidence of new stroke  Pretty debilitated since his stroke (poorly functional, fully dependent, dysarthria, dysphagia)      PEG (percutaneous endoscopic gastrostomy) adjustment/replacement/removal  No acute issues  On tube feeds. So fat tolerating well      Dysphagia due to recent stroke  Per last Speech therapy notes from recent admission, patient is  to remain strictly NPO for significant dysphagia and to continue with tube feeds. Speech therapy re-consulted.       Chronic anticoagulation  No acute issues      Hypercoagulable state  On anticoagulation      Acquired hypothyroidism  No acute issues  Continue home regimen      Other persistent atrial fibrillation  Dose of metoprolol increased for better rate control.   On anticoagulation for stroke prophylaxis (CHADS VASc 5, which puts him at high risk for recurrent stroke)      Type 2 diabetes mellitus with circulatory disorder, without long-term current use of insulin  Well controlled on sliding scale insulin  HgbA1c 6%      Essential hypertension  BP low but stable and now more alert and interactive  Resumed furosemide per PEG    VTE Risk Mitigation (From admission, onward)         Ordered     apixaban tablet 5 mg  2 times daily      02/04/20 1331     IP VTE HIGH RISK PATIENT  Once      02/03/20 1918     Reason for No Pharmacological VTE Prophylaxis  Once     Question:  Reasons:  Answer:  Already adequately anticoagulated on oral Anticoagulants    02/03/20 1918                Dispo; patient is stable for discharge once equipment sent to family's house. Will ask nutrition for recommendations regarding bolus feeds.       Norma Silva MD  Department of Hospital Medicine   [facility]

## 2020-02-09 NOTE — NURSING
Received report from IVANNA Castaneda. Patient lying in bed, NAD noted, Safety precautions maintained, will monitor. Avasys at bedside. Sitter at bedside.

## 2020-02-09 NOTE — ASSESSMENT & PLAN NOTE
2/2 pulmonary edema and pleural effusions  Atrial fibrillation can result in ineffective cardiac output and pulmonary edema when poorly controlled  Echocardiogram with mild diastolic dysfunction, preserved EF and no significant valvular issues  I am concerned this is due to malnutrition rather than cardiogenic  Supplemental O2 as needed. So far doing great at room air  Patient is max assist and has stage 2 sacral pressure ulcer. Patient is considered bed bound  PT/OT as tolerated. Hospital bed, katy lift, wheelchair to be sent to house when family ready to receive equipment. They are aware and will plan with SW. Will ask insurance about transportation for appointments, etc since he is bed bound.   NPO as patient is very high risk for aspiration. Feeding per PEG. Added water flushes  Diuretics per PEG. Vitals q 4 hours

## 2020-02-09 NOTE — NURSING
Reported off to oncoming nurse, patient resting in bed, oriented to self.  Patient can make needs known to staff, max assist required for adls and transfers, no acute distress noted, safety precautions maintained. Sitter at bedside, Avasys at bedside.     Chart check completed.

## 2020-02-10 LAB
ALBUMIN SERPL BCP-MCNC: 2 G/DL (ref 3.5–5.2)
ALLENS TEST: ABNORMAL
ALP SERPL-CCNC: 112 U/L (ref 55–135)
ALT SERPL W/O P-5'-P-CCNC: 31 U/L (ref 10–44)
ANION GAP SERPL CALC-SCNC: 6 MMOL/L (ref 8–16)
AST SERPL-CCNC: 39 U/L (ref 10–40)
BILIRUB SERPL-MCNC: 1.1 MG/DL (ref 0.1–1)
BUN SERPL-MCNC: 36 MG/DL (ref 8–23)
CALCIUM SERPL-MCNC: 8.6 MG/DL (ref 8.7–10.5)
CHLORIDE SERPL-SCNC: 107 MMOL/L (ref 95–110)
CO2 SERPL-SCNC: 31 MMOL/L (ref 23–29)
CREAT SERPL-MCNC: 0.8 MG/DL (ref 0.5–1.4)
DELSYS: ABNORMAL
EST. GFR  (AFRICAN AMERICAN): >60 ML/MIN/1.73 M^2
EST. GFR  (NON AFRICAN AMERICAN): >60 ML/MIN/1.73 M^2
FIO2: 21
GLUCOSE SERPL-MCNC: 190 MG/DL (ref 70–110)
HCO3 UR-SCNC: 32.2 MMOL/L (ref 24–28)
MODE: ABNORMAL
PCO2 BLDA: 39.6 MMHG (ref 35–45)
PH SMN: 7.52 [PH] (ref 7.35–7.45)
PO2 BLDA: 57 MMHG (ref 80–100)
POC BE: 9 MMOL/L
POC SATURATED O2: 92 % (ref 95–100)
POC TCO2: 33 MMOL/L (ref 23–27)
POCT GLUCOSE: 156 MG/DL (ref 70–110)
POCT GLUCOSE: 178 MG/DL (ref 70–110)
POCT GLUCOSE: 196 MG/DL (ref 70–110)
POCT GLUCOSE: 201 MG/DL (ref 70–110)
POTASSIUM SERPL-SCNC: 3.9 MMOL/L (ref 3.5–5.1)
PROT SERPL-MCNC: 5.1 G/DL (ref 6–8.4)
SAMPLE: ABNORMAL
SITE: ABNORMAL
SODIUM SERPL-SCNC: 144 MMOL/L (ref 136–145)
SP02: 92

## 2020-02-10 PROCEDURE — 80053 COMPREHEN METABOLIC PANEL: CPT

## 2020-02-10 PROCEDURE — 25000003 PHARM REV CODE 250: Performed by: INTERNAL MEDICINE

## 2020-02-10 PROCEDURE — C9399 UNCLASSIFIED DRUGS OR BIOLOG: HCPCS | Performed by: INTERNAL MEDICINE

## 2020-02-10 PROCEDURE — 36600 WITHDRAWAL OF ARTERIAL BLOOD: CPT

## 2020-02-10 PROCEDURE — 25000242 PHARM REV CODE 250 ALT 637 W/ HCPCS: Performed by: INTERNAL MEDICINE

## 2020-02-10 PROCEDURE — 99900035 HC TECH TIME PER 15 MIN (STAT)

## 2020-02-10 PROCEDURE — 21400001 HC TELEMETRY ROOM

## 2020-02-10 PROCEDURE — 94761 N-INVAS EAR/PLS OXIMETRY MLT: CPT

## 2020-02-10 PROCEDURE — 36415 COLL VENOUS BLD VENIPUNCTURE: CPT

## 2020-02-10 PROCEDURE — 94640 AIRWAY INHALATION TREATMENT: CPT

## 2020-02-10 PROCEDURE — 82803 BLOOD GASES ANY COMBINATION: CPT

## 2020-02-10 PROCEDURE — 63600175 PHARM REV CODE 636 W HCPCS: Performed by: INTERNAL MEDICINE

## 2020-02-10 RX ORDER — MIDODRINE HYDROCHLORIDE 2.5 MG/1
2.5 TABLET ORAL 3 TIMES DAILY
Status: DISCONTINUED | OUTPATIENT
Start: 2020-02-10 | End: 2020-02-11 | Stop reason: HOSPADM

## 2020-02-10 RX ADMIN — IPRATROPIUM BROMIDE AND ALBUTEROL SULFATE 3 ML: .5; 3 SOLUTION RESPIRATORY (INHALATION) at 04:02

## 2020-02-10 RX ADMIN — METOPROLOL TARTRATE 50 MG: 50 TABLET, FILM COATED ORAL at 10:02

## 2020-02-10 RX ADMIN — MIDODRINE HYDROCHLORIDE 2.5 MG: 2.5 TABLET ORAL at 10:02

## 2020-02-10 RX ADMIN — POLYETHYLENE GLYCOL 3350 17 G: 17 POWDER, FOR SOLUTION ORAL at 09:02

## 2020-02-10 RX ADMIN — INSULIN ASPART 1 UNITS: 100 INJECTION, SOLUTION INTRAVENOUS; SUBCUTANEOUS at 12:02

## 2020-02-10 RX ADMIN — LEVOTHYROXINE SODIUM 75 MCG: 75 TABLET ORAL at 05:02

## 2020-02-10 RX ADMIN — ALLOPURINOL 300 MG: 100 TABLET ORAL at 10:02

## 2020-02-10 RX ADMIN — IPRATROPIUM BROMIDE AND ALBUTEROL SULFATE 3 ML: .5; 3 SOLUTION RESPIRATORY (INHALATION) at 08:02

## 2020-02-10 RX ADMIN — ATORVASTATIN CALCIUM 40 MG: 40 TABLET, FILM COATED ORAL at 10:02

## 2020-02-10 RX ADMIN — APIXABAN 5 MG: 5 TABLET, FILM COATED ORAL at 09:02

## 2020-02-10 RX ADMIN — INSULIN DETEMIR 8 UNITS: 100 INJECTION, SOLUTION SUBCUTANEOUS at 10:02

## 2020-02-10 RX ADMIN — FUROSEMIDE 40 MG: 40 TABLET ORAL at 10:02

## 2020-02-10 RX ADMIN — IPRATROPIUM BROMIDE AND ALBUTEROL SULFATE 3 ML: .5; 3 SOLUTION RESPIRATORY (INHALATION) at 07:02

## 2020-02-10 RX ADMIN — INSULIN ASPART 2 UNITS: 100 INJECTION, SOLUTION INTRAVENOUS; SUBCUTANEOUS at 01:02

## 2020-02-10 RX ADMIN — APIXABAN 5 MG: 5 TABLET, FILM COATED ORAL at 10:02

## 2020-02-10 RX ADMIN — INSULIN ASPART 4 UNITS: 100 INJECTION, SOLUTION INTRAVENOUS; SUBCUTANEOUS at 10:02

## 2020-02-10 RX ADMIN — MIDODRINE HYDROCHLORIDE 2.5 MG: 2.5 TABLET ORAL at 04:02

## 2020-02-10 RX ADMIN — POLYETHYLENE GLYCOL 3350 17 G: 17 POWDER, FOR SOLUTION ORAL at 10:02

## 2020-02-10 RX ADMIN — MIDODRINE HYDROCHLORIDE 2.5 MG: 2.5 TABLET ORAL at 09:02

## 2020-02-10 RX ADMIN — IPRATROPIUM BROMIDE AND ALBUTEROL SULFATE 3 ML: .5; 3 SOLUTION RESPIRATORY (INHALATION) at 11:02

## 2020-02-10 RX ADMIN — DOCUSATE SODIUM 100 MG: 50 LIQUID ORAL at 11:02

## 2020-02-10 NOTE — PLAN OF CARE
Patient currently on Room air with oxygen saturation of 96%.  Will administer Respiratory treatments as ordered.  Will continue to monitor.

## 2020-02-10 NOTE — PROGRESS NOTES
[facility] Hospital Medicine  Progress Note    Patient Name: Chirag Thompson  MRN: [id number]  Patient Class: IP- Inpatient   Admission Date: 2/3/2020  Length of Stay: 7 days  Attending Physician: Norma Watkins MD  Primary Care Provider: Dany Marinelli Iii, MD        Subjective:     Principal Problem:Acute respiratory failure with hypoxia        HPI:    Chirag Thompson is a 79 y.o. male that (in part)  has a past medical history of Acquired hypothyroidism, Acute ischemic left middle cerebral artery (MCA) stroke, Anticoagulant long-term use, Arthritis, Chronic a-fib, Current use of long term anticoagulation, Embolic stroke involving left middle cerebral artery, Hypertension, PEG (percutaneous endoscopic gastrostomy) adjustment/replacement/removal, Pressure ulcer of right leg, unspecified pressure ulcer stage, Right spastic hemiparesis, Stroke due to embolism of left middle cerebral artery, Thyroid disease, and Type 2 diabetes mellitus with circulatory disorder, without long-term current use of insulin.  has a past surgical history that includes Hernia repair and Esophagogastroduodenoscopy w/ PEG (N/A, 1/23/2020). Presents to [facility]  by EMS from  [facility] today with report of both shortness of breath.  Associated symptoms include urinary retention and swollen testicles.  He was recently admitted for acute CVA a discharged approximately 2 weeks ago.  He recently had an indwelling dean removed.  No report of fever chills.  No hematuria.  Positive for suprapubic tenderness .  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  He is reportedly lethargic compared to his baseline.  Concern for both his breathing and urinary retention.    In the emergency department routine laboratory studies, urinalysis, chest x-ray obtained.  Chest x-ray concerning for bilateral pleural effusions.  Supple oxygen  was given.  He is on and apixaban therefore no thoracentesis could be performed at this time safely.  History phone was slightly elevated but consistent with previous measurements.  EKG was nonischemic.  Urinalysis concerning for WBCs and RBCs.  Possible UTI.    Hospital medicine has been asked to admit to inpatient for further evaluation and treatment.     Overview/Hospital Course:  Mr Thompson presented with acute respiratory failure with hypoxia and acute encephalopathy. Workup consistent with bilateral pleural effusions, pulmonary edema and severe malnutrition. Initiated on IV furosemide, supplemental O2 via low fow NC and consulted IR for thoracentesis. IR recommended against thoracentesis as they thought size of effusions were too small for safe intervention. IV furosemide continued. Echocardiogram with grade 1 diastolic dysfunction with preserved EF and no significant valvular issues. He is in chronic AFib however, rate controlled with metoprolol. As for encephalopathy, patient became more alert and interactive after narcotics were discontinued and shortness of breath addressed. No evidence of infection except for abnormal urinalysis however with few bacteria in setting of indwelling dean catheter. Urine culture negative, no growth in blood cultures, afebrile and no leukocytosis. Antibiotics therefore discontinued. Dean exchanged for a new one in the ED. On 2/5 patient developed rapid ventricular response despite use of home metoprolol and overall clinical improvement. Blood pressure at the time too low at the time, therefore transferred to ICU on amiodarone infusion and cardiology consultation. Blood pressure had improved therefore Cardiology recommended diltiazem infusion instead. Repeat CXR and labs showed no acute findings. Patient was properly rate controlled and was transitioned to a higher dose of metoprolol (50 mg BID). Remained stable. Stepped down to telemetry floor on 2/6.     Interval History:  drowsy again today. But easily arousable and make eye contact but not as interactive. Low BP and sats 88% at room air.     Review of Systems   Unable to perform ROS: Other (slurred speech)     Objective:     Vital Signs (Most Recent):  Temp: 98.5 °F (36.9 °C) (02/10/20 1432)  Pulse: 79 (02/10/20 1432)  Resp: 19 (02/10/20 1432)  BP: (!) 93/53 (02/10/20 1432)  SpO2: (!) 88 % (02/10/20 1432) Vital Signs (24h Range):  Temp:  [97.3 °F (36.3 °C)-98.5 °F (36.9 °C)] 98.5 °F (36.9 °C)  Pulse:  [] 79  Resp:  [18-20] 19  SpO2:  [88 %-96 %] 88 %  BP: ()/(52-72) 93/53     Weight: 94.1 kg (207 lb 7.3 oz)  Body mass index is 29.77 kg/m².    Intake/Output Summary (Last 24 hours) at 2/10/2020 1557  Last data filed at 2/10/2020 0900  Gross per 24 hour   Intake 834 ml   Output 1275 ml   Net -441 ml      Physical Exam   Constitutional: He appears well-developed. No distress.   HENT:   Head: Normocephalic and atraumatic.   Eyes: Pupils are equal, round, and reactive to light. Conjunctivae and EOM are normal.   Neck: Normal range of motion.   Cardiovascular: Normal rate. An irregularly irregular rhythm present.   Pulmonary/Chest: Effort normal. He has no wheezes. He has no rales.   Decreased breath sounds bases   Abdominal: Soft. Bowel sounds are normal.   Neurological:   Drowsy   Facial droop    Skin: Capillary refill takes less than 2 seconds. He is not diaphoretic.   Nursing note and vitals reviewed.      Significant Labs: All pertinent labs within the past 24 hours have been reviewed.    Significant Imaging: I have reviewed all pertinent imaging results/findings within the past 24 hours.  I have reviewed and interpreted all pertinent imaging results/findings within the past 24 hours.      Assessment/Plan:      * Acute respiratory failure with hypoxia  2/2 pulmonary edema and pleural effusions  Atrial fibrillation can result in ineffective cardiac output and pulmonary edema when poorly controlled  Echocardiogram with mild  diastolic dysfunction, preserved EF and no significant valvular issues  I am concerned this is due to malnutrition rather than cardiogenic  Supplemental O2 as needed. Hypoxic at room air.  Patient is max assist and has stage 2 sacral pressure ulcer. Patient is considered bed bound  PT/OT as tolerated. Hospital bed, katy lift, wheelchair to be sent to house when family ready to receive equipment. They are aware and will plan with SW. Will ask insurance about transportation for appointments, etc since he is bed bound.   NPO as patient is very high risk for aspiration. Feeding per PEG. Added water flushes  Will place back on NC, repeat ABG and obtain CXR  Diuretics per PEG. Vitals q 4 hours        Pleural effusion, bilateral  Not candidate for therapeutic thoracentesis due to size  Is on low supplemental O2 requirements and breathing more comfortably with IV diuresis  No evidence of systolic heart failure on Echo, although limited study due to atrial fibrillation  Grade 1 diastolic dysfunction and no significant valve disease  Third spacing from malnutrition?        Acute metabolic encephalopathy  Likely due to hypoxia  Resolved     Serum total bilirubin elevated  Etiology unknown  RUQ ultrasound with cholelithiasis and sludge but no obstruction, inflammation and CBD normal        Severe protein-calorie malnutrition  As evidenced by low albumin, low total protein, sacral pressure ulcer and evidence of third spacing  This is all likely to consequences of large stroke 3 weeks prior to presentation  Pre-albumin very low at 5  Tube feeds via PEG tube per dietary    Chronic ischemic left MCA stroke  No evidence of new stroke  Pretty debilitated since his stroke (poorly functional, fully dependent, dysarthria, dysphagia)      PEG (percutaneous endoscopic gastrostomy) adjustment/replacement/removal  No acute issues  On tube feeds. So fat tolerating well      Dysphagia due to recent stroke  Per last Speech therapy notes from  recent admission, patient is to remain strictly NPO for significant dysphagia and to continue with tube feeds. Speech therapy re-consulted.       Chronic anticoagulation  No acute issues      Hypercoagulable state  On anticoagulation      Acquired hypothyroidism  No acute issues  Continue home regimen      Other persistent atrial fibrillation  Dose of metoprolol increased for better rate control.   On anticoagulation for stroke prophylaxis (CHADS VASc 5, which puts him at high risk for recurrent stroke)      Type 2 diabetes mellitus with circulatory disorder, without long-term current use of insulin  Well controlled on sliding scale insulin  HgbA1c 6%      Essential hypertension  BP low but stable and now more alert and interactive  Resumed furosemide per PEG    VTE Risk Mitigation (From admission, onward)         Ordered     apixaban tablet 5 mg  2 times daily      02/04/20 1331     IP VTE HIGH RISK PATIENT  Once      02/03/20 1918     Reason for No Pharmacological VTE Prophylaxis  Once     Question:  Reasons:  Answer:  Already adequately anticoagulated on oral Anticoagulants    02/03/20 1918                      Norma Silva MD  Department of Hospital Medicine   [facility]

## 2020-02-10 NOTE — NURSING
Reported off to oncoming nurse, patient resting in bed,oriented to self. Max assist required for adls and transfers, no acute distress noted, safety precautions maintained. Avasys at bedside.     Chart check completed.

## 2020-02-10 NOTE — NURSING
Basic safety and environmental rounds completed. Patient with AMS, non verbal at time of visit. Avasys in use, bed alarm engaged, call bell was looped on siderail, placed close to patient's hand.

## 2020-02-10 NOTE — PLAN OF CARE
Problem: Wound  Goal: Optimal Wound Healing  Intervention: Promote Effective Wound Healing  Flowsheets (Taken 2/9/2020 1817)  Pain Management Interventions: care clustered     Problem: Fall Injury Risk  Goal: Absence of Fall and Fall-Related Injury  Intervention: Identify and Manage Contributors to Fall Injury Risk  Flowsheets (Taken 2/9/2020 1817)  Medication Review/Management: medications reviewed     Problem: Fall Injury Risk  Goal: Absence of Fall and Fall-Related Injury  Intervention: Promote Injury-Free Environment  Flowsheets (Taken 2/9/2020 1817)  Safety Promotion/Fall Prevention: bed alarm set; side rails raised x 2  Environmental Safety Modification: clutter free environment maintained; room near unit station     Problem: Skin Injury Risk Increased  Goal: Skin Health and Integrity  Intervention: Optimize Skin Protection  Flowsheets (Taken 2/9/2020 1817)  Pressure Reduction Techniques: weight shift assistance provided; pressure points protected; positioned off wounds  Pressure Reduction Devices: positioning supports utilized; heel offloading device utilized  Skin Protection: adhesive use limited; incontinence pads utilized  Head of Bed (HOB): HOB elevated     Problem: Infection  Goal: Infection Symptom Resolution  Intervention: Prevent or Manage Infection  Flowsheets (Taken 2/9/2020 1817)  Infection Management: aseptic technique maintained     Problem: Aspiration (Enteral Nutrition)  Goal: Absence of Aspiration Signs/Symptoms  Intervention: Minimize Aspiration Risk  Flowsheets (Taken 2/9/2020 1817)  Head of Bed (HOB): HOB elevated

## 2020-02-10 NOTE — PROGRESS NOTES
Results for KAHLIL TOVAR (MRN [id number]) as of 2/10/2020 16:43   Ref. Range 2/10/2020 16:34   POC PH Latest Ref Range: 7.35 - 7.45  7.518 (H)   POC PCO2 Latest Ref Range: 35 - 45 mmHg 39.6   POC PO2 Latest Ref Range: 80 - 100 mmHg 57 (LL)   POC BE Latest Ref Range: -2 to 2 mmol/L 9   POC HCO3 Latest Ref Range: 24 - 28 mmol/L 32.2 (H)   POC SATURATED O2 Latest Ref Range: 95 - 100 % 92 (L)   POC TCO2 Latest Ref Range: 23 - 27 mmol/L 33 (H)   FiO2 Unknown 21   Sample Unknown ARTERIAL   DelSys Unknown Room Air   Allens Test Unknown Pass   Site Unknown LR   Mode Unknown SPONT

## 2020-02-10 NOTE — ASSESSMENT & PLAN NOTE
2/2 pulmonary edema and pleural effusions  Atrial fibrillation can result in ineffective cardiac output and pulmonary edema when poorly controlled  Echocardiogram with mild diastolic dysfunction, preserved EF and no significant valvular issues  I am concerned this is due to malnutrition rather than cardiogenic  Supplemental O2 as needed. Hypoxic at room air.  Patient is max assist and has stage 2 sacral pressure ulcer. Patient is considered bed bound  PT/OT as tolerated. Hospital bed, katy lift, wheelchair to be sent to house when family ready to receive equipment. They are aware and will plan with SW. Will ask insurance about transportation for appointments, etc since he is bed bound.   NPO as patient is very high risk for aspiration. Feeding per PEG. Added water flushes  Will place back on NC and repeat ABG  Diuretics per PEG. Vitals q 4 hours

## 2020-02-10 NOTE — SUBJECTIVE & OBJECTIVE
Interval History: drowsy again today. But easily arousable and make eye contact but not as interactive. Low BP and sats 88% at room air.     Review of Systems   Unable to perform ROS: Other (slurred speech)     Objective:     Vital Signs (Most Recent):  Temp: 98.5 °F (36.9 °C) (02/10/20 1432)  Pulse: 79 (02/10/20 1432)  Resp: 19 (02/10/20 1432)  BP: (!) 93/53 (02/10/20 1432)  SpO2: (!) 88 % (02/10/20 1432) Vital Signs (24h Range):  Temp:  [97.3 °F (36.3 °C)-98.5 °F (36.9 °C)] 98.5 °F (36.9 °C)  Pulse:  [] 79  Resp:  [18-20] 19  SpO2:  [88 %-96 %] 88 %  BP: ()/(52-72) 93/53     Weight: 94.1 kg (207 lb 7.3 oz)  Body mass index is 29.77 kg/m².    Intake/Output Summary (Last 24 hours) at 2/10/2020 1557  Last data filed at 2/10/2020 0900  Gross per 24 hour   Intake 834 ml   Output 1275 ml   Net -441 ml      Physical Exam   Constitutional: He appears well-developed. No distress.   HENT:   Head: Normocephalic and atraumatic.   Eyes: Pupils are equal, round, and reactive to light. Conjunctivae and EOM are normal.   Neck: Normal range of motion.   Cardiovascular: Normal rate. An irregularly irregular rhythm present.   Pulmonary/Chest: Effort normal. He has no wheezes. He has no rales.   Decreased breath sounds bases   Abdominal: Soft. Bowel sounds are normal.   Neurological:   Drowsy   Facial droop    Skin: Capillary refill takes less than 2 seconds. He is not diaphoretic.   Nursing note and vitals reviewed.      Significant Labs: All pertinent labs within the past 24 hours have been reviewed.    Significant Imaging: I have reviewed all pertinent imaging results/findings within the past 24 hours.  I have reviewed and interpreted all pertinent imaging results/findings within the past 24 hours.

## 2020-02-10 NOTE — NURSING
Received report from IVANNA Castaneda. Patient lying in bed resting, NAD noted. Safety precautions maintained, will monitor. Avasys at bedside.

## 2020-02-11 VITALS
SYSTOLIC BLOOD PRESSURE: 113 MMHG | DIASTOLIC BLOOD PRESSURE: 56 MMHG | BODY MASS INDEX: 45.1 KG/M2 | RESPIRATION RATE: 17 BRPM | WEIGHT: 315 LBS | TEMPERATURE: 98 F | HEART RATE: 90 BPM | HEIGHT: 70 IN | OXYGEN SATURATION: 93 %

## 2020-02-11 LAB
ALBUMIN SERPL BCP-MCNC: 2 G/DL (ref 3.5–5.2)
ALP SERPL-CCNC: 106 U/L (ref 55–135)
ALT SERPL W/O P-5'-P-CCNC: 31 U/L (ref 10–44)
ANION GAP SERPL CALC-SCNC: 5 MMOL/L (ref 8–16)
AST SERPL-CCNC: 37 U/L (ref 10–40)
BILIRUB SERPL-MCNC: 1.2 MG/DL (ref 0.1–1)
BUN SERPL-MCNC: 34 MG/DL (ref 8–23)
CALCIUM SERPL-MCNC: 8.6 MG/DL (ref 8.7–10.5)
CHLORIDE SERPL-SCNC: 109 MMOL/L (ref 95–110)
CO2 SERPL-SCNC: 30 MMOL/L (ref 23–29)
CREAT SERPL-MCNC: 0.8 MG/DL (ref 0.5–1.4)
EST. GFR  (AFRICAN AMERICAN): >60 ML/MIN/1.73 M^2
EST. GFR  (NON AFRICAN AMERICAN): >60 ML/MIN/1.73 M^2
GLUCOSE SERPL-MCNC: 169 MG/DL (ref 70–110)
POCT GLUCOSE: 150 MG/DL (ref 70–110)
POCT GLUCOSE: 174 MG/DL (ref 70–110)
POCT GLUCOSE: 177 MG/DL (ref 70–110)
POCT GLUCOSE: 181 MG/DL (ref 70–110)
POCT GLUCOSE: 193 MG/DL (ref 70–110)
POTASSIUM SERPL-SCNC: 3.9 MMOL/L (ref 3.5–5.1)
PROT SERPL-MCNC: 4.9 G/DL (ref 6–8.4)
SODIUM SERPL-SCNC: 144 MMOL/L (ref 136–145)

## 2020-02-11 PROCEDURE — 94640 AIRWAY INHALATION TREATMENT: CPT

## 2020-02-11 PROCEDURE — 25000242 PHARM REV CODE 250 ALT 637 W/ HCPCS: Performed by: INTERNAL MEDICINE

## 2020-02-11 PROCEDURE — 36415 COLL VENOUS BLD VENIPUNCTURE: CPT

## 2020-02-11 PROCEDURE — 25000003 PHARM REV CODE 250: Performed by: INTERNAL MEDICINE

## 2020-02-11 PROCEDURE — 97530 THERAPEUTIC ACTIVITIES: CPT

## 2020-02-11 PROCEDURE — 97110 THERAPEUTIC EXERCISES: CPT

## 2020-02-11 PROCEDURE — 80053 COMPREHEN METABOLIC PANEL: CPT

## 2020-02-11 PROCEDURE — 27000221 HC OXYGEN, UP TO 24 HOURS

## 2020-02-11 PROCEDURE — 94761 N-INVAS EAR/PLS OXIMETRY MLT: CPT

## 2020-02-11 RX ORDER — FUROSEMIDE 40 MG/1
40 TABLET ORAL DAILY
Qty: 30 TABLET | Refills: 11 | Status: SHIPPED | OUTPATIENT
Start: 2020-02-12 | End: 2021-02-11

## 2020-02-11 RX ORDER — MIDODRINE HYDROCHLORIDE 2.5 MG/1
2.5 TABLET ORAL 3 TIMES DAILY
Qty: 90 TABLET | Refills: 11 | Status: SHIPPED | OUTPATIENT
Start: 2020-02-11 | End: 2021-02-10

## 2020-02-11 RX ORDER — DEXTROSE 4 G
1 TABLET,CHEWABLE ORAL
Qty: 1 EACH | Refills: 0 | Status: SHIPPED | OUTPATIENT
Start: 2020-02-11 | End: 2021-02-10

## 2020-02-11 RX ORDER — METOPROLOL TARTRATE 25 MG/1
50 TABLET, FILM COATED ORAL 2 TIMES DAILY
Qty: 120 TABLET | Refills: 11 | Status: SHIPPED | OUTPATIENT
Start: 2020-02-11 | End: 2021-02-10

## 2020-02-11 RX ADMIN — POLYETHYLENE GLYCOL 3350 17 G: 17 POWDER, FOR SOLUTION ORAL at 08:02

## 2020-02-11 RX ADMIN — INSULIN DETEMIR 8 UNITS: 100 INJECTION, SOLUTION SUBCUTANEOUS at 08:02

## 2020-02-11 RX ADMIN — IPRATROPIUM BROMIDE AND ALBUTEROL SULFATE 3 ML: .5; 3 SOLUTION RESPIRATORY (INHALATION) at 07:02

## 2020-02-11 RX ADMIN — MIDODRINE HYDROCHLORIDE 2.5 MG: 2.5 TABLET ORAL at 08:02

## 2020-02-11 RX ADMIN — FUROSEMIDE 40 MG: 40 TABLET ORAL at 08:02

## 2020-02-11 RX ADMIN — MIDODRINE HYDROCHLORIDE 2.5 MG: 2.5 TABLET ORAL at 04:02

## 2020-02-11 RX ADMIN — IPRATROPIUM BROMIDE AND ALBUTEROL SULFATE 3 ML: .5; 3 SOLUTION RESPIRATORY (INHALATION) at 03:02

## 2020-02-11 RX ADMIN — METOPROLOL TARTRATE 50 MG: 50 TABLET, FILM COATED ORAL at 08:02

## 2020-02-11 RX ADMIN — IPRATROPIUM BROMIDE AND ALBUTEROL SULFATE 3 ML: .5; 3 SOLUTION RESPIRATORY (INHALATION) at 11:02

## 2020-02-11 RX ADMIN — INSULIN ASPART 2 UNITS: 100 INJECTION, SOLUTION INTRAVENOUS; SUBCUTANEOUS at 08:02

## 2020-02-11 RX ADMIN — INSULIN ASPART 2 UNITS: 100 INJECTION, SOLUTION INTRAVENOUS; SUBCUTANEOUS at 05:02

## 2020-02-11 RX ADMIN — ALLOPURINOL 300 MG: 100 TABLET ORAL at 08:02

## 2020-02-11 RX ADMIN — IPRATROPIUM BROMIDE AND ALBUTEROL SULFATE 3 ML: .5; 3 SOLUTION RESPIRATORY (INHALATION) at 01:02

## 2020-02-11 RX ADMIN — DOCUSATE SODIUM 100 MG: 50 LIQUID ORAL at 08:02

## 2020-02-11 RX ADMIN — INSULIN ASPART 2 UNITS: 100 INJECTION, SOLUTION INTRAVENOUS; SUBCUTANEOUS at 10:02

## 2020-02-11 RX ADMIN — LEVOTHYROXINE SODIUM 75 MCG: 75 TABLET ORAL at 06:02

## 2020-02-11 RX ADMIN — APIXABAN 5 MG: 5 TABLET, FILM COATED ORAL at 08:02

## 2020-02-11 RX ADMIN — ATORVASTATIN CALCIUM 40 MG: 40 TABLET, FILM COATED ORAL at 08:02

## 2020-02-11 NOTE — PROGRESS NOTES
Follow-up Information     Dany Marinelli Iii, MD On 2/14/2020.    Specialty:  Internal Medicine  Why:  Outpatient Services PCP Follow-Up Friday Clinic staff will contact family that PCP will come to pt's home.  Contact information:  [address]  [address]  [facility] [id number] [phone]             [facility]    Why:  Home Health Agency will contact pt's family to initiate services  Contact information:  [address]  [facility] [id number] [phone]           [facility]    Specialty:  Dialysis Center  Why:  Infusion, Tube Feedings  Contact information:  [address] [place] [address] [phone]             [facility]    Specialty:  DME Provider  Why:  DME, oxygen, katy lift, and hospital  Contact information:  [address]  [facility]  [facility] [address]  [phone]                 PLEASE BRING TO ALL FOLLOW UP APPOINTMENTS:   1) A COPY YOUR DISCHARGE INSTRUCTIONS   2) ALL MEDICINES YOU ARE CURRENTLY TAKING IN THEIR ORIGINAL BOTTLES   3) IDENTIFICATION CARD   4) INSURANCE CARD    **PLEASE ARRIVE 15 MINUTES AHEAD OF SCHEDULED APPOINTMENT TIME   ++PLEASE CALL 24 HOURS IN ADVANCE IF YOU MUST RESCHEDULE YOUR APPOINTMENT DAY AND/OR TIME     [facility] CARE MANAGEMENT WRITTEN DISCHARGE INFORMATION    APPOINTMENTS AND RESOURCES TO HELP YOU MANAGE YOUR CARE AT HOME BASED ON YOUR PREFERENCES:  (If an appointment is not scheduled for you when you leave the hospital, call your doctor to schedule a follow up visit within a week)        Healthy Living Instructions to HELP MANAGE YOUR CARE AT HOME:  Things You are responsible for:  1.    Getting your prescriptions filled   2.    Taking your medications as directed, DO NOT MISS ANY DOSES!  3.    Following the diet and exercise recommended by your doctor  4.    Going to your follow-up doctor appointment. This is important because it allows the doctor to monitor your progress  and determine if any changes need to made to your treatment plan.  5. If you have any questions about MANAGING YOUR CARE AT HOME Call the Nurse Care Line for 24/7 Assistance [phone]       Please answer any calls you may receive from [facility]. We want to continue to support you as you manage your healthcare needs. [facility] is happy to have the opportunity to serve you.      Thank you for choosing [facility] for your healthcare needs!  Your [facility] Case Management Team,    May Coyle RN [place]  Registered Nurse Transition Navigator  [phone]

## 2020-02-11 NOTE — NURSING
Reported off to oncoming nurse, patient resting in bed, oriented to self. Max assist required for adls and transfers, no acute distress noted, safety precautions maintained. Avasys at bedside.     Chart check completed.

## 2020-02-11 NOTE — PLAN OF CARE
02/11/20 1050   Post-Acute Status   Post-Acute Authorization Placement;HME;Home Health/Hospice   Home Health/Hospice Status Referrals Sent   Discharge Delays (!) Other  (Awaiting assigned home health agency and home infusion agency)   Discharge Plan   Discharge Plan A Home with family   Discharge Plan B Home Health

## 2020-02-11 NOTE — PLAN OF CARE
02/11/20 1710   Post-Acute Status   Post-Acute Authorization Placement;HME   HME Status Authorization Obtained  ([facility])   Home Health/Hospice Status Authorization Obtained  ([facility])   Discharge Delays (!) Patient Transportation   Discharge Plan   Discharge Plan A Home with family

## 2020-02-11 NOTE — PLAN OF CARE
Problem: Physical Therapy Goal  Goal: Physical Therapy Goal  Description  Goals to be met by: 20     Patient will increase functional independence with mobility by performin. Supine to sit with Moderate Assistance  2. Sit to supine with Moderate Assistance  3. Rolling to Left and Right with Moderate Assistance  4. Sitting at edge of bed x30 minutes with Stand-by Assistance  5. Lower extremity exercise program x10-15 reps per handout, with assistance as needed     Outcome: Ongoing, Progressing    Pt was dep of 2 persons to sit EOB, required mod-max A to maintain static sit balance.

## 2020-02-11 NOTE — NURSING
Received report from HARRISON Cook. Patient lying in bed resting, NAD noted. Safety precautions maintained, will monitor. Avasys at bedside.

## 2020-02-11 NOTE — PLAN OF CARE
02/11/20 1710   Post-Acute Status   Post-Acute Authorization Placement;Home Health/Hospice   Home Health/Hospice Status Authorization Obtained  ([facility])   Discharge Delays (!) Patient Transportation   Discharge Plan   Discharge Plan A Home with family

## 2020-02-11 NOTE — PLAN OF CARE
Problem: Occupational Therapy Goal  Goal: Occupational Therapy Goal  Description  Goals to be met by: 02/19/20    Patient will increase functional independence with ADLs by performing:    Sitting at edge of bed x15 minutes with Moderate Assistance.  Rolling to the Right with Minimal Assistance.   Pt will follow 75% of 1-step commands with L arm AAROM exercises in order to increase active participation with care.   Pt will follow 25% of 2-step commands with L arm AROM exercises in order to increase active participation with care.   Pt will reach for ADL object with LUE with minimal verbal cueing in supported sit in order to increase pt's participation with ADLs.   Pt will visually track familiar object L<>R with verbal cueing 50% of the time in order to increase active engagement with ADLs and social participation with family.      Outcome: Ongoing, Progressing  TOTAL A x2 for bed mobility. Pt sat EOB for ~20 min demo'ing lateral lean to the right, requiring MIN>TOTAL A for correction to midline. Pt interactive with some exercises with LUE, but required frequent cueing to open his eyes and hold his head up. Pt's family wants to take the pt home per chart review- no family present; pt will benefit from HHOT with w/c, hospital bed, and katy lift. Pt will require 24 hour assist. If family cannot provide this, OT rec retirement nursing home.

## 2020-02-11 NOTE — PROGRESS NOTES
0976 TN contacted Masha of [facility] to inform pt is discharged today regarding equipment delivery. Masha contacted family members, Sully and David. David did not answer phone. Sully stated she is working and will not be available. TN will have wheelchair pulled from the DME closet. TN conveyed to attending, Dr. Silva.    1025 TN contacted [facility] member services to inquire of if they provide ambulance transport to medical appts when pts are bed bound andif doctor would make home visits. Janell stated she was not aware of asked questions. Did know a NP comes out for well checks but did not know how often. Stated she would expedite questions to [facility] staff and contact TN. TN provided contact information.    1030 TN faxed clinicals to [facility] at [phone] for requested home health, [facility]; [facility] for tube feedings, and authorization for oxygen; awaiting review and assigned provider.    1105 TN contacted PCP's office at [phone]; spoke with Radha to inquire if Dr. Marinelli could see pt in his home. Radha scheduled tentatively for 02/14/20 in the afternoon. TN provided contact numbers of Sully pena and nephew, David. Clinic nurse will contact family to confirm appt.    1138 TN received a call from Meme of [facility] stating [facility] and [facility] are the assigned providers. TN inquired of oxygen authorization, Meme stated she did not receive the clinicals. TN faxed again to [phone].    1435 TN contacted [facility], spoke with Meme, stated [employer] is the contracted agency for the oxygen.    TN contacted [facility] at [phone]; no answer, unable to leave a message    0055 TN contacted pt's nephew, David, spoke with him concerning importance of talking with [facility], [facility], [facility], and oxygen company. Informed it has been difficult to reach family members, namely he and pt's niece, Sully. TN provided contact number to Masha of [facility], concerning the hospital bed's delivery and katy  lift. TN explained to David and Masha pt cannot discharge home without the bed in place. David stated he will contact Masha when he gets off the phone with TN. David confirmed Cintia will meet him at the house on today at 4:00 PM.    1530 TN contacted Masha of [facility] who was informed that [facility] is the provider for the oxygen. Masha stated that the DME and oxygen should come from one provider. Masha contacted [facility] and then contacted TN and stated [facility] will provide DME and oxygen. Masha will contact TN with time of DME to be delivery.    1545 TN contacted Dianna of [facility] to request ambulance stretcher authorization; will contact TN with authorization.    1555 Dianna contacted TN with ambulance auth, [id number].    1600 TN was contacted by Masha stating oxygen tank can be pulled by respiratory.    1615 ADT 30 order placed for Ambulance Stretcher Transportation by Sabiha who is the contracted provider.  [facility] provided the authorization number. Requested  time: 6:00 PM. If transportation does not arrive at ETA time nurse, Yaneli, will be instructed to follow protocol for transportation below:   How can I get in touch directly with dispatch, if needed?                 · Non-emergent (stretcher): [phone]    · Emergent dispatch: [phone]    NURSING:  If Stretcher does not arrive at requested time please call the above Non Emergent Dispatcher.  If issue not resolved please escalate to your charge nurse for further instructions.    1630 TN spoke with respiratory to pull tank; was given respiratory therapist extension to contact.    1640 TN spoke with Stephen who will deliver tank to pt's room; floor nurse, Yaneli, will sign for oxygen per authorization given by Masha of [facility].    1650 TN was contacted by Stephen or respiratory stating there are no regulators or holders for the oxygen tanks. TN conveyed to  Supervisor and Care Management Director.     TN contacted David to determine if  oxygen has been delivered, no answer.    TN contacted Yaneli to inform of signature and reminded to get wheelchair from room 318 and [facility] with [facility] to home address.    1741 TN brought packet to floor nurse, Yaneli, reminded to get wheelchair out of room 318 and requested to give 4 bags of tube feedings to pt per request of Cintia. Yaneli will convey request to charge nurse.

## 2020-02-11 NOTE — PLAN OF CARE
[facility]    HOME HEALTH ORDERS  FACE TO FACE ENCOUNTER    Patient Name: Chirag Thompson  YOB: 1940    PCP: Dany Marinelli Iii, MD   PCP Address: [address] [address]  PCP Phone Number: [phone]  PCP Fax: [phone]    Encounter Date: 02/11/2020    Admit to Home Health    Diagnoses:  Active Hospital Problems    Diagnosis  POA    *Acute respiratory failure with hypoxia [J96.01]  Yes    Pleural effusion, bilateral [J90]  Yes     Priority: 2     Acute metabolic encephalopathy [G93.41]  Yes    Severe protein-calorie malnutrition [E43]  Yes    Serum total bilirubin elevated [R17]  Yes    Atrial fibrillation with RVR [I48.91]  Yes    Chronic ischemic left MCA stroke [I69.30]  Not Applicable    PEG (percutaneous endoscopic gastrostomy) adjustment/replacement/removal [Z43.1]  Not Applicable    Dysphagia due to recent stroke [I69.391]  Not Applicable    Acquired hypothyroidism [E03.9]  Yes     Chronic    Hypercoagulable state [D68.59]  Yes     Chronic    Chronic anticoagulation [Z79.01]  Not Applicable    Essential hypertension [I10]  Yes     Chronic    Type 2 diabetes mellitus with circulatory disorder, 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       No future appointments.        I have seen and examined this patient face to face today. My clinical findings that support the need for the home health skilled services and home bound status are the following:  Weakness/numbness causing balance and gait disturbance due to Stroke and Weakness/Debility making it taxing to leave home.  Patient with medication mismanagement issues requiring home bound status as evidenced by  Unstable vital signs (blood pressure, heart rate) and Poor understanding of medication regimen/dosage.  Medical restrictions  requiring assistance of another human to leave home due to  Decreased range of motions in extremities, Wound care needs and Newly placed G-tube/ostomy.  Mental confusion making it unsafe for patient to leave home alone due to  Dementia.    Allergies:Review of patient's allergies indicates:  No Known Allergies    Diet: tube feedings: Bolus1 can, 4 times daily of glucerna 1.5 kcal. Please flush 100 cc water 3 times a day.     Activities: bedrest    Nursing:   SN to complete comprehensive assessment including routine vital signs. Instruct on disease process and s/s of complications to report to MD. Review/verify medication list sent home with the patient at time of discharge  and instruct patient/caregiver as needed. Frequency may be adjusted depending on start of care date.    Notify MD if SBP > 160 or < 90; DBP > 90 or < 50; HR > 120 or < 50; Temp > 101      CONSULTS:    Physical Therapy to evaluate and treat. Evaluate for home safety and equipment needs; Establish/upgrade home exercise program. Perform / instruct on therapeutic exercises, gait training, transfer training, and Range of Motion.  Occupational Therapy to evaluate and treat. Evaluate home environment for safety and equipment needs. Perform/Instruct on transfers, ADL training, ROM, and therapeutic exercises.  Speech Therapy  to evaluate and treat for  Language, Swallowing and Cognition.   to evaluate for community resources/long-range planning.  Aide to provide assistance with personal care, ADLs, and vital signs.    MISCELLANEOUS CARE:  PEG Care:  Instruct patient/caregiver to clean site.  Monitor skin integrity.  Rodriguez Care: Instruct patient/caregiver to empty Rodriguez bag.  Change Rodriguez every month.  Routine Skin for Bedridden Patients: Instruct patient/caregiver to apply moisture barrier cream to all skin folds and wet areas in perineal area daily and after baths and all bowel movements.  Diabetic Care:   SN to perform and educate Diabetic  management with blood glucose monitoring:, Fingerstick blood sugar every 6 hours and Report CBG < 60 or > 350 to physician.    WOUND CARE ORDERS  yes:  Pressure Ulcer(s) Stage I :   Location: sacrum                      Frequency:  Daily                             If incontinent of stool or urine, apply thin layer Barrier cream                   twice daily and PRN to wound         Pressure relief measure:  for pressure redistribution and A/C Boots     Oxygen: low flow nasal cannula 2 L as needed for shortness of breath/hypoxia < 90% sats    Medications: Review discharge medications with patient and family and provide education.      Current Discharge Medication List      START taking these medications    Details   furosemide (LASIX) 40 MG tablet 1 tablet (40 mg total) by Per G Tube route once daily.  Qty: 30 tablet, Refills: 11      insulin detemir U-100 (LEVEMIR FLEXTOUCH) 100 unit/mL (3 mL) SubQ InPn pen Inject 8 Units into the skin once daily.  Qty: 2.4 mL, Refills: 11      midodrine (PROAMATINE) 2.5 MG Tab 1 tablet (2.5 mg total) by Per G Tube route 3 (three) times daily.  Qty: 90 tablet, Refills: 11      nut.tx.gluc intol,lf,soy-fiber (GLUCERNA 1.5 CARLOS) 0.08-1.5 gram-kcal/mL Liqd 1 each by Per G Tube route 4 (four) times daily.  Qty: 120 Can, Refills: 3         CONTINUE these medications which have CHANGED    Details   metoprolol tartrate (LOPRESSOR) 25 MG tablet 2 tablets (50 mg total) by Per G Tube route 2 (two) times daily.  Qty: 120 tablet, Refills: 11         CONTINUE these medications which have NOT CHANGED    Details   albuterol-ipratropium (DUO-NEB) 2.5 mg-0.5 mg/3 mL nebulizer solution Take 3 mLs by nebulization every 6 (six) hours as needed for Wheezing. Rescue  Qty: 1 Box, Refills: 0      allopurinol (ZYLOPRIM) 300 MG tablet 1 tablet (300 mg total) by Per G Tube route once daily.      apixaban (ELIQUIS) 5 mg Tab 1 tablet (5 mg total) by Per G Tube route 2 (two) times daily.      atorvastatin  (LIPITOR) 40 MG tablet 1 tablet (40 mg total) by Per G Tube route once daily.  Qty: 90 tablet, Refills: 3      levothyroxine (SYNTHROID) 75 MCG tablet 1 tablet (75 mcg total) by Per G Tube route once daily.      polyethylene glycol (GLYCOLAX) 17 gram PwPk 17 g by Per G Tube route daily as needed.  Refills: 0      senna-docusate 8.6-50 mg (PERICOLACE) 8.6-50 mg per tablet 1 tablet by Per G Tube route 2 (two) times daily as needed for Constipation.         STOP taking these medications       QUEtiapine (SEROQUEL) 25 MG Tab Comments:   Reason for Stopping:               I certify that this patient is confined to his home and needs intermittent skilled nursing care, physical therapy and occupational therapy.

## 2020-02-11 NOTE — PLAN OF CARE
02/11/20 1743   Final Note   Assessment Type Final Discharge Note   Anticipated Discharge Disposition Home   What phone number can be called within the next 1-3 days to see how you are doing after discharge?   (Listed in chart)   Hospital Follow Up  Appt(s) scheduled? Yes   Discharge plans and expectations educations in teach back method with documentation complete? Yes  (Per telephone interview with pt's nephewWagner)   Right Care Referral Info   Post Acute Recommendation Home-care   Referral Type   (Home Health)   Facility Name   ([facility])   Street   ([address] [place] [address])   [facility]   ([place], [place] )     TN informed floor nurse, Yaneli, care management is complete and can proceed with a discharge teaching.

## 2020-02-11 NOTE — PT/OT/SLP PROGRESS
Occupational Therapy   Treatment    Name: Chirag Thompson  MRN: [id number]  Admitting Diagnosis:  Acute respiratory failure with hypoxia       Recommendations:     Discharge Recommendations: home health OT(with 24 hour assist (per family request)- if family cannot provide, pt will require long term NH)  Discharge Equipment Recommendations:  wheelchair, hospital bed(katy lift; possible home O2)  Barriers to discharge:  (pt will require total A for all aspects of care)    Assessment:     Chirag Thompson is a 79 y.o. male with a medical diagnosis of Acute respiratory failure with hypoxia. Performance deficits affecting function are weakness, gait instability, decreased upper extremity function, decreased ROM, impaired cardiopulmonary response to activity, impaired endurance, impaired balance, decreased lower extremity function, impaired coordination, decreased safety awareness, impaired fine motor, impaired self care skills, impaired cognition, pain, impaired skin, impaired functional mobilty, edema.     TOTAL A x2 for bed mobility. Pt sat EOB for ~20 min demo'ing lateral lean to the right, requiring MIN>TOTAL A for correction to midline. Pt interactive with some exercises with LUE, but required frequent cueing to open his eyes and hold his head up. Pt's family wants to take the pt home per chart review- no family present; pt will benefit from HHOT with w/c, hospital bed, and katy lift. Pt will require 24 hour assist. If family cannot provide this, OT rec long term nursing home.     Rehab Prognosis:  Fair -; patient would benefit from acute skilled OT services to address these deficits and reach maximum level of function.       Plan:     Patient to be seen (2-3x/week) to address the above listed problems via self-care/home management, therapeutic activities, therapeutic exercises  · Plan of Care Expires: 02/19/20  · Plan of Care Reviewed with: patient    Subjective     Chief complaint: aphasic with no communication    Patient's comments/goals: cooperative and interactive at times with therapy session- not resistive in treatment session     Pain/Comfort:  · Pain Rating 1: (Pt appeared comfortable; no facial reactions during session to demo pain)    Objective:     Communicated with: Patient found left sidelying with bed alarm, telemetry, dean catheter, pressure relief boots, PEG Tube, oxygen, peripheral IV(Avasys monitor) upon OT entry to room.    General Precautions: Standard, fall, aphasia, respiratory, diabetic, NPO   Orthopedic Precautions:N/A   Braces: N/A     Occupational Performance:     Bed Mobility:    · Patient completed Rolling/Turning to Left with  total assistance and 2 persons  · Patient completed Rolling/Turning to Right with total assistance and 2 persons  · Patient completed Scooting/Bridging with total assistance and 2 persons  · Patient completed Supine to Sit with total assistance and 2 persons  · Patient completed Sit to Supine with total assistance and 2 persons     Functional Mobility/Transfers:  · Functional Mobility: Not appropriate to assess 2* poor sitting balance     Activities of Daily Living:  · Lower Body Dressing: total assistance with socks      AMPAC 6 Click ADL: 6    Treatment & Education:  · Pt re-educated on OT role/re-oriented to time and place   · Hand-over-hand assist to assist with static sitting balance at EOB with RUE elevated on a pillow    · White board updated   · 1x15 LUE shoulder flexion/extension AAROM   · 1x15 LUE elbow flexion/extension AAROM   · 1x20 LUE forearm pronation/supination AAROM   · 1x10 RUE elbow flexion/extension PROM  · 1x15 RUE shoulder horizontal ab/dduction  · RUE stretched in scaption   · Pt assisted with counting reps to LUE only  · Pt encouraged for LUE dynamic reaching for ADL object; pt tolerated this L<>R x2 times, then fatigued and stopped participating with this activity.   · No family present for education.       Patient left right sidelying with all  lines intact, call button in reach, bed alarm on, PCT, Paz, notified and RUE elevated on pillow and Avasys in placeEducation:      GOALS:   Multidisciplinary Problems     Occupational Therapy Goals        Problem: Occupational Therapy Goal    Goal Priority Disciplines Outcome Interventions   Occupational Therapy Goal     OT, PT/OT Ongoing, Progressing    Description:  Goals to be met by: 02/19/20    Patient will increase functional independence with ADLs by performing:    Sitting at edge of bed x15 minutes with Moderate Assistance.  Rolling to the Right with Minimal Assistance.   Pt will follow 75% of 1-step commands with L arm AAROM exercises in order to increase active participation with care.   Pt will follow 25% of 2-step commands with L arm AROM exercises in order to increase active participation with care.   Pt will reach for ADL object with LUE with minimal verbal cueing in supported sit in order to increase pt's participation with ADLs.   Pt will visually track familiar object L<>R with verbal cueing 50% of the time in order to increase active engagement with ADLs and social participation with family.                       Time Tracking:     OT Date of Treatment: 02/11/20  OT Start Time: 1004  OT Stop Time: 1032  OT Total Time (min): 28 min    Billable Minutes:Therapeutic Activity 14 min  Total Time 28 min (co-treat with PT)    Tara Noble OT  2/11/2020

## 2020-02-11 NOTE — PLAN OF CARE
Dx B pleural effusion, ARF w/hypoxia; 0 dyspnea noted; sats maintained on current regimen; NAD noted; TF at 55ml/hr; high residuals noted; MD notified; instructed to hold>300ml; VSS this shift; afebrile; assessment per flowsheet; meds administered per MAR; safety precautions maintained; 0 falls, injury or acute events this shift;

## 2020-02-11 NOTE — PT/OT/SLP PROGRESS
Physical Therapy Treatment    Patient Name:  Chirag Thompson   MRN:  [id number]    Recommendations:     Discharge Recommendations:  home health PT(with 24 hour care)   Discharge Equipment Recommendations: hospital bed, lift device, wheelchair; home O2 currently being ordered as well   Barriers to discharge home: Inaccessible home and Pt with decreased mobility.    Assessment:     Chirag Thompson is a 79 y.o. male admitted with a medical diagnosis of Acute respiratory failure with hypoxia.  He presents with the following impairments/functional limitations:  weakness, impaired endurance, impaired self care skills, impaired functional mobilty, impaired balance, impaired cognition, decreased coordination, decreased upper extremity function, decreased lower extremity function, decreased safety awareness, abnormal tone, decreased ROM, impaired coordination, impaired fine motor, impaired skin, edema, impaired cardiopulmonary response to activity, impaired joint extensibility, orthopedic precautions.    Rehab Prognosis: Fair-; patient would benefit from acute skilled PT services to address these deficits and reach maximum level of function.    Recent Surgery: * No surgery found *      Plan:     During this hospitalization, patient to be seen (2-3x/wk) to address the identified rehab impairments via therapeutic activities, therapeutic exercises, neuromuscular re-education and progress toward the following goals:    · Plan of Care Expires:  02/19/20    Subjective     Chief Complaint: N/A; Pt with h/o aphasia 2* CVA.    Patient/Family Comments/goals: Pt was not resistant to therapy.    Pain/Comfort:  · Pain Rating 1: (Pt appeared comfortable.)      Objective:     Patient found left sidelying with oxygen 2L, PEG Tube, dean catheter, pressure relief boots, peripheral IV, telemetry, bed alarm upon PT entry to room.     General Precautions: Standard, fall, diabetic, aphasia, NPO (PEG)   Orthopedic Precautions:N/A   Braces:  N/A    Functional Mobility:  Pt with h/o aphasia, however able to follow some simple commands.  Pt required min VC's/TC's to maintain alertness and keep eyes open.  · Bed Mobility:     · Rolling Left:  dependence and of 2 persons  · Rolling Right: dependence and of 2 persons  · Scooting: dependence and of 2 persons  · Bridging: dependence and of 2 persons  · Supine to Sit: dependence and of 2 persons  · Sit to Supine: dependence and of 2 persons  · Balance: Pt with poor/fair- static sit balance.  Pt with R lateral trunk lean, mod-max A to correct posture and maintain static sit balance.       AM-PAC 6 CLICK MOBILITY  Turning over in bed (including adjusting bedclothes, sheets and blankets)?: 2  Sitting down on and standing up from a chair with arms (e.g., wheelchair, bedside commode, etc.): 1  Moving from lying on back to sitting on the side of the bed?: 2  Moving to and from a bed to a chair (including a wheelchair)?: 1  Need to walk in hospital room?: 1  Climbing 3-5 steps with a railing?: 1  Basic Mobility Total Score: 8       Therapeutic Activities and Exercises:  PROM RLE seated in all available planes    LLE seated therex 10 reps: hip flex, LAQ, HS, and AP.  Pt was attempting to count while performing LE therex.      Seated trunk PROM flex/ext and rotation    Patient left right sidelying and RUE elevated on pillow with all lines intact, call button in reach, bed alarm on and Deonna, PCT notified.  B pressure relief boots placed.      GOALS:   Multidisciplinary Problems     Physical Therapy Goals        Problem: Physical Therapy Goal    Goal Priority Disciplines Outcome Goal Variances Interventions   Physical Therapy Goal     PT, PT/OT Ongoing, Progressing     Description:  Goals to be met by: 20     Patient will increase functional independence with mobility by performin. Supine to sit with Moderate Assistance  2. Sit to supine with Moderate Assistance  3. Rolling to Left and Right with Moderate  Assistance  4. Sitting at edge of bed x30 minutes with Stand-by Assistance  5. Lower extremity exercise program x10-15 reps per handout, with assistance as needed                      Time Tracking:     PT Received On: 02/11/20  PT Start Time: 1004     PT Stop Time: 1031  PT Total Time (min): 27 min     Billable Minutes: Therapeutic Exercise 14 min co-tx with TAHMINA Gomez, PT  02/11/2020

## 2020-02-11 NOTE — DISCHARGE SUMMARY
[facility] Hospital Medicine  Discharge Summary      Patient Name: Chirag Thompson  MRN: [id number]  Admission Date: 2/3/2020  Hospital Length of Stay: 8 days  Discharge Date and Time:  02/11/2020 5:00 PM  Attending Physician: Norma Watkins MD   Discharging Provider: Norma Silva MD  Primary Care Provider: Dany Marinelli Iii, MD      HPI:     Chirag Thompson is a 79 y.o. male that (in part)  has a past medical history of Acquired hypothyroidism, Acute ischemic left middle cerebral artery (MCA) stroke, Anticoagulant long-term use, Arthritis, Chronic a-fib, Current use of long term anticoagulation, Embolic stroke involving left middle cerebral artery, Hypertension, PEG (percutaneous endoscopic gastrostomy) adjustment/replacement/removal, Pressure ulcer of right leg, unspecified pressure ulcer stage, Right spastic hemiparesis, Stroke due to embolism of left middle cerebral artery, Thyroid disease, and Type 2 diabetes mellitus with circulatory disorder, without long-term current use of insulin.  has a past surgical history that includes Hernia repair and Esophagogastroduodenoscopy w/ PEG (N/A, 1/23/2020). Presents to [facility] Department  by EMS from  [facility] today with report of both shortness of breath.  Associated symptoms include urinary retention and swollen testicles.  He was recently admitted for acute CVA a discharged approximately 2 weeks ago.  He recently had an indwelling dean removed.  No report of fever chills.  No hematuria.  Positive for suprapubic tenderness .  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  He is reportedly lethargic compared to his baseline.  Concern for both his breathing and urinary retention.    In the emergency department routine laboratory studies, urinalysis, chest x-ray obtained.  Chest x-ray concerning for bilateral pleural effusions.  Supple oxygen was given.   He is on and apixaban therefore no thoracentesis could be performed at this time safely.  History phone was slightly elevated but consistent with previous measurements.  EKG was nonischemic.  Urinalysis concerning for WBCs and RBCs.  Possible UTI.    Hospital medicine has been asked to admit to inpatient for further evaluation and treatment.     * No surgery found *      Hospital Course:   Mr Thompson presented with acute respiratory failure with hypoxia and acute encephalopathy. Workup consistent with bilateral pleural effusions, pulmonary edema and severe malnutrition. Initiated on IV furosemide, supplemental O2 via low fow NC and consulted IR for thoracentesis. IR recommended against thoracentesis as they thought size of effusions were too small for safe intervention. IV furosemide continued. Echocardiogram with grade 1 diastolic dysfunction with preserved EF and no significant valvular issues. He is in chronic AFib however, rate controlled with metoprolol. Is on NOAC for stroke prophylaxis. As for encephalopathy, patient became more alert and interactive after narcotics were discontinued and shortness of breath addressed. No evidence of infection except for abnormal urinalysis however with few bacteria in setting of indwelling dean catheter. Urine culture negative, no growth in blood cultures, afebrile and no leukocytosis. Antibiotics therefore discontinued. Dean exchanged for a new one in the ED. On 2/5 patient developed rapid ventricular response despite use of home metoprolol and overall clinical improvement. Blood pressure at the time too low at the time, therefore transferred to ICU on amiodarone infusion and cardiology consultation. Blood pressure had improved therefore Cardiology recommended diltiazem infusion instead. Repeat CXR and labs showed no acute findings. Patient was properly rate controlled and was transitioned to a higher dose of metoprolol (50 mg BID). Remained stable. Stepped down to  telemetry floor on 2/6. Remained stable on supplemental O2 via low flow NC, dean in place and bowel regimen. Tolerated tube feeds. Patient's nephew and 2 nieces willing to care for patient at home. This is first time patient is going home since the stroke and is no longer ambulatory/independent. Given that he is now bedbound and fully dependent, a hospital bed, katy lift, portable O2 and wheelchair were ordered. They were approved by insurance. Patient is to remain with dean catheter and PEG tube. Unsafe for oral intake unless pleasure feeds are initiated however risk for aspiration very high. Bolus tube feeds glucerna 1.5 kcal QID with water flushes. Maintenance diuresis with furosemide 40 mg daily via PEG. Nurse to teach family how to use PEG and insulin. Wound care and pressure shift of DTI to sacrum. As for PCP, SW got a PCP that will visit patient at home on Friday 2/14/2020. Patient is high risk for readmission. Code status should be addressed. Home once HH set up, equipment sent to patient's home and teaching provided to family concerning glucose checks, PEG use and insulin injection. HH to proceed with wound care.      Consults:   Consults (From admission, onward)        Status Ordering Provider     Inpatient consult to Cardiology  Once     Provider:  Quan Calvillo MD    Completed LYNDSAY WYNN     Inpatient consult to Interventional Radiology  Once     Provider:  Stevie White MD    Completed QUAN DIAMOND     Inpatient consult to Registered Dietitian/Nutritionist  Once     Provider:  (Not yet assigned)    Completed QUAN DIAMOND     Inpatient consult to Registered Dietitian/Nutritionist  Once     Provider:  (Not yet assigned)    Completed LYNDSAY WYNN     Inpatient consult to Social Work  Once     Provider:  (Not yet assigned)    Acknowledged LYNDSAY WYNN        Final Active Diagnoses:    Diagnosis Date Noted POA    PRINCIPAL PROBLEM:  Acute respiratory failure with  hypoxia [J96.01] 02/05/2020 Yes    Pleural effusion, bilateral [J90] 02/03/2020 Yes    Acute metabolic encephalopathy [G93.41] 02/06/2020 Yes    Severe protein-calorie malnutrition [E43] 02/05/2020 Yes    Serum total bilirubin elevated [R17] 02/05/2020 Yes    Atrial fibrillation with RVR [I48.91] 02/05/2020 Yes    Chronic ischemic left MCA stroke [I69.30] 02/04/2020 Not Applicable    PEG (percutaneous endoscopic gastrostomy) adjustment/replacement/removal [Z43.1]  Not Applicable    Dysphagia due to recent stroke [I69.391]  Not Applicable    Acquired hypothyroidism [E03.9] 01/14/2020 Yes     Chronic    Hypercoagulable state [D68.59] 01/14/2020 Yes     Chronic    Chronic anticoagulation [Z79.01] 01/14/2020 Not Applicable    Essential hypertension [I10] 01/10/2020 Yes     Chronic    Type 2 diabetes mellitus with circulatory disorder, 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] 01/10/2020 Yes     Chronic    Other persistent atrial fibrillation [I48.19] 01/10/2020 Yes      Problems Resolved During this Admission:       Discharged Condition: stable    Disposition: Home-Health Care c    Follow Up:  Follow-up Information     Dany Marinelli Iii, MD On 2/14/2020.    Specialty:  Internal Medicine  Why:  Outpatient Services PCP Follow-Up Friday Clinic staff will contact family that PCP will come to pt's home.  Contact information:  [address]  [facility] [address]  [facility] [address] [phone]             [facility]    Why:  Home Health Agency will contact pt's family to initiate services  Contact information:  [address] [facility] [address]  [facility] [address] [phone]           [facility]    Specialty:  Dialysis Center  Why:  Infusion, Tube Feedings  Contact information:  [address] [place] [address] [phone]             [facility]    Specialty:  DME Provider  Why:  DME, oxygen, katy lift, and hospital  Contact  "information:  [address]  [facility] [address]  [facility] [address]  [phone]                 Patient Instructions:      WHEELCHAIR FOR HOME USE     Order Specific Question Answer Comments   Hours in W/C per day: 24    Type of Wheelchair: Standard    Size(Width): 18"(STD adult)    Leg Support: Elevating leg rests    Lap Belt: Velcro    Cushion: Basic    Height: 5' 10" (1.778 m)    Weight: 92.6 kg (204 lb 2.3 oz)    Does patient have medical equipment at home? bath bench    Does patient have medical equipment at home? cane maria guadalupe    Length of need (1-99 months): 99    Please check all that apply: Caregiver is capable and willing to operate wheelchair safely.    Please check all that apply: The patient has significant edema of the lower extremities that requires an elevating leg rest.    Please check all that apply: The patient requires the use of a w/c for activities of daily living within the Home.    Please check all that apply: Patient mobility limitations cannot be sufficiently resolved by the use of other ambulatory therapies.      HOSPITAL BED FOR HOME USE     Order Specific Question Answer Comments   Type: Manual    Length of need (1-99 months): 99    Does patient have medical equipment at home? bath bench    Does patient have medical equipment at home? cane, maria guadalupe    Height: 5' 10" (1.778 m)    Weight: 92.6 kg (204 lb 2.3 oz)    Please check all that apply: Patient requires positioning of the body in ways not feasible in an ordinary bed due to a medical condition which is expected to last at least one month.    Please check all that apply: Patient requires the head of bed to be elevated more than 30 degrees most of the time due to congestive heart failure, chronic pulmonary disease, or aspiration.  Pillows and wedges have been considered and ruled out.    Please check all that apply: Patient requires a bed height different than a fixed height hospital bed to permit transfers to chair, wheelchair, or " "standing.    Please check all that apply: Patient requires frequent changes in body position and/or has an immediate need for a change in body position.      PATIENT (JOSEPH) LIFT FOR HOME USE     Order Specific Question Answer Comments   Height: 5' 10" (1.778 m)    Weight: 92.6 kg (204 lb 2.3 oz)    Does patient have medical equipment at home? bath bench    Does patient have medical equipment at home? cane, straight    Length of need (1-99 months): 99      OXYGEN FOR HOME USE     Order Specific Question Answer Comments   Liter Flow 2    Duration Continuous    Qualifying SpO2: 88    Testing done at: Rest    Route nasal cannula    Portable mode: pulse dose acceptable    Device home concentrator with portable unit    Length of need (in months): 99 mos    Patient condition with qualifying saturation CHF    Height: 5' 10" (1.778 m)    Weight: 94.1 kg (207 lb 7.3 oz)    Does patient have medical equipment at home? cane, straight    Alternative treatment measures have been tried or considered and deemed clinically ineffective. Yes      Medications:  Reconciled Home Medications:      Medication List      START taking these medications    blood sugar diagnostic Strp  1 each by Misc.(Non-Drug; Combo Route) route 4 (four) times daily before meals and nightly.     blood-glucose meter Misc  1 each by Misc.(Non-Drug; Combo Route) route 4 (four) times daily before meals and nightly.     furosemide 40 MG tablet  Commonly known as:  LASIX  1 tablet (40 mg total) by Per G Tube route once daily.  Start taking on:  February 12, 2020     insulin detemir U-100 100 unit/mL (3 mL) Inpn pen  Commonly known as:  LEVEMIR FLEXTOUCH  Inject 8 Units into the skin once daily.  Start taking on:  February 12, 2020     lancets 31 gauge Misc  1 lancet by Misc.(Non-Drug; Combo Route) route 4 (four) times daily before meals and nightly.     midodrine 2.5 MG Tab  Commonly known as:  PROAMATINE  1 tablet (2.5 mg total) by Per G Tube route 3 (three) times " daily.     nut.tx.gluc intol,lf,soy-fiber 0.08-1.5 gram-kcal/mL Liqd  Commonly known as:  Glucerna 1.5 Rony  1 each by Per G Tube route 4 (four) times daily.        CONTINUE taking these medications    albuterol-ipratropium 2.5 mg-0.5 mg/3 mL nebulizer solution  Commonly known as:  DUO-NEB  Take 3 mLs by nebulization every 6 (six) hours as needed for Wheezing. Rescue     allopurinoL 300 MG tablet  Commonly known as:  ZYLOPRIM  1 tablet (300 mg total) by Per G Tube route once daily.     apixaban 5 mg Tab  Commonly known as:  ELIQUIS  1 tablet (5 mg total) by Per G Tube route 2 (two) times daily.     atorvastatin 40 MG tablet  Commonly known as:  LIPITOR  1 tablet (40 mg total) by Per G Tube route once daily.     levothyroxine 75 MCG tablet  Commonly known as:  SYNTHROID  1 tablet (75 mcg total) by Per G Tube route once daily.     metoprolol tartrate 25 MG tablet  Commonly known as:  LOPRESSOR  2 tablets (50 mg total) by Per G Tube route 2 (two) times daily.     polyethylene glycol 17 gram Pwpk  Commonly known as:  GLYCOLAX  17 g by Per G Tube route daily as needed.     senna-docusate 8.6-50 mg 8.6-50 mg per tablet  Commonly known as:  PERICOLACE  1 tablet by Per G Tube route 2 (two) times daily as needed for Constipation.        STOP taking these medications    QUEtiapine 25 MG Tab  Commonly known as:  SEROQUEL            Indwelling Lines/Drains at time of discharge:   Lines/Drains/Airways     Drain                 Gastrostomy/Enterostomy 01/23/20 0827 Percutaneous endoscopic gastrostomy (PEG) LUQ feeding 19 days         Urethral Catheter 02/03/20 1436 Latex 16 Fr. 8 days          Pressure Ulcer                 Pressure Injury 01/29/20 2100 Left Sacral spine Suspected DTPI 12 days         Pressure Injury 02/03/20 1530 Left anterior Toe, fourth Suspected DTPI 8 days         Pressure Injury 02/03/20 1530 Right anterior Toe, second Suspected DTPI 8 days         Pressure Injury 02/03/20 1530 Right medial Toe, first Stage  2 8 days                Time spent on the discharge of patient: > 35 minutes  Patient was seen and examined on the date of discharge and determined to be suitable for discharge.         Norma Silva MD  Department of Hospital Medicine  [facility]

## 2020-02-12 NOTE — NURSING
Reported off to oncoming nurse, patient resting in bed, oriented to self, max assist required for adls and transfers, no acute distress noted, safety precautions maintained. Avas[employer] at bedside. [facility] to home.     Chart check completed.

## 2020-02-12 NOTE — NURSING
Patient bathed and changed-incontinent of large amount of loose, brown stool. Stopped TF and flushed PEG tube. DCd telemetry, tele sitter and IV site. Spoke with family via phone- state they were already given medication and feeding instructions. They indicatedv that they didn't have any questions. Also verbalized they didn't want pneumonia vaccine given before DC- state they would get it for the patient per HH. Also family stated patient had received flu 1 week prior to this admit.

## 2020-02-12 NOTE — PT/OT/SLP DISCHARGE
Physical Therapy Discharge Summary    Name: Chirag Thompson  MRN: [id number]   Principal Problem: Acute respiratory failure with hypoxia     Patient Discharged from acute Physical Therapy on 20.  Please refer to prior PT notes for functional status.     Assessment:     Patient appropriate for care in another setting.    Objective:     GOALS:   Multidisciplinary Problems     Physical Therapy Goals        Problem: Physical Therapy Goal    Goal Priority Disciplines Outcome Goal Variances Interventions   Physical Therapy Goal     PT, PT/OT Ongoing, Progressing     Description:  Goals to be met by: 20     Patient will increase functional independence with mobility by performin. Supine to sit with Moderate Assistance  2. Sit to supine with Moderate Assistance  3. Rolling to Left and Right with Moderate Assistance  4. Sitting at edge of bed x30 minutes with Stand-by Assistance  5. Lower extremity exercise program x10-15 reps per handout, with assistance as needed                      Reasons for Discontinuation of Therapy Services  Transfer to alternate level of care.      Plan:     Patient Discharged to: Home with Home Health Service.    Suzie Gomez, PT  2020

## 2020-02-12 NOTE — NURSING
Patient discharged via ambulance stretcher with [place] in no distress with O2 at 2L per NC. Personal wheelchair and other personal belongings accompanied patient. Patients family notified patient on his way

## 2020-02-12 NOTE — PT/OT/SLP DISCHARGE
Occupational Therapy Discharge Summary    Chirag Thompson  MRN: [id number]   Principal Problem: Acute respiratory failure with hypoxia      Patient Discharged from acute Occupational Therapy on 02/11/20.  Please refer to prior OT notes for functional status.    Assessment:      Patient appropriate for care in another setting.    Objective:     GOALS:   Multidisciplinary Problems     Occupational Therapy Goals        Problem: Occupational Therapy Goal    Goal Priority Disciplines Outcome Interventions   Occupational Therapy Goal     OT, PT/OT Ongoing, Progressing    Description:  Goals to be met by: 02/19/20    Patient will increase functional independence with ADLs by performing:    Sitting at edge of bed x15 minutes with Moderate Assistance.  Rolling to the Right with Minimal Assistance.   Pt will follow 75% of 1-step commands with L arm AAROM exercises in order to increase active participation with care.   Pt will follow 25% of 2-step commands with L arm AROM exercises in order to increase active participation with care.   Pt will reach for ADL object with LUE with minimal verbal cueing in supported sit in order to increase pt's participation with ADLs.   Pt will visually track familiar object L<>R with verbal cueing 50% of the time in order to increase active engagement with ADLs and social participation with family.                       Reasons for Discontinuation of Therapy Services  Transfer to alternate level of care.      Plan:     Patient Discharged to: Home with Home Health Service per family request with katy lift, hospital bed, and wheelchair; notes detail that pt requires TOTAL A for all aspects of care and 24 hour assist at d/c.     Tara Noble OT  2/12/2020

## 2020-02-13 ENCOUNTER — PATIENT OUTREACH (OUTPATIENT)
Dept: ADMINISTRATIVE | Facility: CLINIC | Age: 80
End: 2020-02-13

## 2020-02-13 NOTE — PROGRESS NOTES
C3 nurse attempted to contact patient. No answer. The following message was left for the patient to return the call:  Good morning, I am a nurse calling on behalf of [facility] from the Care Coordination Center.  This is a Transitional Care Call for Chirag Thompson. When you have a moment please contact us at [phone] or [phone] Monday through Friday, between the hours of 8 am to 4 pm. We look forward to speaking with you. On behalf of [facility] have a nice day.    The patient does not have a scheduled HOSFU appointment within 7 days post hospital discharge date 2/11.

## 2020-02-13 NOTE — PATIENT INSTRUCTIONS
Pulmonary Edema  Your healthcare provider has told you that you have pulmonary edema. Read on to learn more about pulmonary edema and how it can be treated.  What is pulmonary edema?     Pulmonary edema is fluid in the air sacs (alveoli) in the lungs.   Pulmonary edema occurs when the air sacs (alveoli) in your lungs fill with fluid. The fluid buildup makes it hard for the lungs to do their job, including getting oxygen from the air you breathe. This can make it hard to breathe. The most common cause of pulmonary edema is heart failure. When the heart doesnt work properly, it can cause pressure to rise in the veins (blood vessels) of the lungs. As pressure builds, fluid leaks out of the congested veins. It fills the alveoli. The extra fluid prevents oxygen from moving through the lungs as it should. But heart failure isnt the only cause of pulmonary edema. Damage to the lungs or kidney failure can also cause fluid to fill the lungs. And in some cases, living or exercising at high altitudes can lead to fluid buildup in the lungs.  How is pulmonary edema diagnosed?  Your healthcare provider does an exam and asks about your health history. You may also have one or more of the following:  · Blood tests to take samples of blood.  · Imaging tests to take detailed pictures of inside the body. These may include a chest X-ray and ultrasound.  · Electrocardiography (ECG)  and echocardiogram to test how well the heart is functioning.  How is pulmonary edema treated?  Treatment usually depends on whats causing the edema. For instance, if its because of heart failure, treating the heart condition will treat the edema. Treatment can also ease symptoms. Therapy often includes the following:  · Oxygen. This may be given through a mask that goes over the nose. It may be given through a small tube that sits under the nose. Sometimes, pressurized air will be needed through a tight fitting mask, using a machine called CPAP or  BiPAP. Or it may be given through a tube placed into the windpipe (trachea). If a tube is required, a ventilator, often called a breathing machine or respirator will be used.  · Medicines. These may include water pills (diuretics) to help your body get rid of extra fluid. The fluid passes out of your body as urine. You may also need medicines to treat the heart. These can help your heart work better. This helps reduce fluid buildup in the lungs.  What are the long-term concerns?  If treated right away, pulmonary edema can be improved. It may even be cured. But in some cases, ongoing treatment is needed to help control the problem. This may require having procedures or taking medicines for months or years. In some cases, you may need to use oxygen or breathing equipment for a long time. This can lead to complications such as damage to lung tissue. Your healthcare provider can tell you more if needed.  Call 911  Call 911 if any of these occur:  · Chest pain  · Severe trouble breathing  · Coughing up blood  · Skin turns blue      When to call the healthcare provider  Call your the healthcare provider right away if you have any of the following:  · Unusual or irregular heartbeat  · Unable to speak full sentences before running out of breath  · Sweating more than usual   Date Last Reviewed: 2/1/2017  © 2476-2115 [employer]. [address] [place] [address], [place] [address]. All rights reserved. This information is not intended as a substitute for professional medical care. Always follow your healthcare professional's instructions.

## 2020-03-05 ENCOUNTER — TELEPHONE (OUTPATIENT)
Dept: HOME HEALTH SERVICES | Facility: HOSPITAL | Age: 80
End: 2020-03-05

## 2023-05-19 NOTE — SUBJECTIVE & OBJECTIVE
Interval History: very alert and more interactive. Breathing comfortably at room air. Constipation     Review of Systems   Unable to perform ROS: Other (slurred speech)     Objective:     Vital Signs (Most Recent):  Temp: 97.9 °F (36.6 °C) (02/09/20 1124)  Pulse: 91 (02/09/20 1124)  Resp: 16 (02/09/20 1124)  BP: (!) 102/55 (02/09/20 1124)  SpO2: (!) 93 % (02/09/20 1124) Vital Signs (24h Range):  Temp:  [97.5 °F (36.4 °C)-98.1 °F (36.7 °C)] 97.9 °F (36.6 °C)  Pulse:  [] 91  Resp:  [16-22] 16  SpO2:  [93 %-96 %] 93 %  BP: ()/(54-67) 102/55     Weight: 94.1 kg (207 lb 7.3 oz)  Body mass index is 29.77 kg/m².    Intake/Output Summary (Last 24 hours) at 2/9/2020 1327  Last data filed at 2/9/2020 1124  Gross per 24 hour   Intake 500 ml   Output 800 ml   Net -300 ml      Physical Exam   Constitutional: He appears well-developed. No distress.   HENT:   Head: Normocephalic and atraumatic.   Eyes: Pupils are equal, round, and reactive to light. Conjunctivae and EOM are normal.   Neck: Normal range of motion.   Cardiovascular: Normal rate. An irregularly irregular rhythm present.   Pulmonary/Chest: Effort normal. He has no wheezes. He has no rales.   Decreased breath sounds bases   Abdominal: Soft. Bowel sounds are normal.   Neurological:   Alert and interactive. Squeezing left hand when asked   Facial droop    Skin: Capillary refill takes less than 2 seconds. He is not diaphoretic.   Nursing note and vitals reviewed.      Significant Labs: All pertinent labs within the past 24 hours have been reviewed.    Significant Imaging: I have reviewed all pertinent imaging results/findings within the past 24 hours.  I have reviewed and interpreted all pertinent imaging results/findings within the past 24 hours.   · Continue Protonix 40 mg IV twice daily

## (undated) DEVICE — LUBRICANT SURGILUBE 2 OZ

## (undated) DEVICE — SEE MEDLINE ITEM 152487

## (undated) DEVICE — SEE MEDLINE ITEM 146313

## (undated) DEVICE — KIT PEG PULL STANDARD 20F

## (undated) DEVICE — URINARY DRAINAGE BAG